# Patient Record
Sex: MALE | Race: WHITE | Employment: OTHER | URBAN - METROPOLITAN AREA
[De-identification: names, ages, dates, MRNs, and addresses within clinical notes are randomized per-mention and may not be internally consistent; named-entity substitution may affect disease eponyms.]

---

## 2017-09-06 PROBLEM — G60.9 PERIPHERAL NEUROPATHY, IDIOPATHIC: Status: ACTIVE | Noted: 2017-09-06

## 2017-09-06 PROCEDURE — 86141 C-REACTIVE PROTEIN HS: CPT | Performed by: FAMILY MEDICINE

## 2017-12-13 RX ORDER — MULTIVITAMIN
1 TABLET ORAL DAILY
COMMUNITY
End: 2020-01-21

## 2017-12-26 ENCOUNTER — APPOINTMENT (OUTPATIENT)
Dept: LAB | Age: 62
End: 2017-12-26
Attending: SURGERY
Payer: COMMERCIAL

## 2017-12-26 ENCOUNTER — LAB ENCOUNTER (OUTPATIENT)
Dept: LAB | Age: 62
End: 2017-12-26
Attending: SURGERY
Payer: COMMERCIAL

## 2017-12-26 DIAGNOSIS — Z01.818 PRE-OP TESTING: ICD-10-CM

## 2017-12-26 DIAGNOSIS — Z01.818 PREOPERATIVE TESTING: ICD-10-CM

## 2017-12-26 PROCEDURE — 93005 ELECTROCARDIOGRAM TRACING: CPT

## 2017-12-26 PROCEDURE — 86850 RBC ANTIBODY SCREEN: CPT

## 2017-12-26 PROCEDURE — 80053 COMPREHEN METABOLIC PANEL: CPT

## 2017-12-26 PROCEDURE — 85025 COMPLETE CBC W/AUTO DIFF WBC: CPT

## 2017-12-26 PROCEDURE — 85730 THROMBOPLASTIN TIME PARTIAL: CPT

## 2017-12-26 PROCEDURE — 93010 ELECTROCARDIOGRAM REPORT: CPT | Performed by: INTERNAL MEDICINE

## 2017-12-26 PROCEDURE — 86901 BLOOD TYPING SEROLOGIC RH(D): CPT

## 2017-12-26 PROCEDURE — 36415 COLL VENOUS BLD VENIPUNCTURE: CPT

## 2017-12-26 PROCEDURE — 85610 PROTHROMBIN TIME: CPT

## 2017-12-26 PROCEDURE — 86900 BLOOD TYPING SEROLOGIC ABO: CPT

## 2018-02-11 ENCOUNTER — ANESTHESIA EVENT (OUTPATIENT)
Dept: CARDIAC SURGERY | Facility: HOSPITAL | Age: 63
DRG: 272 | End: 2018-02-11
Payer: COMMERCIAL

## 2018-02-12 ENCOUNTER — SURGERY (OUTPATIENT)
Age: 63
End: 2018-02-12

## 2018-02-12 ENCOUNTER — HOSPITAL ENCOUNTER (INPATIENT)
Facility: HOSPITAL | Age: 63
LOS: 1 days | Discharge: HOME | DRG: 272 | End: 2018-02-13
Attending: SURGERY | Admitting: SURGERY
Payer: COMMERCIAL

## 2018-02-12 ENCOUNTER — ANESTHESIA (OUTPATIENT)
Dept: CARDIAC SURGERY | Facility: HOSPITAL | Age: 63
DRG: 272 | End: 2018-02-12
Payer: COMMERCIAL

## 2018-02-12 PROBLEM — I71.40 AAA (ABDOMINAL AORTIC ANEURYSM) WITHOUT RUPTURE: Status: ACTIVE | Noted: 2018-02-12

## 2018-02-12 PROBLEM — I71.4 AAA (ABDOMINAL AORTIC ANEURYSM) WITHOUT RUPTURE (HCC): Status: ACTIVE | Noted: 2018-02-12

## 2018-02-12 LAB
ALBUMIN SERPL-MCNC: 4.5 G/DL (ref 3.5–4.8)
ALP LIVER SERPL-CCNC: 67 U/L (ref 45–117)
ALT SERPL-CCNC: 31 U/L (ref 17–63)
ANTIBODY SCREEN: NEGATIVE
APTT PPP: 32.3 SECONDS (ref 25–34)
APTT PPP: 33.9 SECONDS (ref 25–34)
AST SERPL-CCNC: 15 U/L (ref 15–41)
ATRIAL RATE: 83 BPM
BASOPHILS # BLD AUTO: 0.08 X10(3) UL (ref 0–0.1)
BASOPHILS NFR BLD AUTO: 1.2 %
BILIRUB SERPL-MCNC: 0.9 MG/DL (ref 0.1–2)
BUN BLD-MCNC: 18 MG/DL (ref 8–20)
CALCIUM BLD-MCNC: 9.3 MG/DL (ref 8.3–10.3)
CHLORIDE: 105 MMOL/L (ref 101–111)
CO2: 24 MMOL/L (ref 22–32)
CREAT BLD-MCNC: 0.96 MG/DL (ref 0.7–1.3)
EOSINOPHIL # BLD AUTO: 0.3 X10(3) UL (ref 0–0.3)
EOSINOPHIL NFR BLD AUTO: 4.5 %
ERYTHROCYTE [DISTWIDTH] IN BLOOD BY AUTOMATED COUNT: 13.2 % (ref 11.5–16)
ERYTHROCYTE [DISTWIDTH] IN BLOOD BY AUTOMATED COUNT: 13.3 % (ref 11.5–16)
GLUCOSE BLD-MCNC: 114 MG/DL (ref 70–99)
HCT VFR BLD AUTO: 40.6 % (ref 37–53)
HCT VFR BLD AUTO: 45.6 % (ref 37–53)
HGB BLD-MCNC: 14.2 G/DL (ref 13–17)
HGB BLD-MCNC: 16.1 G/DL (ref 13–17)
IMMATURE GRANULOCYTE COUNT: 0.01 X10(3) UL (ref 0–1)
IMMATURE GRANULOCYTE RATIO %: 0.1 %
INR BLD: 1.13 (ref 0.89–1.11)
INR BLD: 1.25 (ref 0.89–1.11)
LYMPHOCYTES # BLD AUTO: 2.51 X10(3) UL (ref 0.9–4)
LYMPHOCYTES NFR BLD AUTO: 37.5 %
M PROTEIN MFR SERPL ELPH: 7.5 G/DL (ref 6.1–8.3)
MCH RBC QN AUTO: 30.3 PG (ref 27–33.2)
MCH RBC QN AUTO: 30.5 PG (ref 27–33.2)
MCHC RBC AUTO-ENTMCNC: 35 G/DL (ref 31–37)
MCHC RBC AUTO-ENTMCNC: 35.3 G/DL (ref 31–37)
MCV RBC AUTO: 85.9 FL (ref 80–99)
MCV RBC AUTO: 87.1 FL (ref 80–99)
MONOCYTES # BLD AUTO: 0.72 X10(3) UL (ref 0.1–1)
MONOCYTES NFR BLD AUTO: 10.8 %
NEUTROPHIL ABS PRELIM: 3.07 X10 (3) UL (ref 1.3–6.7)
NEUTROPHILS # BLD AUTO: 3.07 X10(3) UL (ref 1.3–6.7)
NEUTROPHILS NFR BLD AUTO: 45.9 %
P AXIS: 48 DEGREES
P-R INTERVAL: 140 MS
PLATELET # BLD AUTO: 209 10(3)UL (ref 150–450)
PLATELET # BLD AUTO: 226 10(3)UL (ref 150–450)
POTASSIUM SERPL-SCNC: 3.9 MMOL/L (ref 3.6–5.1)
PSA SERPL DL<=0.01 NG/ML-MCNC: 14.6 SECONDS (ref 12–14.3)
PSA SERPL DL<=0.01 NG/ML-MCNC: 15.8 SECONDS (ref 12–14.3)
Q-T INTERVAL: 386 MS
QRS DURATION: 88 MS
QTC CALCULATION (BEZET): 453 MS
R AXIS: 77 DEGREES
RBC # BLD AUTO: 4.66 X10(6)UL (ref 4.3–5.7)
RBC # BLD AUTO: 5.31 X10(6)UL (ref 4.3–5.7)
RED CELL DISTRIBUTION WIDTH-SD: 41.1 FL (ref 35.1–46.3)
RED CELL DISTRIBUTION WIDTH-SD: 42.4 FL (ref 35.1–46.3)
RH BLOOD TYPE: POSITIVE
SODIUM SERPL-SCNC: 139 MMOL/L (ref 136–144)
T AXIS: 38 DEGREES
VENTRICULAR RATE: 83 BPM
WBC # BLD AUTO: 6.7 X10(3) UL (ref 4–13)
WBC # BLD AUTO: 9.6 X10(3) UL (ref 4–13)

## 2018-02-12 PROCEDURE — 85730 THROMBOPLASTIN TIME PARTIAL: CPT | Performed by: SURGERY

## 2018-02-12 PROCEDURE — 86900 BLOOD TYPING SEROLOGIC ABO: CPT | Performed by: SURGERY

## 2018-02-12 PROCEDURE — 80053 COMPREHEN METABOLIC PANEL: CPT | Performed by: SURGERY

## 2018-02-12 PROCEDURE — 85027 COMPLETE CBC AUTOMATED: CPT | Performed by: SURGERY

## 2018-02-12 PROCEDURE — 85025 COMPLETE CBC W/AUTO DIFF WBC: CPT | Performed by: SURGERY

## 2018-02-12 PROCEDURE — 93010 ELECTROCARDIOGRAM REPORT: CPT | Performed by: INTERNAL MEDICINE

## 2018-02-12 PROCEDURE — 93005 ELECTROCARDIOGRAM TRACING: CPT

## 2018-02-12 PROCEDURE — 87081 CULTURE SCREEN ONLY: CPT | Performed by: SURGERY

## 2018-02-12 PROCEDURE — 86901 BLOOD TYPING SEROLOGIC RH(D): CPT | Performed by: SURGERY

## 2018-02-12 PROCEDURE — 04VC3DZ RESTRICTION OF RIGHT COMMON ILIAC ARTERY WITH INTRALUMINAL DEVICE, PERCUTANEOUS APPROACH: ICD-10-PCS | Performed by: SURGERY

## 2018-02-12 PROCEDURE — 85610 PROTHROMBIN TIME: CPT | Performed by: SURGERY

## 2018-02-12 PROCEDURE — 86850 RBC ANTIBODY SCREEN: CPT | Performed by: SURGERY

## 2018-02-12 PROCEDURE — 04VD3DZ RESTRICTION OF LEFT COMMON ILIAC ARTERY WITH INTRALUMINAL DEVICE, PERCUTANEOUS APPROACH: ICD-10-PCS | Performed by: SURGERY

## 2018-02-12 PROCEDURE — B41DYZZ FLUOROSCOPY OF AORTA AND BILATERAL LOWER EXTREMITY ARTERIES USING OTHER CONTRAST: ICD-10-PCS | Performed by: SURGERY

## 2018-02-12 PROCEDURE — 86920 COMPATIBILITY TEST SPIN: CPT

## 2018-02-12 RX ORDER — DEXAMETHASONE SODIUM PHOSPHATE 4 MG/ML
4 VIAL (ML) INJECTION AS NEEDED
Status: ACTIVE | OUTPATIENT
Start: 2018-02-12 | End: 2018-02-12

## 2018-02-12 RX ORDER — MORPHINE SULFATE 2 MG/ML
6 INJECTION, SOLUTION INTRAMUSCULAR; INTRAVENOUS EVERY 4 HOURS PRN
Status: DISCONTINUED | OUTPATIENT
Start: 2018-02-12 | End: 2018-02-13

## 2018-02-12 RX ORDER — FAMOTIDINE 10 MG/ML
20 INJECTION, SOLUTION INTRAVENOUS 2 TIMES DAILY
Status: DISCONTINUED | OUTPATIENT
Start: 2018-02-12 | End: 2018-02-13

## 2018-02-12 RX ORDER — ACETAMINOPHEN 325 MG/1
650 TABLET ORAL EVERY 4 HOURS PRN
Status: DISCONTINUED | OUTPATIENT
Start: 2018-02-12 | End: 2018-02-13

## 2018-02-12 RX ORDER — DOCUSATE SODIUM 100 MG/1
100 CAPSULE, LIQUID FILLED ORAL 2 TIMES DAILY
Status: DISCONTINUED | OUTPATIENT
Start: 2018-02-12 | End: 2018-02-13

## 2018-02-12 RX ORDER — HYDROCODONE BITARTRATE AND ACETAMINOPHEN 10; 325 MG/1; MG/1
2 TABLET ORAL AS NEEDED
Status: ACTIVE | OUTPATIENT
Start: 2018-02-12 | End: 2018-02-12

## 2018-02-12 RX ORDER — VALSARTAN AND HYDROCHLOROTHIAZIDE 160; 25 MG/1; MG/1
1 TABLET ORAL
Status: DISCONTINUED | OUTPATIENT
Start: 2018-02-12 | End: 2018-02-12 | Stop reason: SDUPTHER

## 2018-02-12 RX ORDER — HYDROCODONE BITARTRATE AND ACETAMINOPHEN 10; 325 MG/1; MG/1
1 TABLET ORAL AS NEEDED
Status: ACTIVE | OUTPATIENT
Start: 2018-02-12 | End: 2018-02-12

## 2018-02-12 RX ORDER — HYDROCODONE BITARTRATE AND ACETAMINOPHEN 5; 325 MG/1; MG/1
2 TABLET ORAL EVERY 4 HOURS PRN
Status: DISCONTINUED | OUTPATIENT
Start: 2018-02-12 | End: 2018-02-13

## 2018-02-12 RX ORDER — SODIUM CHLORIDE 9 MG/ML
INJECTION, SOLUTION INTRAVENOUS CONTINUOUS
Status: DISCONTINUED | OUTPATIENT
Start: 2018-02-12 | End: 2018-02-13

## 2018-02-12 RX ORDER — ASPIRIN 325 MG
325 TABLET, DELAYED RELEASE (ENTERIC COATED) ORAL DAILY
Status: DISCONTINUED | OUTPATIENT
Start: 2018-02-12 | End: 2018-02-13

## 2018-02-12 RX ORDER — CLOPIDOGREL BISULFATE 75 MG/1
150 TABLET ORAL ONCE
Status: COMPLETED | OUTPATIENT
Start: 2018-02-13 | End: 2018-02-13

## 2018-02-12 RX ORDER — BUPIVACAINE HYDROCHLORIDE AND EPINEPHRINE 5; 5 MG/ML; UG/ML
INJECTION, SOLUTION EPIDURAL; INTRACAUDAL; PERINEURAL AS NEEDED
Status: DISCONTINUED | OUTPATIENT
Start: 2018-02-12 | End: 2018-02-12 | Stop reason: HOSPADM

## 2018-02-12 RX ORDER — TEMAZEPAM 15 MG/1
15 CAPSULE ORAL NIGHTLY PRN
Status: DISCONTINUED | OUTPATIENT
Start: 2018-02-12 | End: 2018-02-13

## 2018-02-12 RX ORDER — HYDROCODONE BITARTRATE AND ACETAMINOPHEN 5; 325 MG/1; MG/1
1 TABLET ORAL EVERY 4 HOURS PRN
Status: DISCONTINUED | OUTPATIENT
Start: 2018-02-12 | End: 2018-02-13

## 2018-02-12 RX ORDER — SODIUM CHLORIDE, SODIUM LACTATE, POTASSIUM CHLORIDE, CALCIUM CHLORIDE 600; 310; 30; 20 MG/100ML; MG/100ML; MG/100ML; MG/100ML
INJECTION, SOLUTION INTRAVENOUS CONTINUOUS
Status: DISCONTINUED | OUTPATIENT
Start: 2018-02-12 | End: 2018-02-13

## 2018-02-12 RX ORDER — ONDANSETRON 2 MG/ML
4 INJECTION INTRAMUSCULAR; INTRAVENOUS AS NEEDED
Status: ACTIVE | OUTPATIENT
Start: 2018-02-12 | End: 2018-02-12

## 2018-02-12 RX ORDER — MORPHINE SULFATE 2 MG/ML
4 INJECTION, SOLUTION INTRAMUSCULAR; INTRAVENOUS EVERY 4 HOURS PRN
Status: DISCONTINUED | OUTPATIENT
Start: 2018-02-12 | End: 2018-02-13

## 2018-02-12 RX ORDER — NALOXONE HYDROCHLORIDE 0.4 MG/ML
80 INJECTION, SOLUTION INTRAMUSCULAR; INTRAVENOUS; SUBCUTANEOUS AS NEEDED
Status: ACTIVE | OUTPATIENT
Start: 2018-02-12 | End: 2018-02-12

## 2018-02-12 RX ORDER — MORPHINE SULFATE 2 MG/ML
2 INJECTION, SOLUTION INTRAMUSCULAR; INTRAVENOUS EVERY 4 HOURS PRN
Status: DISCONTINUED | OUTPATIENT
Start: 2018-02-12 | End: 2018-02-13

## 2018-02-12 RX ORDER — FAMOTIDINE 20 MG/1
20 TABLET ORAL 2 TIMES DAILY
Status: DISCONTINUED | OUTPATIENT
Start: 2018-02-12 | End: 2018-02-13

## 2018-02-12 RX ORDER — CLOPIDOGREL BISULFATE 75 MG/1
150 TABLET ORAL ONCE
Status: DISCONTINUED | OUTPATIENT
Start: 2018-02-13 | End: 2018-02-12

## 2018-02-12 RX ORDER — ONDANSETRON 2 MG/ML
4 INJECTION INTRAMUSCULAR; INTRAVENOUS EVERY 6 HOURS PRN
Status: DISCONTINUED | OUTPATIENT
Start: 2018-02-12 | End: 2018-02-13

## 2018-02-12 NOTE — ANESTHESIA POSTPROCEDURE EVALUATION
[facility] Patient Status:  Inpatient   Age/Gender 57year old male MRN [id number]   [facility] 6NE-A Attending Anne Hollins MD   Hosp Day # 0 PCP Tod Merrill MD       Anesthesia Post-op Note    Procedure(s):  e

## 2018-02-12 NOTE — BRIEF OP NOTE
Pre-Operative Diagnosis: bilateral common iliac artery aneurysms     Post-Operative Diagnosis: same     Procedure Performed:   1. U/S perc access left and right common femoral arteries  2. Aortogram  3.  Right iliac branch device placement (23x14.5x10) w

## 2018-02-12 NOTE — ANESTHESIA PREPROCEDURE EVALUATION
PRE-OP EVALUATION    Patient Name: Sherrie Chino    Pre-op Diagnosis: abdominal aortic aneurysm    Procedure(s):  endovascular abdominal aortic aneurysm stent graft repair  SA pending      Surgeon(s) and Role:     Martina Carvajal MD - Primary    Pr path), int hemorrhoids;                next colonoscopy in 2019  No date: OTHER SURGICAL HISTORY      Comment: Eye surgery for lazy eye  10/15/09  [facility]: OTHER SURGICAL HISTORY      Comment: EGD (heartburn): normal     Smoking status: Current Some Day Smoker products.           Present on Admission:  **None**

## 2018-02-13 VITALS
SYSTOLIC BLOOD PRESSURE: 125 MMHG | HEART RATE: 69 BPM | TEMPERATURE: 98 F | DIASTOLIC BLOOD PRESSURE: 84 MMHG | BODY MASS INDEX: 29.99 KG/M2 | WEIGHT: 254 LBS | OXYGEN SATURATION: 100 % | RESPIRATION RATE: 20 BRPM | HEIGHT: 77 IN

## 2018-02-13 LAB
BUN BLD-MCNC: 13 MG/DL (ref 8–20)
CALCIUM BLD-MCNC: 7.7 MG/DL (ref 8.3–10.3)
CHLORIDE: 107 MMOL/L (ref 101–111)
CO2: 25 MMOL/L (ref 22–32)
CREAT BLD-MCNC: 0.65 MG/DL (ref 0.7–1.3)
ERYTHROCYTE [DISTWIDTH] IN BLOOD BY AUTOMATED COUNT: 13.4 % (ref 11.5–16)
GLUCOSE BLD-MCNC: 108 MG/DL (ref 70–99)
HCT VFR BLD AUTO: 33.3 % (ref 37–53)
HGB BLD-MCNC: 11.9 G/DL (ref 13–17)
MCH RBC QN AUTO: 31.2 PG (ref 27–33.2)
MCHC RBC AUTO-ENTMCNC: 35.7 G/DL (ref 31–37)
MCV RBC AUTO: 87.4 FL (ref 80–99)
PLATELET # BLD AUTO: 142 10(3)UL (ref 150–450)
POTASSIUM SERPL-SCNC: 4 MMOL/L (ref 3.6–5.1)
RBC # BLD AUTO: 3.81 X10(6)UL (ref 4.3–5.7)
RED CELL DISTRIBUTION WIDTH-SD: 42.5 FL (ref 35.1–46.3)
SODIUM SERPL-SCNC: 139 MMOL/L (ref 136–144)
WBC # BLD AUTO: 8.9 X10(3) UL (ref 4–13)

## 2018-02-13 PROCEDURE — 80048 BASIC METABOLIC PNL TOTAL CA: CPT | Performed by: SURGERY

## 2018-02-13 PROCEDURE — 85027 COMPLETE CBC AUTOMATED: CPT | Performed by: SURGERY

## 2018-02-13 RX ORDER — CLOPIDOGREL BISULFATE 75 MG/1
75 TABLET ORAL DAILY
Status: DISCONTINUED | OUTPATIENT
Start: 2018-02-14 | End: 2018-02-13

## 2018-02-13 NOTE — PLAN OF CARE
Pt from OR this afternoon around 1500. Pt recovered per CCU protocol and orders. Labs sent, EKG done, initial assessment done. Bilateral groin sites c/d/i, FRANCISCO, soft, nontender. no bleeding or hematoma noted. Ped and PT pulses per doppler.   Pt c/o lower

## 2018-02-13 NOTE — PAYOR COMM NOTE
--------------  ADMISSION REVIEW     Payor: [facility]  Subscriber #:  [id number]  Authorization Number: [id number]    Admit date: 2/12/18  Admit time: 3275       Admitting Physician: Per Bear MD  Attending Physician:  Per Bear MD  Primary Date Action Dose Route User    2/12/2018 2145 Given 026 mg Oral Nhung Mcnulty, NIKO      famoTIDine (PEPCID) tab 20 mg     Date Action Dose Route User    2/12/2018 2145 Given 20 mg Oral Loki WATKINS RN      iodixanol (VISIPAQUE) 320 MG/ML injecti

## 2018-02-13 NOTE — PLAN OF CARE
Discharge instructions, medications & follow-up appointments reviewed with pt and wife. Verbalizes understanding. IV SL X2 DC'd. Wife to drive pt home. To car ambulatory accompanied by wife.

## 2018-02-15 LAB — BLOOD TYPE BARCODE: 5100

## 2018-02-15 NOTE — PAYOR COMM NOTE
--------------  DISCHARGE REVIEW    Payor: [employer]SA [employer]  Subscriber #:  [id number]  Authorization Number: [id number]    Admit date: 2/12/18  Admit time:  0915  Discharge Date: 2/13/2018  4:10 PM     Admitting Physician: Bhumi Manning MD  Attending Physici

## 2018-02-19 NOTE — OPERATIVE REPORT
Pre-Operative Diagnosis: bilateral common iliac artery aneurysms     Post-Operative Diagnosis: same     Procedure Performed:   1. U/S perc access left and right common femoral arteries  2. Aortogram  3.  Right iliac branch device placement (23x14.5x10) w Both hypogastric arteries were patent in the external iliac arteries are patent. To preserve both internal iliac arteries and treat his aneurysm, both left and right iliac would have to be treated with iliac branch device.     Over the 8 Western Angie sheath on with a 12 x 20 balloon proximally and distally. 12 mm balloon was inflated to the rated burst close to 13 mm both times. The second stage of the iliac branch device was deployed into the external iliac artery on the right.   Iliac branch device main body internal iliac, 3 Ecorse V BX stents were deployed. Two 11 x 39 and 1 11 x 29 Ecorse V BX stents were deployed. Stents were profiled initially with the 12 balloon and then a 14 balloon. Repeat angiogram showed excellent filling of the hypogastric artery.   Garrison Essex summary, endograft stent system was deployed from below the renal arteries with reconstruction of both left and right iliac artery bifurcation using bifurcated endoprosthesis from the common iliac into both external and internal iliac arteries bilateral.

## 2018-02-22 NOTE — H&P
[facility]  Vascular Surgery Consultation    Mart Lee Patient Status:  Inpatient    1955 MRN [id number]   [facility] 7NE-A Attending No att. providers found   [facility] Day # 1 PCP Gerhard Sherman MD     History of Present Illne any unusual skin lesions or rashes  MUSCULOSKELETAL: denies back pain, joint pain, leg swelling  NEURO/EYES: denies headaches, passing out, motor dysfunction, difficulty walking, difficulty with speech, temporary blindness, double vision, confusion    Phys

## 2019-12-27 RX ORDER — WARFARIN SODIUM 4 MG/1
4 TABLET ORAL DAILY
COMMUNITY
End: 2020-01-30

## 2019-12-27 RX ORDER — ATORVASTATIN CALCIUM 40 MG/1
40 TABLET, FILM COATED ORAL DAILY
COMMUNITY
End: 2020-02-24

## 2019-12-27 NOTE — PAT NURSING NOTE
Called surgeons office and closed. Spoke to JUNE GRUBER at [facility]- states did not receive any orders from surgeon regarding stopping coumadin or procedure being done. Fax [employer] instructions and arrival time so transportation could be arranged.   Paged o

## 2019-12-28 NOTE — PAT NURSING NOTE
Spoke to Dr De Frederick- states to hold coumadin starting tonight if he has not already received.     Notified Neftali POPE at [employer]

## 2019-12-29 ENCOUNTER — ANESTHESIA EVENT (OUTPATIENT)
Dept: SURGERY | Facility: HOSPITAL | Age: 64
DRG: 240 | End: 2019-12-29
Payer: MEDICARE

## 2019-12-30 ENCOUNTER — HOSPITAL ENCOUNTER (INPATIENT)
Facility: HOSPITAL | Age: 64
LOS: 4 days | Discharge: NURSING, REHAB OR LONG-TERM CARE | DRG: 240 | End: 2020-01-03
Attending: SURGERY | Admitting: SURGERY
Payer: MEDICARE

## 2019-12-30 ENCOUNTER — ANESTHESIA (OUTPATIENT)
Dept: SURGERY | Facility: HOSPITAL | Age: 64
DRG: 240 | End: 2019-12-30
Payer: MEDICARE

## 2019-12-30 DIAGNOSIS — I10 ESSENTIAL HYPERTENSION: Primary | ICD-10-CM

## 2019-12-30 LAB
ANION GAP SERPL CALC-SCNC: 8 MMOL/L (ref 0–18)
APTT PPP: 33.5 SECONDS (ref 25.4–36.1)
ATRIAL RATE: 87 BPM
BUN BLD-MCNC: 23 MG/DL (ref 7–18)
BUN/CREAT SERPL: 19 (ref 10–20)
CALCIUM BLD-MCNC: 8.4 MG/DL (ref 8.5–10.1)
CHLORIDE SERPL-SCNC: 102 MMOL/L (ref 98–112)
CO2 SERPL-SCNC: 20 MMOL/L (ref 21–32)
CREAT BLD-MCNC: 1.21 MG/DL (ref 0.7–1.3)
GLUCOSE BLD-MCNC: 142 MG/DL (ref 70–99)
GLUCOSE BLD-MCNC: 90 MG/DL (ref 70–99)
INR BLD: 1.2 (ref 0.9–1.1)
OSMOLALITY SERPL CALC.SUM OF ELEC: 273 MOSM/KG (ref 275–295)
P AXIS: 74 DEGREES
P-R INTERVAL: 134 MS
POTASSIUM SERPL-SCNC: 4.9 MMOL/L (ref 3.5–5.1)
PSA SERPL DL<=0.01 NG/ML-MCNC: 15.8 SECONDS (ref 12.5–14.7)
Q-T INTERVAL: 344 MS
QRS DURATION: 86 MS
QTC CALCULATION (BEZET): 413 MS
R AXIS: 88 DEGREES
SODIUM SERPL-SCNC: 130 MMOL/L (ref 136–145)
T AXIS: 51 DEGREES
VENTRICULAR RATE: 87 BPM

## 2019-12-30 PROCEDURE — 0Y6N0Z9 DETACHMENT AT LEFT FOOT, PARTIAL 1ST RAY, OPEN APPROACH: ICD-10-PCS | Performed by: SURGERY

## 2019-12-30 PROCEDURE — 0Y6W0Z0 DETACHMENT AT LEFT 4TH TOE, COMPLETE, OPEN APPROACH: ICD-10-PCS | Performed by: SURGERY

## 2019-12-30 PROCEDURE — 88305 TISSUE EXAM BY PATHOLOGIST: CPT | Performed by: SURGERY

## 2019-12-30 PROCEDURE — 82962 GLUCOSE BLOOD TEST: CPT

## 2019-12-30 PROCEDURE — 0Y6N0ZC DETACHMENT AT LEFT FOOT, PARTIAL 3RD RAY, OPEN APPROACH: ICD-10-PCS | Performed by: SURGERY

## 2019-12-30 PROCEDURE — 85730 THROMBOPLASTIN TIME PARTIAL: CPT | Performed by: SURGERY

## 2019-12-30 PROCEDURE — 88311 DECALCIFY TISSUE: CPT | Performed by: SURGERY

## 2019-12-30 PROCEDURE — 0Y6Y0Z0 DETACHMENT AT LEFT 5TH TOE, COMPLETE, OPEN APPROACH: ICD-10-PCS | Performed by: SURGERY

## 2019-12-30 PROCEDURE — 93010 ELECTROCARDIOGRAM REPORT: CPT | Performed by: INTERNAL MEDICINE

## 2019-12-30 PROCEDURE — 0Y6N0ZB DETACHMENT AT LEFT FOOT, PARTIAL 2ND RAY, OPEN APPROACH: ICD-10-PCS | Performed by: SURGERY

## 2019-12-30 PROCEDURE — 93005 ELECTROCARDIOGRAM TRACING: CPT

## 2019-12-30 PROCEDURE — 85610 PROTHROMBIN TIME: CPT

## 2019-12-30 PROCEDURE — 80048 BASIC METABOLIC PNL TOTAL CA: CPT

## 2019-12-30 RX ORDER — ONDANSETRON 2 MG/ML
INJECTION INTRAMUSCULAR; INTRAVENOUS AS NEEDED
Status: DISCONTINUED | OUTPATIENT
Start: 2019-12-30 | End: 2019-12-30 | Stop reason: SURG

## 2019-12-30 RX ORDER — MORPHINE SULFATE 4 MG/ML
4 INJECTION, SOLUTION INTRAMUSCULAR; INTRAVENOUS
Status: DISCONTINUED | OUTPATIENT
Start: 2019-12-30 | End: 2020-01-03

## 2019-12-30 RX ORDER — VANCOMYCIN HYDROCHLORIDE 1 G/20ML
INJECTION, POWDER, LYOPHILIZED, FOR SOLUTION INTRAVENOUS AS NEEDED
Status: DISCONTINUED | OUTPATIENT
Start: 2019-12-30 | End: 2019-12-30 | Stop reason: SURG

## 2019-12-30 RX ORDER — ENOXAPARIN SODIUM 100 MG/ML
1 INJECTION SUBCUTANEOUS EVERY 12 HOURS SCHEDULED
Status: DISCONTINUED | OUTPATIENT
Start: 2019-12-31 | End: 2020-01-03

## 2019-12-30 RX ORDER — EPHEDRINE SULFATE 50 MG/ML
INJECTION, SOLUTION INTRAVENOUS AS NEEDED
Status: DISCONTINUED | OUTPATIENT
Start: 2019-12-30 | End: 2019-12-30 | Stop reason: SURG

## 2019-12-30 RX ORDER — ACETAMINOPHEN 500 MG
1000 TABLET ORAL ONCE
Status: DISCONTINUED | OUTPATIENT
Start: 2019-12-30 | End: 2019-12-30 | Stop reason: HOSPADM

## 2019-12-30 RX ORDER — DEXAMETHASONE SODIUM PHOSPHATE 4 MG/ML
VIAL (ML) INJECTION AS NEEDED
Status: DISCONTINUED | OUTPATIENT
Start: 2019-12-30 | End: 2019-12-30 | Stop reason: SURG

## 2019-12-30 RX ORDER — SODIUM CHLORIDE, SODIUM LACTATE, POTASSIUM CHLORIDE, CALCIUM CHLORIDE 600; 310; 30; 20 MG/100ML; MG/100ML; MG/100ML; MG/100ML
INJECTION, SOLUTION INTRAVENOUS CONTINUOUS
Status: DISCONTINUED | OUTPATIENT
Start: 2019-12-30 | End: 2020-01-02

## 2019-12-30 RX ORDER — ATORVASTATIN CALCIUM 40 MG/1
40 TABLET, FILM COATED ORAL NIGHTLY
Status: DISCONTINUED | OUTPATIENT
Start: 2019-12-30 | End: 2020-01-03

## 2019-12-30 RX ORDER — MAGNESIUM OXIDE 400 MG (241.3 MG MAGNESIUM) TABLET
1 TABLET NIGHTLY
Status: DISCONTINUED | OUTPATIENT
Start: 2019-12-30 | End: 2020-01-03

## 2019-12-30 RX ORDER — ONDANSETRON 2 MG/ML
INJECTION INTRAMUSCULAR; INTRAVENOUS
Status: DISCONTINUED
Start: 2019-12-30 | End: 2019-12-30 | Stop reason: WASHOUT

## 2019-12-30 RX ORDER — HYDROMORPHONE HYDROCHLORIDE 1 MG/ML
INJECTION, SOLUTION INTRAMUSCULAR; INTRAVENOUS; SUBCUTANEOUS
Status: COMPLETED
Start: 2019-12-30 | End: 2019-12-30

## 2019-12-30 RX ORDER — ASPIRIN 81 MG/1
81 TABLET ORAL DAILY
Status: DISCONTINUED | OUTPATIENT
Start: 2019-12-30 | End: 2020-01-03

## 2019-12-30 RX ORDER — HYDROCODONE BITARTRATE AND ACETAMINOPHEN 10; 325 MG/1; MG/1
1 TABLET ORAL EVERY 4 HOURS PRN
Status: DISCONTINUED | OUTPATIENT
Start: 2019-12-30 | End: 2020-01-03

## 2019-12-30 RX ORDER — SODIUM CHLORIDE 9 MG/ML
INJECTION, SOLUTION INTRAVENOUS CONTINUOUS
Status: DISCONTINUED | OUTPATIENT
Start: 2019-12-30 | End: 2020-01-02

## 2019-12-30 RX ORDER — SODIUM CHLORIDE, SODIUM LACTATE, POTASSIUM CHLORIDE, CALCIUM CHLORIDE 600; 310; 30; 20 MG/100ML; MG/100ML; MG/100ML; MG/100ML
INJECTION, SOLUTION INTRAVENOUS CONTINUOUS
Status: DISCONTINUED | OUTPATIENT
Start: 2019-12-30 | End: 2019-12-30 | Stop reason: HOSPADM

## 2019-12-30 RX ORDER — ASPIRIN 325 MG
325 TABLET, DELAYED RELEASE (ENTERIC COATED) ORAL DAILY
Status: DISCONTINUED | OUTPATIENT
Start: 2019-12-30 | End: 2019-12-30

## 2019-12-30 RX ORDER — WARFARIN SODIUM 2 MG/1
4 TABLET ORAL NIGHTLY
Status: DISCONTINUED | OUTPATIENT
Start: 2019-12-30 | End: 2020-01-02

## 2019-12-30 RX ORDER — HYDROMORPHONE HYDROCHLORIDE 1 MG/ML
0.4 INJECTION, SOLUTION INTRAMUSCULAR; INTRAVENOUS; SUBCUTANEOUS EVERY 5 MIN PRN
Status: DISCONTINUED | OUTPATIENT
Start: 2019-12-30 | End: 2019-12-30 | Stop reason: HOSPADM

## 2019-12-30 RX ORDER — HYDROCODONE BITARTRATE AND ACETAMINOPHEN 5; 325 MG/1; MG/1
1 TABLET ORAL EVERY 4 HOURS PRN
Status: DISCONTINUED | OUTPATIENT
Start: 2019-12-30 | End: 2020-01-03

## 2019-12-30 RX ORDER — MORPHINE SULFATE 4 MG/ML
8 INJECTION, SOLUTION INTRAMUSCULAR; INTRAVENOUS
Status: DISCONTINUED | OUTPATIENT
Start: 2019-12-30 | End: 2020-01-03

## 2019-12-30 RX ORDER — GLYCOPYRROLATE 0.2 MG/ML
INJECTION, SOLUTION INTRAMUSCULAR; INTRAVENOUS AS NEEDED
Status: DISCONTINUED | OUTPATIENT
Start: 2019-12-30 | End: 2019-12-30 | Stop reason: SURG

## 2019-12-30 RX ORDER — LIDOCAINE HYDROCHLORIDE 10 MG/ML
INJECTION, SOLUTION EPIDURAL; INFILTRATION; INTRACAUDAL; PERINEURAL AS NEEDED
Status: DISCONTINUED | OUTPATIENT
Start: 2019-12-30 | End: 2019-12-30 | Stop reason: SURG

## 2019-12-30 RX ORDER — ONDANSETRON 2 MG/ML
4 INJECTION INTRAMUSCULAR; INTRAVENOUS AS NEEDED
Status: DISCONTINUED | OUTPATIENT
Start: 2019-12-30 | End: 2019-12-30 | Stop reason: HOSPADM

## 2019-12-30 RX ORDER — ROCURONIUM BROMIDE 10 MG/ML
INJECTION, SOLUTION INTRAVENOUS AS NEEDED
Status: DISCONTINUED | OUTPATIENT
Start: 2019-12-30 | End: 2019-12-30 | Stop reason: SURG

## 2019-12-30 RX ORDER — NEOSTIGMINE METHYLSULFATE 1 MG/ML
INJECTION INTRAVENOUS AS NEEDED
Status: DISCONTINUED | OUTPATIENT
Start: 2019-12-30 | End: 2019-12-30 | Stop reason: SURG

## 2019-12-30 RX ORDER — NALOXONE HYDROCHLORIDE 0.4 MG/ML
80 INJECTION, SOLUTION INTRAMUSCULAR; INTRAVENOUS; SUBCUTANEOUS AS NEEDED
Status: DISCONTINUED | OUTPATIENT
Start: 2019-12-30 | End: 2019-12-30 | Stop reason: HOSPADM

## 2019-12-30 RX ORDER — MORPHINE SULFATE 4 MG/ML
2 INJECTION, SOLUTION INTRAMUSCULAR; INTRAVENOUS
Status: DISCONTINUED | OUTPATIENT
Start: 2019-12-30 | End: 2020-01-03

## 2019-12-30 RX ADMIN — NEOSTIGMINE METHYLSULFATE 3 MG: 1 INJECTION INTRAVENOUS at 17:31:00

## 2019-12-30 RX ADMIN — EPHEDRINE SULFATE 10 MG: 50 INJECTION, SOLUTION INTRAVENOUS at 16:12:00

## 2019-12-30 RX ADMIN — LIDOCAINE HYDROCHLORIDE 50 MG: 10 INJECTION, SOLUTION EPIDURAL; INFILTRATION; INTRACAUDAL; PERINEURAL at 16:06:00

## 2019-12-30 RX ADMIN — VANCOMYCIN HYDROCHLORIDE 1000 MG: 1 INJECTION, POWDER, LYOPHILIZED, FOR SOLUTION INTRAVENOUS at 16:22:00

## 2019-12-30 RX ADMIN — DEXAMETHASONE SODIUM PHOSPHATE 4 MG: 4 MG/ML VIAL (ML) INJECTION at 16:17:00

## 2019-12-30 RX ADMIN — ROCURONIUM BROMIDE 40 MG: 10 INJECTION, SOLUTION INTRAVENOUS at 16:06:00

## 2019-12-30 RX ADMIN — SODIUM CHLORIDE, SODIUM LACTATE, POTASSIUM CHLORIDE, CALCIUM CHLORIDE: 600; 310; 30; 20 INJECTION, SOLUTION INTRAVENOUS at 16:00:00

## 2019-12-30 RX ADMIN — ONDANSETRON 4 MG: 2 INJECTION INTRAMUSCULAR; INTRAVENOUS at 16:17:00

## 2019-12-30 RX ADMIN — SODIUM CHLORIDE, SODIUM LACTATE, POTASSIUM CHLORIDE, CALCIUM CHLORIDE: 600; 310; 30; 20 INJECTION, SOLUTION INTRAVENOUS at 17:31:00

## 2019-12-30 RX ADMIN — GLYCOPYRROLATE 0.4 MG: 0.2 INJECTION, SOLUTION INTRAMUSCULAR; INTRAVENOUS at 17:31:00

## 2019-12-30 NOTE — ANESTHESIA PROCEDURE NOTES
Airway  Date/Time: 12/30/2019 4:11 PM  Urgency: elective    Airway not difficult    General Information and Staff    Patient location during procedure: OR  Anesthesiologist: Tim Clifton MD  Performed: anesthesiologist     Indications and Patient Condi

## 2019-12-30 NOTE — ANESTHESIA PREPROCEDURE EVALUATION
PRE-OP EVALUATION    Patient Name: Guerrero Lewis    Pre-op Diagnosis: GANGRENE OF LEFT FOOT    Procedure(s):  LEFT RAY FIRST-THIRD METATARSAL AMPUTATION, FOURTH AND FIFTH TOE AMPUTATION    Surgeon(s) and Role:     Aurelia Jay MD - Primary    Pr Clarence Kilgore MD at [address] [facility]   • COLONOSCOPY  10/15/09  [facility]    Screening: small polypoid fold in sigmoid (no polyp tissue seen on path), int hemorrhoids; next colonoscopy in 2019   • OTHER  02/2017    AAA repair   • OTHER SURGICAL HISTORY      Eye surger

## 2019-12-30 NOTE — ANESTHESIA POSTPROCEDURE EVALUATION
[facility] Patient Status:  Hospital Outpatient Surgery   Age/Gender 75year old male MRN [id number]   Location [address] [facility] Attending Leora Hennessy MD   Hosp Day # 0 PCP Wenceslao Bunk, MD Teri Romberg

## 2019-12-30 NOTE — H&P
[facility]  Vascular Surgery Consultation    Juan Pablo Vanessa Patient Status:  Hospital Outpatient Surgery    1955 MRN [id number]   Location [facility] PRE OP HOLDING Attending Jose Grossman MD   Hosp Day # 0 PCP Anjum Au MD lactated ringers infusion, , Intravenous, Continuous  •  [MAR Hold] acetaminophen (TYLENOL EXTRA STRENGTH) tab 1,000 mg, 1,000 mg, Oral, Once    Review of Systems:    CONSTITUTIONAL: denies fever, chills  ENT: denies sore throat, nasal drainage/congestion 12/30/2019    PTP 15.8 12/30/2019       Impression and Plan:  Left foot gangrene - plan for 1-3 ampuation, digit amp of 4/5, possible tma. INR normal, left foot marked     Thank you for allowing me to participate in the care of your patient.     Leanne Hester

## 2019-12-30 NOTE — BRIEF OP NOTE
Pre-Operative Diagnosis: GANGRENE OF LEFT FOOT     Post-Operative Diagnosis: GANGRENE OF LEFT FOOT      Procedure Performed:   1. Digit amputation of 4th and 5th toes  2.  Partial ray amputation of 1st, 2nd, 3rd toes    Surgeon(s) and Role:     * Sheridan Oquendo

## 2019-12-31 LAB
GLUCOSE BLD-MCNC: 115 MG/DL (ref 70–99)
GLUCOSE BLD-MCNC: 240 MG/DL (ref 70–99)
INR BLD: 1.26 (ref 0.9–1.1)
PLATELET # BLD AUTO: 161 10(3)UL (ref 150–450)
PSA SERPL DL<=0.01 NG/ML-MCNC: 16.4 SECONDS (ref 12.5–14.7)

## 2019-12-31 PROCEDURE — 85610 PROTHROMBIN TIME: CPT | Performed by: SURGERY

## 2019-12-31 PROCEDURE — 85049 AUTOMATED PLATELET COUNT: CPT | Performed by: SURGERY

## 2019-12-31 PROCEDURE — 82962 GLUCOSE BLOOD TEST: CPT

## 2019-12-31 RX ORDER — RAMELTEON 8 MG/1
8 TABLET ORAL NIGHTLY
COMMUNITY
End: 2020-01-21

## 2019-12-31 NOTE — OPERATIVE REPORT
[facility]    PATIENT'S NAME: Reola Frankel   ATTENDING PHYSICIAN: Mónica Kapoor M.D. OPERATING PHYSICIAN: Mónica Kapoor M.D.    PATIENT ACCOUNT#:   [de-identified]    LOCATION:  [address] [place] [address]  MEDICAL RECORD #:   [id number]       [facility] the fourth toe, I made a circumferential incision at the border of the healthy tissue and the gangrene and disconnected the joint between the middle and proximal phalanges. I then removed all ligaments and tendons.   I then used a [place] and exposed the dis 00:02:17  [facility] [id number]  [facility]/

## 2019-12-31 NOTE — OCCUPATIONAL THERAPY NOTE
Attempted to see pt for skilled OT eval, however, per orders pt will remain on bed rest until specified/activity orders placed. Will attempt to see pt as medically appropriate and as schedule allows.  Mily Welch, 12/31/19, 8:06 AM

## 2019-12-31 NOTE — DIETARY NOTE
[facility]    NUTRITION INITIAL ASSESSMENT    Pt does not meet malnutrition criteria.       NUTRITION DIAGNOSIS/PROBLEM:    Unintentional weight loss related to physiological causes increasing needs, prolonged hospitalization as evidenced by wt loss of oz)  08/02/18 : 111.6 kg (246 lb)  02/28/18 : 111.1 kg (245 lb)  02/13/18 : 115.2 kg (253 lb 15.5 oz)  10/24/17 : 111.1 kg (245 lb)      NUTRITION:  Diet:General  Oral Supplements: Ensure Enlive daily    FOOD/NUTRITION RELATED HISTORY:  Appetite: Good  Int

## 2019-12-31 NOTE — PLAN OF CARE
ADMISSION NOTE    Assumed care of Ze Croft at 2100 from PACU to room 1816  Patient oriented to room  Safety precautions in place  Admission navigator completed with patient and wife  Dr. Marrianne Boas and Dr. Brady Chester notified of patient status  V

## 2019-12-31 NOTE — CONSULTS
[facility] Hospitalist History and Physical      CC:  Consult sp amputation      PCP: Luciana Mcginnis MD      History of Present Illness: Patient is a 75year old male with PMH sig for AAA sp repair, recent iliac to SMA bypass for dissection, HTN, GERD, presents f Disorder Father         CHF   • Heart Disorder Mother          age 79, aortic aneurysm   • Hypertension Sister        Review of Systems  Comprehensive ROS reviewed and negative except for what is stated in HPI.       OBJECTIVE:  /61 (BP Location: coumadin/lovenox   - INR 1.26    Outpatient records or previous hospital records reviewed.    [facility] hospitalist to continue to follow patient while in house      Ramy Quintero  [phone]    **Certification      PHYSICIAN Certification

## 2019-12-31 NOTE — PHYSICAL THERAPY NOTE
Attempted PT eval, however, per orders pt will remain on bed rest until specified/activity orders placed. Will continue to follow.

## 2019-12-31 NOTE — CM/SW NOTE
Pt is a 76 yo male admitted for gangrene of left foot. Pt had surgery for toe amp yesterday with Dr Reagan Lawson. Pt came from [facility] where he was getting acute rehab.   He would like to go back to [place].  PT/OT to see to determine if acute rehab is still being recomm none

## 2020-01-01 LAB
ANION GAP SERPL CALC-SCNC: 4 MMOL/L (ref 0–18)
BASOPHILS # BLD AUTO: 0.03 X10(3) UL (ref 0–0.2)
BASOPHILS NFR BLD AUTO: 0.7 %
BUN BLD-MCNC: 32 MG/DL (ref 7–18)
BUN/CREAT SERPL: 34 (ref 10–20)
CALCIUM BLD-MCNC: 7.6 MG/DL (ref 8.5–10.1)
CHLORIDE SERPL-SCNC: 105 MMOL/L (ref 98–112)
CO2 SERPL-SCNC: 24 MMOL/L (ref 21–32)
CREAT BLD-MCNC: 0.94 MG/DL (ref 0.7–1.3)
DEPRECATED RDW RBC AUTO: 48.5 FL (ref 35.1–46.3)
EOSINOPHIL # BLD AUTO: 0.03 X10(3) UL (ref 0–0.7)
EOSINOPHIL NFR BLD AUTO: 0.7 %
ERYTHROCYTE [DISTWIDTH] IN BLOOD BY AUTOMATED COUNT: 14.1 % (ref 11–15)
GLUCOSE BLD-MCNC: 98 MG/DL (ref 70–99)
HCT VFR BLD AUTO: 30.7 % (ref 39–53)
HGB BLD-MCNC: 9.9 G/DL (ref 13–17.5)
IMM GRANULOCYTES # BLD AUTO: 0.05 X10(3) UL (ref 0–1)
IMM GRANULOCYTES NFR BLD: 1.1 %
INR BLD: 1.41 (ref 0.9–1.1)
LYMPHOCYTES # BLD AUTO: 1.47 X10(3) UL (ref 1–4)
LYMPHOCYTES NFR BLD AUTO: 32.7 %
MCH RBC QN AUTO: 29.7 PG (ref 26–34)
MCHC RBC AUTO-ENTMCNC: 32.2 G/DL (ref 31–37)
MCV RBC AUTO: 92.2 FL (ref 80–100)
MONOCYTES # BLD AUTO: 0.43 X10(3) UL (ref 0.1–1)
MONOCYTES NFR BLD AUTO: 9.6 %
NEUTROPHILS # BLD AUTO: 2.48 X10 (3) UL (ref 1.5–7.7)
NEUTROPHILS # BLD AUTO: 2.48 X10(3) UL (ref 1.5–7.7)
NEUTROPHILS NFR BLD AUTO: 55.2 %
OSMOLALITY SERPL CALC.SUM OF ELEC: 283 MOSM/KG (ref 275–295)
PLATELET # BLD AUTO: 139 10(3)UL (ref 150–450)
POTASSIUM SERPL-SCNC: 4.4 MMOL/L (ref 3.5–5.1)
PSA SERPL DL<=0.01 NG/ML-MCNC: 18 SECONDS (ref 12.5–14.7)
RBC # BLD AUTO: 3.33 X10(6)UL (ref 3.8–5.8)
SODIUM SERPL-SCNC: 133 MMOL/L (ref 136–145)
WBC # BLD AUTO: 4.5 X10(3) UL (ref 4–11)

## 2020-01-01 PROCEDURE — 85610 PROTHROMBIN TIME: CPT | Performed by: SURGERY

## 2020-01-01 PROCEDURE — 80048 BASIC METABOLIC PNL TOTAL CA: CPT | Performed by: INTERNAL MEDICINE

## 2020-01-01 PROCEDURE — 97535 SELF CARE MNGMENT TRAINING: CPT

## 2020-01-01 PROCEDURE — 85025 COMPLETE CBC W/AUTO DIFF WBC: CPT | Performed by: INTERNAL MEDICINE

## 2020-01-01 PROCEDURE — 97166 OT EVAL MOD COMPLEX 45 MIN: CPT

## 2020-01-01 PROCEDURE — 97162 PT EVAL MOD COMPLEX 30 MIN: CPT

## 2020-01-01 PROCEDURE — 97530 THERAPEUTIC ACTIVITIES: CPT

## 2020-01-01 NOTE — OCCUPATIONAL THERAPY NOTE
Attempted to see pt for OT. Pt politely declining secondary to breakfast, stating \"Trust me, I want to get in there. I just want to eat my breakfast first.\" Will re-attempt to see pt as appropriate and as able.

## 2020-01-01 NOTE — PHYSICAL THERAPY NOTE
PHYSICAL THERAPY EVALUATION - INPATIENT     Room Number: 8063/1853-S  Evaluation Date: 1/1/2020  Type of Evaluation: Initial  Physician Order: PT Eval and Treat    Presenting Problem: gangrene left foot s/p left partial ray amputations digits 1-3, di HISTORY  10/15/09  [facility]    EGD (heartburn): normal   • PART REMOVAL COLON W END COLOSTOMY      10/2019       HOME SITUATION  Type of Home: House   Home Layout: Two level  Stairs to Enter : 3  Railing: Yes  Stairs to Bedroom: 16  Railing: Yes    Lives With: -   Sitting down on and standing up from a chair with arms (e.g., wheelchair, bedside commode, etc.): A Lot   -   Moving from lying on back to sitting on the side of the bed?: A Little   How much help from another person does the patient currently need. Maya Olsen session/findings; All patient questions and concerns addressed; Family present    ASSESSMENT   Patient is a 75year old male admitted on 12/30/2019 for  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   Pertinent comorb Good  Frequency (Obs): Daily  Number of Visits to Meet Established Goals: 5      CURRENT GOALS    Goal #1 Patient is able to demonstrate supine - sit EOB @ level: modified independent     Goal #2 Patient is able to demonstrate transfers EOB to/from Chair/W

## 2020-01-01 NOTE — PLAN OF CARE
Assumed care at [address].   1 tab norco given for pain with relief. Bilateral pedal pulses palpable. Wound vac and bunny boot to L foot, no drainage. Clean, dry. R toe ulcer noted. Clean, dry. NWB to L foot. Ensure given through PEG tube.   Ileostomy infection  Description  INTERVENTIONS:  - Assess and document risk factors for pressure ulcer development  - Assess and document skin integrity  - Assess and document dressing/incision, wound bed, drain sites and surrounding tissue  - Implement wound care

## 2020-01-01 NOTE — PHYSICAL THERAPY NOTE
Attempted to see patient for PT evaluation. Pt politely declining secondary to eating breakfast, stating \"Trust me, I want to get in there. I just want to eat my breakfast first.\" Will continue to follow.

## 2020-01-01 NOTE — PROGRESS NOTES
[facility] Hospitalist Progress Note                                                                   Jaswant Pavon  4/24/1955    SUBJECTIVE: no issues. Pain tolerable.       OBJECTIVE:  Temp coumadin/lovenox   - INR 1.41     Outpatient records or previous hospital records reviewed.    [facility] hospitalist to continue to follow patient while in house        Alexx Davila MD  [facility] Hospitalist  [phone]     **Certification               Benjamin Crawford

## 2020-01-01 NOTE — PLAN OF CARE
Assumed care at 0730. Pt AxOx4, SR on tele, RA  C/o left foot pain. Given morphine and norco w/ relief  Per Dr. Katiana Rodriguez, ok to work w/ therapy but NWB to left foot.  OK to wear surgical shoe  BLE warm, pulses palpable  Wound vac w/ no output  Ileostomy w/ b

## 2020-01-01 NOTE — OCCUPATIONAL THERAPY NOTE
OCCUPATIONAL THERAPY EVALUATION - INPATIENT     Room Number: 2576/8316-L  Evaluation Date: 1/1/2020  Type of Evaluation: Initial  Presenting Problem: LLE gangrene s/p LLE partial ray amputation digits 1-3, digits 4& 5 amputation    Physician Order: IP Cons REMOVAL COLON W END COLOSTOMY      10/2019       OCCUPATIONAL PROFILE    HOME SITUATION  Type of Home: House  Home Layout: Two level  Lives With: Spouse    Toilet and Equipment: Comfort height toilet  Shower/Tub and Equipment: Walk-in shower(plans to get g Bathing (including washing, rinsing, drying)?: A Lot  -   Toileting, which includes using toilet, bedpan or urinal? : A Lot  -   Putting on and taking off regular upper body clothing?: A Little  -   Taking care of personal grooming such as brushing teeth patient’s ability to participate in ADL, transfers, instrumental activities of daily living, rest and sleep, leisure and social participation.      The patient is functioning below his previous functional level and would benefit from skilled inpatient OT to during pivot transfer with no more than 1 verbal cue

## 2020-01-01 NOTE — PLAN OF CARE
Received report at 0700. Patient alert and oriented x4. Complains of pain to left foot. Norco given this AM with relief. Refused PT this AM due to wanting to finish his breakfast. They will try to see him again today if time allows.  Wound vac to L foot,

## 2020-01-01 NOTE — PROGRESS NOTES
01/01/20 6311   Clinical Encounter Type   Visited With Patient   Routine Visit Follow-up  ( gave the patient a blank POLST form for review and completion.)   Continue Visiting No  (Unless paged for POLST form completion.)   Patient Spiritual Enc

## 2020-01-02 LAB
ANION GAP SERPL CALC-SCNC: 6 MMOL/L (ref 0–18)
BUN BLD-MCNC: 28 MG/DL (ref 7–18)
BUN/CREAT SERPL: 31.8 (ref 10–20)
CALCIUM BLD-MCNC: 8.1 MG/DL (ref 8.5–10.1)
CHLORIDE SERPL-SCNC: 109 MMOL/L (ref 98–112)
CO2 SERPL-SCNC: 22 MMOL/L (ref 21–32)
CREAT BLD-MCNC: 0.88 MG/DL (ref 0.7–1.3)
GLUCOSE BLD-MCNC: 80 MG/DL (ref 70–99)
INR BLD: 1.48 (ref 0.9–1.1)
OSMOLALITY SERPL CALC.SUM OF ELEC: 288 MOSM/KG (ref 275–295)
POTASSIUM SERPL-SCNC: 4 MMOL/L (ref 3.5–5.1)
PSA SERPL DL<=0.01 NG/ML-MCNC: 18.7 SECONDS (ref 12.5–14.7)
SODIUM SERPL-SCNC: 137 MMOL/L (ref 136–145)

## 2020-01-02 PROCEDURE — 97110 THERAPEUTIC EXERCISES: CPT

## 2020-01-02 PROCEDURE — 97530 THERAPEUTIC ACTIVITIES: CPT

## 2020-01-02 PROCEDURE — 97535 SELF CARE MNGMENT TRAINING: CPT

## 2020-01-02 PROCEDURE — 85610 PROTHROMBIN TIME: CPT | Performed by: INTERNAL MEDICINE

## 2020-01-02 PROCEDURE — 80048 BASIC METABOLIC PNL TOTAL CA: CPT | Performed by: INTERNAL MEDICINE

## 2020-01-02 NOTE — PROGRESS NOTES
[facility] Hospitalist Progress Note                                                                   Jaswant Librado  4/24/1955    SUBJECTIVE:   Doing well. Waiting for wound vac to come off. today  - re consult MJ for re-eval    Mild hilton- resolved  - dc IVF     Hyponatremia-resolved  -  Dc IVF      recent dissection  - coumadin/lovenox   - INR 1.48     Outpatient records or previous hospital records reviewed.    [facility] hospitalist to continue to f

## 2020-01-02 NOTE — PLAN OF CARE
Wound vac removed per Dr. Ramos Smith. Sutures intact. Gauze dressing applied per his order. Pt already has ortho shoe at bedside. Left heel weight bearing only.

## 2020-01-02 NOTE — OCCUPATIONAL THERAPY NOTE
OCCUPATIONAL THERAPY TREATMENT NOTE - INPATIENT     Room Number: 5660/9455-J  Session: 1   Number of Visits to Meet Established Goals: 5    Presenting Problem: LLE gangrene s/p LLE partial ray amputation digits 1-3, digits 4& 5 amputation    History relate digit amputations)    Patient self-stated goal is to go home    OBJECTIVE  Precautions: Colostomy;ROYER tube    WEIGHT BEARING RESTRICTION  Weight Bearing Restriction: L lower extremity           L Lower Extremity: Partial Weight Bearing(Heel weight bearing w completed full body sponge bath while in supported sitting, set-up. Supine to sidelying: supervision and use of rails. Dr. Janae Kim saw patient during session and removed wound vac.   Dr. Janae Kim gave orders to allow patient heel weight bearing on left w/

## 2020-01-02 NOTE — DIETARY NOTE
[facility]    NUTRITION INITIAL ASSESSMENT    Pt does not meet malnutrition criteria.       NUTRITION DIAGNOSIS/PROBLEM:    Unintentional weight loss related to physiological causes increasing needs, prolonged hospitalization as evidenced by wt loss of well, avoids nuts, skin from fruit and popcorn typically. Agreeable with continuation of bolus feeds to maximize nutrition. ANTHROPOMETRICS:  Ht: 195.6 cm (6' 5\")  Wt: 87.1 kg (192 lb). This is 92% of IBW  BMI: Body mass index is 22.77 kg/m². IBW: 94.

## 2020-01-02 NOTE — CM/SW NOTE
Updates sent to [facility], they are still recommending acute rehab. PMR consult completed, patient is acute rehab appropriate. SW will continue to follow up.     Kane Roa, [facility]

## 2020-01-02 NOTE — CONSULTS
.[facility] Patient Status:  Inpatient    1955 MRN [id number]   [facility] 7NE-A Attending Per Bear MD   [facility] Day # 3 PCP Dagmar Gibbs MD     Patient Identification  Sariah Last is a 75 year consult obtained now to assess pt's funtional status and make appropriate recommendations. Premorbid Functional Status: Patient was independent with mobility/ambulation, transfers, ADL's, IADL's.   Support System and Family Circumstances (i.e., potential per tab 1 tablet, 1 tablet, Oral, Q4H PRN  enoxaparin sodium (LOVENOX) 100 MG/ML injection 90 mg, 1 mg/kg, Subcutaneous, 2 times per day  melatonin tab TABS 1 mg, 1 mg, Oral, Nightly  [COMPLETED] HYDROmorphone HCl (DILAUDID) 1 MG/ML injection, , ,   aspiri functional.   Ears/Nose/Throat: Hearing intact. Lips, mucosa, and tongue normal. Teeth and gums normal. Moist mucous membranes. Neck: No neck masses or thyroid enlargement/tenderness/nodules.        Lungs:   Resonant, clear breath sounds, quiet accessor with risk for developing chronic pain. Risk for infection. Risk for contractures and stiffness with consequent functional impairments.     Risk for thromboembolic disease with need for careful DVT prophylaxis, potential for bleeding and hematoma or hemar

## 2020-01-03 VITALS
SYSTOLIC BLOOD PRESSURE: 136 MMHG | OXYGEN SATURATION: 99 % | HEIGHT: 77 IN | TEMPERATURE: 98 F | RESPIRATION RATE: 18 BRPM | BODY MASS INDEX: 22.67 KG/M2 | HEART RATE: 85 BPM | DIASTOLIC BLOOD PRESSURE: 78 MMHG | WEIGHT: 192 LBS

## 2020-01-03 LAB
ANION GAP SERPL CALC-SCNC: 8 MMOL/L (ref 0–18)
BASOPHILS # BLD AUTO: 0.07 X10(3) UL (ref 0–0.2)
BASOPHILS NFR BLD AUTO: 1.5 %
BUN BLD-MCNC: 19 MG/DL (ref 7–18)
BUN/CREAT SERPL: 20.4 (ref 10–20)
CALCIUM BLD-MCNC: 8.2 MG/DL (ref 8.5–10.1)
CHLORIDE SERPL-SCNC: 107 MMOL/L (ref 98–112)
CO2 SERPL-SCNC: 22 MMOL/L (ref 21–32)
CREAT BLD-MCNC: 0.93 MG/DL (ref 0.7–1.3)
DEPRECATED RDW RBC AUTO: 48.6 FL (ref 35.1–46.3)
EOSINOPHIL # BLD AUTO: 0.16 X10(3) UL (ref 0–0.7)
EOSINOPHIL NFR BLD AUTO: 3.5 %
ERYTHROCYTE [DISTWIDTH] IN BLOOD BY AUTOMATED COUNT: 14.2 % (ref 11–15)
GLUCOSE BLD-MCNC: 91 MG/DL (ref 70–99)
HCT VFR BLD AUTO: 34.3 % (ref 39–53)
HGB BLD-MCNC: 10.8 G/DL (ref 13–17.5)
IMM GRANULOCYTES # BLD AUTO: 0.04 X10(3) UL (ref 0–1)
IMM GRANULOCYTES NFR BLD: 0.9 %
INR BLD: 1.53 (ref 0.9–1.1)
LYMPHOCYTES # BLD AUTO: 1.63 X10(3) UL (ref 1–4)
LYMPHOCYTES NFR BLD AUTO: 35.3 %
MCH RBC QN AUTO: 29.3 PG (ref 26–34)
MCHC RBC AUTO-ENTMCNC: 31.5 G/DL (ref 31–37)
MCV RBC AUTO: 93.2 FL (ref 80–100)
MONOCYTES # BLD AUTO: 0.34 X10(3) UL (ref 0.1–1)
MONOCYTES NFR BLD AUTO: 7.4 %
NEUTROPHILS # BLD AUTO: 2.38 X10 (3) UL (ref 1.5–7.7)
NEUTROPHILS # BLD AUTO: 2.38 X10(3) UL (ref 1.5–7.7)
NEUTROPHILS NFR BLD AUTO: 51.4 %
OSMOLALITY SERPL CALC.SUM OF ELEC: 286 MOSM/KG (ref 275–295)
PLATELET # BLD AUTO: 142 10(3)UL (ref 150–450)
POTASSIUM SERPL-SCNC: 3.7 MMOL/L (ref 3.5–5.1)
PSA SERPL DL<=0.01 NG/ML-MCNC: 19.2 SECONDS (ref 12.5–14.7)
RBC # BLD AUTO: 3.68 X10(6)UL (ref 3.8–5.8)
SODIUM SERPL-SCNC: 137 MMOL/L (ref 136–145)
WBC # BLD AUTO: 4.6 X10(3) UL (ref 4–11)

## 2020-01-03 PROCEDURE — 85610 PROTHROMBIN TIME: CPT | Performed by: SURGERY

## 2020-01-03 PROCEDURE — 97535 SELF CARE MNGMENT TRAINING: CPT

## 2020-01-03 PROCEDURE — 97530 THERAPEUTIC ACTIVITIES: CPT

## 2020-01-03 PROCEDURE — 85025 COMPLETE CBC W/AUTO DIFF WBC: CPT | Performed by: SURGERY

## 2020-01-03 PROCEDURE — 80048 BASIC METABOLIC PNL TOTAL CA: CPT | Performed by: SURGERY

## 2020-01-03 RX ORDER — ENOXAPARIN SODIUM 100 MG/ML
1 INJECTION SUBCUTANEOUS EVERY 12 HOURS SCHEDULED
Qty: 10 SYRINGE | Refills: 0 | Status: SHIPPED | OUTPATIENT
Start: 2020-01-03 | End: 2020-01-08

## 2020-01-03 RX ORDER — HYDROCODONE BITARTRATE AND ACETAMINOPHEN 10; 325 MG/1; MG/1
1 TABLET ORAL EVERY 4 HOURS PRN
Qty: 30 TABLET | Refills: 0 | Status: SHIPPED | OUTPATIENT
Start: 2020-01-03 | End: 2020-01-21

## 2020-01-03 NOTE — PLAN OF CARE
Assumed care at 0700  Patient alert and oriented x4. NSR  Pain control with norco  L foot dressing changed. +2 pedals. Bunny boot on  Worked with PT and OT. Tolerated well    Discharge planning to [facility] in progress.  Called medicar at [phone] to check eta hygiene and moisture  - Encourage food from home; allow for food preferences  - Enhance eating environment  1/3/2020 1137 by Janet Bauer  Outcome: Progressing  1/3/2020 1126 by Janet Bauer  Outcome: Progressing     Problem: SKIN/TISSUE INTEGRITY - AD

## 2020-01-03 NOTE — PROGRESS NOTES
01/03/20 1601   Clinical Encounter Type   Visited With Health care provider  (Chaplian scanned completed POLST form, dated 12/2/19, into patient's electronic Resuscitation Wishes/POLST medical records. )   Referral From Other (Comment)  (Nurse paged Brendan Kappa

## 2020-01-03 NOTE — PLAN OF CARE
Assumed care at [address]. AOx4. Denies left foot pain. Left foot dressing and bunny boot in place. Pt voids per urinal.  Ileostomy site care provided. Plan for [facility] of care discussed with pt. Call light within reach.       Problem: PAIN - ADULT  Goal: Assess and document skin integrity  - Assess and document dressing/incision, wound bed, drain sites and surrounding tissue  - Implement wound care per orders  - Initiate isolation precautions as appropriate  - Initiate Pressure Ulcer prevention bundle as i

## 2020-01-03 NOTE — OCCUPATIONAL THERAPY NOTE
OCCUPATIONAL THERAPY TREATMENT NOTE - INPATIENT     Room Number: 9660/5855-O  Session: 2  Number of Visits to Meet Established Goals: 5    Presenting Problem: LLE gangrene s/p LLE partial ray amputation digits 1-3, digits 4& 5 amputation    History related left foot digit amputations)    Patient self-stated goal is to go home    OBJECTIVE  Precautions: Colostomy;ROYER tube    WEIGHT BEARING RESTRICTION  Weight Bearing Restriction: L lower extremity           L Lower Extremity: Partial Weight Bearing(Heel weight goals with improvements in functional transfers and ADL. However,  patient remains below his baseline in these and other functional areas.  Patient would benefit from continued OT services during hospital stay to further address these deficits and assist pa

## 2020-01-03 NOTE — PLAN OF CARE
NURSING DISCHARGE NOTE    Patient discharged to [facility]  Report given to [facility]. Driven by [facility]  AVS, transition of care paperwork, POLST, and prescriptions provided.  1940.  dylan cardozo patient leaving hospital    Discharged Yayo

## 2020-01-03 NOTE — PHYSICAL THERAPY NOTE
PHYSICAL THERAPY TREATMENT NOTE - INPATIENT    Room Number: 2331/5526-L     Session: 1   Number of Visits to Meet Established Goals: 5    Presenting Problem: gangrene left foot s/p left partial ray amputations digits 1-3, digits 4&5 amputations (12/30/19) Standing: Fair -  Dynamic Standing: Fair -    ACTIVITY TOLERANCE  Pulse: 80  Heart Rate Source: Monitor                   O2 WALK                    AM-PAC '6-Clicks' INPATIENT SHORT FORM - BASIC MOBILITY  How much difficulty does the patient currently hav met;Call light within reach; All patient questions and concerns addressed(left with OT in room )    ASSESSMENT   Patient limited due to functional mobility due to WB restrictions L LE.  Patient able to progress to perform transfer today adhering to [facility] restri

## 2020-01-03 NOTE — PROGRESS NOTES
[facility] Hospitalist Progress Note                                                                   Jaswant Librado  4/24/1955    SUBJECTIVE:   Doing well. Waiting for wound vac to come off. dc  - re consult MJ for re-eval    Mild hilton- resolved  - dc IVF     Hyponatremia-resolved  -  Dc IVF      recent dissection  - coumadin/lovenox   - INR 1.53, cont lovenox until INR > 2.0     Outpatient records or previous hospital records reviewed.    [facility] h

## 2020-01-03 NOTE — CM/SW NOTE
Patient is cleared for DC. BENITO called and left a voice mail for Sugar with [facility] to schedule dc back to [facility] today, 1.3.19.    BENITO will continue to follow up. ADDENDUM 1.3.19 1 PM    BENITO left another voice mail for [facility] [phone].

## 2020-01-03 NOTE — PLAN OF CARE
No acute issues today. Wound vac initially in place to left foot. Discontinued this early evening per Dr. Jessie Finn. Sutures intact. Scant bleeding. Dry Gauze applied. Ok for heel bearing only. Up to chair x 1. Prn Norco given x 1 for pain.    Ileostomy ba

## 2020-01-04 NOTE — DISCHARGE SUMMARY
Vascular Surgery  Surgical Discharge Summary    Admission Date: 12/30/2019   D/C Date: 1/3/2020  Discharge Diagnosis: gangrene of lef tfoot  Discharge Condition: good      HISTORY OF PRESENT ILLNESS: Jose Rafael Estevez is a 75year old  male who w Tab Take 40 mg by mouth daily. • Naproxen Sodium 550 MG Oral Tab Take 1 tablet (550 mg total) by mouth 2 (two) times daily as needed. 90 tablet 2   • Multiple Vitamin (ONE-DAILY MULTI VITAMINS) Oral Tab Take 1 tablet by mouth daily.      • tadalafil (CI

## 2020-01-21 PROBLEM — Z93.3 COLOSTOMY IN PLACE (HCC): Status: ACTIVE | Noted: 2020-01-21

## 2020-01-21 PROBLEM — I71.019 DISSECTION OF THORACIC AORTA (HCC): Status: ACTIVE | Noted: 2020-01-21

## 2020-01-21 PROBLEM — I25.119 ATHEROSCLEROSIS OF NATIVE CORONARY ARTERY OF NATIVE HEART WITH ANGINA PECTORIS: Status: ACTIVE | Noted: 2020-01-21

## 2020-01-21 PROBLEM — I71.01 DISSECTION OF THORACIC AORTA (HCC): Status: ACTIVE | Noted: 2020-01-21

## 2020-01-21 PROBLEM — I25.119 ATHEROSCLEROSIS OF NATIVE CORONARY ARTERY OF NATIVE HEART WITH ANGINA PECTORIS (HCC): Status: ACTIVE | Noted: 2020-01-21

## 2020-01-29 PROBLEM — J96.01 ACUTE RESPIRATORY FAILURE WITH HYPOXIA (HCC): Status: ACTIVE | Noted: 2020-01-29

## 2020-01-29 PROBLEM — I48.92 ATRIAL FLUTTER, UNSPECIFIED TYPE (HCC): Status: ACTIVE | Noted: 2020-01-29

## 2020-01-30 NOTE — PAT NURSING NOTE
I called DR Mayco Hammonds office and spoke to [employer]. After talking to pt, gathering his health history, I inquired the type of sedation, and asked her to clarify with . Due to the pt health history.   She will talk to  and call me back today or tomorrow w

## 2020-02-07 ENCOUNTER — HOSPITAL ENCOUNTER (OUTPATIENT)
Facility: HOSPITAL | Age: 65
Setting detail: HOSPITAL OUTPATIENT SURGERY
Discharge: HOME | End: 2020-02-07
Attending: INTERNAL MEDICINE | Admitting: INTERNAL MEDICINE
Payer: MEDICARE

## 2020-02-07 VITALS
RESPIRATION RATE: 17 BRPM | HEIGHT: 77 IN | HEART RATE: 62 BPM | TEMPERATURE: 98 F | SYSTOLIC BLOOD PRESSURE: 137 MMHG | DIASTOLIC BLOOD PRESSURE: 66 MMHG | BODY MASS INDEX: 22.43 KG/M2 | WEIGHT: 190 LBS | OXYGEN SATURATION: 94 %

## 2020-02-07 DIAGNOSIS — Z12.11 SPECIAL SCREENING FOR MALIGNANT NEOPLASM OF COLON: ICD-10-CM

## 2020-02-07 DIAGNOSIS — K52.9 COLITIS: ICD-10-CM

## 2020-02-07 LAB — INR: 3.1 (ref 0.8–1.3)

## 2020-02-07 PROCEDURE — 85610 PROTHROMBIN TIME: CPT | Performed by: INTERNAL MEDICINE

## 2020-02-07 PROCEDURE — 99152 MOD SED SAME PHYS/QHP 5/>YRS: CPT | Performed by: INTERNAL MEDICINE

## 2020-02-07 PROCEDURE — 99153 MOD SED SAME PHYS/QHP EA: CPT | Performed by: INTERNAL MEDICINE

## 2020-02-07 PROCEDURE — 0DJD8ZZ INSPECTION OF LOWER INTESTINAL TRACT, VIA NATURAL OR ARTIFICIAL OPENING ENDOSCOPIC: ICD-10-PCS | Performed by: INTERNAL MEDICINE

## 2020-02-07 RX ORDER — SODIUM CHLORIDE, SODIUM LACTATE, POTASSIUM CHLORIDE, CALCIUM CHLORIDE 600; 310; 30; 20 MG/100ML; MG/100ML; MG/100ML; MG/100ML
INJECTION, SOLUTION INTRAVENOUS CONTINUOUS
Status: DISCONTINUED | OUTPATIENT
Start: 2020-02-07 | End: 2020-02-07

## 2020-02-07 RX ORDER — DIPHENHYDRAMINE HYDROCHLORIDE 50 MG/ML
INJECTION INTRAMUSCULAR; INTRAVENOUS
Status: DISCONTINUED | OUTPATIENT
Start: 2020-02-07 | End: 2020-02-07

## 2020-02-07 RX ORDER — MIDAZOLAM HYDROCHLORIDE 1 MG/ML
INJECTION INTRAMUSCULAR; INTRAVENOUS
Status: DISCONTINUED | OUTPATIENT
Start: 2020-02-07 | End: 2020-02-07

## 2020-02-07 NOTE — BRIEF OP NOTE
Pre-Operative Diagnosis: Colitis [K52.9]  Special screening for malignant neoplasm of colon [Z12.11]     Post-Operative Diagnosis: normal flexsig      Procedure Performed:   Procedure(s):   FLEXIBLE SIGMOIDOSCOPY    Surgeon(s) and Role:     * Juan Carlos Yarbrough

## 2020-02-07 NOTE — OPERATIVE REPORT
[facility]    PATIENT'S NAME: Ayse GUEVARA   ATTENDING PHYSICIAN: Huyen Lyon M.D. OPERATING PHYSICIAN: Huyen Lyon M.D.    PATIENT ACCOUNT#:   [de-identified]    LOCATION:  [facility] ENDO POOL ROOMS 5 EDWP  MEDICAL RECORD #:   [id number]

## 2020-02-07 NOTE — H&P
[facility] Endoscopy Health History   [facility] Department of  Gastroenterology  Update Health History :       Fidelina Hastings  male   Drew Grant MD     [id number]  4/24/1955 Primary Care Physician  Ginny Mondragon MD        HPI : comment: Cigars     Quit cigarettes in 2000    Alcohol use: Yes      Alcohol/week: 0.0 standard drinks      Frequency: 2-3 times a week      Drinks per session: 3 or 4      Binge frequency: Less than monthly      Comment: occassionally    Drug use:  No were discussed in detail with the patient, alternatives and options were all discussed, all questions were answered, patient verbalized understanding and agreed to proceed with procedure as scheduled.       Concetta Garcia MD

## 2020-02-13 PROBLEM — I82.409 DVT OF LOWER EXTREMITY (DEEP VENOUS THROMBOSIS) (HCC): Status: ACTIVE | Noted: 2020-02-13

## 2020-03-25 PROBLEM — I82.409 DEEP VEIN THROMBOSIS (DVT) OF LOWER EXTREMITY, UNSPECIFIED CHRONICITY, UNSPECIFIED LATERALITY, UNSPECIFIED VEIN (HCC): Status: ACTIVE | Noted: 2020-03-25

## 2020-05-27 PROBLEM — I82.409 DEEP VEIN THROMBOSIS (DVT) OF LOWER EXTREMITY, UNSPECIFIED CHRONICITY, UNSPECIFIED LATERALITY, UNSPECIFIED VEIN (HCC): Status: RESOLVED | Noted: 2020-03-25 | Resolved: 2020-05-27

## 2020-08-20 PROBLEM — I48.92 ATRIAL FLUTTER, UNSPECIFIED TYPE (HCC): Status: RESOLVED | Noted: 2020-01-29 | Resolved: 2020-08-20

## 2020-08-20 PROBLEM — J96.01 ACUTE RESPIRATORY FAILURE WITH HYPOXIA (HCC): Status: RESOLVED | Noted: 2020-01-29 | Resolved: 2020-08-20

## 2020-08-20 PROBLEM — I25.119 ATHEROSCLEROSIS OF NATIVE CORONARY ARTERY OF NATIVE HEART WITH ANGINA PECTORIS (HCC): Status: RESOLVED | Noted: 2020-01-21 | Resolved: 2020-08-20

## 2020-08-20 PROBLEM — I25.119 ATHEROSCLEROSIS OF NATIVE CORONARY ARTERY OF NATIVE HEART WITH ANGINA PECTORIS: Status: RESOLVED | Noted: 2020-01-21 | Resolved: 2020-08-20

## 2020-08-20 PROBLEM — Z87.19: Status: ACTIVE | Noted: 2020-08-20

## 2020-08-20 PROBLEM — Z93.3 COLOSTOMY IN PLACE (HCC): Status: RESOLVED | Noted: 2020-01-21 | Resolved: 2020-08-20

## 2021-02-08 PROBLEM — K43.9 VENTRAL HERNIA: Status: ACTIVE | Noted: 2021-02-08

## 2021-02-11 PROBLEM — S98.112D: Status: ACTIVE | Noted: 2021-02-11

## 2021-02-11 PROBLEM — I71.3 AAA (ABDOMINAL AORTIC ANEURYSM, RUPTURED) (HCC): Status: ACTIVE | Noted: 2021-02-11

## 2021-02-11 PROBLEM — I71.40 AAA (ABDOMINAL AORTIC ANEURYSM) WITHOUT RUPTURE: Status: RESOLVED | Noted: 2018-02-12 | Resolved: 2021-02-11

## 2021-02-11 PROBLEM — I71.4 AAA (ABDOMINAL AORTIC ANEURYSM) WITHOUT RUPTURE (HCC): Status: RESOLVED | Noted: 2018-02-12 | Resolved: 2021-02-11

## 2021-02-11 PROBLEM — I71.30 AAA (ABDOMINAL AORTIC ANEURYSM, RUPTURED) (HCC): Status: ACTIVE | Noted: 2021-02-11

## 2021-08-05 PROBLEM — I82.409 DVT OF LOWER EXTREMITY (DEEP VENOUS THROMBOSIS) (HCC): Status: RESOLVED | Noted: 2020-02-13 | Resolved: 2021-08-05

## 2022-03-23 PROBLEM — I71.019 DISSECTION OF THORACIC AORTA (HCC): Status: RESOLVED | Noted: 2020-01-21 | Resolved: 2022-03-23

## 2022-03-23 PROBLEM — J30.1 NON-SEASONAL ALLERGIC RHINITIS DUE TO POLLEN: Status: ACTIVE | Noted: 2022-03-23

## 2022-03-23 PROBLEM — I71.30 AAA (ABDOMINAL AORTIC ANEURYSM, RUPTURED) (HCC): Status: RESOLVED | Noted: 2021-02-11 | Resolved: 2022-03-23

## 2025-03-17 ENCOUNTER — ANESTHESIA EVENT (OUTPATIENT)
Dept: CARDIAC SURGERY | Facility: HOSPITAL | Age: 70
End: 2025-03-17
Payer: MEDICARE

## 2025-03-18 NOTE — TRANSFER CENTER NOTE
DIRECT ADMISSION    Admitting MD: Azra  Called in by: Dr. Oquendo  Call back #: [phone]  Date of admission: 3/19/2025  Diagnosis: AAA/INR correction/lumbar drain insertion  Specialist MD: Azra  Hospitalist assigned: no  Type of admission: INPT  Type of bed: Cardiac Telemetry  Time of admission: 1300  Insurance Verification complete: Yes

## 2025-03-19 ENCOUNTER — HOSPITAL ENCOUNTER (INPATIENT)
Facility: HOSPITAL | Age: 70
LOS: 29 days | Discharge: HOME HEALTH | End: 2025-04-18
Attending: SURGERY | Admitting: SURGERY
Payer: MEDICARE

## 2025-03-19 LAB
ALBUMIN SERPL-MCNC: 4.4 G/DL (ref 3.2–4.8)
ALBUMIN/GLOB SERPL: 1.5 {RATIO} (ref 1–2)
ALP LIVER SERPL-CCNC: 86 U/L
ALT SERPL-CCNC: <7 U/L
ANION GAP SERPL CALC-SCNC: 7 MMOL/L (ref 0–18)
ANTIBODY SCREEN: NEGATIVE
APTT PPP: 32.5 SECONDS (ref 23–36)
AST SERPL-CCNC: 10 U/L (ref ?–34)
BASOPHILS # BLD AUTO: 0.08 X10(3) UL (ref 0–0.2)
BASOPHILS NFR BLD AUTO: 1.2 %
BILIRUB SERPL-MCNC: 0.5 MG/DL (ref 0.2–1.1)
BUN BLD-MCNC: 21 MG/DL (ref 9–23)
CALCIUM BLD-MCNC: 9.2 MG/DL (ref 8.7–10.6)
CHLORIDE SERPL-SCNC: 102 MMOL/L (ref 98–112)
CO2 SERPL-SCNC: 26 MMOL/L (ref 21–32)
CREAT BLD-MCNC: 1.46 MG/DL
EGFRCR SERPLBLD CKD-EPI 2021: 51 ML/MIN/1.73M2 (ref 60–?)
EOSINOPHIL # BLD AUTO: 0.16 X10(3) UL (ref 0–0.7)
EOSINOPHIL NFR BLD AUTO: 2.3 %
ERYTHROCYTE [DISTWIDTH] IN BLOOD BY AUTOMATED COUNT: 17.1 %
GLOBULIN PLAS-MCNC: 2.9 G/DL (ref 2–3.5)
GLUCOSE BLD-MCNC: 97 MG/DL (ref 70–99)
HCT VFR BLD AUTO: 37.3 %
HGB BLD-MCNC: 12.5 G/DL
IMM GRANULOCYTES # BLD AUTO: 0.04 X10(3) UL (ref 0–1)
IMM GRANULOCYTES NFR BLD: 0.6 %
INR BLD: 1.66 (ref 0.8–1.2)
INR BLD: 1.8 (ref 0.8–1.2)
LYMPHOCYTES # BLD AUTO: 1.83 X10(3) UL (ref 1–4)
LYMPHOCYTES NFR BLD AUTO: 26.4 %
MCH RBC QN AUTO: 27.8 PG (ref 26–34)
MCHC RBC AUTO-ENTMCNC: 33.5 G/DL (ref 31–37)
MCV RBC AUTO: 82.9 FL
MONOCYTES # BLD AUTO: 0.63 X10(3) UL (ref 0.1–1)
MONOCYTES NFR BLD AUTO: 9.1 %
NEUTROPHILS # BLD AUTO: 4.2 X10 (3) UL (ref 1.5–7.7)
NEUTROPHILS # BLD AUTO: 4.2 X10(3) UL (ref 1.5–7.7)
NEUTROPHILS NFR BLD AUTO: 60.4 %
OSMOLALITY SERPL CALC.SUM OF ELEC: 283 MOSM/KG (ref 275–295)
PLATELET # BLD AUTO: 187 10(3)UL (ref 150–450)
POTASSIUM SERPL-SCNC: 4.4 MMOL/L (ref 3.5–5.1)
PROT SERPL-MCNC: 7.3 G/DL (ref 5.7–8.2)
PROTHROMBIN TIME: 19.7 SECONDS (ref 11.6–14.8)
PROTHROMBIN TIME: 21 SECONDS (ref 11.6–14.8)
RBC # BLD AUTO: 4.5 X10(6)UL
RH BLOOD TYPE: POSITIVE
SODIUM SERPL-SCNC: 135 MMOL/L (ref 136–145)
WBC # BLD AUTO: 6.9 X10(3) UL (ref 4–11)

## 2025-03-19 RX ORDER — HYDROCHLOROTHIAZIDE 25 MG/1
25 TABLET ORAL DAILY
Status: DISCONTINUED | OUTPATIENT
Start: 2025-03-20 | End: 2025-03-21

## 2025-03-19 RX ORDER — HYDROCHLOROTHIAZIDE 25 MG/1
25 TABLET ORAL DAILY
COMMUNITY
End: 2025-04-18

## 2025-03-19 RX ORDER — DIPHENOXYLATE HYDROCHLORIDE AND ATROPINE SULFATE 2.5; .025 MG/1; MG/1
1 TABLET ORAL 4 TIMES DAILY PRN
COMMUNITY

## 2025-03-19 RX ORDER — FLUTICASONE PROPIONATE 50 MCG
1 SPRAY, SUSPENSION (ML) NASAL DAILY PRN
Status: DISCONTINUED | OUTPATIENT
Start: 2025-03-19 | End: 2025-03-21

## 2025-03-19 NOTE — PROGRESS NOTES
STAT labs ordered   Patient to not receive any antiplatelet or anticoagulation    Plan for lumbar drain with Neuro IR tomorrow

## 2025-03-19 NOTE — PLAN OF CARE
NURSING ADMISSION NOTE      Patient admitted via Wheelchair  Oriented to room.  Safety precautions initiated.  Bed in low position.  Call light in reach.    Received direct admission.   Admission complete. Paged md's.

## 2025-03-20 ENCOUNTER — APPOINTMENT (OUTPATIENT)
Dept: INTERVENTIONAL RADIOLOGY/VASCULAR | Facility: HOSPITAL | Age: 70
End: 2025-03-20
Payer: MEDICARE

## 2025-03-20 PROBLEM — Z01.818 PRE-OP TESTING: Status: ACTIVE | Noted: 2025-03-20

## 2025-03-20 LAB
ALBUMIN SERPL-MCNC: 4.2 G/DL (ref 3.2–4.8)
ANION GAP SERPL CALC-SCNC: 6 MMOL/L (ref 0–18)
BUN BLD-MCNC: 23 MG/DL (ref 9–23)
CALCIUM BLD-MCNC: 9.2 MG/DL (ref 8.7–10.6)
CHLORIDE SERPL-SCNC: 103 MMOL/L (ref 98–112)
CO2 SERPL-SCNC: 29 MMOL/L (ref 21–32)
CREAT BLD-MCNC: 1.38 MG/DL
EGFRCR SERPLBLD CKD-EPI 2021: 55 ML/MIN/1.73M2 (ref 60–?)
ERYTHROCYTE [DISTWIDTH] IN BLOOD BY AUTOMATED COUNT: 17 %
GLUCOSE BLD-MCNC: 87 MG/DL (ref 70–99)
HCT VFR BLD AUTO: 35.5 %
HGB BLD-MCNC: 11.9 G/DL
INR BLD: 1.38 (ref 0.8–1.2)
MAGNESIUM SERPL-MCNC: 1.6 MG/DL (ref 1.6–2.6)
MCH RBC QN AUTO: 27.4 PG (ref 26–34)
MCHC RBC AUTO-ENTMCNC: 33.5 G/DL (ref 31–37)
MCV RBC AUTO: 81.6 FL
MRSA DNA SPEC QL NAA+PROBE: NEGATIVE
OSMOLALITY SERPL CALC.SUM OF ELEC: 289 MOSM/KG (ref 275–295)
PHOSPHATE SERPL-MCNC: 3.8 MG/DL (ref 2.4–5.1)
PLATELET # BLD AUTO: 177 10(3)UL (ref 150–450)
POTASSIUM SERPL-SCNC: 4.2 MMOL/L (ref 3.5–5.1)
PROTHROMBIN TIME: 17.1 SECONDS (ref 11.6–14.8)
RBC # BLD AUTO: 4.35 X10(6)UL
SODIUM SERPL-SCNC: 138 MMOL/L (ref 136–145)
WBC # BLD AUTO: 7.1 X10(3) UL (ref 4–11)

## 2025-03-20 PROCEDURE — 009U30Z DRAINAGE OF SPINAL CANAL WITH DRAINAGE DEVICE, PERCUTANEOUS APPROACH: ICD-10-PCS | Performed by: RADIOLOGY

## 2025-03-20 RX ORDER — MAGNESIUM OXIDE 400 MG/1
400 TABLET ORAL ONCE
Status: COMPLETED | OUTPATIENT
Start: 2025-03-20 | End: 2025-03-20

## 2025-03-20 RX ORDER — LOPERAMIDE HYDROCHLORIDE 2 MG/1
2 CAPSULE ORAL
Status: DISCONTINUED | OUTPATIENT
Start: 2025-03-20 | End: 2025-03-21

## 2025-03-20 RX ORDER — LIDOCAINE HYDROCHLORIDE 10 MG/ML
INJECTION, SOLUTION INFILTRATION; PERINEURAL
Status: COMPLETED
Start: 2025-03-20 | End: 2025-03-20

## 2025-03-20 RX ORDER — DIPHENOXYLATE HYDROCHLORIDE AND ATROPINE SULFATE 2.5; .025 MG/1; MG/1
2 TABLET ORAL 2 TIMES DAILY
Status: DISCONTINUED | OUTPATIENT
Start: 2025-03-20 | End: 2025-03-21

## 2025-03-20 RX ORDER — MIDAZOLAM HYDROCHLORIDE 1 MG/ML
INJECTION INTRAMUSCULAR; INTRAVENOUS
Status: COMPLETED
Start: 2025-03-20 | End: 2025-03-20

## 2025-03-20 NOTE — PROCEDURES
[facility]  Interventional Neuroradiology Procedure Note      Procedure: Lumbar Drain Placement    Pre-Op Diagnosis:  Aortic intervention    Post-Op Diagnosis: Same    Surgeon: Deejay Haney MD    Technique/Findings:      A 14 gauge Touhy needle was placed into the thecal sac at the L2-L3 level.  A drain with inner 018 wire was advance through the needle up to the T9-T10 level.  The needle and wire were removed.  Clear CSF drainage was confirmed through the drain.    Full report to follow      Anesthesia:  Moderate sedation    Complications:  None immediate    Medications:  2mg IV versed  100 ucg IV fentanyl    Specimens: 5 mL CSF drawn and discarded    Blood Loss:  < 2 mL     Assessment/Plan:  - Lay flat with HOB flat for 2 hours after the procedure to reduce risk of subsequent CSF leak  - Assess vitals and neurological status in room  - Will drain 5mL every 2 hours to maintain drain patency in anticipation of surgery  - Will initiate Ancef and discuss with vascular surgery  - Additional drain management per vascular surgery    Deejay Haney MD  3/20/2025  2:01 PM

## 2025-03-20 NOTE — H&P (VIEW-ONLY)
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    PMH:  Past Medical History:    AAA (abdominal aortic aneurysm)    Abdominal aneurysm    Abdominal aortic aneurysm (AAA)    AAA surgery done    Anxiety state    Back pain    Naproxen twice a week    Back problem    minor    Calculus of kidney    Deep vein thrombosis (HCC)    to colon    Esophageal reflux    Hearing impairment    Lt ear tinnitus    High blood pressure    History of recent hospitalization    10/2019- was at [facility] x5 weeks (ICU) with Dissected Aorta/ Post op colon problem/and post op cardiac arrest    Ileostomy present (HCC)    Peripheral vascular disease    Unspecified essential hypertension    Visual impairment    glasses        PSH:  Past Surgical History:   Procedure Laterality Date    Colonoscopy  10/15/09  [facility]    Screening: small polypoid fold in sigmoid (no polyp tissue seen on path), int hemorrhoids; next colonoscopy in 2019    Other  02/2017    AAA repair    Other      10/23/19- Dissected Aorta surgery    Other surgical history      Eye surgery for lazy eye    Other surgical history  10/15/09  [facility]    EGD (heartburn): normal    Other surgical history      AAA repair    Other surgical history      multiple toe amputations    Part removal colon w end colostomy      10/2019        Home Medications:  Medications  Taking[1]    Scheduled Medication:   [START ON 3/20/2025] hydroCHLOROthiazide  25 mg Oral Daily     Continuous Infusing Medication:    PRN Medication:  fluticasone propionate     ALL:  Allergies[2]     Soc Hx:  Social History     Tobacco Use    Smoking status: Some Days     Types: Cigars, Cigarettes    Smokeless tobacco: Never    Tobacco comments:     Cigars      Substance Use Topics    Alcohol use: Yes     Alcohol/week: 0.0 standard drinks of alcohol     Comment: occassionally        Fam Hx  Family History   Problem Relation Age of Onset    Diabetes Father     Heart Disorder Father         CHF    Heart Disorder Mother          age 70, aortic aneurysm    Hypertension Sister        Review of Systems  Comprehensive ROS reviewed and negative except for what's stated above.  Including negative for chest pain, shortness of breath, syncope.       OBJECTIVE:  BP (!) 163/99 (BP Location: Left arm)   Pulse 67   Temp 97.7 °F (36.5 °C) (Oral)   Resp 16   Wt 193 lb (87.5 kg)   SpO2 96%   BMI 22.89 kg/m²     Exam   Gen: Alert, no acute distress  Heent: Normocephalic, atraumatic, neck supple, EOMI, PERRLA  Pulm: Lungs CTAB, normal respiratory effort  CV:  Regular rate and rhythm, no murmurs/rubs/gallops  Abd: Soft, nontender, nondistended, bowel sounds present  Extremities: No peripheral edema, no clubbing, pulses intact. Toe amputations bilaterally.  Skin: No rashes or lesions  Neuro: AOx3, no focal neurologic deficits, CN II-XII grossly intact  Psych: appropriate mood and affect     Diagnostics:   CBC/Chem  Recent Labs   Lab 25  1424 25  2132   WBC 6.9  --    HGB 12.5*  --    MCV 82.9  --    .0  --    INR 1.80* 1.66*       Recent Labs   Lab 25  1427   *   K 4.4      CO2 26.0   BUN 21   CREATSERUM 1.46*   GLU 97   CA 9.2       Recent Labs   Lab 25  1427   ALT <7*   AST 10   ALB 4.4       Radiology:   All imaging personally reviewed      No results found.       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  Planning for AAA repair with vascular.  -Inpatient admission, this is an impending high risk procedure with complications including spinal cord ischemia, mesenteric ischemia, possible renal failure.  -Plan for lumbar drain tomorrow with neurointerventional to limit risk of spinal cord ischemia  -Outpatient warfarin has been held since last Sunday. INR greater than 3 on outpatient testing. Repeat INR reviewed, 1.66 today.  No heparin GGT to be initiated per vascular.  -Further recommendations and management per vascular surgery    CKD  -Creatinine reviewed this admission, 1.46 (baseline 1.2)  -Continue to monitor with serial RFP's  -Avoid nephrotoxic agents    Hypertension  -Continue home HCTZ    Allergic rhinitis  -Continue home fluticasone as needed    Dispo: Inpatient    Outpatient or previous hospital records reviewed. Questions/concerns were discussed with patient and/or family by bedside. Discussed with vascular surgery.     DO Kendra Lewis [facility]  Hospitalist  Contact via  Epic/PerfectServe/AlertMD      Advanced Care Planning    While discussing goals of care with the patient and their family, patient voluntarily participated in an advanced care planning discussion. The following was discussed: Had lengthy conversation with patient regarding CODE STATUS.  Patient notes that he has documents at home regarding his wishes and advanced directives as well.  Patient said that if his heart were to stop beating, he would not want any chest compressions or ACLS, however okay with all the treatments at this time.  He says that he has needed CPR and shocking in the past, and feels like if his heart were to stop beating this time, he would like to go peacefully.  However, he remained apprehensive and said he would like additional time to think about this.  For now, he said that his CODE STATUS will be DNR full treatment.    16 minutes were spent in discussing advanced care planning. This time was exclusive of the documented time for this visit.      Supplementary Documentation:   DVT Mechanical Prophylaxis:   SCDs,    DVT Pharmacologic Prophylaxis   Medication   None                Code Status: Full Code  Arzola: No urinary catheter in place  Arzola Duration (in days):   Central line:    YNES:                          [1]   Outpatient Medications Marked as Taking for the 3/19/25 encounter (Hospital Encounter)   Medication Sig Dispense Refill    hydroCHLOROthiazide 25 MG Oral Tab Take 1 tablet (25 mg total) by mouth daily.      diphenoxylate-atropine 2.5-0.025 MG Oral Tab Take 1 tablet by mouth 4 (four) times daily as needed for Diarrhea.      Naproxen Sodium 550 MG Oral Tab Take 1 tablet (550 mg total) by mouth 2 (two) times daily with meals. (Patient taking differently: Take 1 tablet (550 mg total) by mouth 2 (two) times daily as needed.) 180 tablet 3    Fluticasone Propionate 50 MCG/ACT Nasal Suspension 1 spray by Nasal route daily as needed.      Loperamide-Simethicone (IMODIUM ADVANCED OR) Take  2 Doses by mouth Every afternoon at 2:00 pm.     [2]   Allergies  Allergen Reactions    Amoxicillin ITCHING     Itchiness on hands and feet      Clindamycin OTHER (SEE COMMENTS)     Upset stomach

## 2025-03-20 NOTE — PLAN OF CARE
PT A/O, 95% ON RA, AFEBRILE, SR, UP VOIDS IN BATHROOM, LOOSE STOOL, WHICH PT STATES IS HIS BASELINE, GIVEN VITAMIN K AT 1900, POST INR AT 2130-1.66, NOTIFIED DR. EDUARDO, ORDERS RECEIVED TO GIVE ADDITIONAL DOSE OF VIT K, RECHECK INR WITH LABS IN AM, NPO FROM MIDNIGHT, REFUSED SCDS, INSTRUCTED PT ON POC    Problem: CARDIOVASCULAR - ADULT  Goal: Maintains optimal cardiac output and hemodynamic stability  Description: INTERVENTIONS:- Monitor vital signs, rhythm, and trends- Monitor for bleeding, hypotension and signs of decreased cardiac output- Evaluate effectiveness of vasoactive medications to optimize hemodynamic stability- Monitor arterial and/or venous puncture sites for bleeding and/or hematoma- Assess quality of pulses, skin color and temperature- Assess for signs of decreased coronary artery perfusion - ex. Angina- Evaluate fluid balance, assess for edema, trend weights  Outcome: Progressing     Problem: HEMATOLOGIC - ADULT  Goal: Free from bleeding injury  Description: (Example usage: patient with low platelets)INTERVENTIONS:- Avoid intramuscular injections, enemas and rectal medication administration- Ensure safe mobilization of patient- Hold pressure on venipuncture sites to achieve adequate hemostasis- Assess for signs and symptoms of internal bleeding- Monitor lab trends- Patient is to report abnormal signs of bleeding to staff- Avoid use of toothpicks and dental floss- Use electric shaver for shaving- Use soft bristle tooth brush- Limit straining and forceful nose blowing  Outcome: Progressing

## 2025-03-20 NOTE — PLAN OF CARE
Assumed care of patient at 0700  Mg replaced per protocol  NPO for lumbar drain  Patient requesting his home Lomotil medication be reordered during his stay, hospitalist notified of request  Denies pain  IVPB phytonadione given per order  Patient taken to IR for drain placement  Patient's belongings brought to new room on CNICU

## 2025-03-20 NOTE — PROGRESS NOTES
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  Planning for AAA repair with vascular.  -Inpatient admission, this is an impending high risk procedure with complications including spinal cord ischemia, mesenteric ischemia, possible renal failure.  -s/p lumbar drain with neurointerventional to limit risk of spinal cord ischemia 3/20  -Outpatient warfarin has been held since last Sunday. INR greater than 3 on outpatient testing. Repeat INR reviewed, 1.66 today.  No heparin GGT to be initiated per vascular.  -Further recommendations and management per vascular surgery     CKD  -Creatinine reviewed this admission, 1.46 (baseline 1.2)  -Continue to monitor with serial RFP's  -Avoid nephrotoxic agents     Hypertension  -Continue home HCTZ     Allergic rhinitis  -Continue home fluticasone as needed     Short gut syndrome  - resume lamotil    Dispo: Inpatient     Outpatient or previous hospital records reviewed. Questions/concerns  were discussed with patient and/or family by bedside. Discussed with vascular surgery.      Dallas Green DO  Hospitalist  [facility]       Subjective:     No CP, SOB, or N/V. Laying flat after the procedure.     OBJECTIVE:    Blood pressure 150/77, pulse 60, temperature 99 °F (37.2 °C), temperature source Temporal, resp. rate 16, weight 192 lb 15.9 oz (87.5 kg), SpO2 92%.    Temp:  [97.5 °F (36.4 °C)-99 °F (37.2 °C)] 99 °F (37.2 °C)  Pulse:  [56-71] 60  Resp:  [11-21] 16  BP: (125-168)/(77-99) 150/77  SpO2:  [92 %-96 %] 92 %      Intake/Output:    Intake/Output Summary (Last 24 hours) at 3/20/2025 1543  Last data filed at 3/20/2025 1400  Gross per 24 hour   Intake 0 ml   Output 5 ml   Net -5 ml       Last 3 Weights   03/20/25 1231 192 lb 15.9 oz (87.5 kg)   03/19/25 1444 193 lb (87.5 kg)   03/07/25 1536 185 lb (83.9 kg)   03/23/22 0943 236 lb 9.6 oz (107.3 kg)       Exam   Gen: No acute distress, alert and oriented x3, no focal neurologic deficits  Heent: NC AT, mucous memb clear, neck supple  Pulm: Lungs clear, normal respiratory effort  CV: Heart with regular rate and rhythm, no peripheral edema  Abd: Abdomen soft, nontender, nondistended, bowel sounds present  MSK: Full range of motion in extremities, 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  Skin: no rashes or lesions  Neuro: AO*3, motor intact, no sensory deficits  Psyc: appropriate mood and affect      Data Review:       Labs:     Recent Labs   Lab 03/19/25  1424 03/20/25  0528   RBC 4.50 4.35   HGB 12.5* 11.9*   HCT 37.3* 35.5*   MCV 82.9 81.6   MCH 27.8 27.4   MCHC 33.5 33.5   RDW 17.1 17.0   NEPRELIM 4.20  --    WBC 6.9 7.1   .0 177.0         Recent Labs   Lab 03/19/25  1427 03/20/25  0528   GLU 97 87   BUN 21 23   CREATSERUM 1.46* 1.38*   EGFRCR 51* 55*   CA 9.2 9.2   * 138   K 4.4 4.2    103   CO2 26.0 29.0       Recent Labs   Lab 03/19/25  1427 03/20/25  0528   ALT <7*  --    AST 10  --    ALB 4.4 4.2         Imaging:  IR  LUMBAR DRAINAGE    Result Date: 3/20/2025  CONCLUSION:   Successful lumbar drain placement, as described above.   LOCATION:  [facility]       Dictated by (CST): Deejay Haney MD on 3/20/2025 at 2:05 PM     Finalized by (CST): Deejay Haney MD on 3/20/2025 at 2:19 PM          Meds:      ceFAZolin  2 g Intravenous Q8H    hydroCHLOROthiazide  25 mg Oral Daily         fluticasone propionate      Supplementary Documentation:   DVT Mechanical Prophylaxis:   SCDs,    DVT Pharmacologic Prophylaxis   Medication   None                Code Status: DNAR/Full Treatment  Arzola: No urinary catheter in place  Arzola Duration (in days):   Central line:    YNES:

## 2025-03-20 NOTE — ANESTHESIA PREPROCEDURE EVALUATION
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, type 1c endoleak of aortic graft, history of abdominal aortic aneurysm repair    FENESTRATED ENDOVASCULAR ARCH REPAIR AND FENESTRATED THORACOABDOMINAL ANEURYSM REPAIR WITH POSSIBLE LEFT CAROTID TO SUBCLAVIAN TRANSPOSITION, LEFT AXILLARY EXPOSURE, RIGHT AXILLARY EXPOSURE, ENDOVASCULAR SEPTOTOMY OF DISSECTION, NEUROMONITORING    Anesthesia Procedure: FENESTRATED ENDOVASCULAR ARCH REPAIR AND FENESTRATED THORACOABDOMINAL ANEURYSM REPAIR WITH POSSIBLE LEFT CAROTID TO SUBCLAVIAN TRANSPOSITION, LEFT AXILLARY EXPOSURE, RIGHT AXILLARY EXPOSURE, ENDOVASCULAR SEPTOTOMY OF DISSECTION, NEUROMONITORING    Surgeons and Role:     * Neil Oquendo MD - Primary     * David Yanez MD    Pre-op vitals reviewed.  Temp: 98.5 °F (36.9 °C)  Pulse: 56  Resp: 18  BP: 143/79  SpO2: 94 %  Body mass index is 22.89 kg/m².    Current medications reviewed.  Hospital Medications:   [COMPLETED] phytonadione (Aqua-Mephton) 10 mg in sodium chloride 0.9% 50 mL IVPB  10 mg Intravenous Once    [COMPLETED] magnesium oxide (Mag-Ox) tab 400 mg  400 mg Oral Once    [COMPLETED] lidocaine (Xylocaine) 1 % injection        [COMPLETED] fentaNYL (Sublimaze) 50 mcg/mL injection        [COMPLETED] midazolam (Versed) 2 MG/2ML injection        [COMPLETED] phytonadione (Aqua-Mephton) 10 mg in sodium chloride 0.9% 50 mL IVPB  10 mg Intravenous Once    fluticasone propionate (Flonase) 50 MCG/ACT nasal suspension 1 spray  1 spray Nasal Daily PRN    hydroCHLOROthiazide tab 25 mg  25 mg Oral Daily    [COMPLETED] phytonadione (Aqua-Mephton) 10 mg in sodium chloride 0.9% 50 mL IVPB  10 mg Intravenous Once       Outpatient Medications:   Prescriptions Prior to Admission[1]    Allergies: Amoxicillin and Clindamycin      Anesthesia Evaluation    Patient summary  reviewed.    Anesthetic Complications  (-) history of anesthetic complications         GI/Hepatic/Renal      (+) GERD                           Cardiovascular  Comment: H/o acute dissection of aorta managed at [facility]. Course complicated by bowel ischemia requiring resection and gangrene of left foot. S/p endo AAA stent graft 2018 at [facility].    ECG reviewed.      MET: >4      (+) hypertension   (+) hyperlipidemia                                  Endo/Other    Negative endo/other ROS.                              Pulmonary    Negative pulmonary ROS.                       Neuro/Psych  Comment: H/o cerebral infarction   peripheral neuropathy      (+) anxiety  (+) CVA       (+) neuromuscular disease             Type A dissection repair and with type B s/p EVAR        Past Surgical History:   Procedure Laterality Date    Colonoscopy  10/15/09  [facility]    Screening: small polypoid fold in sigmoid (no polyp tissue seen on path), int hemorrhoids; next colonoscopy in 2019    Other  02/2017    AAA repair    Other      10/23/19- Dissected Aorta surgery    Other surgical history      Eye surgery for lazy eye    Other surgical history  10/15/09  [facility]    EGD (heartburn): normal    Other surgical history      AAA repair    Other surgical history      multiple toe amputations    Part removal colon w end colostomy      10/2019     Social History     Socioeconomic History    Marital status:    Tobacco Use    Smoking status: Some Days     Types: Cigars, Cigarettes    Smokeless tobacco: Never    Tobacco comments:     Cigars      Vaping Use    Vaping status: Never Used   Substance and Sexual Activity    Alcohol use: Yes     Alcohol/week: 0.0 standard drinks of alcohol     Comment: occassionally    Drug use: No     History   Drug Use No     Available pre-op labs reviewed.  Lab Results   Component Value Date    WBC 7.1 03/20/2025    RBC 4.35 03/20/2025    HGB 11.9 (L) 03/20/2025    HCT 35.5 (L) 03/20/2025    MCV 81.6 03/20/2025     MCH 27.4 03/20/2025    MCHC 33.5 03/20/2025    RDW 17.0 03/20/2025    .0 03/20/2025     Lab Results   Component Value Date     03/20/2025    K 4.2 03/20/2025     03/20/2025    CO2 29.0 03/20/2025    BUN 23 03/20/2025    CREATSERUM 1.38 (H) 03/20/2025    GLU 87 03/20/2025    CA 9.2 03/20/2025     Lab Results   Component Value Date    INR 1.38 (H) 03/20/2025         Airway      Mallampati: II  Mouth opening: >3 FB  TM distance: > 6 cm  Neck ROM: full Cardiovascular    Cardiovascular exam normal.         Dental    Dentition appears grossly intact         Pulmonary    Pulmonary exam normal.                 Other findings              ASA: 4   Plan: general  NPO status verified and     Post-procedure pain management plan discussed with surgeon and patient.    Comment: Lumbar drain placed in IR today. Discussed risks and benefits of GA including sore throat, allergy, nausea, vomiting, dental trauma, pain management modalities, transfusion if needed, etc. Discussed possible central line, additional IV access, a-line, possible post-op mechanical ventilation. Questions answered and options explained. Patient accepts and wishes to proceed. The consent was signed without further questions.    Plan/risks discussed with: patient  Use of blood product(s) discussed with: patient    Consented to blood products.          Present on Admission:  **None**             [1]   Medications Prior to Admission   Medication Sig Dispense Refill Last Dose/Taking    hydroCHLOROthiazide 25 MG Oral Tab Take 1 tablet (25 mg total) by mouth daily.   3/19/2025 at  7:30 AM    diphenoxylate-atropine 2.5-0.025 MG Oral Tab Take 1 tablet by mouth 4 (four) times daily as needed for Diarrhea.   Taking As Needed    Naproxen Sodium 550 MG Oral Tab Take 1 tablet (550 mg total) by mouth 2 (two) times daily with meals. (Patient taking differently: Take 1 tablet (550 mg total) by mouth 2 (two) times daily as needed.) 180 tablet 3 3/18/2025 at  11:00 PM    Fluticasone Propionate 50 MCG/ACT Nasal Suspension 1 spray by Nasal route daily as needed.   Past Month    Loperamide-Simethicone (IMODIUM ADVANCED OR) Take 2 Doses by mouth Every afternoon at 2:00 pm.   3/18/2025 at  2:30 PM    tadalafil (CIALIS) 5 MG Oral Tab Take 1 tablet (5 mg total) by mouth daily as needed for Erectile Dysfunction. 30 tablet 5 Unknown    Fexofenadine-Pseudoephed -240 MG Oral Tablet 24 Hr Take 1 tablet by mouth daily as needed.   More than a month

## 2025-03-20 NOTE — CONSULTS
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    PMH:  Past Medical History:    AAA (abdominal aortic aneurysm)    Abdominal aneurysm    Abdominal aortic aneurysm (AAA)    AAA surgery done    Anxiety state    Back pain    Naproxen twice a week    Back problem    minor    Calculus of kidney    Deep vein thrombosis (HCC)    to colon    Esophageal reflux    Hearing impairment    Lt ear tinnitus    High blood pressure    History of recent hospitalization    10/2019- was at [facility] x5 weeks (ICU) with Dissected Aorta/ Post op colon problem/and post op cardiac arrest    Ileostomy present (HCC)    Peripheral vascular disease    Unspecified essential hypertension    Visual impairment    glasses        PSH:  Past Surgical History:   Procedure Laterality Date    Colonoscopy  10/15/09  [facility]    Screening: small polypoid fold in sigmoid (no polyp tissue seen on path), int hemorrhoids; next colonoscopy in 2019    Other  02/2017    AAA repair    Other      10/23/19- Dissected Aorta surgery    Other surgical history      Eye surgery for lazy eye    Other surgical history  10/15/09  [facility]    EGD (heartburn): normal    Other surgical history      AAA repair    Other surgical history      multiple toe amputations    Part removal colon w end colostomy      10/2019        Home Medications:  Medications  Taking[1]    Scheduled Medication:   [START ON 3/20/2025] hydroCHLOROthiazide  25 mg Oral Daily     Continuous Infusing Medication:    PRN Medication:  fluticasone propionate     ALL:  Allergies[2]     Soc Hx:  Social History     Tobacco Use    Smoking status: Some Days     Types: Cigars, Cigarettes    Smokeless tobacco: Never    Tobacco comments:     Cigars      Substance Use Topics    Alcohol use: Yes     Alcohol/week: 0.0 standard drinks of alcohol     Comment: occassionally        Fam Hx  Family History   Problem Relation Age of Onset    Diabetes Father     Heart Disorder Father         CHF    Heart Disorder Mother          age 70, aortic aneurysm    Hypertension Sister        Review of Systems  Comprehensive ROS reviewed and negative except for what's stated above.  Including negative for chest pain, shortness of breath, syncope.       OBJECTIVE:  BP (!) 163/99 (BP Location: Left arm)   Pulse 67   Temp 97.7 °F (36.5 °C) (Oral)   Resp 16   Wt 193 lb (87.5 kg)   SpO2 96%   BMI 22.89 kg/m²     Exam   Gen: Alert, no acute distress  Heent: Normocephalic, atraumatic, neck supple, EOMI, PERRLA  Pulm: Lungs CTAB, normal respiratory effort  CV:  Regular rate and rhythm, no murmurs/rubs/gallops  Abd: Soft, nontender, nondistended, bowel sounds present  Extremities: No peripheral edema, no clubbing, pulses intact. Toe amputations bilaterally.  Skin: No rashes or lesions  Neuro: AOx3, no focal neurologic deficits, CN II-XII grossly intact  Psych: appropriate mood and affect     Diagnostics:   CBC/Chem  Recent Labs   Lab 25  1424 25  2132   WBC 6.9  --    HGB 12.5*  --    MCV 82.9  --    .0  --    INR 1.80* 1.66*       Recent Labs   Lab 25  1427   *   K 4.4      CO2 26.0   BUN 21   CREATSERUM 1.46*   GLU 97   CA 9.2       Recent Labs   Lab 25  1427   ALT <7*   AST 10   ALB 4.4       Radiology:   All imaging personally reviewed      No results found.       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  Planning for AAA repair with vascular.  -Inpatient admission, this is an impending high risk procedure with complications including spinal cord ischemia, mesenteric ischemia, possible renal failure.  -Plan for lumbar drain tomorrow with neurointerventional to limit risk of spinal cord ischemia  -Outpatient warfarin has been held since last Sunday. INR greater than 3 on outpatient testing. Repeat INR reviewed, 1.66 today.  No heparin GGT to be initiated per vascular.  -Further recommendations and management per vascular surgery    CKD  -Creatinine reviewed this admission, 1.46 (baseline 1.2)  -Continue to monitor with serial RFP's  -Avoid nephrotoxic agents    Hypertension  -Continue home HCTZ    Allergic rhinitis  -Continue home fluticasone as needed    Dispo: Inpatient    Outpatient or previous hospital records reviewed. Questions/concerns were discussed with patient and/or family by bedside. Discussed with vascular surgery.     DO Kendra Lewis [facility]  Hospitalist  Contact via  Epic/PerfectServe/AlertMD      Advanced Care Planning    While discussing goals of care with the patient and their family, patient voluntarily participated in an advanced care planning discussion. The following was discussed: Had lengthy conversation with patient regarding CODE STATUS.  Patient notes that he has documents at home regarding his wishes and advanced directives as well.  Patient said that if his heart were to stop beating, he would not want any chest compressions or ACLS, however okay with all the treatments at this time.  He says that he has needed CPR and shocking in the past, and feels like if his heart were to stop beating this time, he would like to go peacefully.  However, he remained apprehensive and said he would like additional time to think about this.  For now, he said that his CODE STATUS will be DNR full treatment.    16 minutes were spent in discussing advanced care planning. This time was exclusive of the documented time for this visit.      Supplementary Documentation:   DVT Mechanical Prophylaxis:   SCDs,    DVT Pharmacologic Prophylaxis   Medication   None                Code Status: Full Code  Arzola: No urinary catheter in place  Arzola Duration (in days):   Central line:    YNES:                          [1]   Outpatient Medications Marked as Taking for the 3/19/25 encounter (Hospital Encounter)   Medication Sig Dispense Refill    hydroCHLOROthiazide 25 MG Oral Tab Take 1 tablet (25 mg total) by mouth daily.      diphenoxylate-atropine 2.5-0.025 MG Oral Tab Take 1 tablet by mouth 4 (four) times daily as needed for Diarrhea.      Naproxen Sodium 550 MG Oral Tab Take 1 tablet (550 mg total) by mouth 2 (two) times daily with meals. (Patient taking differently: Take 1 tablet (550 mg total) by mouth 2 (two) times daily as needed.) 180 tablet 3    Fluticasone Propionate 50 MCG/ACT Nasal Suspension 1 spray by Nasal route daily as needed.      Loperamide-Simethicone (IMODIUM ADVANCED OR) Take  2 Doses by mouth Every afternoon at 2:00 pm.     [2]   Allergies  Allergen Reactions    Amoxicillin ITCHING     Itchiness on hands and feet      Clindamycin OTHER (SEE COMMENTS)     Upset stomach

## 2025-03-20 NOTE — CONSULTS
[facility]   Vascular and Endovascular Surgery     VASCULAR SURGERY   CONSULT NOTE      Name: Cy Monsalve   :   1955  [id number]     3/20/2025       REFERRING PHYSICIAN: Neil Oquendo MD  PRIMARY CARE PHYSICIAN:  Kam Alberts MD    HISTORY OF PRESENT ILLNESS: Patient is a 69 year old male who presents with for scheduled fenestrated abdominal aortic aneurysm repair with left carotid to subclavian transposition on 3/21 with Dr. Yanez and Dr. Oquendo.  Patient will undergo a lumbar drain placement for neuromonitoring today.  Patient states he is doing well today.  Denies any abdominal pain.  Denies any neurological changes.  Full range motion and strength in the upper and lower extremities.    PAST MEDICAL HISTORY:    Past Medical History:    AAA (abdominal aortic aneurysm)    Abdominal aneurysm    Abdominal aortic aneurysm (AAA)    AAA surgery done    Anxiety state    Back pain    Naproxen twice a week    Back problem    minor    Calculus of kidney    Deep vein thrombosis (HCC)    to colon    Esophageal reflux    Hearing impairment    Lt ear tinnitus    High blood pressure    History of recent hospitalization    10/2019- was at [facility] x5 weeks (ICU) with Dissected Aorta/ Post op colon problem/and post op cardiac arrest    Ileostomy present (HCC)    Peripheral vascular disease    Unspecified essential hypertension    Visual impairment    glasses       PAST SURGICAL HISTORY:   Past Surgical History:   Procedure Laterality Date    Colonoscopy  10/15/09  [facility]    Screening: small polypoid fold in sigmoid (no polyp tissue seen on path), int hemorrhoids; next colonoscopy in 2019    Other  2017    AAA repair    Other      10/23/19- Dissected Aorta surgery    Other surgical history      Eye surgery for lazy eye    Other surgical history  10/15/09  [facility]    EGD (heartburn): normal    Other surgical history      AAA repair    Other surgical history      multiple  toe amputations    Part removal colon w end colostomy      10/2019       MEDICATIONS:   Medications Ordered Prior to Encounter[1]     ALLERGIES:    He is allergic to amoxicillin and clindamycin.    SOCIAL HISTORY:    Patient  reports that he has been smoking cigars and cigarettes. He has never used smokeless tobacco. He reports current alcohol use. He reports that he does not use drugs.    FAMILY HISTORY:    Patient's family history includes Diabetes in his father; Heart Disorder in his father and mother; Hypertension in his sister.    ROS:    Comprehensive ROS reviewed and negative except for what's stated above.  Including negative for chest pain, shortness of breath, syncope.     EXAM:  /83 (BP Location: Left arm)   Pulse 68   Temp 97.6 °F (36.4 °C) (Oral)   Resp 16   Wt 193 lb (87.5 kg)   SpO2 92%   BMI 22.89 kg/m²   GENERAL: alert and oriented x3, in no apparent distress  PSYCH: Normal mood and affect  RESPIRATORY: Normal work of breathing  ABDOMEN: Soft, non-tender, non-distended  BACK: Normal, no tenderness  SKIN: No rashes, warm and dry  MUSCULOSKELETAL: Strength 5/5 throughout, sensation intact  EXTREMITIES: No edema  VASCULAR:    DP PT Edema   Right palpable, strong palpable, strong none   Left palpable, strong palpable, strong none        Diagnostic Data:      LABS:  Recent Labs   Lab 03/19/25 1424 03/19/25 2132 03/20/25 0528   WBC 6.9  --  7.1   HGB 12.5*  --  11.9*   MCV 82.9  --  81.6   .0  --  177.0   INR 1.80* 1.66* 1.38*       Recent Labs   Lab 03/19/25 1427 03/20/25 0528   * 138   K 4.4 4.2    103   CO2 26.0 29.0   BUN 21 23   CREATSERUM 1.46* 1.38*   GLU 97 87   CA 9.2 9.2   MG  --  1.6   PHOS  --  3.8     Recent Labs   Lab 03/19/25 1424 03/19/25 2132 03/20/25 0528   PTP 21.0* 19.7* 17.1*   INR 1.80* 1.66* 1.38*   PTT 32.5  --   --      Recent Labs   Lab 03/19/25 1427 03/20/25 0528   ALT <7*  --    AST 10  --    ALB 4.4 4.2     No results for input(s):  \"TROP\" in the last 168 hours.  No results found for: \"ANAS\", \"ROVERTO\", \"ANASCRN\"  No results for input(s): \"PCACT\", \"PSACT\", \"AT3ACT\", \"HIPAB\", \"PATHI\", \"STALA\", \"DRVVTRATIO\", \"DRVVT\", \"STACLOT\", \"CARIGG\", \"V8SM4GLZIP\", \"D3MI0GLEPV\", \"RA\", \"HAVIGM\", \"HBCIGM\", \"HCVAB\", \"HBSAG\", \"HBCAB\", \"HBVDNAINTERP\", \"ANAS\", \"C3\", \"C4\" in the last 168 hours.    Radiology: No results found.       ASSESSMENT AND PLAN:     The patient is a 69 year old male who presents for fenestrated abdominal aortic repair with subclavian to carotid transposition on 3/21 with Dr. Oquendo and Dr. Yanez. Patient will undergo a lumbar drain placement for neuromonitoring today.      ISATU Warren  03/20/25   8:04 AM   Supplementary Documentation:   DVT Mechanical Prophylaxis:   SCDs,    DVT Pharmacologic Prophylaxis   Medication   None                Code Status: DNAR/Full Treatment  Arzola: No urinary catheter in place  Arzola Duration (in days):   Central line:    YNES:                                         [1]   No current facility-administered medications on file prior to encounter.     Current Outpatient Medications on File Prior to Encounter   Medication Sig Dispense Refill    hydroCHLOROthiazide 25 MG Oral Tab Take 1 tablet (25 mg total) by mouth daily.      diphenoxylate-atropine 2.5-0.025 MG Oral Tab Take 1 tablet by mouth 4 (four) times daily as needed for Diarrhea.      Naproxen Sodium 550 MG Oral Tab Take 1 tablet (550 mg total) by mouth 2 (two) times daily with meals. (Patient taking differently: Take 1 tablet (550 mg total) by mouth 2 (two) times daily as needed.) 180 tablet 3    Fluticasone Propionate 50 MCG/ACT Nasal Suspension 1 spray by Nasal route daily as needed.      Loperamide-Simethicone (IMODIUM ADVANCED OR) Take 2 Doses by mouth Every afternoon at 2:00 pm.      tadalafil (CIALIS) 5 MG Oral Tab Take 1 tablet (5 mg total) by mouth daily as needed for Erectile Dysfunction. 30 tablet 5    Fexofenadine-Pseudoephed -240 MG  Oral Tablet 24 Hr Take 1 tablet by mouth daily as needed.

## 2025-03-21 ENCOUNTER — ANESTHESIA (OUTPATIENT)
Dept: CARDIAC SURGERY | Facility: HOSPITAL | Age: 70
End: 2025-03-21
Payer: MEDICARE

## 2025-03-21 ENCOUNTER — APPOINTMENT (OUTPATIENT)
Dept: GENERAL RADIOLOGY | Facility: HOSPITAL | Age: 70
End: 2025-03-21
Attending: SURGERY
Payer: MEDICARE

## 2025-03-21 PROBLEM — I71.60 THORACOABDOMINAL AORTIC ANEURYSM (TAAA): Status: ACTIVE | Noted: 2025-03-21

## 2025-03-21 LAB
ALBUMIN SERPL-MCNC: 4.1 G/DL (ref 3.2–4.8)
ALBUMIN/GLOB SERPL: 1.4 {RATIO} (ref 1–2)
ALP LIVER SERPL-CCNC: 81 U/L
ALT SERPL-CCNC: <7 U/L
ANION GAP SERPL CALC-SCNC: 9 MMOL/L (ref 0–18)
APTT PPP: 31.8 SECONDS (ref 23–36)
AST SERPL-CCNC: 10 U/L (ref ?–34)
BASE EXCESS BLD CALC-SCNC: -2 MMOL/L
BASE EXCESS BLD CALC-SCNC: -3 MMOL/L
BASE EXCESS BLD CALC-SCNC: -4 MMOL/L
BASE EXCESS BLD CALC-SCNC: -4 MMOL/L
BASE EXCESS BLD CALC-SCNC: -5 MMOL/L
BASE EXCESS BLD CALC-SCNC: -5 MMOL/L
BASE EXCESS BLD CALC-SCNC: -6 MMOL/L
BASE EXCESS BLD CALC-SCNC: -6 MMOL/L
BASOPHILS # BLD AUTO: 0.03 X10(3) UL (ref 0–0.2)
BASOPHILS NFR BLD AUTO: 0.4 %
BILIRUB SERPL-MCNC: 0.7 MG/DL (ref 0.2–1.1)
BUN BLD-MCNC: 23 MG/DL (ref 9–23)
CA-I BLD-SCNC: 1.04 MMOL/L (ref 1.12–1.32)
CA-I BLD-SCNC: 1.08 MMOL/L (ref 1.12–1.32)
CA-I BLD-SCNC: 1.1 MMOL/L (ref 1.12–1.32)
CA-I BLD-SCNC: 1.14 MMOL/L (ref 1.12–1.32)
CA-I BLD-SCNC: 1.14 MMOL/L (ref 1.12–1.32)
CA-I BLD-SCNC: 1.17 MMOL/L (ref 1.12–1.32)
CA-I BLD-SCNC: 1.19 MMOL/L (ref 1.12–1.32)
CA-I BLD-SCNC: 1.21 MMOL/L (ref 1.12–1.32)
CALCIUM BLD-MCNC: 8.9 MG/DL (ref 8.7–10.6)
CHLORIDE SERPL-SCNC: 101 MMOL/L (ref 98–112)
CO2 BLD-SCNC: 22 MMOL/L (ref 22–32)
CO2 BLD-SCNC: 23 MMOL/L (ref 22–32)
CO2 BLD-SCNC: 23 MMOL/L (ref 22–32)
CO2 BLD-SCNC: 24 MMOL/L (ref 22–32)
CO2 BLD-SCNC: 26 MMOL/L (ref 22–32)
CO2 BLD-SCNC: 26 MMOL/L (ref 22–32)
CO2 SERPL-SCNC: 27 MMOL/L (ref 21–32)
CREAT BLD-MCNC: 1.27 MG/DL
EGFRCR SERPLBLD CKD-EPI 2021: 60 ML/MIN/1.73M2 (ref 60–?)
EOSINOPHIL # BLD AUTO: 0.12 X10(3) UL (ref 0–0.7)
EOSINOPHIL NFR BLD AUTO: 1.7 %
ERYTHROCYTE [DISTWIDTH] IN BLOOD BY AUTOMATED COUNT: 16.5 %
GLOBULIN PLAS-MCNC: 2.9 G/DL (ref 2–3.5)
GLUCOSE BLD-MCNC: 100 MG/DL (ref 70–99)
GLUCOSE BLD-MCNC: 128 MG/DL (ref 70–99)
GLUCOSE BLD-MCNC: 139 MG/DL (ref 70–99)
GLUCOSE BLD-MCNC: 143 MG/DL (ref 70–99)
GLUCOSE BLD-MCNC: 145 MG/DL (ref 70–99)
GLUCOSE BLD-MCNC: 146 MG/DL (ref 70–99)
GLUCOSE BLD-MCNC: 149 MG/DL (ref 70–99)
GLUCOSE BLD-MCNC: 152 MG/DL (ref 70–99)
GLUCOSE BLD-MCNC: 92 MG/DL (ref 70–99)
HCO3 BLD-SCNC: 20.8 MEQ/L
HCO3 BLD-SCNC: 21.2 MEQ/L
HCO3 BLD-SCNC: 21.6 MEQ/L
HCO3 BLD-SCNC: 22.1 MEQ/L
HCO3 BLD-SCNC: 22.6 MEQ/L
HCO3 BLD-SCNC: 22.6 MEQ/L
HCO3 BLD-SCNC: 24 MEQ/L
HCO3 BLD-SCNC: 24.4 MEQ/L
HCT VFR BLD AUTO: 35.6 %
HCT VFR BLD CALC: 28 %
HCT VFR BLD CALC: 28 %
HCT VFR BLD CALC: 29 %
HCT VFR BLD CALC: 29 %
HCT VFR BLD CALC: 30 %
HCT VFR BLD CALC: 32 %
HCT VFR BLD CALC: 33 %
HCT VFR BLD CALC: 36 %
HGB BLD-MCNC: 12.2 G/DL
IMM GRANULOCYTES # BLD AUTO: 0.03 X10(3) UL (ref 0–1)
IMM GRANULOCYTES NFR BLD: 0.4 %
INR BLD: 1.29 (ref 0.8–1.2)
ISTAT ACTIVATED CLOTTING TIME: 129 SECONDS (ref 74–137)
ISTAT ACTIVATED CLOTTING TIME: 135 SECONDS (ref 74–137)
ISTAT ACTIVATED CLOTTING TIME: 268 SECONDS (ref 74–137)
ISTAT ACTIVATED CLOTTING TIME: 285 SECONDS (ref 74–137)
ISTAT ACTIVATED CLOTTING TIME: 285 SECONDS (ref 74–137)
ISTAT ACTIVATED CLOTTING TIME: 302 SECONDS (ref 74–137)
ISTAT ACTIVATED CLOTTING TIME: 308 SECONDS (ref 74–137)
ISTAT ACTIVATED CLOTTING TIME: 314 SECONDS (ref 74–137)
LYMPHOCYTES # BLD AUTO: 0.89 X10(3) UL (ref 1–4)
LYMPHOCYTES NFR BLD AUTO: 12.4 %
MAGNESIUM SERPL-MCNC: 1.6 MG/DL (ref 1.6–2.6)
MCH RBC QN AUTO: 27.7 PG (ref 26–34)
MCHC RBC AUTO-ENTMCNC: 34.3 G/DL (ref 31–37)
MCV RBC AUTO: 80.7 FL
MONOCYTES # BLD AUTO: 0.44 X10(3) UL (ref 0.1–1)
MONOCYTES NFR BLD AUTO: 6.2 %
NEUTROPHILS # BLD AUTO: 5.64 X10 (3) UL (ref 1.5–7.7)
NEUTROPHILS # BLD AUTO: 5.64 X10(3) UL (ref 1.5–7.7)
NEUTROPHILS NFR BLD AUTO: 78.9 %
OSMOLALITY SERPL CALC.SUM OF ELEC: 287 MOSM/KG (ref 275–295)
PCO2 BLD: 44.7 MMHG
PCO2 BLD: 45.3 MMHG
PCO2 BLD: 45.3 MMHG
PCO2 BLD: 45.6 MMHG
PCO2 BLD: 45.8 MMHG
PCO2 BLD: 46.7 MMHG
PCO2 BLD: 47.8 MMHG
PCO2 BLD: 51.6 MMHG
PH BLD: 7.27 [PH]
PH BLD: 7.28 [PH]
PH BLD: 7.29 [PH]
PH BLD: 7.3 [PH]
PH BLD: 7.31 [PH]
PH BLD: 7.32 [PH]
PLATELET # BLD AUTO: 169 10(3)UL (ref 150–450)
PO2 BLD: 288 MMHG
PO2 BLD: 303 MMHG
PO2 BLD: 305 MMHG
PO2 BLD: 308 MMHG
PO2 BLD: 310 MMHG
PO2 BLD: 313 MMHG
PO2 BLD: 344 MMHG
PO2 BLD: 415 MMHG
POTASSIUM BLD-SCNC: 3.7 MMOL/L (ref 3.6–5.1)
POTASSIUM BLD-SCNC: 4.5 MMOL/L (ref 3.6–5.1)
POTASSIUM BLD-SCNC: 4.8 MMOL/L (ref 3.6–5.1)
POTASSIUM BLD-SCNC: 5 MMOL/L (ref 3.6–5.1)
POTASSIUM BLD-SCNC: 5.1 MMOL/L (ref 3.6–5.1)
POTASSIUM BLD-SCNC: 5.1 MMOL/L (ref 3.6–5.1)
POTASSIUM SERPL-SCNC: 3.8 MMOL/L (ref 3.5–5.1)
PROT SERPL-MCNC: 7 G/DL (ref 5.7–8.2)
PROTHROMBIN TIME: 16.3 SECONDS (ref 11.6–14.8)
RBC # BLD AUTO: 4.41 X10(6)UL
SAO2 % BLD: 100 %
SODIUM BLD-SCNC: 136 MMOL/L (ref 136–145)
SODIUM BLD-SCNC: 137 MMOL/L (ref 136–145)
SODIUM BLD-SCNC: 138 MMOL/L (ref 136–145)
SODIUM SERPL-SCNC: 137 MMOL/L (ref 136–145)
WBC # BLD AUTO: 7.2 X10(3) UL (ref 4–11)

## 2025-03-21 PROCEDURE — 36430 TRANSFUSION BLD/BLD COMPNT: CPT | Performed by: INTERNAL MEDICINE

## 2025-03-21 PROCEDURE — B41GYZZ FLUOROSCOPY OF LEFT LOWER EXTREMITY ARTERIES USING OTHER CONTRAST: ICD-10-PCS | Performed by: SURGERY

## 2025-03-21 PROCEDURE — B418YZZ FLUOROSCOPY OF BILATERAL RENAL ARTERIES USING OTHER CONTRAST: ICD-10-PCS | Performed by: SURGERY

## 2025-03-21 PROCEDURE — 30233N1 TRANSFUSION OF NONAUTOLOGOUS RED BLOOD CELLS INTO PERIPHERAL VEIN, PERCUTANEOUS APPROACH: ICD-10-PCS | Performed by: SURGERY

## 2025-03-21 PROCEDURE — 04V03FZ RESTRICTION OF ABDOMINAL AORTA WITH BRANCHED OR FENESTRATED INTRALUMINAL DEVICE, THREE OR MORE ARTERIES, PERCUTANEOUS APPROACH: ICD-10-PCS | Performed by: SURGERY

## 2025-03-21 PROCEDURE — 031J0ZY BYPASS LEFT COMMON CAROTID ARTERY TO UPPER ARTERY, OPEN APPROACH: ICD-10-PCS | Performed by: SURGERY

## 2025-03-21 PROCEDURE — B44HZZ3 ULTRASONOGRAPHY OF BILATERAL LOWER EXTREMITY ARTERIES, INTRAVASCULAR: ICD-10-PCS | Performed by: SURGERY

## 2025-03-21 PROCEDURE — 02VW3EZ RESTRICTION OF THORACIC AORTA, DESCENDING WITH BRANCHED OR FENESTRATED INTRALUMINAL DEVICE, ONE OR TWO ARTERIES, PERCUTANEOUS APPROACH: ICD-10-PCS | Performed by: SURGERY

## 2025-03-21 PROCEDURE — B440ZZ3 ULTRASONOGRAPHY OF ABDOMINAL AORTA, INTRAVASCULAR: ICD-10-PCS | Performed by: SURGERY

## 2025-03-21 PROCEDURE — 047F3DZ DILATION OF LEFT INTERNAL ILIAC ARTERY WITH INTRALUMINAL DEVICE, PERCUTANEOUS APPROACH: ICD-10-PCS | Performed by: SURGERY

## 2025-03-21 PROCEDURE — B414YZZ FLUOROSCOPY OF SUPERIOR MESENTERIC ARTERY USING OTHER CONTRAST: ICD-10-PCS | Performed by: SURGERY

## 2025-03-21 PROCEDURE — B410YZZ FLUOROSCOPY OF ABDOMINAL AORTA USING OTHER CONTRAST: ICD-10-PCS | Performed by: SURGERY

## 2025-03-21 PROCEDURE — B310YZZ FLUOROSCOPY OF THORACIC AORTA USING OTHER CONTRAST: ICD-10-PCS | Performed by: SURGERY

## 2025-03-21 PROCEDURE — B444ZZ3 ULTRASONOGRAPHY OF SUPERIOR MESENTERIC ARTERY, INTRAVASCULAR: ICD-10-PCS | Performed by: SURGERY

## 2025-03-21 PROCEDURE — 02VW3DZ RESTRICTION OF THORACIC AORTA, DESCENDING WITH INTRALUMINAL DEVICE, PERCUTANEOUS APPROACH: ICD-10-PCS | Performed by: SURGERY

## 2025-03-21 PROCEDURE — 71045 X-RAY EXAM CHEST 1 VIEW: CPT | Performed by: SURGERY

## 2025-03-21 DEVICE — VIABAHN BX BALLOON EXP ENDO 11MMX59MM 8FR135CMCATH HEPARIN
Type: IMPLANTABLE DEVICE | Status: FUNCTIONAL
Brand: GORE VIABAHN VBX BALLOON EXPANDABLE ENDO

## 2025-03-21 DEVICE — VIABAHN SX ENDO HEPARIN 18 RO 6MMX5CM 6FR 120CM CATH
Type: IMPLANTABLE DEVICE | Site: AORTA | Status: FUNCTIONAL
Brand: GORE VIABAHN ENDOPROSTHESIS WITH HEPARIN

## 2025-03-21 DEVICE — CTAG CMDS 37MMX37MMX20CM 22FR
Type: IMPLANTABLE DEVICE | Site: AORTA | Status: FUNCTIONAL
Brand: GORE TAG CONFORMABLE THORACIC STENT GRAFT

## 2025-03-21 DEVICE — IMPLANTABLE DEVICE: Type: IMPLANTABLE DEVICE | Site: ABDOMEN | Status: FUNCTIONAL

## 2025-03-21 DEVICE — BARD® PTFE FELT PLEDGETS, (OVAL), 4.8 MM X 6 MM
Type: IMPLANTABLE DEVICE | Site: AORTA
Brand: BARD® PTFE FELT PLEDGETS

## 2025-03-21 DEVICE — TAG TBE THORACIC SIDE BRANCH 20MMX6CM 12MM HEPARIN
Type: IMPLANTABLE DEVICE | Site: AORTA | Status: FUNCTIONAL
Brand: GORE TAG THORACIC BRANCH ENDOPROSTHESIS

## 2025-03-21 DEVICE — VIABAHN BX BALLOON EXP ENDO 11MMX39MM 8FR 80CMCATH HEPARIN
Type: IMPLANTABLE DEVICE | Status: FUNCTIONAL
Brand: GORE VIABAHN VBX BALLOON EXPANDABLE ENDO

## 2025-03-21 DEVICE — ZENITH ALPHA 2, THORACIC ENDOVASCULAR GRAFT PROXIMAL TAPERED COMPONENT
Type: IMPLANTABLE DEVICE | Site: AORTA
Brand: ZENITH ALPHA

## 2025-03-21 DEVICE — EXCLUDER CONFORM AAA EXT ENDO 36MMX4.5CM 18FR
Type: IMPLANTABLE DEVICE | Status: FUNCTIONAL
Brand: GORE EXCLUDER CONF AAA ENDO - AORTIC EXTENDER

## 2025-03-21 DEVICE — IMPLANTABLE DEVICE: Type: IMPLANTABLE DEVICE | Site: AORTA | Status: FUNCTIONAL

## 2025-03-21 DEVICE — IMPLANTABLE DEVICE: Type: IMPLANTABLE DEVICE | Site: LEG | Status: FUNCTIONAL

## 2025-03-21 RX ORDER — NITROGLYCERIN 20 MG/100ML
INJECTION INTRAVENOUS CONTINUOUS PRN
Status: DISCONTINUED | OUTPATIENT
Start: 2025-03-21 | End: 2025-03-21 | Stop reason: SURG

## 2025-03-21 RX ORDER — ACETAMINOPHEN 325 MG/1
650 TABLET ORAL EVERY 4 HOURS PRN
Status: DISCONTINUED | OUTPATIENT
Start: 2025-03-21 | End: 2025-04-18

## 2025-03-21 RX ORDER — SENNOSIDES 8.8 MG/5ML
10 LIQUID ORAL 2 TIMES DAILY
Status: DISCONTINUED | OUTPATIENT
Start: 2025-03-21 | End: 2025-03-23

## 2025-03-21 RX ORDER — PHENYLEPHRINE HCL 10 MG/ML
VIAL (ML) INJECTION AS NEEDED
Status: DISCONTINUED | OUTPATIENT
Start: 2025-03-21 | End: 2025-03-21 | Stop reason: SURG

## 2025-03-21 RX ORDER — HYDROCODONE BITARTRATE AND ACETAMINOPHEN 5; 325 MG/1; MG/1
1 TABLET ORAL EVERY 4 HOURS PRN
Status: DISCONTINUED | OUTPATIENT
Start: 2025-03-21 | End: 2025-04-18

## 2025-03-21 RX ORDER — HYDROCODONE BITARTRATE AND ACETAMINOPHEN 5; 325 MG/1; MG/1
2 TABLET ORAL EVERY 4 HOURS PRN
Status: DISCONTINUED | OUTPATIENT
Start: 2025-03-21 | End: 2025-04-18

## 2025-03-21 RX ORDER — HYDROMORPHONE HYDROCHLORIDE 1 MG/ML
0.5 INJECTION, SOLUTION INTRAMUSCULAR; INTRAVENOUS; SUBCUTANEOUS EVERY 2 HOUR PRN
Status: DISCONTINUED | OUTPATIENT
Start: 2025-03-21 | End: 2025-03-22

## 2025-03-21 RX ORDER — MIDAZOLAM HYDROCHLORIDE 1 MG/ML
INJECTION INTRAMUSCULAR; INTRAVENOUS AS NEEDED
Status: DISCONTINUED | OUTPATIENT
Start: 2025-03-21 | End: 2025-03-21 | Stop reason: SURG

## 2025-03-21 RX ORDER — CALCIUM CHLORIDE 100 MG/ML
INJECTION INTRAVENOUS; INTRAVENTRICULAR AS NEEDED
Status: DISCONTINUED | OUTPATIENT
Start: 2025-03-21 | End: 2025-03-21 | Stop reason: SURG

## 2025-03-21 RX ORDER — ROCURONIUM BROMIDE 10 MG/ML
INJECTION, SOLUTION INTRAVENOUS AS NEEDED
Status: DISCONTINUED | OUTPATIENT
Start: 2025-03-21 | End: 2025-03-21 | Stop reason: SURG

## 2025-03-21 RX ORDER — PROTAMINE SULFATE 10 MG/ML
INJECTION, SOLUTION INTRAVENOUS AS NEEDED
Status: DISCONTINUED | OUTPATIENT
Start: 2025-03-21 | End: 2025-03-21 | Stop reason: SURG

## 2025-03-21 RX ORDER — HYDROMORPHONE HYDROCHLORIDE 1 MG/ML
0.8 INJECTION, SOLUTION INTRAMUSCULAR; INTRAVENOUS; SUBCUTANEOUS EVERY 2 HOUR PRN
Status: DISCONTINUED | OUTPATIENT
Start: 2025-03-21 | End: 2025-03-27

## 2025-03-21 RX ORDER — ACETAMINOPHEN 10 MG/ML
1000 INJECTION, SOLUTION INTRAVENOUS EVERY 6 HOURS PRN
Status: DISCONTINUED | OUTPATIENT
Start: 2025-03-21 | End: 2025-03-24

## 2025-03-21 RX ORDER — SODIUM CHLORIDE, SODIUM LACTATE, POTASSIUM CHLORIDE, CALCIUM CHLORIDE 600; 310; 30; 20 MG/100ML; MG/100ML; MG/100ML; MG/100ML
INJECTION, SOLUTION INTRAVENOUS CONTINUOUS
Status: DISCONTINUED | OUTPATIENT
Start: 2025-03-21 | End: 2025-03-23

## 2025-03-21 RX ORDER — LIDOCAINE HYDROCHLORIDE 10 MG/ML
INJECTION, SOLUTION EPIDURAL; INFILTRATION; INTRACAUDAL; PERINEURAL AS NEEDED
Status: DISCONTINUED | OUTPATIENT
Start: 2025-03-21 | End: 2025-03-21 | Stop reason: SURG

## 2025-03-21 RX ORDER — HEPARIN SODIUM 1000 [USP'U]/ML
INJECTION, SOLUTION INTRAVENOUS; SUBCUTANEOUS AS NEEDED
Status: DISCONTINUED | OUTPATIENT
Start: 2025-03-21 | End: 2025-03-21 | Stop reason: SURG

## 2025-03-21 RX ORDER — HEPARIN SODIUM 5000 [USP'U]/ML
5000 INJECTION, SOLUTION INTRAVENOUS; SUBCUTANEOUS EVERY 12 HOURS SCHEDULED
Status: DISCONTINUED | OUTPATIENT
Start: 2025-03-22 | End: 2025-03-26

## 2025-03-21 RX ORDER — DEXMEDETOMIDINE HYDROCHLORIDE 4 UG/ML
INJECTION, SOLUTION INTRAVENOUS CONTINUOUS
Status: DISCONTINUED | OUTPATIENT
Start: 2025-03-21 | End: 2025-03-23

## 2025-03-21 RX ORDER — ONDANSETRON 2 MG/ML
4 INJECTION INTRAMUSCULAR; INTRAVENOUS EVERY 6 HOURS PRN
Status: DISCONTINUED | OUTPATIENT
Start: 2025-03-21 | End: 2025-04-18

## 2025-03-21 RX ORDER — MINERAL OIL AND PETROLATUM 150; 830 MG/G; MG/G
1 OINTMENT OPHTHALMIC NIGHTLY
Status: DISCONTINUED | OUTPATIENT
Start: 2025-03-21 | End: 2025-03-23

## 2025-03-21 RX ORDER — INDOMETHACIN 25 MG/1
CAPSULE ORAL AS NEEDED
Status: DISCONTINUED | OUTPATIENT
Start: 2025-03-21 | End: 2025-03-21 | Stop reason: SURG

## 2025-03-21 RX ORDER — HYDROMORPHONE HYDROCHLORIDE 1 MG/ML
0.2 INJECTION, SOLUTION INTRAMUSCULAR; INTRAVENOUS; SUBCUTANEOUS EVERY 2 HOUR PRN
Status: DISCONTINUED | OUTPATIENT
Start: 2025-03-21 | End: 2025-04-07

## 2025-03-21 RX ORDER — POLYETHYLENE GLYCOL 3350 17 G/17G
17 POWDER, FOR SOLUTION ORAL DAILY PRN
Status: DISCONTINUED | OUTPATIENT
Start: 2025-03-21 | End: 2025-03-23

## 2025-03-21 RX ORDER — METOCLOPRAMIDE HYDROCHLORIDE 5 MG/ML
10 INJECTION INTRAMUSCULAR; INTRAVENOUS EVERY 8 HOURS PRN
Status: DISCONTINUED | OUTPATIENT
Start: 2025-03-21 | End: 2025-04-18

## 2025-03-21 RX ORDER — ONDANSETRON 2 MG/ML
INJECTION INTRAMUSCULAR; INTRAVENOUS AS NEEDED
Status: DISCONTINUED | OUTPATIENT
Start: 2025-03-21 | End: 2025-03-21 | Stop reason: SURG

## 2025-03-21 RX ORDER — ACETAMINOPHEN 160 MG/5ML
650 SOLUTION ORAL EVERY 4 HOURS PRN
Status: DISCONTINUED | OUTPATIENT
Start: 2025-03-21 | End: 2025-03-25

## 2025-03-21 RX ORDER — ACETAMINOPHEN 650 MG/1
650 SUPPOSITORY RECTAL EVERY 4 HOURS PRN
Status: DISCONTINUED | OUTPATIENT
Start: 2025-03-21 | End: 2025-03-25

## 2025-03-21 RX ORDER — NITROGLYCERIN 5 MG/ML
INJECTION, SOLUTION INTRAVENOUS AS NEEDED
Status: DISCONTINUED | OUTPATIENT
Start: 2025-03-21 | End: 2025-03-21 | Stop reason: SURG

## 2025-03-21 RX ORDER — DEXAMETHASONE SODIUM PHOSPHATE 4 MG/ML
VIAL (ML) INJECTION AS NEEDED
Status: DISCONTINUED | OUTPATIENT
Start: 2025-03-21 | End: 2025-03-21 | Stop reason: SURG

## 2025-03-21 RX ORDER — BUPIVACAINE HYDROCHLORIDE 5 MG/ML
INJECTION, SOLUTION EPIDURAL; INTRACAUDAL; PERINEURAL AS NEEDED
Status: DISCONTINUED | OUTPATIENT
Start: 2025-03-21 | End: 2025-03-23

## 2025-03-21 RX ORDER — SODIUM CHLORIDE 9 MG/ML
INJECTION, SOLUTION INTRAVENOUS CONTINUOUS PRN
Status: DISCONTINUED | OUTPATIENT
Start: 2025-03-21 | End: 2025-03-21 | Stop reason: SURG

## 2025-03-21 RX ORDER — HYDROMORPHONE HYDROCHLORIDE 1 MG/ML
0.4 INJECTION, SOLUTION INTRAMUSCULAR; INTRAVENOUS; SUBCUTANEOUS EVERY 2 HOUR PRN
Status: DISCONTINUED | OUTPATIENT
Start: 2025-03-21 | End: 2025-04-07

## 2025-03-21 RX ORDER — FAMOTIDINE 10 MG/ML
20 INJECTION, SOLUTION INTRAVENOUS 2 TIMES DAILY
Status: DISCONTINUED | OUTPATIENT
Start: 2025-03-21 | End: 2025-03-22

## 2025-03-21 RX ORDER — BISACODYL 10 MG
10 SUPPOSITORY, RECTAL RECTAL
Status: DISCONTINUED | OUTPATIENT
Start: 2025-03-21 | End: 2025-03-25

## 2025-03-21 RX ORDER — CHLORHEXIDINE GLUCONATE ORAL RINSE 1.2 MG/ML
15 SOLUTION DENTAL
Status: DISCONTINUED | OUTPATIENT
Start: 2025-03-22 | End: 2025-03-23

## 2025-03-21 RX ADMIN — ROCURONIUM BROMIDE 10 MG: 10 INJECTION, SOLUTION INTRAVENOUS at 16:31:00

## 2025-03-21 RX ADMIN — HEPARIN SODIUM 2000 UNITS: 1000 INJECTION, SOLUTION INTRAVENOUS; SUBCUTANEOUS at 11:48:00

## 2025-03-21 RX ADMIN — NITROGLYCERIN 30 MCG/MIN: 20 INJECTION INTRAVENOUS at 21:40:00

## 2025-03-21 RX ADMIN — SODIUM CHLORIDE: 9 INJECTION, SOLUTION INTRAVENOUS at 16:01:00

## 2025-03-21 RX ADMIN — SODIUM CHLORIDE: 9 INJECTION, SOLUTION INTRAVENOUS at 08:06:00

## 2025-03-21 RX ADMIN — LIDOCAINE HYDROCHLORIDE 50 MG: 10 INJECTION, SOLUTION EPIDURAL; INFILTRATION; INTRACAUDAL; PERINEURAL at 08:18:00

## 2025-03-21 RX ADMIN — CALCIUM CHLORIDE 0.5 G: 100 INJECTION INTRAVENOUS; INTRAVENTRICULAR at 17:18:00

## 2025-03-21 RX ADMIN — CALCIUM CHLORIDE 0.5 G: 100 INJECTION INTRAVENOUS; INTRAVENTRICULAR at 16:59:00

## 2025-03-21 RX ADMIN — PHENYLEPHRINE HCL 50 MCG: 10 MG/ML VIAL (ML) INJECTION at 09:31:00

## 2025-03-21 RX ADMIN — ROCURONIUM BROMIDE 10 MG: 10 INJECTION, SOLUTION INTRAVENOUS at 18:52:00

## 2025-03-21 RX ADMIN — ROCURONIUM BROMIDE 10 MG: 10 INJECTION, SOLUTION INTRAVENOUS at 10:15:00

## 2025-03-21 RX ADMIN — ROCURONIUM BROMIDE 10 MG: 10 INJECTION, SOLUTION INTRAVENOUS at 17:54:00

## 2025-03-21 RX ADMIN — NITROGLYCERIN 20 MCG: 5 INJECTION, SOLUTION INTRAVENOUS at 12:41:00

## 2025-03-21 RX ADMIN — INDOMETHACIN 25 ML: 25 CAPSULE ORAL at 18:30:00

## 2025-03-21 RX ADMIN — ROCURONIUM BROMIDE 10 MG: 10 INJECTION, SOLUTION INTRAVENOUS at 17:03:00

## 2025-03-21 RX ADMIN — CALCIUM CHLORIDE 0.5 G: 100 INJECTION INTRAVENOUS; INTRAVENTRICULAR at 19:45:00

## 2025-03-21 RX ADMIN — NITROGLYCERIN 20 MCG: 5 INJECTION, SOLUTION INTRAVENOUS at 12:43:00

## 2025-03-21 RX ADMIN — DEXAMETHASONE SODIUM PHOSPHATE 8 MG: 4 MG/ML VIAL (ML) INJECTION at 08:35:00

## 2025-03-21 RX ADMIN — HEPARIN SODIUM 2000 UNITS: 1000 INJECTION, SOLUTION INTRAVENOUS; SUBCUTANEOUS at 12:23:00

## 2025-03-21 RX ADMIN — ROCURONIUM BROMIDE 10 MG: 10 INJECTION, SOLUTION INTRAVENOUS at 19:26:00

## 2025-03-21 RX ADMIN — ROCURONIUM BROMIDE 10 MG: 10 INJECTION, SOLUTION INTRAVENOUS at 16:00:00

## 2025-03-21 RX ADMIN — SODIUM CHLORIDE: 9 INJECTION, SOLUTION INTRAVENOUS at 19:45:00

## 2025-03-21 RX ADMIN — NITROGLYCERIN 20 MCG/MIN: 20 INJECTION INTRAVENOUS at 21:46:00

## 2025-03-21 RX ADMIN — MIDAZOLAM HYDROCHLORIDE 2 MG: 1 INJECTION INTRAMUSCULAR; INTRAVENOUS at 08:18:00

## 2025-03-21 RX ADMIN — PROTAMINE SULFATE 60 MG: 10 INJECTION, SOLUTION INTRAVENOUS at 20:19:00

## 2025-03-21 RX ADMIN — HEPARIN SODIUM 9000 UNITS: 1000 INJECTION, SOLUTION INTRAVENOUS; SUBCUTANEOUS at 11:04:00

## 2025-03-21 RX ADMIN — ROCURONIUM BROMIDE 10 MG: 10 INJECTION, SOLUTION INTRAVENOUS at 10:59:00

## 2025-03-21 RX ADMIN — CALCIUM CHLORIDE 0.5 G: 100 INJECTION INTRAVENOUS; INTRAVENTRICULAR at 19:26:00

## 2025-03-21 RX ADMIN — ROCURONIUM BROMIDE 10 MG: 10 INJECTION, SOLUTION INTRAVENOUS at 13:23:00

## 2025-03-21 RX ADMIN — ROCURONIUM BROMIDE 50 MG: 10 INJECTION, SOLUTION INTRAVENOUS at 08:19:00

## 2025-03-21 RX ADMIN — ROCURONIUM BROMIDE 10 MG: 10 INJECTION, SOLUTION INTRAVENOUS at 09:32:00

## 2025-03-21 RX ADMIN — INDOMETHACIN 25 ML: 25 CAPSULE ORAL at 19:31:00

## 2025-03-21 RX ADMIN — ONDANSETRON 4 MG: 2 INJECTION INTRAMUSCULAR; INTRAVENOUS at 20:19:00

## 2025-03-21 RX ADMIN — SODIUM CHLORIDE: 9 INJECTION, SOLUTION INTRAVENOUS at 18:32:00

## 2025-03-21 RX ADMIN — ROCURONIUM BROMIDE 10 MG: 10 INJECTION, SOLUTION INTRAVENOUS at 14:56:00

## 2025-03-21 NOTE — PROGRESS NOTES
Attempted to see patient's multiple times this morning.  Patient is in the OR.  Chart reviewed.  Will try again in the afternoon.      Dallas Green,   Hospitalist  [facility]

## 2025-03-21 NOTE — ANESTHESIA PROCEDURE NOTES
Central Line    Date/Time: 3/21/2025 8:25 AM    Performed by: Lionel Amado MD  Authorized by: Lionel Amado MD    General Information and Staff    Procedure Start:  3/21/2025 8:25 AM  Procedure End:  3/21/2025 8:35 AM  Anesthesiologist:  Lionel Amado MD  Performed by:  Anesthesiologist  Patient Location:  OR  Indication: central venous access and CVP monitoring    Site Identification: real time ultrasound guided and image stored and retrievable        Procedure Detail    Patient Position:  Trendelenburg  Laterality:  Right  Site:  Internal jugular  Prep:  Chloraprep  Catheter Size:  9 Fr  Catheter Type:  MAC introducer  Number of Lumens:  Double lumen  Procedure Detail: target vein identified, needle advanced into vein and blood aspirated and guidewire advanced into vein    Seldinger Technique?: Yes    Intravenous Verification: verified by ultrasound and venous blood return    Post Insertion: all ports aspirated, all ports flushed easily, guidewire was removed intact, line was sutured in place and dressing was applied      Assessment    Events: patient tolerated procedure well with no complications      PA Catheter Placement    PA Catheter Placed?: No      Additional Comments

## 2025-03-21 NOTE — ANESTHESIA PROCEDURE NOTES
Arterial Line    Date/Time: 3/21/2025 8:25 AM    Performed by: Lionel Amado MD  Authorized by: Lionel Amado MD    General Information and Staff    Procedure Start:  3/21/2025 8:25 AM  Procedure End:  3/21/2025 8:25 AM  Anesthesiologist:  Lionel Amado MD  Performed By:  Anesthesiologist  Patient Location:  OR  Indication: continuous blood pressure monitoring and blood sampling needed    Site Identification: real time ultrasound guided and image stored and retrievable    Preanesthetic Checklist: 2 patient identifiers, IV checked, risks and benefits discussed, monitors and equipment checked, pre-op evaluation, timeout performed, anesthesia consent and sterile technique used    Procedure Details    Catheter Size:  20 G  Catheter Length:  1 and 3/4 inch  Catheter Type:  Arrow  Seldinger Technique?: Yes    Laterality:  Right  Site:  Radial artery  Site Prep: chlorhexidine    Line Secured:  Tape and Tegaderm    Assessment    Events: patient tolerated procedure well with no complications      Medications  3/21/2025 8:25 AM      Additional Comments

## 2025-03-21 NOTE — PLAN OF CARE
Pt from IR lab around 1300, s/p lumber drain placement. Pt flat for 2 hours then HOB elevated and pt oob. Pt denies pain. Draining 5cc every 2 hours from lumber drain. VSS. Sr on monitor, BP stable. Tolerating diet and voiding without difficulty. Consent signed for OR tomorrow  Problem: CARDIOVASCULAR - ADULT  Goal: Maintains optimal cardiac output and hemodynamic stability  Description: INTERVENTIONS:- Monitor vital signs, rhythm, and trends- Monitor for bleeding, hypotension and signs of decreased cardiac output- Evaluate effectiveness of vasoactive medications to optimize hemodynamic stability- Monitor arterial and/or venous puncture sites for bleeding and/or hematoma- Assess quality of pulses, skin color and temperature- Assess for signs of decreased coronary artery perfusion - ex. Angina- Evaluate fluid balance, assess for edema, trend weights  Outcome: Progressing     Problem: HEMATOLOGIC - ADULT  Goal: Free from bleeding injury  Description: (Example usage: patient with low platelets)INTERVENTIONS:- Avoid intramuscular injections, enemas and rectal medication administration- Ensure safe mobilization of patient- Hold pressure on venipuncture sites to achieve adequate hemostasis- Assess for signs and symptoms of internal bleeding- Monitor lab trends- Patient is to report abnormal signs of bleeding to staff- Avoid use of toothpicks and dental floss- Use electric shaver for shaving- Use soft bristle tooth brush- Limit straining and forceful nose blowing  Outcome: Progressing

## 2025-03-21 NOTE — ANESTHESIA PROCEDURE NOTES
Airway  Date/Time: 3/21/2025 8:21 AM  Urgency: elective      General Information and Staff    Patient location during procedure: OR  Anesthesiologist: Lionel Amado MD  Performed: anesthesiologist   Performed by: Lionel Amado MD  Authorized by: Lionel Amado MD      Indications and Patient Condition  Indications for airway management: anesthesia  Sedation level: deep  Preoxygenated: yes  Patient position: sniffing  Mask difficulty assessment: 1 - vent by mask    Final Airway Details  Final airway type: endotracheal airway      Successful airway: ETT  Cuffed: yes   Successful intubation technique: Video laryngoscopy  Endotracheal tube insertion site: oral  Blade size: #3  ETT size (mm): 8.0    Placement verified by: capnometry   Cuff volume (mL): 10  Measured from: lips  ETT to lips (cm): 23  Number of attempts at approach: 1    Additional Comments  atraumatic

## 2025-03-21 NOTE — PROGRESS NOTES
Physician Clarification    Please specify chronic kidney disease:     CKD stage 3     This note is part of the patient's medical record.

## 2025-03-21 NOTE — PLAN OF CARE
Assumed care at around 1930. Pt A&Ox4. No complaints of pain. Voiding up in bathroom. IV abx given per order. Lumbar draining 5cc Q2 hr. NPO at midnight. Pt updated on plan of care. Call light within reach.

## 2025-03-22 ENCOUNTER — APPOINTMENT (OUTPATIENT)
Dept: MRI IMAGING | Facility: HOSPITAL | Age: 70
End: 2025-03-22
Attending: SURGERY
Payer: MEDICARE

## 2025-03-22 PROBLEM — R29.898 BILATERAL LEG WEAKNESS: Status: ACTIVE | Noted: 2025-03-22

## 2025-03-22 LAB
ALBUMIN SERPL-MCNC: 3.9 G/DL (ref 3.2–4.8)
ALBUMIN/GLOB SERPL: 1.6 {RATIO} (ref 1–2)
ALP LIVER SERPL-CCNC: 68 U/L
ALT SERPL-CCNC: 8 U/L
ANION GAP SERPL CALC-SCNC: 11 MMOL/L (ref 0–18)
ANION GAP SERPL CALC-SCNC: 17 MMOL/L (ref 0–18)
APTT PPP: 28.7 SECONDS (ref 23–36)
AST SERPL-CCNC: 30 U/L (ref ?–34)
BILIRUB SERPL-MCNC: 1.2 MG/DL (ref 0.2–1.1)
BUN BLD-MCNC: 33 MG/DL (ref 9–23)
BUN BLD-MCNC: 35 MG/DL (ref 9–23)
CALCIUM BLD-MCNC: 7.9 MG/DL (ref 8.7–10.6)
CALCIUM BLD-MCNC: 8.7 MG/DL (ref 8.7–10.6)
CHLORIDE SERPL-SCNC: 104 MMOL/L (ref 98–112)
CHLORIDE SERPL-SCNC: 106 MMOL/L (ref 98–112)
CO2 SERPL-SCNC: 17 MMOL/L (ref 21–32)
CO2 SERPL-SCNC: 22 MMOL/L (ref 21–32)
CREAT BLD-MCNC: 1.84 MG/DL
CREAT BLD-MCNC: 2.24 MG/DL
EGFRCR SERPLBLD CKD-EPI 2021: 31 ML/MIN/1.73M2 (ref 60–?)
EGFRCR SERPLBLD CKD-EPI 2021: 39 ML/MIN/1.73M2 (ref 60–?)
ERYTHROCYTE [DISTWIDTH] IN BLOOD BY AUTOMATED COUNT: 16.5 %
EST. AVERAGE GLUCOSE BLD GHB EST-MCNC: 123 MG/DL (ref 68–126)
GLOBULIN PLAS-MCNC: 2.5 G/DL (ref 2–3.5)
GLUCOSE BLD-MCNC: 118 MG/DL (ref 70–99)
GLUCOSE BLD-MCNC: 188 MG/DL (ref 70–99)
GLUCOSE BLD-MCNC: 207 MG/DL (ref 70–99)
HBA1C MFR BLD: 5.9 % (ref ?–5.7)
HCT VFR BLD AUTO: 24.4 %
HCT VFR BLD AUTO: 33.4 %
HGB BLD-MCNC: 11.2 G/DL
HGB BLD-MCNC: 8.1 G/DL
INR BLD: 1.63 (ref 0.8–1.2)
MAGNESIUM SERPL-MCNC: 1.5 MG/DL (ref 1.6–2.6)
MCH RBC QN AUTO: 28.7 PG (ref 26–34)
MCHC RBC AUTO-ENTMCNC: 33.5 G/DL (ref 31–37)
MCV RBC AUTO: 85.6 FL
OSMOLALITY SERPL CALC.SUM OF ELEC: 296 MOSM/KG (ref 275–295)
OSMOLALITY SERPL CALC.SUM OF ELEC: 300 MOSM/KG (ref 275–295)
PLATELET # BLD AUTO: 155 10(3)UL (ref 150–450)
POTASSIUM SERPL-SCNC: 4.2 MMOL/L (ref 3.5–5.1)
POTASSIUM SERPL-SCNC: 4.5 MMOL/L (ref 3.5–5.1)
PROT SERPL-MCNC: 6.4 G/DL (ref 5.7–8.2)
PROTHROMBIN TIME: 19.5 SECONDS (ref 11.6–14.8)
RBC # BLD AUTO: 3.9 X10(6)UL
SODIUM SERPL-SCNC: 138 MMOL/L (ref 136–145)
SODIUM SERPL-SCNC: 139 MMOL/L (ref 136–145)
TRIGL SERPL-MCNC: 113 MG/DL (ref 30–149)
TROPONIN I SERPL HS-MCNC: 12 NG/L
WBC # BLD AUTO: 18 X10(3) UL (ref 4–11)

## 2025-03-22 PROCEDURE — 30233K1 TRANSFUSION OF NONAUTOLOGOUS FROZEN PLASMA INTO PERIPHERAL VEIN, PERCUTANEOUS APPROACH: ICD-10-PCS | Performed by: SURGERY

## 2025-03-22 PROCEDURE — 72146 MRI CHEST SPINE W/O DYE: CPT | Performed by: SURGERY

## 2025-03-22 PROCEDURE — 99291 CRITICAL CARE FIRST HOUR: CPT | Performed by: OTHER

## 2025-03-22 PROCEDURE — 72148 MRI LUMBAR SPINE W/O DYE: CPT | Performed by: SURGERY

## 2025-03-22 RX ORDER — SODIUM CHLORIDE 9 MG/ML
INJECTION, SOLUTION INTRAVENOUS ONCE
Status: COMPLETED | OUTPATIENT
Start: 2025-03-22 | End: 2025-03-22

## 2025-03-22 RX ORDER — MAGNESIUM OXIDE 400 MG/1
800 TABLET ORAL ONCE
Status: COMPLETED | OUTPATIENT
Start: 2025-03-22 | End: 2025-03-22

## 2025-03-22 RX ORDER — NICOTINE POLACRILEX 4 MG
30 LOZENGE BUCCAL
Status: DISCONTINUED | OUTPATIENT
Start: 2025-03-22 | End: 2025-04-18

## 2025-03-22 RX ORDER — DOCUSATE SODIUM 100 MG/1
100 CAPSULE, LIQUID FILLED ORAL 2 TIMES DAILY
Status: DISCONTINUED | OUTPATIENT
Start: 2025-03-22 | End: 2025-04-11

## 2025-03-22 RX ORDER — POLYETHYLENE GLYCOL 3350 17 G/17G
17 POWDER, FOR SOLUTION ORAL DAILY
Status: DISCONTINUED | OUTPATIENT
Start: 2025-03-22 | End: 2025-04-11

## 2025-03-22 RX ORDER — NICOTINE POLACRILEX 4 MG
15 LOZENGE BUCCAL
Status: DISCONTINUED | OUTPATIENT
Start: 2025-03-22 | End: 2025-04-18

## 2025-03-22 RX ORDER — DEXTROSE MONOHYDRATE 25 G/50ML
50 INJECTION, SOLUTION INTRAVENOUS
Status: DISCONTINUED | OUTPATIENT
Start: 2025-03-22 | End: 2025-04-18

## 2025-03-22 RX ORDER — FAMOTIDINE 10 MG
10 TABLET ORAL DAILY
Status: DISCONTINUED | OUTPATIENT
Start: 2025-03-23 | End: 2025-03-22

## 2025-03-22 NOTE — PLAN OF CARE
Assumed care at around 2200. Q1 neuro checks, see flowsheets. Prn dilaudid given for pain. Prn zofran and reglan given. Lumbar drain in place, draining 10 ml Q1 hr.  SBP above 140. Bilateral groin sites C/D/I, soft, and no hematoma. IV abx given per order. Frequent repositioning. Pt updated on plan of care. Call light within reach.

## 2025-03-22 NOTE — PLAN OF CARE
Assumed care of patient this am.   At 0800 assessment, patient noting increased weakness R leg, not able to lift off bed, or move toes, numbness in right thigh. Dr. Oquendo at bedside, aware. 500ml bolus, BP parameters adjusted to 160-180. Movement increased slightly with SCD removal.     Patient later had repeated decreased right sided movement, and right sided \"floaters\" in center of visual field.Dr. Catherine notified. Orders for thoracic/lumba MRI, 1L fluid bolus, and increase lumbar drain fluid removal to 15ml/hr. Neurocritical care consulted.    Patient otherwise A&O x4. Bilateral upper extremities 5/5 strength, no deficits. Numbness/tingling to BLE, R>L. Left leg 4/5 strength, Right 3/5. See flowsheet for detailed neuro exam.    Bilateral groin sites remain soft, no hematoma, clean, dry, intact.   Lumbar drain with 15ml clear fluid out Q1H.   ROYER drain to bulb suction, 45ml out total.     Patient up to chair with total lift, tolerated well

## 2025-03-22 NOTE — PHYSICAL THERAPY NOTE
PHYSICAL THERAPY EVALUATION - INPATIENT     Room Number: 6615/6615-A  Evaluation Date: 3/22/2025  Type of Evaluation: Initial  Physician Order: PT Eval and Treat    Presenting Problem: S/p 3/21 fenestrated abdominal aortic aneurysm repair with left carotid to subclavian transposition on  with Dr. Yanez and Dr. Oquendo and 3/20 lumbar drain  Co-Morbidities : HTN, Peripheral Neuropathy, Hx of toe amputation L great toe  Reason for Therapy: Mobility Dysfunction and Discharge Planning    PHYSICAL THERAPY ASSESSMENT   Patient is a 69 year old male admitted 3/19/2025 for S/p 3/21 fenestrated abdominal aortic aneurysm repair with left carotid to subclavian transposition on  with Dr. Yanez and Dr. Oquendo and 3/20 lumbar drain.  Prior to admission, patient's baseline is IND.  Patient is currently functioning below baseline with bed mobility, transfers, and gait.  Patient is requiring moderate assist and maximum assist as a result of the following impairments: decreased endurance/aerobic capacity, pain, impaired Sitting balance, and decreased muscular endurance.  Physical Therapy will continue to follow for duration of hospitalization.    Patient will benefit from continued skilled PT Services. Pt would benefit with gradual or Intense rehab however, the type of PT services after discharge is undetermined at this time secondary to the progression of his recovery.     PLAN DURING HOSPITALIZATION  Nursing Mobility Recommendation : Lift Equipment  PT Device Recommendation: Rolling walker  PT Treatment Plan: Bed mobility, Body mechanics, Gait training, Strengthening, Transfer training, Stair training, Balance training  Rehab Potential : Good  Frequency (Obs): 3-5x/week     CURRENT GOALS    Goal #1 Patient is able to demonstrate supine - sit EOB @ level: minimum assistance     Goal #2 Patient is able to demonstrate transfers EOB to/from [facility] at assistance level: minimum assistance     Goal #3 Patient is able to sit at the EOB  for 10 mins with CGA.      Goal #4 Patient is able to ambulate 50 feet with assist device: walker - rolling at assistance level: moderate assistance   Goal #5    Goal #6    Goal Comments: Goals established on 3/22/2025      PHYSICAL THERAPY MEDICAL/SOCIAL HISTORY  History related to current admission: Patient is a 69 year old male admitted on 3/19/2025 from Home for S/p 3/21 fenestrated abdominal aortic aneurysm repair with left carotid to subclavian transposition on  with Dr. Yanez and Dr. Oquendo and 3/20 lumbar drain.      HOME SITUATION  Type of Home: House  Home Layout: Two level           Stairs to Bedroom: 16    Railing: Yes    Lives With: Spouse               Prior Level of [place]: Pt states that he lives in a house with spouse. Pt  reports that he was IND with all his ADLs and gait. Pt has no history of falls.    SUBJECTIVE  \"My right leg is weak, I hope I am not crippled\"    OBJECTIVE  Precautions:  (>160 SBP, Lumbar Drain, Surgical boot on the L foot)  Fall Risk: High fall risk    WEIGHT BEARING RESTRICTION     PAIN ASSESSMENT  Ratin  Location: Pt reports no pain       COGNITION  Overall Cognitive Status:  WFL - within functional limits  Pt is observed to be impulsive with poor safety awareness.     RANGE OF MOTION AND STRENGTH ASSESSMENT  Upper extremity ROM and strength are within functional limits     Lower extremity ROM is within functional limits     Lower extremity strength is within functional limits except for the following:    Right Hip flexion  1/5  Left Hip flexion  2+/5  Right Knee extension  1/5  Left Knee extension  2+/5  Right Knee flexion  1/5  Left Knee flexion  2+/5  Right Dorsiflexion  2+/5  Left Dorsiflexion  2+/5    BALANCE  Static Sitting: Poor +  Dynamic Sitting: Poor  Static Standing: Not tested  Dynamic Standing: Not tested    ADDITIONAL TESTS                                    ACTIVITY TOLERANCE                         O2 WALK       NEUROLOGICAL FINDINGS                         AM-PAC '6-Clicks' INPATIENT SHORT FORM - BASIC MOBILITY  How much difficulty does the patient currently have...  Patient Difficulty: Turning over in bed (including adjusting bedclothes, sheets and blankets)?: A Lot   Patient Difficulty: Sitting down on and standing up from a chair with arms (e.g., wheelchair, bedside commode, etc.): A Lot   Patient Difficulty: Moving from lying on back to sitting on the side of the bed?: A Lot   How much help from another person does the patient currently need...   Help from Another: Moving to and from a bed to a chair (including a wheelchair)?: A Lot   Help from Another: Need to walk in hospital room?: Total   Help from Another: Climbing 3-5 steps with a railing?: Total     AM-PAC Score:  Raw Score: 10   Approx Degree of Impairment: 76.75%   Standardized Score (AM-PAC Scale): 32.29   CMS Modifier (G-Code): CL    FUNCTIONAL ABILITY STATUS  Gait Assessment   Functional Mobility/Gait Assessment  Gait Assistance: Not tested    Skilled Therapy Provided     Bed Mobility:  Rolling: Mod A  Supine to sit: Mod A   Sit to supine: Mod A     Transfer Mobility:  Sit to stand: NT   Stand to sit: NT  Gait = NT    Therapist's Comments: Pt was observed to require total assist for his RLE to bring to the EOB. While sitting at the EOB pt demonstrated the need of Mod a to maintain balance at times pt was observed to require min A. Pt demonstrated increase sitting sway as pt attempted to self himself. Pt required constant cues to decrease activity speed as pt demonstrates to be impulsive. Pt was unable to demonstrate force production on the BLE preventing pt from attempting to perform sit to stand. Discuss with RN the session and advise to use the katherine lift for transfers.     Exercise/Education Provided:  Bed mobility  Body mechanics  Gait training  Transfer training    Patient End of Session: In bed, Needs met, Call light within reach, RN aware of session/findings, All patient questions and  concerns addressed, Alarm set      Patient Evaluation Complexity Level:  History Moderate - 1 or 2 personal factors and/or co-morbidities   Examination of body systems Low -  addressing 1-2 elements   Clinical Presentation Low- Stable   Clinical Decision Making Low Complexity       PT Session Time: 23 minutes  Therapeutic Activity: 15 minutes

## 2025-03-22 NOTE — PLAN OF CARE
Assumed care this morning at 0700 however Pt to OR this morning around 0800. Pt NPO, ready for surgery and in good spirits. Lumber drain intact     Mary Herndon RN

## 2025-03-22 NOTE — PROGRESS NOTES
[facility] Note: Route Optimization    Famotidine (Pepcid) switched from IV to PO per P&T approved protocol.

## 2025-03-22 NOTE — PROGRESS NOTES
[facility] Hospitalist Progress Note     CC: Hospital Follow up    PCP: Kam Alberts MD       Assessment/Plan:     Active Problems:    Pre-op testing    Thoracoabdominal aortic aneurysm (TAAA)        69 year old male with a past medical history of hypertension, GERD, anxiety, renal calculi, DVT, ascending aortic dissection s/p repair, 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 Patient is now post op.      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  Planning for AAA repair with vascular.  -Inpatient admission, this is an impending high risk procedure with complications including spinal cord ischemia, mesenteric ischemia, possible renal failure.  -s/p lumbar drain with neurointerventional to limit risk of spinal cord ischemia 3/20  -Outpatient warfarin has been held since last Sunday. INR greater than 3 on outpatient testing. Repeat INR reviewed, 1.66 today.  No heparin GGT to be initiated per vascular.  -Further recommendations and management per vascular surgery     S/p complex aortic repair 3/21  - SBP goal >160, per vascualr  - pressors per vascular  - pulm consulted, recs reviewed  - post op managmenet per [facility]      HOLLIS on CKD  -Creatinine reviewed this admission, 1.46 (baseline 1.2)  -Continue to monitor with serial RFP's  -Avoid nephrotoxic agents  - Cr bumped post operatively - given  prolonged surgery and complicated repair  - given IVF, follow BMP  - discussed with vascular surgery- defer decision for renal consult to vascular      Hypertension  -hold home HCTZ     Allergic rhinitis  -Continue home fluticasone as needed     Short gut syndrome  - resume lamotil    Dispo: Inpatient     Outpatient or previous hospital records reviewed. Questions/concerns were discussed with patient and/or family by bedside. Discussed with vascular surgery.      Dallas Green DO  Hospitalist  [facility]       Subjective:     Denies cp, sob. Feels ok post op. Some weakness on the RLE.    OBJECTIVE:    Blood pressure (!) 174/62, pulse 78, temperature 98.4 °F (36.9 °C), temperature source Temporal, resp. rate 22, weight 153 lb 3.5 oz (69.5 kg), SpO2 97%.    Temp:  [97 °F (36.1 °C)-98.4 °F (36.9 °C)] 98.4 °F (36.9 °C)  Pulse:  [] 78  Resp:  [11-25] 22  BP: (107-183)/() 174/62  SpO2:  [87 %-99 %] 97 %  AO: (117-198)/(51-93) 176/77      Intake/Output:    Intake/Output Summary (Last 24 hours) at 3/22/2025 1448  Last data filed at 3/22/2025 1330  Gross per 24 hour   Intake 8715.8 ml   Output 2170 ml   Net 6545.8 ml       Last 3 Weights   03/22/25 0637 153 lb 3.5 oz (69.5 kg)   03/20/25 1231 192 lb 15.9 oz (87.5 kg)   03/19/25 1444 193 lb (87.5 kg)   03/22/25 1113 153 lb 3.5 oz (69.5 kg)   03/07/25 1536 185 lb (83.9 kg)       Exam   Gen: No acute distress, alert and oriented x3, no focal neurologic deficits  Heent: NC AT, mucous memb clear, neck supple  Pulm: Lungs clear, normal respiratory effort  CV: Heart with regular rate and rhythm, no peripheral edema  Abd: Abdomen soft, nontender, nondistended, bowel sounds present  MSK:expected rom, no hematoma appreciated bilaterally, 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, 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, strentgh 3/4 on R, pulses intact  Skin: no rashes or lesions  Neuro: AO*3, motor intact, no sensory deficits  Psyc: appropriate mood and  affect      Data Review:       Labs:     Recent Labs   Lab 03/19/25  1424 03/20/25  0528 03/21/25 0415 03/22/25  0425   RBC 4.50 4.35 4.41 3.90   HGB 12.5* 11.9* 12.2* 11.2*   HCT 37.3* 35.5* 35.6* 33.4*   MCV 82.9 81.6 80.7 85.6   MCH 27.8 27.4 27.7 28.7   MCHC 33.5 33.5 34.3 33.5   RDW 17.1 17.0 16.5 16.5   NEPRELIM 4.20  --  5.64  --    WBC 6.9 7.1 7.2 18.0*   .0 177.0 169.0 155.0         Recent Labs   Lab 03/20/25  0528 03/21/25 0415 03/22/25  0423   GLU 87 92 207*   BUN 23 23 35*   CREATSERUM 1.38* 1.27 2.24*   EGFRCR 55* 60 31*   CA 9.2 8.9 8.7    137 138   K 4.2 3.8 4.5    101 104   CO2 29.0 27.0 17.0*       Recent Labs   Lab 03/19/25  1427 03/20/25 0528 03/21/25 0415 03/22/25  0423   ALT <7*  --  <7* 8*   AST 10  --  10 30   ALB 4.4 4.2 4.1 3.9         Imaging:  XR CHEST AP PORTABLE  (CPT=71045)    Result Date: 3/21/2025  CONCLUSION:   1. Endotracheal tube tip is 10.6 cm above the shailesh.  2. Right internal jugular central line has tip in the SVC.  Possible drainage catheter or other rectangular foreign body overlying the left supraclavicular region.  3. Aortic endograft is noted.  4. Median sternotomy wires are noted.  5. Interstitial abnormalities throughout the lungs.  6. No other foreign bodies are identified.    LOCATION:  [facility]      Dictated by (CST): Peter Villagomez MD on 3/21/2025 at 9:42 PM     Finalized by (CST): Peter Villagomez MD on 3/21/2025 at 9:45 PM       IR LUMBAR DRAINAGE    Result Date: 3/20/2025  CONCLUSION:   Successful lumbar drain placement, as described above.   LOCATION:  [place]       Dictated by (CST): Deejay Haney MD on 3/20/2025 at 2:05 PM     Finalized by (CST): Deejay Haney MD on 3/20/2025 at 2:19 PM          Meds:      sodium chloride  500 mL Intravenous Once    docusate sodium  100 mg Oral BID    polyethylene glycol (PEG 3350)  17 g Oral Daily    [START ON 3/23/2025] famotidine  10 mg Oral Daily    heparin  5,000 Units Subcutaneous 2  times per day    senna  10 mL Per NG Tube BID    chlorhexidine gluconate  15 mL Mouth/Throat BID@0800,2000    mineral oil-white petrolatum  1 Application Both Eyes Nightly      lactated ringers 150 mL/hr at 25 1349    niCARdipine Stopped (25 0245)    norepinephrine Stopped (25 1031)    fentanyl      dexmedetomidine         acetaminophen **OR** HYDROcodone-acetaminophen **OR** HYDROcodone-acetaminophen    ondansetron    metoclopramide    HYDROmorphone **OR** HYDROmorphone **OR** HYDROmorphone    niCARdipine    norepinephrine    bupivacaine PF    acetaminophen **OR** acetaminophen **OR** acetaminophen    [] fentaNYL (Sublimaze) **AND** fentanyl    fentaNYL (Sublimaze)    polyethylene glycol (PEG 3350)    bisacodyl      Supplementary Documentation:   DVT Mechanical Prophylaxis:   SCDs, Early ambuation  DVT Pharmacologic Prophylaxis   Medication    heparin (Porcine) 5000 UNIT/ML injection 5,000 Units                Code Status: DNAR/Full Treatment  Arzola: Arzola catheter in place  Arzola Duration (in days):   Central line:    YNES:

## 2025-03-22 NOTE — BRIEF OP NOTE
Cy Monsalve  [id number]  4/24/1955     Pre-Operative Diagnosis: ascending aortic dissection, aortic dissection distal to left subclavian, type 1c endoleak of aortic graft, history of abdominal aortic aneurysm repair     Post-Operative Diagnosis: * No post-op diagnosis entered *      Procedure Performed:   FENESTRATED ENDOVASCULAR ARCH REPAIR AND FENESTRATED THORACOABDOMINAL ANEURYSM REPAIR WITH LEFT CAROTID TO SUBCLAVIAN TRANSPOSITION, ENDOVASCULAR SEPTOTOMY OF DISSECTION, NEUROMONITORING; SEE CATH LAB CHARTING FOR ADDITIONAL DETAILS    Co-Surgeons:     * Neil Oquendo MD       * David Yanez MD     Surgical Findings: Wide patency of all great vessels. Widely patency viscerals. No endoleak at completion. Palpable pulses bilaterally.      Specimen: None     Estimated Blood Loss: 800cc       David Yanez MD  3/21/2025  8:58 PM

## 2025-03-22 NOTE — CONSULTS
[facility]    Cy Monsalve Patient Status:  Inpatient    1955 MRN [id number]   Location 6615/6615-A PCP Kam Alberts MD     Date of Admission: 3/19/2025    History of Present Illness: Pt is a 69 year old male consulted for CC management with h/o prior type A dissection s/p repair, s/p inffra renal repair, s/p SMA bypass for mesenteric ischemia, DVT, HTN, former smoker. Pt was admitted 3/19 for complex AAA repair and lumbar drain placement. Lumbar drained 3/20 by neurointervention. 3/21 had endovascular arch repair, thoracoabdominal aneurysm repair, repair of left carotid to subclavian transposition and endovscualr septotomy of dissection. Pt was extubated post op and brought to ICU for monitoring with goal of SBP >160 for cord perfusion. Pt currently feels OK with pain controlled. No focal weakness, chest pain or dyspnea.   Currently on low dose pressors.     PMH:    Past Medical History:    AAA (abdominal aortic aneurysm)    Abdominal aneurysm    Abdominal aortic aneurysm (AAA)    AAA surgery done    Anxiety state    Back pain    Naproxen twice a week    Back problem    minor    Calculus of kidney    Deep vein thrombosis (HCC)    to colon    Esophageal reflux    Hearing impairment    Lt ear tinnitus    High blood pressure    History of recent hospitalization    10/2019- was at [facility]5 weeks (ICU) with Dissected Aorta/ Post op colon problem/and post op cardiac arrest    Ileostomy present (HCC)    Peripheral vascular disease    Unspecified essential hypertension    Visual impairment    glasses       Past Surgical History:   Procedure Laterality Date    Colonoscopy  10/15/09  [facility]    Screening: small polypoid fold in sigmoid (no polyp tissue seen on path), int hemorrhoids; next colonoscopy in 2019    Other  2017    AAA repair    Other      10/23/19- Dissected Aorta surgery    Other surgical history      Eye surgery for lazy eye    Other surgical history   10/15/09  [facility]    EGD (heartburn): normal    Other surgical history      AAA repair    Other surgical history      multiple toe amputations    Part removal colon w end colostomy      10/2019       Allergies: Allergies[1]    Medications:  Outpatient Medications:  Medications Ordered Prior to Encounter[2]    Inpatient Medications:  Scheduled Medications:     sodium chloride  500 mL Intravenous Once    sodium chloride   Intravenous Once    sodium chloride  500 mL Intravenous Once    docusate sodium  100 mg Oral BID    polyethylene glycol (PEG 3350)  17 g Oral Daily    heparin  5,000 Units Subcutaneous 2 times per day    ceFAZolin  2 g Intravenous Q8H    senna  10 mL Per NG Tube BID    chlorhexidine gluconate  15 mL Mouth/Throat BID@0800,2000    mineral oil-white petrolatum  1 Application Both Eyes Nightly    famotidine  20 mg Intravenous BID     Infusing Medications:     lactated ringers 125 mL/hr at 25 0552    niCARdipine Stopped (25 0245)    norepinephrine Stopped (25 0815)    fentanyl      propofol      dexmedetomidine       PRN Medications:    acetaminophen **OR** HYDROcodone-acetaminophen **OR** HYDROcodone-acetaminophen    ondansetron    metoclopramide    HYDROmorphone **OR** HYDROmorphone **OR** HYDROmorphone    niCARdipine    norepinephrine    bupivacaine PF    acetaminophen **OR** acetaminophen **OR** acetaminophen    [] fentaNYL (Sublimaze) **AND** fentanyl    fentaNYL (Sublimaze)    polyethylene glycol (PEG 3350)    bisacodyl    HYDROmorphone    Social History:    History   Smoking Status    Some Days    Types: Cigars, Cigarettes   Smokeless Tobacco    Never       History   Alcohol Use    0.0 standard drinks of alcohol/week     Comment: occassionally       History   Drug Use No     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    TB: None  Asbestos: None known.       Family History   Problem Relation Age of Onset    Diabetes Father     Heart Disorder Father         CHF    Heart Disorder  Mother          age 70, aortic aneurysm    Hypertension Sister       REVIEW OF SYSTEMS:   A comprehensive 10 point review of systems was completed.  Pertinent positives and negatives noted in the the HPI.    OBJECTIVE:  Temp (24hrs), Av.5 °F (36.4 °C), Min:97 °F (36.1 °C), Max:97.8 °F (36.6 °C)   /80 (BP Location: Right arm)   Pulse 102   Temp 97.8 °F (36.6 °C) (Temporal)   Resp 23   Wt 153 lb 3.5 oz (69.5 kg)   SpO2 93%   BMI 18.17 kg/m²     Intake/Output Summary (Last 24 hours) at 3/22/2025 0924  Last data filed at 3/22/2025 0844  Gross per 24 hour   Intake 6278.8 ml   Output 1810 ml   Net 4468.8 ml     O2: RA  General: NAD.   Neuro: Alert, no focal deficits.    HEENT: PERRL  Neck : No LAD  CV: RRR, nl S1, S2, no S4, S3, soft SILVIANO.   Lungs: Clear bilaterally.   Abd: Nontender, non distended.   Ext: No edema. Post toe amputations on L and great toe amputation on R,  2+ dP pulses bilaterally.   Skin: No rashes. Cool at fee.     Labs:  Recent Labs   Lab 25  0525  0415 25  0425   WBC 7.1 7.2 18.0*   HGB 11.9* 12.2* 11.2*   HCT 35.5* 35.6* 33.4*   .0 169.0 155.0     Recent Labs   Lab 25  1427 25  0528 25  0415 25  0423   GLU 97 87 92 207*   BUN 21 23 23 35*   CREATSERUM 1.46* 1.38* 1.27 2.24*   CA 9.2 9.2 8.9 8.7   * 138 137 138   K 4.4 4.2 3.8 4.5    103 101 104   CO2 26.0 29.0 27.0 17.0*   AST 10  --  10 30   ALT <7*  --  <7* 8*   ALB 4.4 4.2 4.1 3.9     Recent Labs   Lab 25  1424 25  2132 25  0528 25  0415 25  0423   INR 1.80*   < > 1.38* 1.29* 1.63*   PTT 32.5  --   --  31.8 28.7    < > = values in this interval not displayed.     No results for input(s): \"ABGPHT\", \"PGLJMC3U\", \"PNPOZ3Z\", \"ABGHCO3\", \"SITE\", \"DEV\", \"THGB\" in the last 168 hours.  No results for input(s): \"TROP\", \"TROPHS\", \"CK\" in the last 168 hours.  No results for input(s): \"BNP\" in the last 168 hours.  COVID-19 Lab  Results    COVID-19  Lab Results   Component Value Date    COVID19 NOT DETECTED 01/04/2022       Pro-Calcitonin  No results for input(s): \"PCT\" in the last 168 hours.    Cardiac  No results for input(s): \"TROP\", \"PBNP\" in the last 168 hours.    Creatinine Kinase  No results for input(s): \"CK\" in the last 168 hours.    Inflammatory Markers  No results for input(s): \"CRP\", \"LUCY\", \"LDH\", \"DDIMER\" in the last 168 hours.      Imaging:  CXR 3/21:  1. Endotracheal tube tip is 10.6 cm above the shailesh.      2. Right internal jugular central line has tip in the SVC.  Possible drainage catheter or other rectangular foreign body overlying the left supraclavicular region.      3. Aortic endograft is noted.      4. Median sternotomy wires are noted.      5. Interstitial abnormalities throughout the lungs.      6. No other foreign bodies are identified.          Chest images personally reviewed.     Assessment/Plan   S/p complex aortic repair 3/21>   Wean pressors as tolerated to keep SBP >160  Neurochecks.   Per vascular surgery   Lumbar drain in place.   Respiratory insufficiency.   Extubated 3/21 post op.   O2 as needed.   Prior smoker. Outpt PFT's.   HOLLIS on CKD  Monitor labs.   Likely post procedure. IF does not improve or worsen then would consider renal evaluation.   Heme  Leukocytosis. No clear infection at this point. Monitor.   HTN  Per PCP  FEN  PO diet  Proph  SCD and subcutaneous heparin.   Dispo  Full code ICU monitoring.   Will follow.     Critical Care Time greater than: 35 minutes    My best regards,         Galo Nieves MD  [facility]monary, Critical Care and Sleep Medicine                     [1]   Allergies  Allergen Reactions    Amoxicillin ITCHING     Itchiness on hands and feet      Clindamycin OTHER (SEE COMMENTS)     Upset stomach   [2]   No current facility-administered medications on file prior to encounter.     Current Outpatient Medications on File Prior to Encounter   Medication Sig  Dispense Refill    hydroCHLOROthiazide 25 MG Oral Tab Take 1 tablet (25 mg total) by mouth daily.      diphenoxylate-atropine 2.5-0.025 MG Oral Tab Take 1 tablet by mouth 4 (four) times daily as needed for Diarrhea.      Naproxen Sodium 550 MG Oral Tab Take 1 tablet (550 mg total) by mouth 2 (two) times daily with meals. (Patient taking differently: Take 1 tablet (550 mg total) by mouth 2 (two) times daily as needed.) 180 tablet 3    Fluticasone Propionate 50 MCG/ACT Nasal Suspension 1 spray by Nasal route daily as needed.      Loperamide-Simethicone (IMODIUM ADVANCED OR) Take 2 Doses by mouth Every afternoon at 2:00 pm.      tadalafil (CIALIS) 5 MG Oral Tab Take 1 tablet (5 mg total) by mouth daily as needed for Erectile Dysfunction. 30 tablet 5    Fexofenadine-Pseudoephed -240 MG Oral Tablet 24 Hr Take 1 tablet by mouth daily as needed.

## 2025-03-22 NOTE — ANESTHESIA POSTPROCEDURE EVALUATION
[facility]    Cy Monsalve Patient Status:  Inpatient   Age/Gender 69 year old male MRN [id number]   Location [facility] 6NE-A Attending Neil Oquendo MD   Hosp Day # 1 PCP Kam Alberts MD       Anesthesia Post-op Note    FENESTRATED ENDOVASCULAR ARCH REPAIR AND FENESTRATED THORACOABDOMINAL ANEURYSM REPAIR WITH LEFT CAROTID TO SUBCLAVIAN TRANSPOSITION, ENDOVASCULAR SEPTOTOMY OF DISSECTION, NEUROMONITORING; SEE CATH LAB CHARTING FOR ADDITIONAL DETAILS    Procedure Summary       Date: 03/21/25 Room / Location:  CVOR 03 HYBRID /  CVOR    Anesthesia Start: 0806 Anesthesia Stop: 2217    Procedure: FENESTRATED ENDOVASCULAR ARCH REPAIR AND FENESTRATED THORACOABDOMINAL ANEURYSM REPAIR WITH LEFT CAROTID TO SUBCLAVIAN TRANSPOSITION, ENDOVASCULAR SEPTOTOMY OF DISSECTION, NEUROMONITORING; SEE CATH LAB CHARTING FOR ADDITIONAL DETAILS Diagnosis: (ascending aortic dissection, aortic dissection distal to left subclavian, type 1c endoleak of aortic graft, history of abdominal aortic aneurysm repair)    Surgeons: Neil Oquendo MD Anesthesiologist: Lionel Amado MD    Anesthesia Type: general ASA Status: 4            Anesthesia Type: general    Vitals Value Taken Time   /72 03/21/25 2217   Temp 97 03/21/25 2217   Pulse 88 03/21/25 2216   Resp 23 03/21/25 2216   SpO2 96 % 03/21/25 2216   Vitals shown include unfiled device data.        Patient Location: PACU    Anesthesia Type: general    Airway Patency: patent    Postop Pain Control: adequate    Mental Status: mildly sedated but able to meaningfully participate in the post-anesthesia evaluation    Nausea/Vomiting: none    Cardiopulmonary/Hydration status: stable euvolemic    Complications: no apparent anesthesia related complications    Postop vital signs: stable    Dental Exam: Unchanged from Preop    Sign out given to ICU staff.

## 2025-03-22 NOTE — PROGRESS NOTES
Much more awake this am and eating   Has right thigh numbness   Movement in legs appropriate but did feel like right leg was weaker until I removed SCD   Neck drain with minimal drainage     Cr 2.3  Hemoglobin stable   INR 1.63    Will give ffp   Saline bolus 500   Up to chair   Continue lumbar drain 48 hours at least   Adjust bp goal to 160

## 2025-03-22 NOTE — CONSULTS
[facility]   NEUROLOGY   CONSULT NOTE    Admission date: 3/19/2025  Reason for Consult: Leg weakness   Chief Complaint: S/p AAA repair  ________________________________________________________________    History     History of Presenting Illness  69 year old male with PMH of anxiety, DVT, GERD, prior type A dissection status post repair, PVD, DVT, HTN, prior smoker here for status post endovascular arch repair and thoracoabdominal aneurysm repair.  Patient had lumbar drain placed day prior.  Estimated blood loss from OR 3/21 800 cc.  Extubated overnight.  Pulm consulted for critical care management this morning.  HOLLIS noted.  Bicarb down.  INR elevated so getting FFP this morning.  Levophed weaned off this morning to maintain blood pressure parameters set by vascular surgery.  Neurology consulted for waxing and waning right leg weakness noted this morning.  Patient has baseline numbness and tingling in bilateral legs.    History obtained from patient and EMR.  States that he saw black and white spots from right eye only this morning events became colored and resolved since noon today.  Unclear exactly when lower extremity weakness started, states he did not move them much overnight and weakness only noticed this morning.  Primarily in the right leg.  Has baseline numbness in bilateral feet.  Wife states that he has had generalized weakness in both eyes over the past couple weeks but still was able to walk around and do regular activity.    Past Medical History:    AAA (abdominal aortic aneurysm)    Abdominal aneurysm    Abdominal aortic aneurysm (AAA)    AAA surgery done    Anxiety state    Back pain    Naproxen twice a week    Back problem    minor    Calculus of kidney    Deep vein thrombosis (HCC)    to colon    Esophageal reflux    Hearing impairment    Lt ear tinnitus    High blood pressure    History of recent hospitalization    10/2019- was at [facility] x5 weeks (ICU) with Dissected  Aorta/ Post op colon problem/and post op cardiac arrest    Ileostomy present (HCC)    Peripheral vascular disease    Unspecified essential hypertension    Visual impairment    glasses     Past Surgical History:   Procedure Laterality Date    Colonoscopy  10/15/09  [facility]    Screening: small polypoid fold in sigmoid (no polyp tissue seen on path), int hemorrhoids; next colonoscopy in 2019    Other  2017    AAA repair    Other      10/23/19- Dissected Aorta surgery    Other surgical history      Eye surgery for lazy eye    Other surgical history  10/15/09  [facility]    EGD (heartburn): normal    Other surgical history      AAA repair    Other surgical history      multiple toe amputations    Part removal colon w end colostomy      10/2019     Social History     Socioeconomic History    Marital status:    Tobacco Use    Smoking status: Some Days     Types: Cigars, Cigarettes    Smokeless tobacco: Never    Tobacco comments:     Cigars      Vaping Use    Vaping status: Never Used   Substance and Sexual Activity    Alcohol use: Yes     Alcohol/week: 0.0 standard drinks of alcohol     Comment: occassionally    Drug use: No     Social Drivers of Health     Food Insecurity: No Food Insecurity (3/19/2025)    NCSS - Food Insecurity     Worried About Running Out of Food in the Last Year: No     Ran Out of Food in the Last Year: No   Transportation Needs: No Transportation Needs (3/19/2025)    NCSS - Transportation     Lack of Transportation: No   Housing Stability: Not At Risk (3/19/2025)    NCSS - Housing/Utilities     Has Housing: Yes     Worried About Losing Housing: No     Unable to Get Utilities: No     Family History   Problem Relation Age of Onset    Diabetes Father     Heart Disorder Father         CHF    Heart Disorder Mother          age 70, aortic aneurysm    Hypertension Sister      Allergies Allergies[1]    Home Meds  Current Outpatient Medications   Medication Instructions    diphenoxylate-atropine 2.5-0.025  MG Oral Tab 1 tablet, 4 times daily PRN    Fexofenadine-Pseudoephed -240 MG Oral Tablet 24 Hr 1 tablet, Oral, DAILY PRN    Fluticasone Propionate 50 MCG/ACT Nasal Suspension 1 spray, DAILY PRN    hydroCHLOROthiazide 25 mg, Daily    Loperamide-Simethicone (IMODIUM ADVANCED OR) 2 Doses, Every afternoon at 1400    Naproxen Sodium 550 mg, Oral, 2 times daily with meals    tadalafil (CIALIS) 5 mg, Oral, Daily as needed     Scheduled Meds:   sodium chloride  500 mL Intravenous Once    docusate sodium  100 mg Oral BID    polyethylene glycol (PEG 3350)  17 g Oral Daily    heparin  5,000 Units Subcutaneous 2 times per day    senna  10 mL Per NG Tube BID    chlorhexidine gluconate  15 mL Mouth/Throat BID@0800,2000    mineral oil-white petrolatum  1 Application Both Eyes Nightly    famotidine  20 mg Intravenous BID     Continuous Infusions:   lactated ringers 150 mL/hr at 25 1119    niCARdipine Stopped (25 0245)    norepinephrine Stopped (25 1031)    fentanyl      propofol      dexmedetomidine       PRN Meds:  acetaminophen **OR** HYDROcodone-acetaminophen **OR** HYDROcodone-acetaminophen    ondansetron    metoclopramide    HYDROmorphone **OR** HYDROmorphone **OR** HYDROmorphone    niCARdipine    norepinephrine    bupivacaine PF    acetaminophen **OR** acetaminophen **OR** acetaminophen    [] fentaNYL (Sublimaze) **AND** fentanyl    fentaNYL (Sublimaze)    polyethylene glycol (PEG 3350)    bisacodyl    HYDROmorphone    OBJECTIVE   VITAL SIGNS:   Temp:  [97 °F (36.1 °C)-98.2 °F (36.8 °C)] 98.2 °F (36.8 °C)  Pulse:  [] 93  Resp:  [11-25] 17  BP: (107-166)/() 160/79  SpO2:  [87 %-99 %] 93 %  AO: (117-198)/(51-93) 175/77    INTAKE/OUTPUT:  Intake/Output Summary (Last 24 hours) at 3/22/2025 1215  Last data filed at 3/22/2025 1112  Gross per 24 hour   Intake 7213.8 ml   Output 2105 ml   Net 5108.8 ml     PHYSICAL EXAM:  CONSTITUTIONAL: Patient resting comfortably in bed, NAD    NEUROLOGIC:     Mental Status:  A&O x 4, Follows simple commands, no obvious aphasia or dysarthria  Cranial nerves: PERRL.  Visual fields full.  EOMI.  Face symmetric with normal movement bilaterally.  Hearing grossly intact. Tongue midline with normal movements.   Motor: Drift:  RUE mild drift, improved on retesting; Motor exam is 5 out of 5 in BL UE       HF  KE  KF  DF  PF     Left 3 5 3 5 4     Right 2 4- 2 3 3     Sensation: Decreased to light touch in left lower extremity.  Decreased cold sensation in bilateral lower extremities, decreased motor sensation in bilateral extremities, pain sharp down to umbilicus, T10, then becomes dull  Cerebellar: Normal Finger-To-Nose test BL    LABORATORY DATA:  Last 24 hour labs were reviewed in detail.  Recent Labs   Lab 03/20/25 0528 03/21/25 0415 03/22/25  0423    137 138   K 4.2 3.8 4.5    101 104   CO2 29.0 27.0 17.0*   GLU 87 92 207*   BUN 23 23 35*   CREATSERUM 1.38* 1.27 2.24*     Recent Labs   Lab 03/20/25 0528 03/21/25 0415 03/22/25  0425   WBC 7.1 7.2 18.0*   HGB 11.9* 12.2* 11.2*   .0 169.0 155.0     Recent Labs   Lab 03/19/25  1427 03/21/25 0415 03/22/25  0423   ALT <7* <7* 8*   AST 10 10 30     Recent Labs   Lab 03/20/25 0528 03/21/25  0415   MG 1.6 1.6   PHOS 3.8  --      Radiology:    XR CHEST AP PORTABLE  (CPT=71045)    Result Date: 3/21/2025  CONCLUSION:   1. Endotracheal tube tip is 10.6 cm above the shailesh.  2. Right internal jugular central line has tip in the SVC.  Possible drainage catheter or other rectangular foreign body overlying the left supraclavicular region.  3. Aortic endograft is noted.  4. Median sternotomy wires are noted.  5. Interstitial abnormalities throughout the lungs.  6. No other foreign bodies are identified.    LOCATION:  [facility]      Dictated by (CST): Peter Villagomez MD on 3/21/2025 at 9:42 PM     Finalized by (CST): Peter Villagomez MD on 3/21/2025 at 9:45 PM      ASSESSMENT/PLAN   69 year old male with PMH of  anxiety, DVT, GERD, prior type A dissection status post repair, PVD, DVT, HTN, prior smoker here for status post endovascular arch repair and thoracoabdominal aneurysm repair, neuroconsulted for waxing and waning right leg weakness    Active Problems:    Pre-op testing    Thoracoabdominal aortic aneurysm (TAAA)     Reviewed notes by pulm and vascular  Reviewed CBC/BMP and CXR  Imaging personally reviewed and interpreted by me, agree with radiologist impression.  Discussed case with care team    Bilateral lower extremity weakness  Initially reported right lower extremity weakness this morning, on current exam bilateral lower extremity weaker, right worse than left.    Very mild drift in RUE could potentially point to primary brain issue however given bilateral lower extremity involvement currently this is less likely.  Exam limited by baseline peripheral neuropathy in lower extremities.  Concern for spinal cord ischemia/hypoperfusion in the postoperative state, discussed with patient and family at bedside.  MRI T spine limited by artifact but without obvious compression or signs of cord ischemia noted   However, given clinical exam, concern remains so agree with blood pressure augmentation as ordered by vascular surgery  LD management per surgical team   Hb goal > 10, recheck in PM  Discussed with vascular surgery, given no improvement with above measures, will trial high dose steroids as well as a rescue measure   Already on chemoprophylaxis for DVT prevention  PT/OT evals when clinically stabilized  Will follow    36 minutes of CC time (exclusive of billable procedures) was administered to manage and/or prevent neurologic instability. More than 50% spent counseling/coordinating care. This involved direct patient intervention, complex decision making, and/or extensive discussions with the patient, family, and clinical staff.    Carolyn Burch MD  Neurocritical Care  [facility]       [1]    Allergies  Allergen Reactions    Amoxicillin ITCHING     Itchiness on hands and feet      Clindamycin OTHER (SEE COMMENTS)     Upset stomach

## 2025-03-23 LAB
ANION GAP SERPL CALC-SCNC: 9 MMOL/L (ref 0–18)
ANTIBODY SCREEN: NEGATIVE
ATRIAL RATE: 75 BPM
BASOPHILS # BLD AUTO: 0.01 X10(3) UL (ref 0–0.2)
BASOPHILS NFR BLD AUTO: 0.1 %
BLOOD TYPE BARCODE: 5100
BUN BLD-MCNC: 31 MG/DL (ref 9–23)
CALCIUM BLD-MCNC: 8.3 MG/DL (ref 8.7–10.6)
CHLORIDE SERPL-SCNC: 104 MMOL/L (ref 98–112)
CO2 SERPL-SCNC: 23 MMOL/L (ref 21–32)
CREAT BLD-MCNC: 1.72 MG/DL
EGFRCR SERPLBLD CKD-EPI 2021: 42 ML/MIN/1.73M2 (ref 60–?)
EOSINOPHIL # BLD AUTO: 0 X10(3) UL (ref 0–0.7)
EOSINOPHIL NFR BLD AUTO: 0 %
ERYTHROCYTE [DISTWIDTH] IN BLOOD BY AUTOMATED COUNT: 15.9 %
ERYTHROCYTE [DISTWIDTH] IN BLOOD BY AUTOMATED COUNT: 16 %
GLUCOSE BLD-MCNC: 169 MG/DL (ref 70–99)
GLUCOSE BLD-MCNC: 174 MG/DL (ref 70–99)
GLUCOSE BLD-MCNC: 177 MG/DL (ref 70–99)
GLUCOSE BLD-MCNC: 189 MG/DL (ref 70–99)
HCT VFR BLD AUTO: 27.3 %
HCT VFR BLD AUTO: 29.1 %
HGB BLD-MCNC: 10 G/DL
HGB BLD-MCNC: 9.2 G/DL
IMM GRANULOCYTES # BLD AUTO: 0.04 X10(3) UL (ref 0–1)
IMM GRANULOCYTES NFR BLD: 0.5 %
LYMPHOCYTES # BLD AUTO: 0.21 X10(3) UL (ref 1–4)
LYMPHOCYTES NFR BLD AUTO: 2.9 %
MAGNESIUM SERPL-MCNC: 1.6 MG/DL (ref 1.6–2.6)
MCH RBC QN AUTO: 28.2 PG (ref 26–34)
MCH RBC QN AUTO: 28.4 PG (ref 26–34)
MCHC RBC AUTO-ENTMCNC: 33.7 G/DL (ref 31–37)
MCHC RBC AUTO-ENTMCNC: 34.4 G/DL (ref 31–37)
MCV RBC AUTO: 82.7 FL
MCV RBC AUTO: 83.7 FL
MONOCYTES # BLD AUTO: 0.29 X10(3) UL (ref 0.1–1)
MONOCYTES NFR BLD AUTO: 3.9 %
NEUTROPHILS # BLD AUTO: 6.81 X10 (3) UL (ref 1.5–7.7)
NEUTROPHILS # BLD AUTO: 6.81 X10(3) UL (ref 1.5–7.7)
NEUTROPHILS NFR BLD AUTO: 92.6 %
OSMOLALITY SERPL CALC.SUM OF ELEC: 293 MOSM/KG (ref 275–295)
P AXIS: 67 DEGREES
P-R INTERVAL: 152 MS
PLATELET # BLD AUTO: 74 10(3)UL (ref 150–450)
PLATELET # BLD AUTO: 86 10(3)UL (ref 150–450)
PLATELETS.RETICULATED NFR BLD AUTO: 5.1 % (ref 0–7)
PLATELETS.RETICULATED NFR BLD AUTO: 7.8 % (ref 0–7)
POTASSIUM SERPL-SCNC: 4.3 MMOL/L (ref 3.5–5.1)
Q-T INTERVAL: 372 MS
QRS DURATION: 90 MS
QTC CALCULATION (BEZET): 415 MS
R AXIS: 74 DEGREES
RBC # BLD AUTO: 3.26 X10(6)UL
RBC # BLD AUTO: 3.52 X10(6)UL
RH BLOOD TYPE: POSITIVE
SODIUM SERPL-SCNC: 136 MMOL/L (ref 136–145)
T AXIS: 86 DEGREES
UNIT VOLUME: 292 ML
VENTRICULAR RATE: 75 BPM
WBC # BLD AUTO: 7.4 X10(3) UL (ref 4–11)
WBC # BLD AUTO: 8.8 X10(3) UL (ref 4–11)

## 2025-03-23 PROCEDURE — 99291 CRITICAL CARE FIRST HOUR: CPT | Performed by: OTHER

## 2025-03-23 PROCEDURE — 30233R1 TRANSFUSION OF NONAUTOLOGOUS PLATELETS INTO PERIPHERAL VEIN, PERCUTANEOUS APPROACH: ICD-10-PCS | Performed by: SURGERY

## 2025-03-23 RX ORDER — SODIUM CHLORIDE 9 MG/ML
INJECTION, SOLUTION INTRAVENOUS CONTINUOUS
Status: DISCONTINUED | OUTPATIENT
Start: 2025-03-23 | End: 2025-03-24

## 2025-03-23 RX ORDER — MAGNESIUM OXIDE 400 MG/1
400 TABLET ORAL ONCE
Status: COMPLETED | OUTPATIENT
Start: 2025-03-23 | End: 2025-03-23

## 2025-03-23 RX ORDER — SODIUM CHLORIDE 9 MG/ML
INJECTION, SOLUTION INTRAVENOUS ONCE
Status: COMPLETED | OUTPATIENT
Start: 2025-03-23 | End: 2025-03-23

## 2025-03-23 RX ORDER — IPRATROPIUM BROMIDE AND ALBUTEROL SULFATE 2.5; .5 MG/3ML; MG/3ML
3 SOLUTION RESPIRATORY (INHALATION) EVERY 4 HOURS PRN
Status: DISCONTINUED | OUTPATIENT
Start: 2025-03-23 | End: 2025-04-18

## 2025-03-23 RX ORDER — FUROSEMIDE 10 MG/ML
20 INJECTION INTRAMUSCULAR; INTRAVENOUS ONCE
Status: COMPLETED | OUTPATIENT
Start: 2025-03-23 | End: 2025-03-23

## 2025-03-23 NOTE — PROGRESS NOTES
[facility] Pulmonary, Critical Care and Sleep    Cy Monsalve Patient Status:  Inpatient    1955 MRN [id number]   [facility] 6NE-A Attending Neil Oquendo MD   Hosp Day # 3 PCP Kam Alberts MD     Date of Admission: 3/19/2025  1:15 PM    Admission Diagnosis: Abdominal aortic aneurysm (AAA) [I71.40]    S: Noted waxing and waning R leg weakness yesterday. Neuro consult obtained and started on high does steroids.     Scheduled Medications:     sodium chloride  500 mL Intravenous Once    docusate sodium  100 mg Oral BID    polyethylene glycol (PEG 3350)  17 g Oral Daily    insulin aspart  1-5 Units Subcutaneous TID AC and HS    methylPREDNISolone  1,000 mg Intravenous Q6H    pantoprazole  40 mg Intravenous Q24H    heparin  5,000 Units Subcutaneous 2 times per day    senna  10 mL Per NG Tube BID    chlorhexidine gluconate  15 mL Mouth/Throat BID@0800,2000    mineral oil-white petrolatum  1 Application Both Eyes Nightly       Infusing Medications:     lactated ringers 150 mL/hr at 25 0600    niCARdipine Stopped (25 0245)    norepinephrine Stopped (25 2255)    fentanyl      dexmedetomidine         PRN Medications:    glucose **OR** glucose **OR** glucose-vitamin C **OR** dextrose **OR** glucose **OR** glucose **OR** glucose-vitamin C    acetaminophen **OR** HYDROcodone-acetaminophen **OR** HYDROcodone-acetaminophen    ondansetron    metoclopramide    HYDROmorphone **OR** HYDROmorphone **OR** HYDROmorphone    niCARdipine    norepinephrine    bupivacaine PF    acetaminophen **OR** acetaminophen **OR** acetaminophen    [] fentaNYL (Sublimaze) **AND** fentanyl    fentaNYL (Sublimaze)    polyethylene glycol (PEG 3350)    bisacodyl    OBJECTIVE:  BP (!) 165/95   Pulse 70   Temp 97.8 °F (36.6 °C) (Temporal)   Resp 15   Wt 204 lb 2.3 oz (92.6 kg)   SpO2 96%   BMI 24.21 kg/m²    Temp (24hrs), Av.3 °F (36.8 °C), Min:97.4 °F (36.3 °C), Max:99.4 °F (37.4 °C)         Wt  Readings from Last 3 Encounters:   03/23/25 204 lb 2.3 oz (92.6 kg)   03/22/25 153 lb 3.5 oz (69.5 kg)   03/07/25 185 lb (83.9 kg)       Intake/Output Summary (Last 24 hours) at 3/23/2025 0703  Last data filed at 3/23/2025 0500  Gross per 24 hour   Intake 6751.8 ml   Output 2510 ml   Net 4241.8 ml     O2: 3 LNC  General: NAD.   Neuro: Alert, no focal deficits.    HEENT: PERRL  Neck : No LAD  CV: RRR, nl S1, S2, no S4, S3 or murmur.   Lungs: Clear bilaterally.   Abd: Nontender, non distended.   Ext: No edema.   Skin: No rashes.     Recent Labs   Lab 03/20/25  0528 03/21/25  0415 03/22/25  0425 03/22/25  1902 03/23/25  0505   WBC 7.1 7.2 18.0*  --  7.4   HGB 11.9* 12.2* 11.2* 8.1* 9.2*   HCT 35.5* 35.6* 33.4* 24.4* 27.3*   .0 169.0 155.0  --   --      Recent Labs   Lab 03/19/25  1427 03/20/25  0528 03/21/25  0415 03/22/25  0423 03/22/25  1739 03/23/25  0505   GLU 97 87 92 207* 118* 174*   BUN 21 23 23 35* 33* 31*   CREATSERUM 1.46* 1.38* 1.27 2.24* 1.84* 1.72*   CA 9.2 9.2 8.9 8.7 7.9* 8.3*   * 138 137 138 139 136   K 4.4 4.2 3.8 4.5 4.2 4.3    103 101 104 106 104   CO2 26.0 29.0 27.0 17.0* 22.0 23.0   AST 10  --  10 30  --   --    ALT <7*  --  <7* 8*  --   --    ALB 4.4 4.2 4.1 3.9  --   --      Recent Labs   Lab 03/19/25  1424 03/19/25  2132 03/20/25  0528 03/21/25  0415 03/22/25  0423   INR 1.80*   < > 1.38* 1.29* 1.63*   PTT 32.5  --   --  31.8 28.7    < > = values in this interval not displayed.     No results for input(s): \"ABGPHT\", \"JOATFR6L\", \"JZSOB0U\", \"ABGHCO3\", \"SITE\", \"DEV\", \"THGB\" in the last 168 hours.    COVID-19 Lab Results    COVID-19  Lab Results   Component Value Date    COVID19 NOT DETECTED 01/04/2022       Pro-Calcitonin  No results for input(s): \"PCT\" in the last 168 hours.    Cardiac  No results for input(s): \"TROP\", \"PBNP\" in the last 168 hours.    Creatinine Kinase  No results for input(s): \"CK\" in the last 168 hours.    Inflammatory Markers  No results for input(s): \"CRP\",  \"LUCY\", \"LDH\", \"DDIMER\" in the last 168 hours.    Imaging:   MRI thoracic spine 3/22:  Exam is somewhat suboptimal due to metal artifacts relating to patient's abdominal aortic stenting.  There is no evidence of acute cord injury/cord infarct.       MRI lumbar spine 3/22:  Pending read.     Assessment/Plan   S/p complex aortic repair 3/21  RLE weakness and started on high dose steroids solumedrol 1g q6hrs per neuro on 3/22.   Wean pressors as tolerated to keep SBP >160 currently off pressors.   S/p MRI spine.   Neurochecks.   Per vascular surgery and neurocritcal care.   Lumbar drain in place.   Respiratory insufficiency.   Extubated 3/21 post op.   O2 as needed.   Prior smoker. Outpt PFT's.   HOLLIS on CKD  Monitor labs and u/o. .   Improving.    Heme  Leukocytosis. No clear infection at this point. Monitor.   Hgb goal >10 per neuro. Transfuse today.   HTN  Per PCP  FEN  PO diet  Proph  SCD and subcutaneous heparin.   Dispo  Full code ICU monitoring.   Will follow.   Critical Care Time greater than: 35 minutes    My best regards,         Galo Nieves MD  [facility] Pulmonary, Critical Care and Sleep Medicine

## 2025-03-23 NOTE — PLAN OF CARE
A/Ox4, states minimal surgical pain. Able to lift R leg antigravity, weak dorsi/plantar flexion, L leg 4/5, sensation intact. Levo restarted to keep SBP>180 per Dr. Catherine. Bilat groin sites soft, no hematoma, mild bruising, + pedal pulses. Lumbar drain site CDI, 15ml/hr clear output drained per hour. L neck caitlin drain with 25ml bloody drainage for 12 hours. CBC reported to Dr. Catherine, orders received for 1 unit PRBC, platelets and FFP, infusions completed without complication. Pt with moist np cough, enouraged IS and flutter valve use, lungs clear posteriorly. Poor appetite, refused bowel regimen stating he has short bowel and normally has diarrhea, educated on effects of decreased activity, anesthesia, pain meds on bowel function. Up to chair x2 with sera steady, tolerated well. Spouse at bedside, updated on condition and POC.      Problem: CARDIOVASCULAR - ADULT  Goal: Maintains optimal cardiac output and hemodynamic stability  Description: INTERVENTIONS:- Monitor vital signs, rhythm, and trends- Monitor for bleeding, hypotension and signs of decreased cardiac output- Evaluate effectiveness of vasoactive medications to optimize hemodynamic stability- Monitor arterial and/or venous puncture sites for bleeding and/or hematoma- Assess quality of pulses, skin color and temperature- Assess for signs of decreased coronary artery perfusion - ex. Angina- Evaluate fluid balance, assess for edema, trend weights  Outcome: Progressing     Problem: HEMATOLOGIC - ADULT  Goal: Free from bleeding injury  Description: (Example usage: patient with low platelets)INTERVENTIONS:- Avoid intramuscular injections, enemas and rectal medication administration- Ensure safe mobilization of patient- Hold pressure on venipuncture sites to achieve adequate hemostasis- Assess for signs and symptoms of internal bleeding- Monitor lab trends- Patient is to report abnormal signs of bleeding to staff- Avoid use of toothpicks and dental floss- Use  electric shaver for shaving- Use soft bristle tooth brush- Limit straining and forceful nose blowing  Outcome: Progressing     Problem: NEUROLOGICAL - ADULT  Goal: Achieves stable or improved neurological status  Description: INTERVENTIONS- Assess for and report changes in neurological status- Initiate measures to prevent increased intracranial pressure- Maintain blood pressure and fluid volume within ordered parameters to optimize cerebral perfusion and minimize risk of hemorrhage- Monitor temperature, glucose, and sodium. Initiate appropriate interventions as ordered  Outcome: Progressing  Goal: Achieves maximal functionality and self care  Description: INTERVENTIONS- Monitor swallowing and airway patency with patient fatigue and changes in neurological status- Encourage and assist patient to increase activity and self care with guidance from PT/OT- Encourage visually impaired, hearing impaired and aphasic patients to use assistive/communication devices  Outcome: Progressing

## 2025-03-23 NOTE — INTERVAL H&P NOTE
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  I discussed with the patient and/or legal representative the potential benefits, risks and side effects of this procedure; the likelihood of the patient achieving goals; and potential problems that might occur during recuperation.  I discussed reasonable alternatives to the procedure, including risks, benefits and side effects related to the alternatives and risks related to not receiving this procedure.  We will proceed with procedure as planned.

## 2025-03-23 NOTE — PROGRESS NOTES
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  Postoperative course complicated by HOLLIS on CKD as well as right lower extremity weakness.     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   - high risk procedure with risks of complications including spinal cord ischemia, mesenteric ischemia, possible renal failure.  -s/p lumbar drain with neurointerventional to limit risk of spinal cord ischemia 3/20  -Further recommendations and management per vascular surgery  - previously on coumadin, now held per vascular  -Status post complex aortic repair 3/21 with vascular-see op report for details     S/p complex aortic repair 3/21  - SBP goal >160, per vascualr  - pressors per vascular  - pulm consulted, recs reviewed  - post op managmenet per [facility]    Right lower extremity weakness-improving  - In the setting of complex aortic repair 3/21  - Seen by neurointensivist, recommendations reviewed  - Hemoglobin goal per neuro ICU greater than 10, received 1 unit  of PRBC 3/22, plan for another unit 3/23  - Plan for 1 unit of platelets 3/23  - Started on IV steroids as rescue measure in the setting of worsening weakness on the right lower extremity-per neuro ICU  - Frequent neurovascular assessment per protocol    HOLLIS on CKD-improving  -Creatinine reviewed this admission, 1.46 (baseline 1.2)  -Continue to monitor with serial RFP's  -Avoid nephrotoxic agents  - Cr bumped post operatively - given prolonged surgery and complicated repair  - given IVF, follow BMP  - discussed with vascular surgery- defer decision for renal consult to vascular      Hypertension  -hold home HCTZ     Allergic rhinitis  -Continue home fluticasone as needed     Short gut syndrome  - resume lamotil if diarrhea resumes    Dispo: Inpatient     Outpatient or previous hospital records reviewed. Questions/concerns were discussed with patient and/or family by bedside. Discussed with vascular surgery.      Dallas Green DO  Hospitalist  [facility]       Subjective:     Denies cp, sob. Right lower extremity weakness is improving.  No overnight issues.    OBJECTIVE:    Blood pressure (!) 167/83, pulse 75, temperature 98.1 °F (36.7 °C), temperature source Temporal, resp. rate 18, weight 204 lb 2.3 oz (92.6 kg), SpO2 93%.    Temp:  [97.5 °F (36.4 °C)-99.4 °F (37.4 °C)] 98.1 °F (36.7 °C)  Pulse:  [] 75  Resp:  [13-22] 18  BP: (129-183)/() 167/83  SpO2:  [90 %-100 %] 93 %  AO: (151-193)/(43-96) 165/73      Intake/Output:    Intake/Output Summary (Last 24 hours) at 3/23/2025 1159  Last data filed at 3/23/2025 1100  Gross per 24 hour   Intake 4779.8 ml   Output 2550 ml   Net 2229.8 ml       Last 3 Weights   03/23/25 0516 204 lb 2.3 oz (92.6 kg)   03/22/25 0637 153 lb 3.5 oz (69.5 kg)   03/20/25 1231 192 lb 15.9 oz (87.5 kg)   03/19/25 1444 193 lb (87.5 kg)   03/22/25 1113 153 lb 3.5 oz (69.5 kg)   03/07/25 1536 185 lb (83.9 kg)       Exam   Gen: No acute distress, alert and oriented x3, no  focal neurologic deficits, right-sided Rhame, arterial line and lumbar drain noted  Heent: NC AT, mucous memb clear, neck supple  Pulm: Lungs clear, normal respiratory effort  CV: Heart with regular rate and rhythm, no peripheral edema  Abd: Abdomen soft, nontender, nondistended, bowel sounds present  MSK:expected rom, no hematoma appreciated bilaterally, 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, 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, strentgh 4/5 on R, pulses intact  Skin: no rashes or lesions  Neuro: AO*3, motor intact, no sensory deficits  Psyc: appropriate mood and affect      Data Review:       Labs:     Recent Labs   Lab 03/19/25  1424 03/20/25  0528 03/21/25  0415 03/22/25  0425 03/22/25  1902 03/23/25  0505   RBC 4.50   < > 4.41 3.90  --  3.26*   HGB 12.5*   < > 12.2* 11.2* 8.1* 9.2*   HCT 37.3*   < > 35.6* 33.4* 24.4* 27.3*   MCV 82.9   < > 80.7 85.6  --  83.7   MCH 27.8   < > 27.7 28.7  --  28.2   MCHC 33.5   < > 34.3 33.5  --  33.7   RDW 17.1   < > 16.5 16.5  --  16.0   NEPRELIM 4.20  --  5.64  --   --  6.81   WBC 6.9   < > 7.2 18.0*  --  7.4   .0   < > 169.0 155.0  --  74.0*    < > = values in this interval not displayed.         Recent Labs   Lab 03/22/25  0423 03/22/25  1739 03/23/25  0505   * 118* 174*   BUN 35* 33* 31*   CREATSERUM 2.24* 1.84* 1.72*   EGFRCR 31* 39* 42*   CA 8.7 7.9* 8.3*    139 136   K 4.5 4.2 4.3    106 104   CO2 17.0* 22.0 23.0       Recent Labs   Lab 03/19/25  1427 03/20/25  0528 03/21/25  0415 03/22/25  0423   ALT <7*  --  <7* 8*   AST 10  --  10 30   ALB 4.4 4.2 4.1 3.9         Imaging:  MRI SPINE THORACIC (CPT=72146)    Result Date: 3/22/2025  CONCLUSION:  Exam is somewhat suboptimal due to metal artifacts relating to patient's abdominal aortic stenting.  There is no evidence of acute cord injury/cord infarct.    Dictated by (CST): Sparkle High DO on 3/22/2025 at 2:54 PM     Finalized by (CST): Sparkle High DO on 3/22/2025 at 2:58 PM       XR  CHEST AP PORTABLE  (CPT=71045)    Result Date: 3/21/2025  CONCLUSION:   1. Endotracheal tube tip is 10.6 cm above the shailesh.  2. Right internal jugular central line has tip in the SVC.  Possible drainage catheter or other rectangular foreign body overlying the left supraclavicular region.  3. Aortic endograft is noted.  4. Median sternotomy wires are noted.  5. Interstitial abnormalities throughout the lungs.  6. No other foreign bodies are identified.    LOCATION:  [facility]      Dictated by (CST): Peter Villagomez MD on 3/21/2025 at 9:42 PM     Finalized by (CST): Peter Villagomez MD on 3/21/2025 at 9:45 PM       IR LUMBAR DRAINAGE    Result Date: 3/20/2025  CONCLUSION:   Successful lumbar drain placement, as described above.   LOCATION:  [facility]       Dictated by (CST): Deejay Haney MD on 3/20/2025 at 2:05 PM     Finalized by (CST): Deejay Haney MD on 3/20/2025 at 2:19 PM          Meds:      sodium chloride  500 mL Intravenous Once    docusate sodium  100 mg Oral BID    polyethylene glycol (PEG 3350)  17 g Oral Daily    insulin aspart  1-5 Units Subcutaneous TID AC and HS    methylPREDNISolone  1,000 mg Intravenous Q6H    pantoprazole  40 mg Intravenous Q24H    heparin  5,000 Units Subcutaneous 2 times per day    senna  10 mL Per NG Tube BID    chlorhexidine gluconate  15 mL Mouth/Throat BID@0800,2000    mineral oil-white petrolatum  1 Application Both Eyes Nightly      sodium chloride 83 mL/hr at 03/23/25 1130    niCARdipine Stopped (03/22/25 0245)    norepinephrine 2 mcg/min (03/23/25 1146)    fentanyl         glucose **OR** glucose **OR** glucose-vitamin C **OR** dextrose **OR** glucose **OR** glucose **OR** glucose-vitamin C    acetaminophen **OR** HYDROcodone-acetaminophen **OR** HYDROcodone-acetaminophen    ondansetron    metoclopramide    HYDROmorphone **OR** HYDROmorphone **OR** HYDROmorphone    niCARdipine    norepinephrine    bupivacaine PF    acetaminophen **OR** acetaminophen **OR**  acetaminophen    [] fentaNYL (Sublimaze) **AND** fentanyl    fentaNYL (Sublimaze)    polyethylene glycol (PEG 3350)    bisacodyl      Supplementary Documentation:   DVT Mechanical Prophylaxis:   SCDs, Early ambuation  DVT Pharmacologic Prophylaxis   Medication    heparin (Porcine) 5000 UNIT/ML injection 5,000 Units                Code Status: DNAR/Full Treatment  Arzola: Azrola catheter in place  Arzola Duration (in days):   Central line:    YNES:

## 2025-03-23 NOTE — PLAN OF CARE
Assumed care of the pt at approx. 0930pm. Neuro checks q 1hrs. A&O x4. Follows commands. Neuros intact except for R-leg weakness. Initially pt barely able to move his R-leg but it got much better overnight. Denies any numbness & tingling. Sensation intact b/l. Denies SOB. 3 L NC. SR/SB overnight. SBP goal 160-180. Weaned off Levo gtt. Hgb last pm 8.1, 2 units of PRBCs given per Dr. Oquendo. Recheck in am. Lumbar drain in place, draining 15 ml/hr. Dressing CDI. Good UO per moon. Dilaudid PRN mid sternal pain. Up to chair last night with total lift.

## 2025-03-24 ENCOUNTER — APPOINTMENT (OUTPATIENT)
Dept: GENERAL RADIOLOGY | Facility: HOSPITAL | Age: 70
End: 2025-03-24
Attending: INTERNAL MEDICINE
Payer: MEDICARE

## 2025-03-24 LAB
ANION GAP SERPL CALC-SCNC: 10 MMOL/L (ref 0–18)
BASOPHILS # BLD AUTO: 0.01 X10(3) UL (ref 0–0.2)
BASOPHILS NFR BLD AUTO: 0.1 %
BLOOD TYPE BARCODE: 1700
BLOOD TYPE BARCODE: 5100
BUN BLD-MCNC: 32 MG/DL (ref 9–23)
CALCIUM BLD-MCNC: 8.4 MG/DL (ref 8.7–10.6)
CHLORIDE SERPL-SCNC: 101 MMOL/L (ref 98–112)
CO2 SERPL-SCNC: 27 MMOL/L (ref 21–32)
CREAT BLD-MCNC: 1.57 MG/DL
EGFRCR SERPLBLD CKD-EPI 2021: 47 ML/MIN/1.73M2 (ref 60–?)
EOSINOPHIL # BLD AUTO: 0 X10(3) UL (ref 0–0.7)
EOSINOPHIL NFR BLD AUTO: 0 %
ERYTHROCYTE [DISTWIDTH] IN BLOOD BY AUTOMATED COUNT: 15.9 %
GLUCOSE BLD-MCNC: 160 MG/DL (ref 70–99)
GLUCOSE BLD-MCNC: 162 MG/DL (ref 70–99)
GLUCOSE BLD-MCNC: 171 MG/DL (ref 70–99)
GLUCOSE BLD-MCNC: 171 MG/DL (ref 70–99)
GLUCOSE BLD-MCNC: 195 MG/DL (ref 70–99)
HCT VFR BLD AUTO: 28.9 %
HEPARIN AB PPP QL PL AGG: NEGATIVE
HGB BLD-MCNC: 9.9 G/DL
IMM GRANULOCYTES # BLD AUTO: 0.07 X10(3) UL (ref 0–1)
IMM GRANULOCYTES NFR BLD: 0.8 %
LYMPHOCYTES # BLD AUTO: 0.18 X10(3) UL (ref 1–4)
LYMPHOCYTES NFR BLD AUTO: 2 %
MAGNESIUM SERPL-MCNC: 1.7 MG/DL (ref 1.6–2.6)
MCH RBC QN AUTO: 28.4 PG (ref 26–34)
MCHC RBC AUTO-ENTMCNC: 34.3 G/DL (ref 31–37)
MCV RBC AUTO: 82.8 FL
MONOCYTES # BLD AUTO: 0.58 X10(3) UL (ref 0.1–1)
MONOCYTES NFR BLD AUTO: 6.5 %
NEUTROPHILS # BLD AUTO: 8.07 X10 (3) UL (ref 1.5–7.7)
NEUTROPHILS # BLD AUTO: 8.07 X10(3) UL (ref 1.5–7.7)
NEUTROPHILS NFR BLD AUTO: 90.6 %
OSMOLALITY SERPL CALC.SUM OF ELEC: 296 MOSM/KG (ref 275–295)
PLATELET # BLD AUTO: 71 10(3)UL (ref 150–450)
PLATELETS.RETICULATED NFR BLD AUTO: 8.1 % (ref 0–7)
POTASSIUM SERPL-SCNC: 3.3 MMOL/L (ref 3.5–5.1)
POTASSIUM SERPL-SCNC: 3.8 MMOL/L (ref 3.5–5.1)
RBC # BLD AUTO: 3.49 X10(6)UL
SODIUM SERPL-SCNC: 138 MMOL/L (ref 136–145)
UNIT VOLUME: 188 ML
UNIT VOLUME: 295 ML
UNIT VOLUME: 350 ML
UNIT VOLUME: 350 ML
WBC # BLD AUTO: 8.9 X10(3) UL (ref 4–11)

## 2025-03-24 PROCEDURE — 71045 X-RAY EXAM CHEST 1 VIEW: CPT | Performed by: INTERNAL MEDICINE

## 2025-03-24 PROCEDURE — 99291 CRITICAL CARE FIRST HOUR: CPT | Performed by: INTERNAL MEDICINE

## 2025-03-24 RX ORDER — FUROSEMIDE 10 MG/ML
40 INJECTION INTRAMUSCULAR; INTRAVENOUS ONCE
Status: COMPLETED | OUTPATIENT
Start: 2025-03-24 | End: 2025-03-24

## 2025-03-24 RX ORDER — FLUTICASONE PROPIONATE 50 MCG
1 SPRAY, SUSPENSION (ML) NASAL DAILY
Status: DISCONTINUED | OUTPATIENT
Start: 2025-03-24 | End: 2025-04-18

## 2025-03-24 RX ORDER — POTASSIUM CHLORIDE 1500 MG/1
40 TABLET, EXTENDED RELEASE ORAL ONCE
Status: COMPLETED | OUTPATIENT
Start: 2025-03-24 | End: 2025-03-24

## 2025-03-24 RX ORDER — MAGNESIUM OXIDE 400 MG/1
400 TABLET ORAL ONCE
Status: COMPLETED | OUTPATIENT
Start: 2025-03-24 | End: 2025-03-24

## 2025-03-24 RX ORDER — POTASSIUM CHLORIDE 1500 MG/1
40 TABLET, EXTENDED RELEASE ORAL EVERY 4 HOURS
Status: COMPLETED | OUTPATIENT
Start: 2025-03-24 | End: 2025-03-24

## 2025-03-24 NOTE — PROGRESS NOTES
Vascular surgery progress note    Patient seen and examined.  No issues overnight.  Denies any weakness.  Vitals and labs stable.  On examination groins flat with no hematoma.  Sensation and motor intact.  Tolerating p.o. and ambulating.  Will decrease lumbar drain output to 5cc per hour. Will have blood pressure with systolic goal of 160. If no issues, will plan to clamp drain tomorrow. Encourage the patient to increase oral intake and ambulation.   Patient should remain in ICU.

## 2025-03-24 NOTE — OCCUPATIONAL THERAPY NOTE
OCCUPATIONAL THERAPY EVALUATION - INPATIENT     Room Number: 6615/6615-A  Evaluation Date: 3/24/2025  Type of Evaluation: Initial  Presenting Problem: S/p 3/21 fenestrated abdominal aortic aneurysm repair with left carotid to subclavian transposition on  with Dr. Yanez and Dr. Oquendo and 3/20 lumbar drain    Physician Order: IP Consult to Occupational Therapy  Reason for Therapy: ADL/IADL Dysfunction and Discharge Planning    OCCUPATIONAL THERAPY ASSESSMENT   Patient is currently functioning below baseline with  all ADL . Prior to admission, patient's baseline is independent.  Patient is requiring maximum assistance as a result of the following impairments: decreased functional strength, decreased endurance, and impaired standing balance. Occupational Therapy will continue to follow for duration of hospitalization.    Patient will benefit from continued skilled OT Services to facilitate return to prior level of function as patient demonstrates high motivation with excellent tolerance to an intensive therapy program    History Related to Current Admission: Patient is a 69 year old male admitted on 3/19/2025 with Presenting Problem: S/p 3/21 fenestrated abdominal aortic aneurysm repair with left carotid to subclavian transposition on  with Dr. Yanez and Dr. Oquendo and 3/20 lumbar drain. Co-Morbidities : HTN, Peripheral Neuropathy, Hx of toe amputation L great toe, mesenteric ischemia s/p SMA bypass with right external iliac artery to SMA bypass, Short gut syndrome      EVALUATION SESSION:  Patient Start of Session: supine    FUNCTIONAL TRANSFER ASSESSMENT  Sit to Stand: Edge of Bed; Chair  Edge of Bed: Maximum Assist (mod A x2)  Chair: Maximum Assist (mod A x2)    BED MOBILITY  Supine to Sit : Maximum Assist  Sit to Supine (OT): Not Tested  Scooting: n    BALANCE ASSESSMENT  Static Sitting: Supervision  Static Standing: Moderate Assist    O2 SATURATIONS  Oxygen Therapy  SPO2% on Oxygen at Rest: 94  At rest  oxygen flow (liters per minute): 8  SPO2% Ambulation on Oxygen: 86  Ambulation oxygen flow (liters per minute): 8    COGNITION  Overall Cognitive Status:  WFL - within functional limits    Upper Extremity   ROM: within functional limits   Strength: within functional limits     EDUCATION PROVIDED  Patient Education : Role of Occupational Therapy; Plan of Care; Functional Transfer Techniques; Posture/Positioning; Energy Conservation  Patient's Response to Education: Requires Further Education    Equipment used: rw     Therapist comments: RN clamped lumbar drain, 8 liters 94% at rest.   Pt reported, \"I can move my legs now, it was concerning when I couldn't move them.\"   Supine to sit with max A.  Supervision static sitting balance.  Cueing provided for hand placement and sequencing, mod A x 2 to shift weight to stand. Ambulated in hallway with close chair follow, min A x 2, HR up to 120, 8 liters 86%. Seated rest break, education on pursed lip breathing,  back to 90-92%, HR 94.   ABP reading 202 (order is to maintain above 180).      Patient End of Session: Up in chair, With  staff, Needs met, Call light within reach, RN aware of session/findings, All patient questions and concerns addressed    OCCUPATIONAL PROFILE    HOME SITUATION  Type of Home: House  Home Layout: Two level  Lives With: Spouse    Toilet and Equipment: Comfort height toilet  Shower/Tub and Equipment: Walk-in shower       Occupation/Status: retired from working for [employer]  Hand Dominance: Right  Drives: Yes       Prior Level of Function: independent with ADL. Pt used to enjoy playing golf. He is hoping to get back on the course.    PAIN ASSESSMENT  Ratin          OBJECTIVE  Precautions: Bed/chair alarm (>180 SBP, Lumbar Drain, Surgical boot on the L foot)  Fall Risk: High fall risk    ASSESSMENTS    AM-PAC ‘6-Clicks’ Inpatient Daily Activity Short Form  -   Putting on and taking off regular lower body clothing?: A Lot  -   Bathing (including  washing, rinsing, drying)?: A Lot  -   Toileting, which includes using toilet, bedpan or urinal? : A Lot  -   Putting on and taking off regular upper body clothing?: A Little  -   Taking care of personal grooming such as brushing teeth?: A Little  -   Eating meals?: A Little    AM-PAC Score:  Score: 15  Approx Degree of Impairment: 56.46%  Standardized Score (AM-PAC Scale): 34.69     PLAN  OT Device Recommendations: TBD  OT Treatment Plan: Balance activities, Energy conservation/work simplification techniques, ADL training, Functional transfer training, UE strengthening/ROM, Patient/Family education, Equipment eval/education, Compensatory technique education  Rehab Potential : Good  Frequency: 3x/week  Number of Visits to Meet Established Goals: 7    ADL Goals   Patient will perform upper body dressing:  with setup  Patient will perform lower body dressing:  with min assist  Patient will perform toileting: with min assist    Functional Transfer Goals  Patient will transfer from supine to sit:  with min assist  Patient will transfer to toilet:  with min assist    UE Exercise Program Goal  Patient will be independent with bilateral AROM HEP (home exercise program).    Additional Goals  Pt will incorporate 2 energy conservation techniques into ADL.    Patient Evaluation Complexity Level:   Occupational Profile/Medical History MODERATE - Expanded review of history including review of medical or therapy record   Specific performance deficits impacting engagement in ADL/IADL MODERATE  3 - 5 performance deficits   Client Assessment/Performance Deficits MODERATE - Comorbidities and min to mod modifications of tasks    Clinical Decision Making MODERATE - Analysis of occupational profile, detailed assessments, several treatment options    Overall Complexity LOW     OT Session Time: 24 minutes  Self-Care Home Management:  minutes  Therapeutic Activity: 10 minutes

## 2025-03-24 NOTE — PHYSICAL THERAPY NOTE
PHYSICAL THERAPY TREATMENT NOTE - INPATIENT    Room Number: 6615/6615-A     Session: 1     Number of Visits to Meet Established Goals: 3  History related to current admission: Patient is a 69 year old male admitted on 3/19/2025 from Home for S/p 3/21 fenestrated abdominal aortic aneurysm repair with left carotid to subclavian transposition on  with Dr. Yanez and Dr. Oquendo and 3/20 lumbar drain.  Reporting RLE weakness 3/22 AM worsening to BLE MRI T spine w/o obvious compression or signs of cord ischemia.      HOME SITUATION  Type of Home: House  Home Layout: Two level           Stairs to Bedroom: 16    Railing: Yes    Lives With: Spouse                Prior Level of [place]: Pt states that he lives in a house with spouse. Pt  reports that he was IND with all his ADLs and gait. Pt has no history of falls.  Presenting Problem: S/p 3/21 fenestrated abdominal aortic aneurysm repair with left carotid to subclavian transposition on  with Dr. Yanez and Dr. Oquendo and 3/20 lumbar drain  Co-Morbidities : HTN, Peripheral Neuropathy, Hx of toe amputation L great toe, mesenteric ischemia s/p SMA bypass with right external iliac artery to SMA bypass, Short gut syndrome    PHYSICAL THERAPY ASSESSMENT   Patient demonstrates good  progress this session, goals  remain in progress.  BLE strength improving however RLE remains weaker than LLE.     Patient is requiring moderate assist as a result of the following impairments: decreased functional strength, impaired static and dynamic balance, impaired coordination, impaired motor planning, and medical status.     Patient continues to function below baseline with bed mobility, transfers, gait, stair negotiation, maintaining seated position, and standing prolonged periods.  Next session anticipate patient to progress transfers and gait.  Physical Therapy will continue to follow patient for duration of hospitalization.    Patient continues to benefit from continued skilled PT  services: to facilitate return to prior level of function as patient demonstrates high motivation with excellent tolerance to an intensive therapy program .    PLAN DURING HOSPITALIZATION  Nursing Mobility Recommendation :  (2 assist RW)  PT Device Recommendation: Rolling walker  PT Treatment Plan: Bed mobility, Body mechanics, Gait training, Strengthening, Transfer training, Stair training, Balance training  Frequency (Obs): 3-5x/week     CURRENT GOALS     Goal #1 Patient is able to demonstrate supine - sit EOB @ level: minimum assistance      Goal #2 Patient is able to demonstrate transfers EOB to/from [facility] at assistance level: minimum assistance      Goal #3 Patient is able to sit at the EOB for 10 mins with CGA.       Goal #4 Patient is able to ambulate 50 feet with assist device: walker - rolling at assistance level: moderate assistance   Goal #5     Goal #6     Goal Comments: Goals established on 3/22/2025  3/24/2025 all goals ongoing achieved     SUBJECTIVE  \"All these lines make me b!tchy\"    \" I lost some toes and 2ft of my intestines last time I went to [facility]\"     OBJECTIVE  Precautions: Bed/chair alarm (>180 SBP, Lumbar Drain, Surgical boot on the L foot)  *after session SBP goal changes to > 160    WEIGHT BEARING RESTRICTION     PAIN ASSESSMENT   Ratin  Location: Pt reports no pain       BALANCE                                                                                                                       Static Sitting: Fair -  Dynamic Sitting: Fair -           Static Standing: Poor  Dynamic Standing: Poor    ACTIVITY TOLERANCE                         O2 WALK       AM-PAC '6-Clicks' INPATIENT SHORT FORM - BASIC MOBILITY  How much difficulty does the patient currently have...  Patient Difficulty: Turning over in bed (including adjusting bedclothes, sheets and blankets)?: A Little   Patient Difficulty: Sitting down on and standing up from a chair with arms (e.g., wheelchair, bedside commode,  etc.): A Lot   Patient Difficulty: Moving from lying on back to sitting on the side of the bed?: A Little   How much help from another person does the patient currently need...   Help from Another: Moving to and from a bed to a chair (including a wheelchair)?: A Lot   Help from Another: Need to walk in hospital room?: A Lot   Help from Another: Climbing 3-5 steps with a railing?: Total     AM-PAC Score:  Raw Score: 13   Approx Degree of Impairment: 64.91%   Standardized Score (AM-PAC Scale): 36.74   CMS Modifier (G-Code): CL    FUNCTIONAL ABILITY STATUS  Gait Assessment   Functional Mobility/Gait Assessment  Gait Assistance: Moderate assistance  Distance (ft): 25  Assistive Device: Rolling walker  Pattern: Shuffle    Skilled Therapy Provided    Bed Mobility:  Rolling: mod A    Supine<>Sit: mod A x2   Sit<>Supine: NT     Transfer Mobility:  Sit<>Stand: mod A    Stand<>Sit: mod A    Gait: mod A x2    Therapist's Comments: Discussed case with RN prior to session initiation. Pt agreeable to participation in therapy. Cuing provided for log roll technique from supine > EOB. Gait belt donned for out of bed mobility. Improvement in functional activity tolerance and strength demonstrated this session - pt able to maintain static and dynamic sitting balance with SBA/CGA. Able to perform ankle pumps and LAQ sitting EOB. Facilitated STS transfer to RW with mod A followed by transfer to bedside chair with mod A x2 - assist for RW navigation, stepping pattern, and BL weight shifts. Participated in gait training in [place] with close chair follow and mod A - cuing for stepping pattern (RW > RLE > LLE step together), assist with RW navigation, lateral weight shifts provided. Additional STS transfer performed at end of session requiring inc assist - max A (to obtain standing weight at scale), pt fatigued.         Patient End of Session: Up in chair, Needs met, Call light within reach, RN aware of session/findings, All patient questions  and concerns addressed, Hospital anti-slip socks, Alarm set    PT Session Time: 40 minutes  Gait Training: 15 minutes  Therapeutic Activity: 23 minutes  Therapeutic Exercise:  minutes   Neuromuscular Re-education:  minutes

## 2025-03-24 NOTE — CONSULTS
PHYSICAL MEDICINE AND REHABILITATION CONSULTATION       Location [facility] 6NE-A Attending Neil Oquendo MD   Hosp Day # 4 PCP Kam Alberts MD     Patient Identification  Cy Monsalve is a 69 year old male.  :  1955  Admit Date:  3/19/2025  Attending Provider:  Neil Oquendo MD                                  Primary Care Physician:  Kam Alberts MD   Admitting Diagnosis: Abdominal aortic aneurysm (AAA) [I71.40]    CC: Impaired mobility and ADL dysfunction secondary to AAA repair        HPI: This is a 69 year old male  who presented to [facility] on 3/19/2025 for planned fenestrated endovascular arch repair and fenestrated thoracoabdominal aneurysm repair with left carotid to subclavian transposition, endovascular septotomy of dissection performed by Dr. Yanez on 3/21. Plan to clamp drain tomorrow. Pain is controlled. On 8L O2. Patient denies any CP, SOB. Some fatigue. In good spirits and joking       PM&R has now been consulted in order to assess patient's functional status and make recommendations for rehabilitation plan of care.     ROS:  All other systems were reviewed and are negative except as noted under HPI    Current medications: Full medication list has been personally reviewed and include:   [COMPLETED] magnesium oxide (Mag-Ox) tab 400 mg  400 mg Oral Once    [COMPLETED] potassium chloride (Klor-Con M20) tab 40 mEq  40 mEq Oral Q4H    meropenem (Merrem) 500 mg in sodium chloride 0.9% 100 mL IVPB-MBP  500 mg Intravenous Q8H    [COMPLETED] furosemide (Lasix) 10 mg/mL injection 40 mg  40 mg Intravenous Once    fluticasone propionate (Flonase) 50 MCG/ACT nasal suspension 1 spray  1 spray Each Nare Daily    [COMPLETED] magnesium oxide (Mag-Ox) tab 400 mg  400 mg Oral Once    [COMPLETED] sodium chloride 0.9% infusion   Intravenous Once    [COMPLETED] furosemide (Lasix) 10 mg/mL injection 20 mg  20 mg Intravenous Once    ipratropium-albuterol (Duoneb) 0.5-2.5 (3) MG/3ML  inhalation solution 3 mL  3 mL Nebulization Q4H PRN    [COMPLETED] sodium chloride 0.9 % IV bolus 500 mL  500 mL Intravenous Once    [COMPLETED] sodium chloride 0.9% infusion   Intravenous Once    sodium chloride 0.9 % IV bolus 500 mL  500 mL Intravenous Once    docusate sodium (Colace) cap 100 mg  100 mg Oral BID    polyethylene glycol (PEG 3350) (Miralax) 17 g oral packet 17 g  17 g Oral Daily    [COMPLETED] sodium chloride 0.9 % IV bolus 1,000 mL  1,000 mL Intravenous Once    [COMPLETED] methylPREDNISolone sodium succinate (Solu-MEDROL) 2,000 mg in sodium chloride 0.9% 100 mL IVPB  2,000 mg Intravenous Once    glucose (Dex4) 15 GM/59ML oral liquid 15 g  15 g Oral Q15 Min PRN    Or    glucose (Glutose) 40% oral gel 15 g  15 g Oral Q15 Min PRN    Or    glucose-vitamin C (Dex-4) chewable tab 4 tablet  4 tablet Oral Q15 Min PRN    Or    dextrose 50% injection 50 mL  50 mL Intravenous Q15 Min PRN    Or    glucose (Dex4) 15 GM/59ML oral liquid 30 g  30 g Oral Q15 Min PRN    Or    glucose (Glutose) 40% oral gel 30 g  30 g Oral Q15 Min PRN    Or    glucose-vitamin C (Dex-4) chewable tab 8 tablet  8 tablet Oral Q15 Min PRN    insulin aspart (NovoLOG) 100 Units/mL FlexPen 1-5 Units  1-5 Units Subcutaneous TID AC and HS    [COMPLETED] magnesium oxide (Mag-Ox) tab 800 mg  800 mg Oral Once    [COMPLETED] methylPREDNISolone sodium succinate (Solu-MEDROL) 1,000 mg in sodium chloride 0.9% 100 mL IVPB  1,000 mg Intravenous Q6H    pantoprazole (Protonix) 40 mg in sodium chloride 0.9% PF 10 mL IV push  40 mg Intravenous Q24H    [COMPLETED] sodium chloride 0.9% infusion   Intravenous Once    acetaminophen (Tylenol) tab 650 mg  650 mg Oral Q4H PRN    Or    HYDROcodone-acetaminophen (Norco) 5-325 MG per tab 1 tablet  1 tablet Oral Q4H PRN    Or    HYDROcodone-acetaminophen (Norco) 5-325 MG per tab 2 tablet  2 tablet Oral Q4H PRN    ondansetron (Zofran) 4 MG/2ML injection 4 mg  4 mg Intravenous Q6H PRN    metoclopramide (Reglan) 5  mg/mL injection 10 mg  10 mg Intravenous Q8H PRN    [Held by provider] heparin (Porcine) 5000 UNIT/ML injection 5,000 Units  5,000 Units Subcutaneous 2 times per day    HYDROmorphone (Dilaudid) 1 MG/ML injection 0.2 mg  0.2 mg Intravenous Q2H PRN    Or    HYDROmorphone (Dilaudid) 1 MG/ML injection 0.4 mg  0.4 mg Intravenous Q2H PRN    Or    HYDROmorphone (Dilaudid) 1 MG/ML injection 0.8 mg  0.8 mg Intravenous Q2H PRN    niCARdipine in sodium chloride 0.86% (carDENE) 20 mg/200mL infusion premix  5-15 mg/hr Intravenous Continuous PRN    norepinephrine (Levophed) 4 mg/250mL infusion premix  0.5-30 mcg/min Intravenous Continuous PRN    [COMPLETED] ceFAZolin (Ancef) 2g in 10mL IV syringe premix  2 g Intravenous Q8H    acetaminophen (Tylenol) 160 MG/5ML oral liquid 650 mg  650 mg Per NG Tube Q4H PRN    Or    acetaminophen (Tylenol) rectal suppository 650 mg  650 mg Rectal Q4H PRN    bisacodyl (Dulcolax) 10 MG rectal suppository 10 mg  10 mg Rectal Daily PRN    [COMPLETED] phytonadione (Aqua-Mephton) 10 mg in sodium chloride 0.9% 50 mL IVPB  10 mg Intravenous Once    [COMPLETED] magnesium oxide (Mag-Ox) tab 400 mg  400 mg Oral Once    [COMPLETED] lidocaine (Xylocaine) 1 % injection        [COMPLETED] fentaNYL (Sublimaze) 50 mcg/mL injection        [COMPLETED] midazolam (Versed) 2 MG/2ML injection        [COMPLETED] phytonadione (Aqua-Mephton) 10 mg in sodium chloride 0.9% 50 mL IVPB  10 mg Intravenous Once    [COMPLETED] phytonadione (Aqua-Mephton) 10 mg in sodium chloride 0.9% 50 mL IVPB  10 mg Intravenous Once       Allergies:  Allergies[1]    Past Medical History:  Past Medical History:    AAA (abdominal aortic aneurysm)    Abdominal aneurysm    Abdominal aortic aneurysm (AAA)    AAA surgery done    Anxiety state    Back pain    Naproxen twice a week    Back problem    minor    Calculus of kidney    Deep vein thrombosis (HCC)    to colon    Esophageal reflux    Hearing impairment    Lt ear tinnitus    High blood  pressure    History of recent hospitalization    10/2019- was at [facility] (ICU) with Dissected Aorta/ Post op colon problem/and post op cardiac arrest    Ileostomy present (HCC)    Peripheral vascular disease    Unspecified essential hypertension    Visual impairment    glasses       Past Surgical History:  Past Surgical History:   Procedure Laterality Date    Colonoscopy  10/15/09  [facility]    Screening: small polypoid fold in sigmoid (no polyp tissue seen on path), int hemorrhoids; next colonoscopy in 2019    Other  2017    AAA repair    Other      10/23/19- Dissected Aorta surgery    Other surgical history      Eye surgery for lazy eye    Other surgical history  10/15/09  [facility]    EGD (heartburn): normal    Other surgical history      AAA repair    Other surgical history      multiple toe amputations    Part removal colon w end colostomy      10/2019       Family History:   Family History   Problem Relation Age of Onset    Diabetes Father     Heart Disorder Father         CHF    Heart Disorder Mother          age 70, aortic aneurysm    Hypertension Sister          Social History:  Social History     Socioeconomic History    Marital status:    Tobacco Use    Smoking status: Some Days     Types: Cigars, Cigarettes    Smokeless tobacco: Never    Tobacco comments:     Cigars      Vaping Use    Vaping status: Never Used   Substance and Sexual Activity    Alcohol use: Yes     Alcohol/week: 0.0 standard drinks of alcohol     Comment: occassionally    Drug use: No       FUNCTIONAL STATUS:  Premorbid functional status/Living Situation-   HOME SITUATION  Type of Home: House  Home Layout: Two level  Lives With: Spouse     Toilet and Equipment: Comfort height toilet  Shower/Tub and Equipment: Walk-in shower     Occupation/Status: retired from working for [employer]  Hand Dominance: Right  Drives: Yes     Prior Level of Function: independent with ADL. Pt used to enjoy playing golf. He is hoping to get  back on the course.     PAIN ASSESSMENT  Ratin    Current functional Status:     FUNCTIONAL TRANSFER ASSESSMENT  Sit to Stand: Edge of Bed; Chair  Edge of Bed: Maximum Assist (mod A x2)  Chair: Maximum Assist (mod A x2)     BED MOBILITY  Supine to Sit : Maximum Assist  Sit to Supine (OT): Not Tested  Scooting: n     BALANCE ASSESSMENT  Static Sitting: Supervision  Static Standing: Moderate Assist     Bed Mobility:  Rolling: mod A               Supine<>Sit: mod A x2             Sit<>Supine: NT               Transfer Mobility:  Sit<>Stand: mod A        Stand<>Sit: mod A        Gait: mod A x2       OBJECTIVE:    /87   Pulse 99   Temp 98.1 °F (36.7 °C) (Temporal)   Resp 19   Wt 200 lb 3.2 oz (90.8 kg)   SpO2 91%   BMI 23.74 kg/m²   Body mass index is 23.74 kg/m².   General: well nourished, NAD  Eyes: conjunctiva and lids intact, pupils are equal and round  ENMT: external inspection of ears and nose within normal limits. Normal functional hearing  Neck: supple, symmetrical, no thyromegaly appreciated  Lymph: no cervical and no axillary lymphadenopathy  Lungs: Non labored on RA, no wheezing appreciated, No accessory muscle noted  Heart: Reg Rate, no edema noted in BLE  Abdomen: soft, non-tender, non-distended. No hepatomegaly appreciated.  Extrems: no clubbing/cyanosis noted in digits or nails  Psych: awake,alert, oriented; normal mood and affect  MSK: PROM of BUE full; MMT 5/5 bilateral UE and LE; no dislocation noted BUE  Neuro: CN 2-12 grossly intact, sensation intact to LT in BUE/BLE;  speech clear and fluent    Data Review:    Lab Results   Component Value Date    WBC 8.9 2025    HGB 9.9 2025    HCT 28.9 2025    PLT 71.0 2025    CREATSERUM 1.57 2025    BUN 32 2025     2025    K 3.8 2025     2025    CO2 27.0 2025     2025    CA 8.4 2025    MG 1.7 2025    PGLU 171 2025       XR CHEST AP PORTABLE   (CPT=71045)    Result Date: 3/24/2025  CONCLUSION:  The patient is status post sternotomy.  Heart size is normal.  Normal vascularity A descending thoracic aortic stent graft is identified unchanged in appearance.  There is a graft along the superior aspect of the transverse aorta.  There are adjacent surgical clips.  There are surgical clips in the left neck also.  A right Norwood-Warner catheter is identified the tip it is in the upper SVC.  No pneumothorax.  There is some consolidation and ground-glass opacities in the left lower lobe with a small left pleural effusion either representing atelectasis or pneumonia.  There are ground-glass opacities in the right suprahilar and right infrahilar region suggesting foci of pneumonia versus atelectasis.  Multi focal pneumonia is favored.  Trace blunting the right CP angle.   LOCATION:  [place] [id number]      Dictated by (CST): Neil Tong MD on 3/24/2025 at 5:57 AM     Finalized by (CST): Neil Tong MD on 3/24/2025 at 6:00 AM        ASSESSMENT:    Deficits of self care and mobility secondary to   Active Problems:    Pre-op testing    Thoracoabdominal aortic aneurysm (TAAA)    Bilateral leg weakness      Recommendations: acute rehab if patient able to sat >90% O2 with 5L O2 or less at rest and with activity. Will continue to follow with you, as patient still undergoing medical optimization.     Advanced directives reviewed and in chart. DNAR    Thank you for allowing me to participate in the care of this patient.     Faby Stewart MD, FAAPM&R          [1]   Allergies  Allergen Reactions    Amoxicillin ITCHING     Itchiness on hands and feet      Clindamycin OTHER (SEE COMMENTS)     Upset stomach

## 2025-03-24 NOTE — PROGRESS NOTES
[facility]   NEUROCRITICAL CARE   PROGRESS NOTE    Admission date: 3/19/2025  Reason for Consult: Leg weakness   Chief Complaint: S/p AAA repair  ________________________________________________________________    SUBJECTIVE     24 Hour Significant Events: Desats overnight, lasix given per Pulm    OBJECTIVE   VITAL SIGNS:   Temp:  [97.5 °F (36.4 °C)-100.5 °F (38.1 °C)] 97.5 °F (36.4 °C)  Pulse:  [] 82  Resp:  [12-22] 20  BP: (155-198)/(74-96) 168/81  SpO2:  [90 %-100 %] 93 %  AO: (165-212)/(70-94) 203/83    INTAKE/OUTPUT:  Intake/Output Summary (Last 24 hours) at 3/24/2025 0838  Last data filed at 3/24/2025 0800  Gross per 24 hour   Intake 2741 ml   Output 3930 ml   Net -1189 ml     PHYSICAL EXAM:  CONSTITUTIONAL: Patient resting comfortably in bed, NAD    NEUROLOGIC:    Mental Status:  A&O x 4, Follows simple commands, no obvious aphasia or dysarthria  Cranial nerves: PERRL.  Visual fields full.  EOMI.  Face symmetric with normal movement bilaterally.  Hearing grossly intact. Tongue midline with normal movements.   Motor: Drift:  RUE mild drift, improved on retesting; Motor exam is 5 out of 5 in BL UE        HF  KE  KF  DF  PF     Left 4- 5 4 5 4     Right 4 5 4 4 4      Sensation: Decreased to light touch in left lower extremity.  Decreased cold sensation in bilateral lower extremities, decreased motor sensation in bilateral extremities, pain sharp down to umbilicus, T10, then becomes dull - checked 3/22  Cerebellar: Normal Finger-To-Nose test BL     LABORATORY DATA:  Last 24 hour labs were reviewed in detail.  Recent Labs   Lab 03/22/25  1739 03/23/25  0505 03/24/25  0443    136 138   K 4.2 4.3 3.3*    104 101   CO2 22.0 23.0 27.0   * 174* 162*   BUN 33* 31* 32*   CREATSERUM 1.84* 1.72* 1.57*     Recent Labs   Lab 03/23/25  0505 03/23/25  1543 03/24/25  0443   WBC 7.4 8.8 8.9   HGB 9.2* 10.0* 9.9*   PLT 74.0* 86.0* 71.0*     Recent Labs   Lab 03/19/25  2003  03/21/25  0415 03/22/25  0423   ALT <7* <7* 8*   AST 10 10 30     Recent Labs   Lab 03/20/25  0528 03/21/25  0415 03/22/25  1739 03/23/25  0505 03/24/25  0443   MG 1.6   < > 1.5* 1.6 1.7   PHOS 3.8  --   --   --   --     < > = values in this interval not displayed.       Scheduled Meds:   potassium chloride  40 mEq Oral Q4H    sodium chloride  500 mL Intravenous Once    docusate sodium  100 mg Oral BID    polyethylene glycol (PEG 3350)  17 g Oral Daily    insulin aspart  1-5 Units Subcutaneous TID AC and HS    methylPREDNISolone  1,000 mg Intravenous Q6H    pantoprazole  40 mg Intravenous Q24H    [Held by provider] heparin  5,000 Units Subcutaneous 2 times per day     Continuous Infusions:   sodium chloride 83 mL/hr at 03/23/25 1130    niCARdipine Stopped (03/22/25 0245)    norepinephrine 1 mcg/min (03/24/25 0710)     PRN Meds:  ipratropium-albuterol    glucose **OR** glucose **OR** glucose-vitamin C **OR** dextrose **OR** glucose **OR** glucose **OR** glucose-vitamin C    acetaminophen **OR** HYDROcodone-acetaminophen **OR** HYDROcodone-acetaminophen    ondansetron    metoclopramide    HYDROmorphone **OR** HYDROmorphone **OR** HYDROmorphone    niCARdipine    norepinephrine    acetaminophen **OR** acetaminophen **OR** [DISCONTINUED] acetaminophen    bisacodyl    Radiology:    XR CHEST AP PORTABLE  (CPT=71045)    Result Date: 3/24/2025  CONCLUSION:  The patient is status post sternotomy.  Heart size is normal.  Normal vascularity A descending thoracic aortic stent graft is identified unchanged in appearance.  There is a graft along the superior aspect of the transverse aorta.  There are adjacent surgical clips.  There are surgical clips in the left neck also.  A right Susan-Warner catheter is identified the tip it is in the upper SVC.  No pneumothorax.  There is some consolidation and ground-glass opacities in the left lower lobe with a small left pleural effusion either representing atelectasis or pneumonia.  There are  ground-glass opacities in the right suprahilar and right infrahilar region suggesting foci of pneumonia versus atelectasis.  Multi focal pneumonia is favored.  Trace blunting the right CP angle.   LOCATION:  [place] [id number]      Dictated by (CST): Neil Tong MD on 3/24/2025 at 5:57 AM     Finalized by (CST): Neil Tong MD on 3/24/2025 at 6:00 AM      ASSESSMENT/PLAN   69 year old male with PMH of anxiety, DVT, GERD, prior type A dissection status post repair, PVD, DVT, HTN, prior smoker here for status post endovascular arch repair and thoracoabdominal aneurysm repair, neuroconsulted for waxing and waning right leg weakness     Active Problems:    Pre-op testing    Thoracoabdominal aortic aneurysm (TAAA)     Reviewed notes by pulm and vascular  Reviewed CBC/BMP and CXR  Imaging personally reviewed and interpreted by me, agree with radiologist impression.  Discussed case with care team     Bilateral lower extremity weakness  Initially reported right lower extremity weakness 3/22 morning, later right leg worsened and patient also developed left leg weakness.    Exam limited by baseline peripheral neuropathy in lower extremities.  Concern for spinal cord ischemia/hypoperfusion in the postoperative state  MRI T spine limited by artifact but without obvious compression or signs of cord ischemia noted however, given clinical exam, concern remains   Improving today but still weak   Ok to wean BP augmentation from neuro standpoint   LD management per surgical team   Platelets goal per vascular surgery  Started steroids 3/22 as rescue measure in setting of worsening exam despite blood pressure augmentation & increased lumbar drainage                - Ok to dc today  Continue chemoprophylaxis for DVT prevention  PT/OT evals   Will follow     40 minutes of CC time (exclusive of billable procedures) was administered to manage and/or prevent neurologic instability. More than 50% spent counseling/coordinating care. This  involved direct patient intervention, complex decision making, and/or extensive discussions with the patient, family, and clinical staff.     Estrellita Verdin MD [facility]  Neurocritical Care  [facility]

## 2025-03-24 NOTE — PLAN OF CARE
Assumed care of the pt at approx. 1930pm. Neuro checks q 1 hr. R-leg slightly weaker than L but much better compared to yesterday. Sensation intact. Denies pain. Draining lumbar drain 15ml q 1 hr. Lumbar dressing CDI. Denies SOB. No respiratory distress noted but sats at 89%. Non-productive cough. Encouraged to use IS & flutter valve. Dr. Brandt notified and a nebulizing treatment ordered. CXR done in am. 20 mg of Lasix given as well with good response. SB/SA. Levo gtt to keep SBP > 180. Updated on POC.

## 2025-03-24 NOTE — CM/SW NOTE
03/24/25 1100   CM/SW Referral Data   Referral Source Physician   Reason for Referral Discharge planning   Informant Patient   Patient Info   Patient's Current Mental Status at Time of Assessment Alert;Oriented   Patient's Home Environment House   Number of Levels in Home 2   Patient lives with Spouse/Significant other   Patient Status Prior to Admission   Independent with ADLs and Mobility Yes   Discharge Needs   Anticipated D/C needs Acute rehab   Services Requested   PMR Consult Requested Consult ordered   Choice of Post-Acute Provider   Informed patient of right to choose their preferred provider Yes     Met with patient at the bedside for eval/discharge planning.    Pt is a 69 year old male admitted for complex AAA repair and lumbar drain  Pt reports he lives with his wife and is independent with all ADLs/IADLs at baseline.  Reports he has a hx toe amputations, has orthotics and ambulates without assistive devices.    PT worked w/pt and Anticipated therapy need: Intensive Therapy Program  PMR pending   Discussed with patient and he reports he has been to [facility] in the past and would like to go again if Acute Rehab needed.    / to remain available for support and/or discharge planning.     Gauri GENTILEA MSN, RN Case Manager  [phone]

## 2025-03-24 NOTE — PROGRESS NOTES
Pulmonary Progress Note        NAME: Cy Monsalve - ROOM: [address] [place], [place] [address] - MRN: [id number] - Age: 69 year old - : 1955        Last 24hrs: conts to have high O2 needs, able to ambulate for short distance this AM    OBJECTIVE:  Vitals:    25 0600 25 0700 25 0715 25 0800   BP: (!) 172/83   (!) 168/81   BP Location:    Right arm   Pulse: 71 76 74 82   Resp: 16 20 16 20   Temp:       TempSrc:       SpO2: 96% 94% 91% 93%   Weight:           Oxygen Therapy  SpO2: 93 %  O2 Device: Nasal cannula  O2 Flow Rate (L/min): 8 L/min  Pulse Oximetry Type: Continuous  Oximetry Probe Site Changed: No  Pulse Ox Probe Location: Right hand                  Intake/Output Summary (Last 24 hours) at 3/24/2025 0829  Last data filed at 3/24/2025 0800  Gross per 24 hour   Intake 2741 ml   Output 3930 ml   Net -1189 ml       Scheduled Medication:   potassium chloride  40 mEq Oral Q4H    sodium chloride  500 mL Intravenous Once    docusate sodium  100 mg Oral BID    polyethylene glycol (PEG 3350)  17 g Oral Daily    insulin aspart  1-5 Units Subcutaneous TID AC and HS    methylPREDNISolone  1,000 mg Intravenous Q6H    pantoprazole  40 mg Intravenous Q24H    [Held by provider] heparin  5,000 Units Subcutaneous 2 times per day     Continuous Infusing Medication:   sodium chloride 83 mL/hr at 25 1130    niCARdipine Stopped (25 0245)    norepinephrine 1 mcg/min (25 0710)       Lungs:  rales in right base  Heart: S1, S2 normal, regular rate and rhythm  Abdomen: soft, non-tender; bowel sounds normal; no masses,  no organomegaly  Extremities: extremities normal, atraumatic, no cyanosis or edema    Labs reviewed as noted below      Imaging: chest x-ray reviewed- attila interstitial changes consistent w/ pulm edema, more dense consolidations in RML and RLL concerning for PNA    ASSESSMENT/PLAN:    S/p complex aortic repair 3/21  RLE weakness and started on high dose steroids solumedrol 1g q6hrs per neuro  on 3/22.   Wean pressors as tolerated to keep SBP >160 currently off pressors.   S/p MRI spine.   Neurochecks.   Per vascular surgery and neurocritcal care.   Lumbar drain in place.   Acute Hypoxic Resp failure  Extubated 3/21 post op.   O2 as needed.   Prior smoker. Outpt PFT's.   Possible PNA  -start janna  -check cultures  Pulm Edema  -diuresis as tolerated, 40 of lasix today  HOLLIS on CKD  Monitor labs and u/o. .   Improving.    Heme  Leukocytosis. No clear infection at this point. Monitor.   Hgb goal >10 per neuro. Transfuse today.   HTN  Per PCP  FEN  PO diet  Proph  SCD and subcutaneous heparin.   Dispo  Full code   Will follow.       Yordy Jones  [facility]  Pulmonary and Critical Care

## 2025-03-24 NOTE — PROGRESS NOTES
[facility] Hospitalist Progress Note     CC: Hospital Follow up    PCP: Kam Alberts MD       Assessment/Plan:     Active Problems:    Pre-op testing    Thoracoabdominal aortic aneurysm (TAAA)    Bilateral leg weakness      69 year old male with a past medical history of hypertension, GERD, anxiety, renal calculi, DVT, ascending aortic dissection s/p repair, 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  Postoperative course complicated by HOLLIS on CKD as well as right lower extremity weakness along with hypoxic respiratory failure.      History of prior infrarenal endovascular AAA repair with bilateral KYLER for hypogastric and common iliac aneurysms  History of mesenteric ischemia s/p SMA bypass with right external iliac artery to SMA bypass  History of aortic dissection s/p repair, aortic dissection distal to left subclavian  Hx of DVT   S/p complex aortic repair 3/21  -Complicated aortic pathology with multiple repairs.   - high risk procedure with risks of complications including spinal cord ischemia, mesenteric ischemia, possible renal failure.  -s/p lumbar drain with neurointerventional to limit risk of spinal cord ischemia 3/20  -Further recommendations and management per vascular surgery  - previously on coumadin, now held per vascular  -Status post complex aortic repair 3/21 with vascular-see op report for details  - SBP goal >160, per vascualr  - pressors per vascular  - post op managmenet per [facility]    Right lower extremity weakness-improving  - In the setting of complex aortic repair 3/21  - Seen by neurointensivist, recommendations reviewed  - Hemoglobin goal per neuro ICU greater than 10, received 1 unit of PRBC 3/22, plan for another  unit 3/23  - Plan for 1 unit of platelets 3/23  - Started on IV steroids as rescue measure in the setting of worsening weakness on the right lower extremity-per neuro ICU  - Frequent neurovascular assessment per protocol    Acute hypoxemic respiratory failure w/ threat to bodily function 2/2 possible pneumonia versus edema  -Discussed with pulmonology  -Meropenem started  -IV Lasix    HOLLIS on CKD-improving  -Creatinine reviewed today - 1.57 (baseline 1.2)  -Continue to monitor with serial RFP's  -Avoid nephrotoxic agents  - Cr bumped post operatively - given prolonged surgery and complicated repair  - given IVF, follow BMP  - discussed with vascular surgery- defer decision for renal consult to vascular      Hypertension  -hold home HCTZ     Allergic rhinitis  -Continue home fluticasone as needed     Short gut syndrome  - resume lamotil if diarrhea resumes    Normocytic anemia  -Hgb reviewed - 9.9 this morning  -Serial CBC's     Dispo: Inpatient     Outpatient or previous hospital records reviewed. Questions/concerns were discussed with patient and/or family by bedside. Discussed with vascular surgery.      DO Kendra Lewis [facility]  Hospitalist  Contact via [employer]         Subjective:     On 8L O2 NC this morning. No significant overnight events.     OBJECTIVE:    Blood pressure 143/83, pulse 91, temperature 97.5 °F (36.4 °C), temperature source Temporal, resp. rate 15, weight 200 lb 3.2 oz (90.8 kg), SpO2 92%.    Temp:  [97.5 °F (36.4 °C)-100.5 °F (38.1 °C)] 97.5 °F (36.4 °C)  Pulse:  [] 91  Resp:  [12-20] 15  BP: (143-198)/(74-96) 143/83  SpO2:  [90 %-100 %] 92 %  AO: (165-212)/(70-94) 180/78      Intake/Output:    Intake/Output Summary (Last 24 hours) at 3/24/2025 1011  Last data filed at 3/24/2025 0900  Gross per 24 hour   Intake 2741 ml   Output 4025 ml   Net -1284 ml       Last 3 Weights   03/24/25 0900 200 lb 3.2 oz (90.8 kg)   03/24/25 0500 203 lb 0.7 oz (92.1 kg)   03/23/25  0516 204 lb 2.3 oz (92.6 kg)   03/22/25 0637 153 lb 3.5 oz (69.5 kg)   03/20/25 1231 192 lb 15.9 oz (87.5 kg)   03/19/25 1444 193 lb (87.5 kg)   03/22/25 1113 153 lb 3.5 oz (69.5 kg)   03/07/25 1536 185 lb (83.9 kg)       Exam   Gen: No acute distress, alert and oriented x3, no focal neurologic deficits, right-sided [place], arterial line and lumbar drain noted  Heent: NC AT, mucous memb clear, neck supple  Pulm: Lungs clear, normal respiratory effort  CV: Heart with regular rate and rhythm, no peripheral edema  Abd: Abdomen soft, nontender, nondistended, bowel sounds present  MSK:expected rom, no hematoma appreciated bilaterally, 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, 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, strentgh 4/5 on R, pulses intact  Skin: no rashes or lesions  Neuro: AO*3, motor intact, no sensory deficits  Psyc: appropriate mood and affect      Data Review:       Labs:     Recent Labs   Lab 03/21/25  0415 03/22/25  0425 03/23/25  0505 03/23/25  1543 03/24/25  0443   RBC 4.41   < > 3.26* 3.52* 3.49*   HGB 12.2*   < > 9.2* 10.0* 9.9*   HCT 35.6*   < > 27.3* 29.1* 28.9*   MCV 80.7   < > 83.7 82.7 82.8   MCH 27.7   < > 28.2 28.4 28.4   MCHC 34.3   < > 33.7 34.4 34.3   RDW 16.5   < > 16.0 15.9 15.9   NEPRELIM 5.64  --  6.81  --  8.07*   WBC 7.2   < > 7.4 8.8 8.9   .0   < > 74.0* 86.0* 71.0*    < > = values in this interval not displayed.         Recent Labs   Lab 03/22/25  1739 03/23/25  0505 03/24/25  0443   * 174* 162*   BUN 33* 31* 32*   CREATSERUM 1.84* 1.72* 1.57*   EGFRCR 39* 42* 47*   CA 7.9* 8.3* 8.4*    136 138   K 4.2 4.3 3.3*    104 101   CO2 22.0 23.0 27.0       Recent Labs   Lab 03/19/25  1427 03/20/25  0528 03/21/25  0415 03/22/25  0423   ALT <7*  --  <7* 8*   AST 10  --  10 30   ALB 4.4 4.2 4.1 3.9         Imaging:  XR CHEST AP PORTABLE  (CPT=71045)    Result Date: 3/24/2025  CONCLUSION:  The patient is status post sternotomy.  Heart size is normal.  Normal vascularity  A descending thoracic aortic stent graft is identified unchanged in appearance.  There is a graft along the superior aspect of the transverse aorta.  There are adjacent surgical clips.  There are surgical clips in the left neck also.  A right Ashton-Warner catheter is identified the tip it is in the upper SVC.  No pneumothorax.  There is some consolidation and ground-glass opacities in the left lower lobe with a small left pleural effusion either representing atelectasis or pneumonia.  There are ground-glass opacities in the right suprahilar and right infrahilar region suggesting foci of pneumonia versus atelectasis.  Multi focal pneumonia is favored.  Trace blunting the right CP angle.   LOCATION:  [id number]      Dictated by (CST): Neil Tong MD on 3/24/2025 at 5:57 AM     Finalized by (CST): Neil Tong MD on 3/24/2025 at 6:00 AM       MRI SPINE THORACIC (CPT=72146)    Result Date: 3/22/2025  CONCLUSION:  Exam is somewhat suboptimal due to metal artifacts relating to patient's abdominal aortic stenting.  There is no evidence of acute cord injury/cord infarct.    Dictated by (CST): Sparkle High DO on 3/22/2025 at 2:54 PM     Finalized by (CST): Sparkle High DO on 3/22/2025 at 2:58 PM       XR CHEST AP PORTABLE  (CPT=71045)    Result Date: 3/21/2025  CONCLUSION:   1. Endotracheal tube tip is 10.6 cm above the shailesh.  2. Right internal jugular central line has tip in the SVC.  Possible drainage catheter or other rectangular foreign body overlying the left supraclavicular region.  3. Aortic endograft is noted.  4. Median sternotomy wires are noted.  5. Interstitial abnormalities throughout the lungs.  6. No other foreign bodies are identified.    LOCATION:  [place]      Dictated by (CST): Peter Villagomez MD on 3/21/2025 at 9:42 PM     Finalized by (CST): Peter Villagomez MD on 3/21/2025 at 9:45 PM          Meds:      potassium chloride  40 mEq Oral Q4H    meropenem  500 mg Intravenous Q8H    furosemide   40 mg Intravenous Once    sodium chloride  500 mL Intravenous Once    docusate sodium  100 mg Oral BID    polyethylene glycol (PEG 3350)  17 g Oral Daily    insulin aspart  1-5 Units Subcutaneous TID AC and HS    methylPREDNISolone  1,000 mg Intravenous Q6H    pantoprazole  40 mg Intravenous Q24H    [Held by provider] heparin  5,000 Units Subcutaneous 2 times per day      sodium chloride 83 mL/hr at 03/24/25 1008    niCARdipine Stopped (03/22/25 0245)    norepinephrine 2 mcg/min (03/24/25 0920)       ipratropium-albuterol    glucose **OR** glucose **OR** glucose-vitamin C **OR** dextrose **OR** glucose **OR** glucose **OR** glucose-vitamin C    acetaminophen **OR** HYDROcodone-acetaminophen **OR** HYDROcodone-acetaminophen    ondansetron    metoclopramide    HYDROmorphone **OR** HYDROmorphone **OR** HYDROmorphone    niCARdipine    norepinephrine    acetaminophen **OR** acetaminophen **OR** [DISCONTINUED] acetaminophen    bisacodyl      Supplementary Documentation:   DVT Mechanical Prophylaxis:   SCDs, Early ambuation  DVT Pharmacologic Prophylaxis   Medication    [Held by provider] heparin (Porcine) 5000 UNIT/ML injection 5,000 Units                Code Status: DNAR/Full Treatment  Arzola: Arzola catheter in place  Arzola Duration (in days):   Central line:    YNES:

## 2025-03-24 NOTE — PLAN OF CARE
Assumed care of patient this am. Neuro exam intact, right leg markedly improved from Saturday, patient able to lift leg, plantar and dorsiflex. States sensation improved as well. See flowsheet for detailed assessment. Pulses palpable.   Lumbar drain initially draining 15ml/hr, per Dr. Yanez decrease to 5 ml out per hour. Maintain -160, preference toward 160.  Patient ambulating in room with 2 assist, gait belt, walker, limited tolerance due to weakness and fatigue.     At 1600, patient's lumbar drain dressing noted to be sticking to chair, with drain out slightly. Drain cleaned, redressed with gauze and tegaderm, Dr. Yanez and Dr. Haney notified. Per Dr. Haney, drain ok to continue to use, keep draining 5ml/hr and will reassess tomorrow.

## 2025-03-25 ENCOUNTER — APPOINTMENT (OUTPATIENT)
Dept: CV DIAGNOSTICS | Facility: HOSPITAL | Age: 70
End: 2025-03-25
Attending: INTERNAL MEDICINE
Payer: MEDICARE

## 2025-03-25 ENCOUNTER — APPOINTMENT (OUTPATIENT)
Dept: GENERAL RADIOLOGY | Facility: HOSPITAL | Age: 70
End: 2025-03-25
Attending: INTERNAL MEDICINE
Payer: MEDICARE

## 2025-03-25 LAB
ALBUMIN SERPL-MCNC: 3.8 G/DL (ref 3.2–4.8)
ALBUMIN SERPL-MCNC: 3.8 G/DL (ref 3.2–4.8)
ALBUMIN/GLOB SERPL: 1.5 {RATIO} (ref 1–2)
ALP LIVER SERPL-CCNC: 52 U/L
ALT SERPL-CCNC: <7 U/L
ANION GAP SERPL CALC-SCNC: 9 MMOL/L (ref 0–18)
ANION GAP SERPL CALC-SCNC: 9 MMOL/L (ref 0–18)
AST SERPL-CCNC: 12 U/L (ref ?–34)
BILIRUB SERPL-MCNC: 1 MG/DL (ref 0.2–1.1)
BUN BLD-MCNC: 35 MG/DL (ref 9–23)
BUN BLD-MCNC: 42 MG/DL (ref 9–23)
CALCIUM BLD-MCNC: 8.4 MG/DL (ref 8.7–10.6)
CALCIUM BLD-MCNC: 8.4 MG/DL (ref 8.7–10.6)
CHLORIDE SERPL-SCNC: 103 MMOL/L (ref 98–112)
CHLORIDE SERPL-SCNC: 99 MMOL/L (ref 98–112)
CO2 SERPL-SCNC: 27 MMOL/L (ref 21–32)
CO2 SERPL-SCNC: 33 MMOL/L (ref 21–32)
CREAT BLD-MCNC: 1.45 MG/DL
CREAT BLD-MCNC: 1.57 MG/DL
EGFRCR SERPLBLD CKD-EPI 2021: 47 ML/MIN/1.73M2 (ref 60–?)
EGFRCR SERPLBLD CKD-EPI 2021: 52 ML/MIN/1.73M2 (ref 60–?)
ERYTHROCYTE [DISTWIDTH] IN BLOOD BY AUTOMATED COUNT: 15.8 %
GLOBULIN PLAS-MCNC: 2.6 G/DL (ref 2–3.5)
GLUCOSE BLD-MCNC: 127 MG/DL (ref 70–99)
GLUCOSE BLD-MCNC: 146 MG/DL (ref 70–99)
GLUCOSE BLD-MCNC: 151 MG/DL (ref 70–99)
GLUCOSE BLD-MCNC: 156 MG/DL (ref 70–99)
GLUCOSE BLD-MCNC: 159 MG/DL (ref 70–99)
GLUCOSE BLD-MCNC: 176 MG/DL (ref 70–99)
HCT VFR BLD AUTO: 30.8 %
HGB BLD-MCNC: 10.6 G/DL
INR BLD: 1.53 (ref 0.8–1.2)
MAGNESIUM SERPL-MCNC: 1.8 MG/DL (ref 1.6–2.6)
MAGNESIUM SERPL-MCNC: 2 MG/DL (ref 1.6–2.6)
MCH RBC QN AUTO: 28.3 PG (ref 26–34)
MCHC RBC AUTO-ENTMCNC: 34.4 G/DL (ref 31–37)
MCV RBC AUTO: 82.4 FL
OSMOLALITY SERPL CALC.SUM OF ELEC: 299 MOSM/KG (ref 275–295)
OSMOLALITY SERPL CALC.SUM OF ELEC: 304 MOSM/KG (ref 275–295)
PHOSPHATE SERPL-MCNC: 1.7 MG/DL (ref 2.4–5.1)
PLATELET # BLD AUTO: 85 10(3)UL (ref 150–450)
PLATELETS.RETICULATED NFR BLD AUTO: 7.7 % (ref 0–7)
POTASSIUM SERPL-SCNC: 3.7 MMOL/L (ref 3.5–5.1)
POTASSIUM SERPL-SCNC: 3.8 MMOL/L (ref 3.5–5.1)
POTASSIUM SERPL-SCNC: 3.8 MMOL/L (ref 3.5–5.1)
PROT SERPL-MCNC: 6.4 G/DL (ref 5.7–8.2)
PROTHROMBIN TIME: 18.5 SECONDS (ref 11.6–14.8)
RBC # BLD AUTO: 3.74 X10(6)UL
SODIUM SERPL-SCNC: 139 MMOL/L (ref 136–145)
SODIUM SERPL-SCNC: 141 MMOL/L (ref 136–145)
WBC # BLD AUTO: 9.8 X10(3) UL (ref 4–11)

## 2025-03-25 PROCEDURE — 5A0945A ASSISTANCE WITH RESPIRATORY VENTILATION, 24-96 CONSECUTIVE HOURS, HIGH NASAL FLOW/VELOCITY: ICD-10-PCS | Performed by: SURGERY

## 2025-03-25 PROCEDURE — 71045 X-RAY EXAM CHEST 1 VIEW: CPT | Performed by: INTERNAL MEDICINE

## 2025-03-25 PROCEDURE — 99291 CRITICAL CARE FIRST HOUR: CPT | Performed by: INTERNAL MEDICINE

## 2025-03-25 PROCEDURE — 93306 TTE W/DOPPLER COMPLETE: CPT | Performed by: INTERNAL MEDICINE

## 2025-03-25 PROCEDURE — 5A09357 ASSISTANCE WITH RESPIRATORY VENTILATION, LESS THAN 24 CONSECUTIVE HOURS, CONTINUOUS POSITIVE AIRWAY PRESSURE: ICD-10-PCS | Performed by: SURGERY

## 2025-03-25 RX ORDER — FUROSEMIDE 10 MG/ML
40 INJECTION INTRAMUSCULAR; INTRAVENOUS 3 TIMES DAILY
Status: DISCONTINUED | OUTPATIENT
Start: 2025-03-25 | End: 2025-03-27

## 2025-03-25 RX ORDER — HYDRALAZINE HYDROCHLORIDE 20 MG/ML
10 INJECTION INTRAMUSCULAR; INTRAVENOUS EVERY 4 HOURS PRN
Status: DISCONTINUED | OUTPATIENT
Start: 2025-03-25 | End: 2025-04-18

## 2025-03-25 RX ORDER — FUROSEMIDE 10 MG/ML
40 INJECTION INTRAMUSCULAR; INTRAVENOUS
Status: DISCONTINUED | OUTPATIENT
Start: 2025-03-25 | End: 2025-03-25

## 2025-03-25 RX ORDER — DEXMEDETOMIDINE HYDROCHLORIDE 4 UG/ML
INJECTION, SOLUTION INTRAVENOUS
Status: COMPLETED
Start: 2025-03-25 | End: 2025-03-25

## 2025-03-25 RX ORDER — DEXMEDETOMIDINE HYDROCHLORIDE 4 UG/ML
INJECTION, SOLUTION INTRAVENOUS CONTINUOUS
Status: DISCONTINUED | OUTPATIENT
Start: 2025-03-25 | End: 2025-03-27

## 2025-03-25 RX ORDER — POTASSIUM CHLORIDE 14.9 MG/ML
20 INJECTION INTRAVENOUS ONCE
Status: COMPLETED | OUTPATIENT
Start: 2025-03-25 | End: 2025-03-26

## 2025-03-25 RX ORDER — LABETALOL HYDROCHLORIDE 5 MG/ML
10 INJECTION, SOLUTION INTRAVENOUS EVERY 4 HOURS PRN
Status: DISCONTINUED | OUTPATIENT
Start: 2025-03-25 | End: 2025-03-26

## 2025-03-25 RX ORDER — FUROSEMIDE 10 MG/ML
40 INJECTION INTRAMUSCULAR; INTRAVENOUS ONCE
Status: COMPLETED | OUTPATIENT
Start: 2025-03-25 | End: 2025-03-25

## 2025-03-25 RX ORDER — MAGNESIUM OXIDE 400 MG/1
400 TABLET ORAL ONCE
Status: COMPLETED | OUTPATIENT
Start: 2025-03-25 | End: 2025-03-25

## 2025-03-25 NOTE — PROGRESS NOTES
Pulmonary Progress Note        NAME: Cy Monsalve - ROOM: [address] [place], [place] [address] - MRN: [id number] - Age: 69 year old - : 1955        Last 24hrs: O2 needs increased, now on 15L    OBJECTIVE:  Vitals:    25 0500 25 0530 25 0600 25 0700   BP: 151/81 141/85 147/80 148/84   BP Location:       Pulse: 102 100 97 97   Resp:  20 21   Temp:       TempSrc:       SpO2: 90%  90% 92%   Weight:   200 lb 2.8 oz (90.8 kg)        Oxygen Therapy  SpO2: 92 %  O2 Device: High flow nasal cannula  O2 Flow Rate (L/min): 10 L/min  Pulse Oximetry Type: Continuous  Oximetry Probe Site Changed: No  Pulse Ox Probe Location: Right hand                  Intake/Output Summary (Last 24 hours) at 3/25/2025 0809  Last data filed at 3/25/2025 0700  Gross per 24 hour   Intake 3143.1 ml   Output 4378 ml   Net -1234.9 ml       Scheduled Medication:   potassium phosphate dibasic 15 mmol in sodium chloride 0.9% 250 mL IVPB  15 mmol Intravenous Once    magnesium oxide  400 mg Oral Once    meropenem  500 mg Intravenous Q8H    fluticasone propionate  1 spray Each Nare Daily    sodium chloride  500 mL Intravenous Once    docusate sodium  100 mg Oral BID    polyethylene glycol (PEG 3350)  17 g Oral Daily    insulin aspart  1-5 Units Subcutaneous TID AC and HS    pantoprazole  40 mg Intravenous Q24H    [Held by provider] heparin  5,000 Units Subcutaneous 2 times per day     Continuous Infusing Medication:   niCARdipine Stopped (25 0245)    norepinephrine Stopped (25 0440)       Lungs:  rales in right base  Heart: S1, S2 normal, regular rate and rhythm  Abdomen: soft, non-tender; bowel sounds normal; no masses,  no organomegaly  Extremities: extremities normal, atraumatic, no cyanosis or edema    Labs reviewed as noted below      Imaging: chest x-ray reviewed- attila interstitial changes consistent w/ pulm edema, more dense consolidations in RML and RLL concerning for PNA    ASSESSMENT/PLAN:    S/p complex aortic repair  3/21  RLE weakness and started on high dose steroids solumedrol 1g q6hrs per neuro on 3/22.   Wean pressors as tolerated to keep SBP >160  S/p MRI spine.   Neurochecks.   Per vascular surgery and neurocritcal care.   Lumbar drain in place.   Acute Hypoxic Resp failure  Extubated 3/21 post op.   O2 needs steadily increasing due to a combination of Pulm Edema, atelectasis, and PNA  -wean O2 as tolerated, may need vapotherm this AM  Possible PNA  -cont janna (3/24- )  -monitor cultures  Pulm Edema  -diuresis as tolerated, increase lasix to BID  Atelectasis  -reviewed IS with patient and discussed technique  HOLLIS on CKD  Monitor labs and u/o. .   Improving.    Heme  Leukocytosis. No clear infection at this point. Monitor.   Hgb goal >10 per neuro. Transfuse PRN  HTN  Per PCP  FEN  PO diet  Proph  SCD and subcutaneous heparin.   Dispo  Full code   Will follow.   -discussed w/ Dr Oquendo    Cctime 35 minutes      [facility]  Pulmonary and Critical Care    Addendum  Resp status worsened as the day progressed  Repeat x-ray shows further progression of pulmonary edema  Will check echo  Will increase lasix to TID  Pt now on BiPAP w/ sats in the high 90s    Additional Cctime 45 minutes

## 2025-03-25 NOTE — CM/SW NOTE
Reviewed [facility] referral and reserved patient's preferred provider, [facility]. Noted PMR recommends Acute rehab pending pt able to sat >90% on 5L or less O2.    / to remain available for support and/or discharge planning.     Gauri GENTILEA MSN, RN Case Manager  [phone]

## 2025-03-25 NOTE — DIETARY NOTE
[facility]   part of formerly [facility]    NUTRITION ASSESSMENT    Pt does not meet malnutrition criteria at this time.      NUTRITION INTERVENTION:    Meal and Snacks - Monitor and encourage adequate PO intake.   Medical Food Supplements - Magic Shake (Ensure/Magic Cup) 2pm daily snack.   Nutrition Education - Discussed importance of PO intake for healing.  Reviewed foods high in Calories to help w/ wt maintenance. Pt/family receptive to education, no barriers noted.    Vitamin and Mineral Supplements - adding Multivitamin with minerals      PATIENT STATUS: 03/25/25 69/M admitted with AAA, POD#4 for fenestrated endovascular arch repair with fenestrated thoracoabdominal aneurysm repair, left carotid to subclavian transposition with endovascular septotomy of dissection.  Pt seen for consult \"Patient refusing Ensure, low appetite, needs protein for wound healing.\"  Pt seen with family at bedside.  Pt reports not a robost appetite, but ate some cornflakes this AM and working on a donut at bedside.  Denies n/v/d. On Vapotherm due to suspected fluid o/l. Pt notes his appetite fluctuated PTA.  He had lost significant wt over the past several years due to medical issues. EMR records do not indicate any wt loss over the past year.  Discussed importance of nutrition and protein for healing.  Pt aware and feels his appetite will return eventually. Pt had Ensure ordered and dislikes it.  He declines this and other ONS offered. He does note that he did tolerate a Boost milkshake previously - will try Magic Shake.  Pt agreeable.  Offered to order food for pt, he declined      ANTHROPOMETRICS:  Ht:  6'5\"  Wt: 90.8 kg (200 lb 2.8 oz).   BMI: Body mass index is 23.74 kg/m².  IBW: 94.5 kg      WEIGHT HISTORY:   Weight loss: No    Wt Readings from Last 10 Encounters:   03/25/25 90.8 kg (200 lb 2.8 oz)   03/22/25 69.5 kg (153 lb 3.5 oz)   03/07/25 83.9 kg (185 lb)   03/23/22 107.3 kg (236 lb 9.6 oz)   12/28/21 106.5 kg (234 lb 12.8  oz)   08/05/21 101.3 kg (223 lb 6.4 oz)   03/16/21 96.2 kg (212 lb)   12/02/20 97.7 kg (215 lb 6.4 oz)   10/27/20 92.1 kg (203 lb)   08/20/20 90 kg (198 lb 6.4 oz)        NUTRITION:  Diet:       Procedures    Regular/General diet Is Patient on Accuchecks? Yes      Food Allergies: No  Cultural/Ethnic/Temple Preferences Addressed: Yes    Percent Meals Eaten (last 3 days)       Date/Time Percent Meals Eaten (%)    03/22/25 0400 0 %    03/22/25 0844 100 %    03/23/25 0800 100 %    03/23/25 1200 100 %    03/24/25 0920 100 %     Percent Meals Eaten (%): peaches and apple juice at 03/24/25 0920    03/24/25 1300 100 %     Percent Meals Eaten (%): ffood from family- burger and fries at 03/24/25 1300    03/24/25 1832 50 %     Percent Meals Eaten (%): half flatbread pizza, plus desserts at 03/24/25 1832            GI system review: WNL Last BM Date: 03/24/25  Skin and wounds: surgical wound to neck, redness to sacrum    NUTRITION RELATED PHYSICAL FINDINGS:     1. Body Fat/Muscle Mass: no wasting noted / well nourished     2. Fluid Accumulation: lower extremity edema non-pitting and generalized edema non-pitting    NUTRITION PRESCRIPTION:  90.8kg Actual Body Weight  Calories: 8623-8861 calories/day (25-30 kcal/kg)  Protein: 109-136 grams protein/day (1.2-1.5 grams protein per kg)  Fluid: ~1 ml/kcal or per MD discretion    NUTRITION DIAGNOSIS/PROBLEM:  Increased nutrient needs related to increased nutritional demands for healing as evidenced by diagnosis consistent with elevated nutritional needs      MONITOR AND EVALUATE/NUTRITION GOALS:  PO intake of 75% of meals TID - New  PO intake of 75% of oral nutrition supplement/s - New  Weight stable within 1 to 2 lbs during admission - New  Provide nutrition adequate for wound healing - New      MEDICATIONS:  Colace, lasix, Novolog (5u x 24h), MgOx, Abx, Protonix, Kphos 15mmol,     LABS:  BUN 35; Cr 1.45; -195    Pt is at Moderate nutrition risk    Susu Prakash RD, LDN,  [facility]  Clinical Dietitian  Phone [phone]

## 2025-03-25 NOTE — PROGRESS NOTES
[facility]   NEUROCRITICAL CARE   PROGRESS NOTE    Admission date: 3/19/2025  Reason for Consult: Leg weakness   Chief Complaint: S/p AAA repair  ________________________________________________________________    SUBJECTIVE     24 Hour Significant Events: Inc O2 requirements overnight. Reports RLE weakness improved though not back to baseline.    OBJECTIVE   VITAL SIGNS:   Temp:  [97.9 °F (36.6 °C)-98.9 °F (37.2 °C)] 98.8 °F (37.1 °C)  Pulse:  [] 97  Resp:  [13-28] 21  BP: (126-185)/(62-94) 148/84  SpO2:  [90 %-95 %] 92 %  AO: ()/() 172/86  FiO2 (%):  [100 %] 100 %    INTAKE/OUTPUT:  Intake/Output Summary (Last 24 hours) at 3/25/2025 0904  Last data filed at 3/25/2025 0800  Gross per 24 hour   Intake 3143.1 ml   Output 4428 ml   Net -1284.9 ml     PHYSICAL EXAM:  CONSTITUTIONAL: Patient resting comfortably in bed, NAD    NEUROLOGIC:    Mental Status:  A&O x 4, Follows simple commands, no obvious aphasia or dysarthria  Cranial nerves: PERRL.  Visual fields full.  EOMI.  Face symmetric with normal movement bilaterally.  Hearing grossly intact. Tongue midline with normal movements.   Motor: Drift:  RUE mild drift, improved on retesting; Motor exam is 5 out of 5 in BL UE        HF  KE  KF  DF  PF     Left 4- 5 4 5 4     Right 4 5 4 5 4      Sensation: Decreased to light touch in left lower extremity.  Decreased cold sensation in bilateral lower extremities, decreased motor sensation in bilateral extremities, pain sharp down to umbilicus, T10, then becomes dull - checked 3/22  Cerebellar: Normal Finger-To-Nose test BL     LABORATORY DATA:  Last 24 hour labs were reviewed in detail.  Recent Labs   Lab 03/23/25  0505 03/24/25  0443 03/24/25  1519 03/25/25  0444    138  --  139   K 4.3 3.3* 3.8 3.8  3.8    101  --  103   CO2 23.0 27.0  --  27.0   * 162*  --  156*   BUN 31* 32*  --  35*   CREATSERUM 1.72* 1.57*  --  1.45*     Recent Labs   Lab 03/23/25  0350  03/24/25  0443 03/25/25  0444   WBC 8.8 8.9 9.8   HGB 10.0* 9.9* 10.6*   PLT 86.0* 71.0* 85.0*     Recent Labs   Lab 03/19/25  1427 03/21/25  0415 03/22/25  0423   ALT <7* <7* 8*   AST 10 10 30     Recent Labs   Lab 03/20/25  0528 03/21/25  0415 03/23/25  0505 03/24/25  0443 03/25/25  0444   MG 1.6   < > 1.6 1.7 1.8   PHOS 3.8  --   --   --  1.7*    < > = values in this interval not displayed.       Scheduled Meds:   potassium phosphate dibasic 15 mmol in sodium chloride 0.9% 250 mL IVPB  15 mmol Intravenous Once    furosemide  40 mg Intravenous BID (Diuretic)    meropenem  500 mg Intravenous Q8H    fluticasone propionate  1 spray Each Nare Daily    sodium chloride  500 mL Intravenous Once    docusate sodium  100 mg Oral BID    polyethylene glycol (PEG 3350)  17 g Oral Daily    insulin aspart  1-5 Units Subcutaneous TID AC and HS    pantoprazole  40 mg Intravenous Q24H    [Held by provider] heparin  5,000 Units Subcutaneous 2 times per day     Continuous Infusions:   niCARdipine Stopped (03/22/25 0245)    norepinephrine Stopped (03/25/25 0440)     PRN Meds:  ipratropium-albuterol    glucose **OR** glucose **OR** glucose-vitamin C **OR** dextrose **OR** glucose **OR** glucose **OR** glucose-vitamin C    acetaminophen **OR** HYDROcodone-acetaminophen **OR** HYDROcodone-acetaminophen    ondansetron    metoclopramide    HYDROmorphone **OR** HYDROmorphone **OR** HYDROmorphone    niCARdipine    norepinephrine    Radiology:    No results found.  ASSESSMENT/PLAN   69 year old male with PMH of anxiety, DVT, GERD, prior type A dissection status post repair, PVD, DVT, HTN, prior smoker here for status post endovascular arch repair and thoracoabdominal aneurysm repair, neuroconsulted for waxing and waning right leg weakness     Active Problems:    Pre-op testing    Thoracoabdominal aortic aneurysm (TAAA)     Reviewed notes by pulm and vascular  Reviewed CBC/BMP and CXR  Imaging personally reviewed and interpreted by me, agree  with radiologist impression.  Discussed case with care team     Bilateral lower extremity weakness  Initially reported right lower extremity weakness 3/22 morning, later right leg worsened and patient also developed left leg weakness.    Exam limited by baseline peripheral neuropathy in lower extremities.  Concern for spinal cord ischemia/hypoperfusion in the postoperative state  MRI T spine limited by artifact but without obvious compression or signs of cord ischemia noted however, given clinical exam, concern remains   Exam continues to improve   Ok to wean BP augmentation from neuro standpoint   LD management per surgical team   Platelets goal per vascular surgery  Steroids 3/22-3/24 as rescue measure in setting of worsening exam despite blood pressure augmentation & increased lumbar drainage   PT/OT evals     No further neurocritical care needs at this time, further management and outpt follow-up per Vasc Surg, will follow peripherally please call with any questions.      40 minutes of CC time (exclusive of billable procedures) was administered to manage and/or prevent neurologic instability. More than 50% spent counseling/coordinating care. This involved direct patient intervention, complex decision making, and/or extensive discussions with the patient, family, and clinical staff.     Estrellita Verdin MD [facility]  Neurocritical Care  [facility]

## 2025-03-25 NOTE — PROGRESS NOTES
[facility] Hospitalist Progress Note     CC: Hospital Follow up    PCP: Kam Alberts MD       Assessment/Plan:     Active Problems:    Pre-op testing    Thoracoabdominal aortic aneurysm (TAAA)    Bilateral leg weakness      69 year old male with a past medical history of hypertension, GERD, anxiety, renal calculi, DVT, ascending aortic dissection s/p repair, 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  Postoperative course complicated by HOLLIS on CKD as well as right lower extremity weakness along with hypoxic respiratory failure.      History of prior infrarenal endovascular AAA repair with bilateral KYLER for hypogastric and common iliac aneurysms  History of mesenteric ischemia s/p SMA bypass with right external iliac artery to SMA bypass  History of aortic dissection s/p repair, aortic dissection distal to left subclavian  Hx of DVT   S/p complex aortic repair 3/21  -Complicated aortic pathology with multiple repairs.   - high risk procedure with risks of complications including spinal cord ischemia, mesenteric ischemia, possible renal failure.  -s/p lumbar drain with neurointerventional to limit risk of spinal cord ischemia 3/20  -Further recommendations and management per vascular surgery  - previously on coumadin, now held per vascular  -Status post complex aortic repair 3/21 with vascular-see op report for details  - SBP goal >160, per vascualr  - pressors per vascular  - post op managmenet per CNICU    Right lower extremity weakness-improving  - In the setting of complex aortic repair 3/21  - Seen by neurointensivist, recommendations reviewed  - Hemoglobin goal per neuro ICU greater than 10, received 1 unit of PRBC 3/22, plan for another  unit 3/23  - Plan for 1 unit of platelets 3/23  - Started on IV steroids as rescue measure in the setting of worsening weakness on the right lower extremity-per neuro ICU  - Frequent neurovascular assessment per protocol    Acute hypoxemic respiratory failure w/ threat to bodily function 2/2 possible pneumonia versus edema  -Discussed with pulmonology  -Meropenem started  -IV Lasix 40 mg twice daily  -On 40 L high flow this morning, continue to monitor respiratory status closely    HOLLIS on CKD-improving  -Creatinine reviewed today -1.45 (baseline 1.2)  -Continue to monitor with serial RFP's  -Avoid nephrotoxic agents  - Cr bumped post operatively - given prolonged surgery and complicated repair  - given IVF, follow BMP  - discussed with vascular surgery- defer decision for renal consult to vascular      Hypertension  -hold home HCTZ     Allergic rhinitis  -Continue home fluticasone as needed     Short gut syndrome  - resume lamotil if diarrhea resumes    Normocytic anemia  -Hgb reviewed -10.6 this morning  -Serial CBC's     Dispo: Inpatient     Outpatient or previous hospital records reviewed. Questions/concerns were discussed with patient and/or family by bedside. Discussed with vascular surgery.      DO Kendra Lewis [facility]  Hospitalist  Contact via [employer]         Subjective:     Increased O2 requirements this morning.  On 40 L high flow.    OBJECTIVE:    Blood pressure (!) 148/92, pulse 119, temperature 98.8 °F (37.1 °C), resp. rate 21, weight 200 lb 2.8 oz (90.8 kg), SpO2 95%.    Temp:  [97.9 °F (36.6 °C)-98.9 °F (37.2 °C)] 98.8 °F (37.1 °C)  Pulse:  [] 119  Resp:  [15-28] 21  BP: (126-183)/(73-94) 148/92  SpO2:  [90 %-98 %] 95 %  AO: ()/() 172/86  FiO2 (%):  [100 %] 100 %      Intake/Output:    Intake/Output Summary (Last 24 hours) at 3/25/2025 1543  Last data filed at 3/25/2025 1122  Gross per 24 hour   Intake 923.3 ml   Output 3243 ml   Net -2319.7 ml       Last  3 Weights   03/25/25 0600 200 lb 2.8 oz (90.8 kg)   03/24/25 0900 200 lb 3.2 oz (90.8 kg)   03/24/25 0500 203 lb 0.7 oz (92.1 kg)   03/23/25 0516 204 lb 2.3 oz (92.6 kg)   03/22/25 0637 153 lb 3.5 oz (69.5 kg)   03/20/25 1231 192 lb 15.9 oz (87.5 kg)   03/19/25 1444 193 lb (87.5 kg)   03/22/25 1113 153 lb 3.5 oz (69.5 kg)   03/07/25 1536 185 lb (83.9 kg)       Exam   Gen: No acute distress, alert and oriented x3, no focal neurologic deficits, right-sided [place], arterial line and lumbar drain noted  Heent: NC AT, mucous memb clear, neck supple  Pulm: Lungs clear, normal respiratory effort  CV: Heart with regular rate and rhythm, no peripheral edema  Abd: Abdomen soft, nontender, nondistended, bowel sounds present  MSK:expected rom, no hematoma appreciated bilaterally, 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, 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, strentgh 4/5 on R, pulses intact  Skin: no rashes or lesions  Neuro: AO*3, motor intact, no sensory deficits  Psyc: appropriate mood and affect      Data Review:       Labs:     Recent Labs   Lab 03/21/25  0415 03/22/25  0425 03/23/25  0505 03/23/25  1543 03/24/25  0443 03/25/25  0444   RBC 4.41   < > 3.26* 3.52* 3.49* 3.74*   HGB 12.2*   < > 9.2* 10.0* 9.9* 10.6*   HCT 35.6*   < > 27.3* 29.1* 28.9* 30.8*   MCV 80.7   < > 83.7 82.7 82.8 82.4   MCH 27.7   < > 28.2 28.4 28.4 28.3   MCHC 34.3   < > 33.7 34.4 34.3 34.4   RDW 16.5   < > 16.0 15.9 15.9 15.8   NEPRELIM 5.64  --  6.81  --  8.07*  --    WBC 7.2   < > 7.4 8.8 8.9 9.8   .0   < > 74.0* 86.0* 71.0* 85.0*    < > = values in this interval not displayed.         Recent Labs   Lab 03/23/25  0505 03/24/25  0443 03/24/25  1519 03/25/25  0444   * 162*  --  156*   BUN 31* 32*  --  35*   CREATSERUM 1.72* 1.57*  --  1.45*   EGFRCR 42* 47*  --  52*   CA 8.3* 8.4*  --  8.4*    138  --  139   K 4.3 3.3* 3.8 3.8  3.8    101  --  103   CO2 23.0 27.0  --  27.0       Recent Labs   Lab 03/19/25  1428  03/20/25  0528 03/21/25  0415 03/22/25  0423 03/25/25  0444   ALT <7*  --  <7* 8*  --    AST 10  --  10 30  --    ALB 4.4 4.2 4.1 3.9 3.8         Imaging:  XR CHEST AP PORTABLE  (CPT=71045)    Result Date: 3/25/2025  CONCLUSION:   Worsening airspace disease concerning for multifocal pneumonia, but the vascularity is congested as well, with cardiac enlargement present and the findings may also reflect asymmetric pulmonary edema and CHF.   LOCATION:  [id number]      Dictated by (CST): Shawn Kumar MD on 3/25/2025 at 3:22 PM     Finalized by (CST): Shawn Kumar MD on 3/25/2025 at 3:23 PM       XR CHEST AP PORTABLE  (CPT=71045)    Result Date: 3/24/2025  CONCLUSION:  The patient is status post sternotomy.  Heart size is normal.  Normal vascularity A descending thoracic aortic stent graft is identified unchanged in appearance.  There is a graft along the superior aspect of the transverse aorta.  There are adjacent surgical clips.  There are surgical clips in the left neck also.  A right Boxford-Warner catheter is identified the tip it is in the upper SVC.  No pneumothorax.  There is some consolidation and ground-glass opacities in the left lower lobe with a small left pleural effusion either representing atelectasis or pneumonia.  There are ground-glass opacities in the right suprahilar and right infrahilar region suggesting foci of pneumonia versus atelectasis.  Multi focal pneumonia is favored.  Trace blunting the right CP angle.   LOCATION:  [id number]      Dictated by (CST): Neil Tong MD on 3/24/2025 at 5:57 AM     Finalized by (CST): Neil Tong MD on 3/24/2025 at 6:00 AM          Meds:      furosemide  40 mg Intravenous BID (Diuretic)    meropenem  1 g Intravenous Q8H    fluticasone propionate  1 spray Each Nare Daily    sodium chloride  500 mL Intravenous Once    docusate sodium  100 mg Oral BID    polyethylene glycol (PEG 3350)  17 g Oral Daily    insulin aspart  1-5 Units Subcutaneous TID AC and HS     pantoprazole  40 mg Intravenous Q24H    [Held by provider] heparin  5,000 Units Subcutaneous 2 times per day      niCARdipine Stopped (03/22/25 0245)    norepinephrine Stopped (03/25/25 0440)       hydrALAzine    ipratropium-albuterol    glucose **OR** glucose **OR** glucose-vitamin C **OR** dextrose **OR** glucose **OR** glucose **OR** glucose-vitamin C    acetaminophen **OR** HYDROcodone-acetaminophen **OR** HYDROcodone-acetaminophen    ondansetron    metoclopramide    HYDROmorphone **OR** HYDROmorphone **OR** HYDROmorphone    niCARdipine    norepinephrine      Supplementary Documentation:   DVT Mechanical Prophylaxis:   SCDs, Early ambuation  DVT Pharmacologic Prophylaxis   Medication    [Held by provider] heparin (Porcine) 5000 UNIT/ML injection 5,000 Units                Code Status: DNAR/Full Treatment  Arzola: Arzola catheter in place  Arzola Duration (in days):   Central line:    YNES:

## 2025-03-25 NOTE — PLAN OF CARE
Assumed care of pt at change of shift.   Alert, oriented x4.  8-10L O2. NSR/ST on monitor.  Plan of care reviewed with pt.

## 2025-03-25 NOTE — PROGRESS NOTES
03/25/25 1500   BiPAP   $ Vent Care / Non-Invasive Initial Day Yes   Device V60   BiPAP / CPAP CE# [id number]   BiPAP bacteria filter Yes   BIPAP plugged into main power? Yes   Mode Spontaneous/Timed   Interface Full face mask   Mask Size Medium   Control Settings   Set Rate 15 breaths/min   Set IPAP 12   Set EPAP 6   Oxygen Percent 100 %   Inspiratory time 0.9   Insp rise time 3   BiPAP/CPAP Alarm Settings   Hi Rate 40   Low Rate 10   Hi VT 1000   Low    Hi Pressure 22   Low Pressure 5   Low Pressure Delay 20   Low MV 2   BiPAP/CPAP Monitored Parameters   PIP 12   Total Rate 35 breaths/min   Minute Volume 20   Tidal Volume 567   Total Leak 33   Trigger % 98   Ti/Ttot % 27   IPAP 12   EPAP 6   Toleration Well     Patient received on high flow canula 15l.  Sat in upper 80's.  Transitioned patient to vapotherm 40l 100%.  Tolerated well for awhile and after a couple hours patient stated he felt short of breathing and his work of breathing increased.  Obtained an ABG per protocol and placed patient on BIPAP with above settings.  Patient looks a lot more comfortable at this time.  Dr Jones notified.  CXR ordered.  Will cont to monitor closely.

## 2025-03-25 NOTE — PLAN OF CARE
Assumed care of pt at change of shift.   Alert, oriented x4.  Neuro checks q2h, stable, right leg slightly weaker than left. Pt reports decreased sensation to ble, hx of neuropathy, no change from his baseline.   NSR/ST on monitor.  Lumbar drain draining 5ml/hr.   SBP goal 140-160, Levo restarted, currently off.   8-10L O2. Has a moist, congested cough.   Plan of care reviewed with pt.    Problem: CARDIOVASCULAR - ADULT  Goal: Maintains optimal cardiac output and hemodynamic stability  Description: INTERVENTIONS:- Monitor vital signs, rhythm, and trends- Monitor for bleeding, hypotension and signs of decreased cardiac output- Evaluate effectiveness of vasoactive medications to optimize hemodynamic stability- Monitor arterial and/or venous puncture sites for bleeding and/or hematoma- Assess quality of pulses, skin color and temperature- Assess for signs of decreased coronary artery perfusion - ex. Angina- Evaluate fluid balance, assess for edema, trend weights  3/25/2025 0501 by Marguerite Jaimes RN  Outcome: Progressing  3/25/2025 0455 by Marguerite Jaimes RN  Outcome: Progressing     Problem: HEMATOLOGIC - ADULT  Goal: Free from bleeding injury  Description: (Example usage: patient with low platelets)INTERVENTIONS:- Avoid intramuscular injections, enemas and rectal medication administration- Ensure safe mobilization of patient- Hold pressure on venipuncture sites to achieve adequate hemostasis- Assess for signs and symptoms of internal bleeding- Monitor lab trends- Patient is to report abnormal signs of bleeding to staff- Avoid use of toothpicks and dental floss- Use electric shaver for shaving- Use soft bristle tooth brush- Limit straining and forceful nose blowing  3/25/2025 0501 by Marguerite Jaimes RN  Outcome: Progressing  3/25/2025 0455 by Marguerite Jaimes, RN  Outcome: Progressing     Problem: NEUROLOGICAL - ADULT  Goal: Achieves stable or improved neurological status  Description: INTERVENTIONS- Assess for  and report changes in neurological status- Initiate measures to prevent increased intracranial pressure- Maintain blood pressure and fluid volume within ordered parameters to optimize cerebral perfusion and minimize risk of hemorrhage- Monitor temperature, glucose, and sodium. Initiate appropriate interventions as ordered  3/25/2025 0501 by Marguerite Jaimes RN  Outcome: Progressing  3/25/2025 0455 by Marguerite Jaimes RN  Outcome: Progressing  Goal: Achieves maximal functionality and self care  Description: INTERVENTIONS- Monitor swallowing and airway patency with patient fatigue and changes in neurological status- Encourage and assist patient to increase activity and self care with guidance from PT/OT- Encourage visually impaired, hearing impaired and aphasic patients to use assistive/communication devices  3/25/2025 0501 by Marguerite Jaimes RN  Outcome: Progressing  3/25/2025 0455 by Marguerite Jaimes RN  Outcome: Progressing     Problem: PAIN - ADULT  Goal: Verbalizes/displays adequate comfort level or patient's stated pain goal  Description: INTERVENTIONS:- Encourage pt to monitor pain and request assistance- Assess pain using appropriate pain scale- Administer analgesics based on type and severity of pain and evaluate response- Implement non-pharmacological measures as appropriate and evaluate response- Consider cultural and social influences on pain and pain management- Manage/alleviate anxiety- Utilize distraction and/or relaxation techniques- Monitor for opioid side effects- Notify MD/LIP if interventions unsuccessful or patient reports new pain- Anticipate increased pain with activity and pre-medicate as appropriate  3/25/2025 0501 by Marguerite Jaimes RN  Outcome: Progressing  3/25/2025 0455 by Marguerite Jaimes RN  Outcome: Progressing     Problem: RESPIRATORY - ADULT  Goal: Achieves optimal ventilation and oxygenation  Description: INTERVENTIONS:- Assess for changes in respiratory status- Assess for  changes in mentation and behavior- Position to facilitate oxygenation and minimize respiratory effort- Oxygen supplementation based on oxygen saturation or ABGs- Provide Smoking Cessation handout, if applicable- Encourage broncho-pulmonary hygiene including cough, deep breathe, Incentive Spirometry- Assess the need for suctioning and perform as needed- Assess and instruct to report SOB or any respiratory difficulty- Respiratory Therapy support as indicated- Manage/alleviate anxiety- Monitor for signs/symptoms of CO2 retention  3/25/2025 0501 by Marguerite Jaimes, RN  Outcome: Not Progressing  3/25/2025 0455 by Marguerite Jaimes, RN  Outcome: Not Progressing

## 2025-03-25 NOTE — PHYSICAL THERAPY NOTE
Attempted to see pt for PT treatment session. Pt had been up in the chair for a few hours and had just gotten back to bed prior to PT arriving. Pt declined bed level exercises at this time. Will follow and re-attempt as able and appropriate.

## 2025-03-25 NOTE — PROGRESS NOTES
Increased oxygen requirement overnight - likely volume overload   Lasix given   Neuro intact     Cap lumbar drain - possibly remove this evening   Continue physical therapy   Start heparin tomorrow

## 2025-03-25 NOTE — PLAN OF CARE
Assumed care of patient at approximately 0730. Pt A&Ox4. Pt currently on BIPAP for dyspnea. Vapotherm on standby when patient can tolerate. PRN precedex gtt for anxiety. On telemetry, NSR/ST. Lasix TID. Pt reports c/o pain, PRN medications.  Pt up using rolling walker with x2 assist. Pt and spouse updated on plan of care and verbalized understanding. Pt in chair for 3 hours today. Per Azra CONNORS, plan is to remove LD tomorrow and start heparin gtt.    Problem: CARDIOVASCULAR - ADULT  Goal: Maintains optimal cardiac output and hemodynamic stability  Description: INTERVENTIONS:- Monitor vital signs, rhythm, and trends- Monitor for bleeding, hypotension and signs of decreased cardiac output- Evaluate effectiveness of vasoactive medications to optimize hemodynamic stability- Monitor arterial and/or venous puncture sites for bleeding and/or hematoma- Assess quality of pulses, skin color and temperature- Assess for signs of decreased coronary artery perfusion - ex. Angina- Evaluate fluid balance, assess for edema, trend weights  Outcome: Progressing     Problem: HEMATOLOGIC - ADULT  Goal: Free from bleeding injury  Description: (Example usage: patient with low platelets)INTERVENTIONS:- Avoid intramuscular injections, enemas and rectal medication administration- Ensure safe mobilization of patient- Hold pressure on venipuncture sites to achieve adequate hemostasis- Assess for signs and symptoms of internal bleeding- Monitor lab trends- Patient is to report abnormal signs of bleeding to staff- Avoid use of toothpicks and dental floss- Use electric shaver for shaving- Use soft bristle tooth brush- Limit straining and forceful nose blowing  Outcome: Progressing     Problem: NEUROLOGICAL - ADULT  Goal: Achieves stable or improved neurological status  Description: INTERVENTIONS- Assess for and report changes in neurological status- Initiate measures to prevent increased intracranial pressure- Maintain blood pressure and fluid  volume within ordered parameters to optimize cerebral perfusion and minimize risk of hemorrhage- Monitor temperature, glucose, and sodium. Initiate appropriate interventions as ordered  Outcome: Progressing  Goal: Achieves maximal functionality and self care  Description: INTERVENTIONS- Monitor swallowing and airway patency with patient fatigue and changes in neurological status- Encourage and assist patient to increase activity and self care with guidance from PT/OT- Encourage visually impaired, hearing impaired and aphasic patients to use assistive/communication devices  Outcome: Progressing     Problem: PAIN - ADULT  Goal: Verbalizes/displays adequate comfort level or patient's stated pain goal  Description: INTERVENTIONS:- Encourage pt to monitor pain and request assistance- Assess pain using appropriate pain scale- Administer analgesics based on type and severity of pain and evaluate response- Implement non-pharmacological measures as appropriate and evaluate response- Consider cultural and social influences on pain and pain management- Manage/alleviate anxiety- Utilize distraction and/or relaxation techniques- Monitor for opioid side effects- Notify MD/LIP if interventions unsuccessful or patient reports new pain- Anticipate increased pain with activity and pre-medicate as appropriate  Outcome: Progressing     Problem: RESPIRATORY - ADULT  Goal: Achieves optimal ventilation and oxygenation  Description: INTERVENTIONS:- Assess for changes in respiratory status- Assess for changes in mentation and behavior- Position to facilitate oxygenation and minimize respiratory effort- Oxygen supplementation based on oxygen saturation or ABGs- Provide Smoking Cessation handout, if applicable- Encourage broncho-pulmonary hygiene including cough, deep breathe, Incentive Spirometry- Assess the need for suctioning and perform as needed- Assess and instruct to report SOB or any respiratory difficulty- Respiratory Therapy support  as indicated- Manage/alleviate anxiety- Monitor for signs/symptoms of CO2 retention  Outcome: Progressing

## 2025-03-25 NOTE — PROGRESS NOTES
[facility]   part of [facility]     Vascular Surgery Progress Note    Cy Monsalve Patient Status:  Inpatient    1955 MRN [id number]   Location [facility] 6NE-A Attending Neil Oquendo MD   Hosp Day # 5 PCP Kam Alberts MD     Objective:   Temp: 98.8 °F (37.1 °C)  Pulse: 97  Resp: 21  BP: 148/84  AO: 172/86      Events Yesterday/Overnight:  Patient is a 69 year old male, POD 4, fenestrated endovascular arch repair with fenestrated thoracoabdominal aneurysm repair, left carotid to subclavian transposition with endovascular septotomy of dissection with Dr. Oquendo and Dr. Yanez.  Patient on 2 L of O2 for shortness of breath.  Chest x-ray completed on 3/24, indicating consolidation and ground glass opacities in the left lower lobe, groundglass opacities in the right suprahilar and infrahilar.  Patient on meropenem for pneumonia.  Patient sitting up in the bed, O2 satting between 88 to 90%. /84.  Hemoglobin 10.6.      Exam:  Patient neurologically intact.  Able to move bilateral lower extremities.  Palpable bilateral DP and PT pulses.  Bilateral groin access site soft, no hematoma. Left neck ROYER drain to suction with sanguineous drainage.     Impression/Plan:    IV furosemide for increase SOB and O2 requirements.    Lumbar drain capped today, possible removal tonight.    Plan to start heparin tomorrow.    Blood pressure goal 140-160.    Continue physical therapy.      Medications:  Current Facility-Administered Medications   Medication Dose Route Frequency    potassium phosphate dibasic 15 mmol in sodium chloride 0.9% 250 mL IVPB  15 mmol Intravenous Once    meropenem (Merrem) 500 mg in sodium chloride 0.9% 100 mL IVPB-MBP  500 mg Intravenous Q8H    fluticasone propionate (Flonase) 50 MCG/ACT nasal suspension 1 spray  1 spray Each Nare Daily    ipratropium-albuterol (Duoneb) 0.5-2.5 (3) MG/3ML inhalation solution 3 mL  3 mL Nebulization Q4H PRN    sodium chloride 0.9 % IV bolus 500  mL  500 mL Intravenous Once    docusate sodium (Colace) cap 100 mg  100 mg Oral BID    polyethylene glycol (PEG 3350) (Miralax) 17 g oral packet 17 g  17 g Oral Daily    glucose (Dex4) 15 GM/59ML oral liquid 15 g  15 g Oral Q15 Min PRN    Or    glucose (Glutose) 40% oral gel 15 g  15 g Oral Q15 Min PRN    Or    glucose-vitamin C (Dex-4) chewable tab 4 tablet  4 tablet Oral Q15 Min PRN    Or    dextrose 50% injection 50 mL  50 mL Intravenous Q15 Min PRN    Or    glucose (Dex4) 15 GM/59ML oral liquid 30 g  30 g Oral Q15 Min PRN    Or    glucose (Glutose) 40% oral gel 30 g  30 g Oral Q15 Min PRN    Or    glucose-vitamin C (Dex-4) chewable tab 8 tablet  8 tablet Oral Q15 Min PRN    insulin aspart (NovoLOG) 100 Units/mL FlexPen 1-5 Units  1-5 Units Subcutaneous TID AC and HS    pantoprazole (Protonix) 40 mg in sodium chloride 0.9% PF 10 mL IV push  40 mg Intravenous Q24H    acetaminophen (Tylenol) tab 650 mg  650 mg Oral Q4H PRN    Or    HYDROcodone-acetaminophen (Norco) 5-325 MG per tab 1 tablet  1 tablet Oral Q4H PRN    Or    HYDROcodone-acetaminophen (Norco) 5-325 MG per tab 2 tablet  2 tablet Oral Q4H PRN    ondansetron (Zofran) 4 MG/2ML injection 4 mg  4 mg Intravenous Q6H PRN    metoclopramide (Reglan) 5 mg/mL injection 10 mg  10 mg Intravenous Q8H PRN    [Held by provider] heparin (Porcine) 5000 UNIT/ML injection 5,000 Units  5,000 Units Subcutaneous 2 times per day    HYDROmorphone (Dilaudid) 1 MG/ML injection 0.2 mg  0.2 mg Intravenous Q2H PRN    Or    HYDROmorphone (Dilaudid) 1 MG/ML injection 0.4 mg  0.4 mg Intravenous Q2H PRN    Or    HYDROmorphone (Dilaudid) 1 MG/ML injection 0.8 mg  0.8 mg Intravenous Q2H PRN    niCARdipine in sodium chloride 0.86% (carDENE) 20 mg/200mL infusion premix  5-15 mg/hr Intravenous Continuous PRN    norepinephrine (Levophed) 4 mg/250mL infusion premix  0.5-30 mcg/min Intravenous Continuous PRN       Laboratory/Data:    LABS:  Recent Labs   Lab 03/19/25  1424 03/19/25  9051  03/20/25  0528 03/21/25  0415 03/22/25  0423 03/22/25  0425 03/22/25  1902 03/23/25  0505 03/23/25  1543 03/24/25  0443 03/25/25  0444   WBC 6.9  --  7.1 7.2  --  18.0*  --  7.4 8.8 8.9 9.8   HGB 12.5*  --  11.9* 12.2*  --  11.2* 8.1* 9.2* 10.0* 9.9* 10.6*   MCV 82.9  --  81.6 80.7  --  85.6  --  83.7 82.7 82.8 82.4   .0  --  177.0 169.0  --  155.0  --  74.0* 86.0* 71.0* 85.0*   INR 1.80* 1.66* 1.38* 1.29* 1.63*  --   --   --   --   --   --        Recent Labs   Lab 03/20/25  0528 03/21/25 0415 03/22/25  0423 03/22/25  1739 03/23/25  0505 03/24/25  0443 03/24/25  1519 03/25/25 0444    137 138 139 136 138  --  139   K 4.2 3.8 4.5 4.2 4.3 3.3* 3.8 3.8  3.8    101 104 106 104 101  --  103   CO2 29.0 27.0 17.0* 22.0 23.0 27.0  --  27.0   BUN 23 23 35* 33* 31* 32*  --  35*   CREATSERUM 1.38* 1.27 2.24* 1.84* 1.72* 1.57*  --  1.45*   GLU 87 92 207* 118* 174* 162*  --  156*   CA 9.2 8.9 8.7 7.9* 8.3* 8.4*  --  8.4*   MG 1.6 1.6  --  1.5* 1.6 1.7  --  1.8   PHOS 3.8  --   --   --   --   --   --  1.7*     Recent Labs   Lab 03/19/25  1424 03/19/25  2132 03/20/25  0528 03/21/25  0415 03/22/25  0423   PTP 21.0*   < > 17.1* 16.3* 19.5*   INR 1.80*   < > 1.38* 1.29* 1.63*   PTT 32.5  --   --  31.8 28.7    < > = values in this interval not displayed.     Recent Labs   Lab 03/19/25  1427 03/20/25  0528 03/21/25 0415 03/22/25 0423 03/25/25  0444   ALT <7*  --  <7* 8*  --    AST 10  --  10 30  --    ALB 4.4 4.2 4.1 3.9 3.8     No results for input(s): \"TROP\" in the last 168 hours.  No results found for: \"ANAS\", \"ROVERTO\", \"ANASCRN\"  Recent Labs   Lab 03/23/25  0907   HIPAB Negative       ISATU Warren  3/25/2025  8:47 AM

## 2025-03-26 ENCOUNTER — APPOINTMENT (OUTPATIENT)
Facility: HOSPITAL | Age: 70
End: 2025-03-26
Attending: SURGERY
Payer: MEDICARE

## 2025-03-26 LAB
ADENOVIRUS PCR:: NOT DETECTED
ANION GAP SERPL CALC-SCNC: 8 MMOL/L (ref 0–18)
ANION GAP SERPL CALC-SCNC: 9 MMOL/L (ref 0–18)
APTT PPP: 29.5 SECONDS (ref 23–36)
APTT PPP: 41.3 SECONDS (ref 23–36)
B PARAPERT DNA SPEC QL NAA+PROBE: NOT DETECTED
B PERT DNA SPEC QL NAA+PROBE: NOT DETECTED
BASE EXCESS BLD CALC-SCNC: -3 MMOL/L
BASE EXCESS BLD CALC-SCNC: -7 MMOL/L
BASOPHILS # BLD AUTO: 0.05 X10(3) UL (ref 0–0.2)
BASOPHILS NFR BLD AUTO: 0.3 %
BUN BLD-MCNC: 47 MG/DL (ref 9–23)
BUN BLD-MCNC: 55 MG/DL (ref 9–23)
C PNEUM DNA SPEC QL NAA+PROBE: NOT DETECTED
CA-I BLD-SCNC: 1.09 MMOL/L (ref 1.12–1.32)
CA-I BLD-SCNC: 1.17 MMOL/L (ref 1.12–1.32)
CALCIUM BLD-MCNC: 8.6 MG/DL (ref 8.7–10.6)
CALCIUM BLD-MCNC: 8.7 MG/DL (ref 8.7–10.6)
CHLORIDE SERPL-SCNC: 99 MMOL/L (ref 98–112)
CHLORIDE SERPL-SCNC: 99 MMOL/L (ref 98–112)
CO2 BLD-SCNC: 21 MMOL/L (ref 22–32)
CO2 BLD-SCNC: 24 MMOL/L (ref 22–32)
CO2 SERPL-SCNC: 33 MMOL/L (ref 21–32)
CO2 SERPL-SCNC: 34 MMOL/L (ref 21–32)
CORONAVIRUS 229E PCR:: NOT DETECTED
CORONAVIRUS HKU1 PCR:: NOT DETECTED
CORONAVIRUS NL63 PCR:: NOT DETECTED
CORONAVIRUS OC43 PCR:: NOT DETECTED
CREAT BLD-MCNC: 1.52 MG/DL
CREAT BLD-MCNC: 1.57 MG/DL
EGFRCR SERPLBLD CKD-EPI 2021: 47 ML/MIN/1.73M2 (ref 60–?)
EGFRCR SERPLBLD CKD-EPI 2021: 49 ML/MIN/1.73M2 (ref 60–?)
EOSINOPHIL # BLD AUTO: 0 X10(3) UL (ref 0–0.7)
EOSINOPHIL NFR BLD AUTO: 0 %
ERYTHROCYTE [DISTWIDTH] IN BLOOD BY AUTOMATED COUNT: 15.8 %
ERYTHROCYTE [DISTWIDTH] IN BLOOD BY AUTOMATED COUNT: 15.9 %
FLUAV RNA SPEC QL NAA+PROBE: NOT DETECTED
FLUBV RNA SPEC QL NAA+PROBE: NOT DETECTED
GLUCOSE BLD-MCNC: 129 MG/DL (ref 70–99)
GLUCOSE BLD-MCNC: 130 MG/DL (ref 70–99)
GLUCOSE BLD-MCNC: 141 MG/DL (ref 70–99)
GLUCOSE BLD-MCNC: 142 MG/DL (ref 70–99)
GLUCOSE BLD-MCNC: 145 MG/DL (ref 70–99)
GLUCOSE BLD-MCNC: 146 MG/DL (ref 70–99)
GLUCOSE BLD-MCNC: 151 MG/DL (ref 70–99)
GLUCOSE BLD-MCNC: 214 MG/DL (ref 70–99)
HCO3 BLD-SCNC: 19.6 MEQ/L
HCO3 BLD-SCNC: 23 MEQ/L
HCT VFR BLD AUTO: 30.5 %
HCT VFR BLD AUTO: 32.4 %
HCT VFR BLD CALC: 30 %
HCT VFR BLD CALC: 32 %
HGB BLD-MCNC: 10.5 G/DL
HGB BLD-MCNC: 10.8 G/DL
IMM GRANULOCYTES # BLD AUTO: 0.07 X10(3) UL (ref 0–1)
IMM GRANULOCYTES NFR BLD: 0.5 %
INR BLD: 1.53 (ref 0.8–1.2)
ISTAT ACTIVATED CLOTTING TIME: 291 SECONDS (ref 74–137)
ISTAT ACTIVATED CLOTTING TIME: 297 SECONDS (ref 74–137)
ISTAT ACTIVATED CLOTTING TIME: 297 SECONDS (ref 74–137)
ISTAT ACTIVATED CLOTTING TIME: 314 SECONDS (ref 74–137)
ISTAT ACTIVATED CLOTTING TIME: 320 SECONDS (ref 74–137)
ISTAT ACTIVATED CLOTTING TIME: 337 SECONDS (ref 74–137)
LYMPHOCYTES # BLD AUTO: 0.28 X10(3) UL (ref 1–4)
LYMPHOCYTES NFR BLD AUTO: 1.8 %
MAGNESIUM SERPL-MCNC: 2 MG/DL (ref 1.6–2.6)
MCH RBC QN AUTO: 28.2 PG (ref 26–34)
MCH RBC QN AUTO: 29.1 PG (ref 26–34)
MCHC RBC AUTO-ENTMCNC: 33.3 G/DL (ref 31–37)
MCHC RBC AUTO-ENTMCNC: 34.4 G/DL (ref 31–37)
MCV RBC AUTO: 84.5 FL
MCV RBC AUTO: 84.6 FL
METAPNEUMOVIRUS PCR:: NOT DETECTED
MONOCYTES # BLD AUTO: 0.58 X10(3) UL (ref 0.1–1)
MONOCYTES NFR BLD AUTO: 3.8 %
MYCOPLASMA PNEUMONIA PCR:: NOT DETECTED
NEUTROPHILS # BLD AUTO: 14.35 X10 (3) UL (ref 1.5–7.7)
NEUTROPHILS # BLD AUTO: 14.35 X10(3) UL (ref 1.5–7.7)
NEUTROPHILS NFR BLD AUTO: 93.6 %
OSMOLALITY SERPL CALC.SUM OF ELEC: 307 MOSM/KG (ref 275–295)
OSMOLALITY SERPL CALC.SUM OF ELEC: 309 MOSM/KG (ref 275–295)
PARAINFLUENZA 1 PCR:: NOT DETECTED
PARAINFLUENZA 2 PCR:: NOT DETECTED
PARAINFLUENZA 3 PCR:: NOT DETECTED
PARAINFLUENZA 4 PCR:: NOT DETECTED
PCO2 BLD: 40.9 MMHG
PCO2 BLD: 45.8 MMHG
PH BLD: 7.29 [PH]
PH BLD: 7.31 [PH]
PHOSPHATE SERPL-MCNC: 3.4 MG/DL (ref 2.4–5.1)
PLATELET # BLD AUTO: 85 10(3)UL (ref 150–450)
PLATELET # BLD AUTO: 93 10(3)UL (ref 150–450)
PLATELETS.RETICULATED NFR BLD AUTO: 9.2 % (ref 0–7)
PO2 BLD: 298 MMHG
PO2 BLD: 460 MMHG
POTASSIUM BLD-SCNC: 4.7 MMOL/L (ref 3.6–5.1)
POTASSIUM BLD-SCNC: 5.2 MMOL/L (ref 3.6–5.1)
POTASSIUM SERPL-SCNC: 3.6 MMOL/L (ref 3.5–5.1)
POTASSIUM SERPL-SCNC: 3.6 MMOL/L (ref 3.5–5.1)
POTASSIUM SERPL-SCNC: 4 MMOL/L (ref 3.5–5.1)
PROCALCITONIN SERPL-MCNC: 0.82 NG/ML (ref ?–0.05)
PROTHROMBIN TIME: 18.6 SECONDS (ref 11.6–14.8)
RBC # BLD AUTO: 3.61 X10(6)UL
RBC # BLD AUTO: 3.83 X10(6)UL
RHINOVIRUS/ENTERO PCR:: NOT DETECTED
RSV RNA SPEC QL NAA+PROBE: NOT DETECTED
SAO2 % BLD: 100 %
SAO2 % BLD: 100 %
SARS-COV-2 RNA NPH QL NAA+NON-PROBE: NOT DETECTED
SODIUM BLD-SCNC: 136 MMOL/L (ref 136–145)
SODIUM BLD-SCNC: 136 MMOL/L (ref 136–145)
SODIUM SERPL-SCNC: 141 MMOL/L (ref 136–145)
SODIUM SERPL-SCNC: 141 MMOL/L (ref 136–145)
WBC # BLD AUTO: 13.4 X10(3) UL (ref 4–11)
WBC # BLD AUTO: 15.3 X10(3) UL (ref 4–11)

## 2025-03-26 PROCEDURE — 02HV33Z INSERTION OF INFUSION DEVICE INTO SUPERIOR VENA CAVA, PERCUTANEOUS APPROACH: ICD-10-PCS | Performed by: SURGERY

## 2025-03-26 PROCEDURE — B548ZZA ULTRASONOGRAPHY OF SUPERIOR VENA CAVA, GUIDANCE: ICD-10-PCS | Performed by: SURGERY

## 2025-03-26 RX ORDER — POTASSIUM CHLORIDE 29.8 MG/ML
40 INJECTION INTRAVENOUS ONCE
Status: COMPLETED | OUTPATIENT
Start: 2025-03-26 | End: 2025-03-26

## 2025-03-26 RX ORDER — HEPARIN SODIUM AND DEXTROSE 10000; 5 [USP'U]/100ML; G/100ML
INJECTION INTRAVENOUS CONTINUOUS
Status: DISCONTINUED | OUTPATIENT
Start: 2025-03-26 | End: 2025-03-27

## 2025-03-26 RX ORDER — HEPARIN SODIUM AND DEXTROSE 10000; 5 [USP'U]/100ML; G/100ML
18 INJECTION INTRAVENOUS ONCE
Status: COMPLETED | OUTPATIENT
Start: 2025-03-26 | End: 2025-03-26

## 2025-03-26 RX ORDER — LIDOCAINE HYDROCHLORIDE 10 MG/ML
5 INJECTION, SOLUTION EPIDURAL; INFILTRATION; INTRACAUDAL; PERINEURAL
Status: COMPLETED | OUTPATIENT
Start: 2025-03-26 | End: 2025-03-26

## 2025-03-26 RX ORDER — HEPARIN SODIUM 1000 [USP'U]/ML
30 INJECTION, SOLUTION INTRAVENOUS; SUBCUTANEOUS ONCE
Status: COMPLETED | OUTPATIENT
Start: 2025-03-26 | End: 2025-03-26

## 2025-03-26 RX ORDER — HEPARIN SODIUM AND DEXTROSE 10000; 5 [USP'U]/100ML; G/100ML
INJECTION INTRAVENOUS CONTINUOUS
Status: DISCONTINUED | OUTPATIENT
Start: 2025-03-26 | End: 2025-03-26

## 2025-03-26 NOTE — PROGRESS NOTES
Received patient on Vapo therm 28L 70%. Weaned patient this afternoon to 25L 60%, patient did not tolerate. Placed patient back on the same settings listed below. Will continue to monitor.      03/26/25 1550   Oxygen Utilization   $ RT Standby Charge (per 15 min) 1   O2 Device High flow/High humidity   O2 Flow Rate (L/min) 28 L/min   FiO2 (%) 70 %   Heater Temperature 96.8 °F (36 °C)   SpO2 96 %   Humidity High   [place] 3/4 Full

## 2025-03-26 NOTE — PHYSICAL THERAPY NOTE
PHYSICAL THERAPY TREATMENT NOTE - INPATIENT    Room Number: 6615/6615-A       Session: 2     Number of Visits to Meet Established Goals: 3  History related to current admission: Patient is a 69 year old male admitted on 3/19/2025 from Home for S/p 3/21 fenestrated abdominal aortic aneurysm repair with left carotid to subclavian transposition on  with Dr. Yanez and Dr. Oquendo and 3/20 lumbar drain.  Reporting RLE weakness 3/22 AM worsening to BLE MRI T spine w/o obvious compression or signs of cord ischemia.      HOME SITUATION  Type of Home: House  Home Layout: Two level           Stairs to Bedroom: 16    Railing: Yes    Lives With: Spouse                Prior Level of [place]: Pt states that he lives in a house with spouse. Pt  reports that he was IND with all his ADLs and gait. Pt has no history of falls.  Presenting Problem: S/p 3/21 fenestrated abdominal aortic aneurysm repair with left carotid to subclavian transposition on  with Dr. Yanez and Dr. Oquendo and 3/20 lumbar drain  Co-Morbidities : HTN, Peripheral Neuropathy, Hx of toe amputation L great toe, mesenteric ischemia s/p SMA bypass with right external iliac artery to SMA bypass, Short gut syndrome    PHYSICAL THERAPY ASSESSMENT   Patient demonstrates good  progress this session, goals  remain in progress.  Grossly weak    Patient is requiring moderate assist as a result of the following impairments: decreased functional strength, impaired static and dynamic balance, impaired coordination, impaired motor planning, and medical status.     Patient continues to function below baseline with bed mobility, transfers, gait, stair negotiation, maintaining seated position, and standing prolonged periods.  Next session anticipate patient to progress transfers and gait.  Physical Therapy will continue to follow patient for duration of hospitalization.    Patient continues to benefit from continued skilled PT services: to facilitate return to prior level  of function as patient demonstrates high motivation with excellent tolerance to an intensive therapy program .    PLAN DURING HOSPITALIZATION  Nursing Mobility Recommendation :  (2 assist RW)  PT Device Recommendation: Rolling walker  PT Treatment Plan: Bed mobility, Body mechanics, Gait training, Strengthening, Transfer training, Stair training, Balance training  Frequency (Obs): 3-5x/week     CURRENT GOALS     Goal #1 Patient is able to demonstrate supine - sit EOB @ level: minimum assistance      Goal #2 Patient is able to demonstrate transfers EOB to/from BSC at assistance level: minimum assistance      Goal #3 Patient is able to sit at the EOB for 10 mins with CGA.       Goal #4 Patient is able to ambulate 50 feet with assist device: walker - rolling at assistance level: moderate assistance   Goal #5     Goal #6     Goal Comments: Goals established on 3/22/2025  3/26/2025 all goals ongoing achieved     SUBJECTIVE  \"I am done\"    OBJECTIVE  Precautions: Bed/chair alarm (>180 SBP, Lumbar Drain, Surgical boot on the L foot)  *after session SBP goal changes to > 160    WEIGHT BEARING RESTRICTION     PAIN ASSESSMENT   Ratin  Location: Pt reports no pain       BALANCE                                                                                                                       Static Sitting: Fair -  Dynamic Sitting: Fair -           Static Standing: Poor  Dynamic Standing: Poor    ACTIVITY TOLERANCE                         O2 WALK       AM-PAC '6-Clicks' INPATIENT SHORT FORM - BASIC MOBILITY  How much difficulty does the patient currently have...  Patient Difficulty: Turning over in bed (including adjusting bedclothes, sheets and blankets)?: A Lot   Patient Difficulty: Sitting down on and standing up from a chair with arms (e.g., wheelchair, bedside commode, etc.): A Lot   Patient Difficulty: Moving from lying on back to sitting on the side of the bed?: A Lot   How much help from another person does the  patient currently need...   Help from Another: Moving to and from a bed to a chair (including a wheelchair)?: Total   Help from Another: Need to walk in hospital room?: Total   Help from Another: Climbing 3-5 steps with a railing?: Total     AM-PAC Score:  Raw Score: 9   Approx Degree of Impairment: 81.38%   Standardized Score (AM-PAC Scale): 30.55   CMS Modifier (G-Code): CM    FUNCTIONAL ABILITY STATUS  Gait Assessment   Functional Mobility/Gait Assessment  Gait Assistance: Not tested  Distance (ft): 0  Assistive Device: Rolling walker  Pattern: Shuffle    Skilled Therapy Provided    Bed Mobility:  Rolling: Mod A    Supine<>Sit: Mod A x 2   Sit<>Supine: NT     Transfer Mobility:  Sit<>Stand: Mod A x 2  Stand<>Sit:  Mod A x 2  Gait: NT    Therapist's Comments: Pt agreeable to EOB sitting - Pt making passive comments about \"this is enough\", \"I don't want this anymore\".  RN Peggy witnessed - will contact doctors/PC.     Pt able to stand x 2 attempts to RW after having BM while sitting on EOB.  Pt with only partial standing posture - heavy reliance on RW/forearms.     Pt on 28 liters/70%FiO2 - sats ranging from 90-94%.  Heavy WOB.     Assisted back to bed - use of hercules for boosting.       Patient End of Session: Up in chair, Needs met, Call light within reach, RN aware of session/findings, All patient questions and concerns addressed, Hospital anti-slip socks    PT Session Time: 23 minutes  Gait Trainin minutes  Therapeutic Activity: 15 minutes  Therapeutic Exercise:  minutes   Neuromuscular Re-education: 8 minutes

## 2025-03-26 NOTE — OPERATIVE REPORT
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 Thoracoabdominal aortic aneurysm with Dissection     Procedure Performed:   Left carotid transposition to the left subclavian artery  Ultrasound and access of the bilateral common femoral arteries  Thoracic and abdominal aortogram with pelvic angiogram with radiologic supervision and interpretation  Thoracic endovascular aortic repair with coverage of the subclavian artery with placement of modified Cook Alpha endoprosthesis [id number], 37x200 [place] CTAG, [id number] Alpha  Angioplasty and stenting of the left subclavian artery with placement of [id number] [place] side branch endoprosthesis.  Angioplasty and stenting of the innominate artery with placement of [id number] [place] side branch endoprosthesis.  Fenestrated endovascular aortic repair with placement of 4 fenestrations (Celiac, SMA, Right and left renal arteries) with placement of 40 x 32 x205  Zdeg, 36mm Concord Excluder Aortic cuffs placed distally to reinforce device distally.  Celiac 10x38 iCast  SMA 11x39 VBX, 12mm Protege distally  Right renal 7x38 iCast  Left renal 6x79 VBX  Electrosurgical septotomy of CPT 42686  Selective catheterization of SMA bypass graft from right external iliac.  Embolization of left SMA bypass with 12mm Amplatzer plug.  11. Selective catheterization third order branch of the left lower extremity (left internal iliac artery).    12. Angioplasty and stenting of left internal iliac artery with placement of 11 x 79 VBX distally and 11 x 39 VBX proximally postdilated with 14 mm balloon  13. Intravascular ultrasound of SMA, left external iliac, left common iliac, right external iliac, right common iliac and aorta with radiologic supervision and  interpretation.  14. Closure of bilateral groin with Pro-glide Perclose suture (20 Bulgarian right, 24 Bulgarian left).     Co-Surgeons:  MD Neil Salgado MD     Due to the complexity of the operation I was asked by my partner to participate as cosurgeon for this operation given the severe medical and surgical complexity.     Surgical Findings: Carotid transposed to the left subclavian.  Endovascular repair performed in the arch with successful exclusion of the aneurysm.   Wide patency of the great vessels with no endoleak.  Neuromonitoring intact with no deficits throughout the entirety of the procedure.  Thermal septotomy performed distally followed by a fenestrated endovascular aortic repair.  Subsequent embolization of SMA bypass performed and repair of left internal iliac dissociated components.  Palpable pulses in the bilateral lower extremities at completion.  Extubated and neurologically intact at completion.     Specimen: None     Estimated Blood Loss: Blood Output: 900 mL       Drains: 10 mm neck drain, lumbar drain     Transfusions: 2 unit packed red blood cells     Complications: none.       Disposition: Awoke from general anesthesia was extubated and taken to the ICU neurovascularly intact.     Indications for procedure: This is a 69 year-old male who was found to have a thoracic aortic aneurysm extending and beginning at the arch with a thoracoabdominal aortic dissection.  He had previous endovascular aortic repair with iliac branch devices and had growth of this aneurysm in the internal iliac arteries.  There was a dissection flap that he developed after his endovascular aortic repair several years later and he had severe compromise to the true lumen of the graft.  He was also noted to have progressive aneurysmal degeneration in the thoracic aorta to 6.5 cm.  His aneurysm met size criteria for repair.  He had no suitable landing zone in the zone 1 or zone 2 segment.  Given the location  of the tears that were in discontinuity he was not amenable to staging.  He was evaluated  and given the extent of the operation and the redo nature, was deemed to be high risk for open surgery.    We thus recommended a fenestrated repair of the arch with carotid transposition, thermal septotomy followed by fenestrated repair and repair of the left internal iliac artery branch. He was informed that there was no stent commercially available and that he would require a physician modified endograft.  Dr. Oquendo discussed the risk and benefits of the procedure.  Informed consent was directly obtained.     On the morning of 03/21 the patient was brought into the operating room and placed in supine position.  General anesthesia was induced without any issues.  An arterial line, Arzola catheter and central line were placed.  Antibiotics were administered.  The patient was subsequently prepped and draped in the usual sterile fashion.  Timeout was performed.     During the anesthesia portion, we modified a Trippifi alpha endoprosthesis and added to internal retrograde branches that were 9 mm in diameter.  A microwire was used to jennifer the fenestration and this was reinforced with 4-0 Ethibond suture the device was then ready for use.  3-0 chromic suture was used for diameter reducing ties circumferentially and the graft was received into a 20 Turkish system.  An additional graft was then modified with 4 fenestrations and sutured microwire rings over the fenestrations with 4 Ethibond suture with circumferential 3-0 chromic sutures for diameter reduction.  Of note the left renal was also reinforced with an external 15 mm x 6 mm in diameter Viabahn external cuff.     With the use of a 10 blade an 8 cm incision was made in the supraclavicular portion of the base of the neck.  Dissection was carried down to electrocautery through the subcutaneous tissues.  Retractors were placed.  The platysma was transected.  The clavicular head of the  sternocleidomastoid was transected with electrocautery.  We first identified the jugular vein and mobilized this laterally to identify the common carotid.  This was circumferentially dissected by Dr. Oquendo down to the base under the sternum in order to ensure enough length for transposition.  The vein was then mobilized medially.  The fat pad was mobilized laterally and the anterior skin was identified with identification and preservation of the phrenic nerve.  This was transected with electrocautery.  The subclavian artery was identified and circumferentially dissected and encircled with Vesseloops with preservation of all branches.  The patient was systemically heparinized.  The common carotid was clamped proximally distally and transected with Noguera scissors and tunneled in a retrojugular fashion.  The stump was ligated with the use of 4-0 Prolene suture in running 2 layered fashion.     An 11 blade was then used to perform an arteriotomy after clamps were placed proximally and distally on the subclavian.  Lopez scissors were used to carry the arteriotomy.  The artery was trimmed to appropriate length and spatulated and then with 5-0 Prolene suture and end-to-side anastomosis was performed in running fashion. Dr. Oquendo performed the superior portion and I performed the inferior portion.  Prior to cinching the sutures the artery was forward and backbled and flushed with heparinized saline and then the sutures were then cinched and tied thereby completing the reconstruction.  The artery was forward bled through the arm and then once this was completed flow was returned to the carotid.  Neuromonitoring was found to be stable throughout the entirety of this segment.  At this point we opted to proceed with the endovascular portion of the procedure.         During this time I accessed the right common femoral arteries with a micropuncture and advanced a microwire and exchanged for a micro sheath.  A J-wire was then  put in place followed by placement of a 6 Djiboutian sheath.  I then deployed 2 Pro-glide Perclose sutures in the 10 and 2 o'clock position in each groin with subsequent placement of 8 Djiboutian sheath.  I performed this in the right. Dr. Oquendo performed this on the left.        The wire was then advanced into the aorta bilaterally.  Intravascular ultrasound was advanced from the right external iliac, right common iliac into the aorta to confirm placement into the true lumen.  I performed this on the right. Dr. Oquendo performed this on the left with confirmation through the left external iliac, left common iliac and aorta.     He then exchanged for a 24 Djiboutian dry seal sheath in the left groin. The graft was advanced into the descending thoracic aorta across the arch with care to manipulate the wire to follow the outer curve thereby aligning the branches with the ostia.  The nose cone was taken just outside the aortic valve with care and deployed without issue.   I then deployed the graft in the ascending aorta, aortic arch as well as the descending thoracic aorta without any issue.  I then upsized to a 20 Djiboutian sheath in the right groin. Dr. Oquendo then utilized a Glidewire advantage to select the graft that the wire then protruded through the innominate branch and with a Sonal 4 catheter the wire was able to select the innominate and directed to the right subclavian and the catheter was advanced and confirmed with contrast injection.  An Amplatz wire was then put in place.  I then similarly accessed the graft and select the left subclavian branch and directed the wire into the distal left subclavian and confirmed this with contrast injection and placed an Amplatz wire.    A robbi wire was then put in place and then the diameter reducing ties were fractured with a Turtlepoint trilobed balloon.   I then advanced the Turtlepoint 20 mm sidebranch over the amplatz wire through the portal into the innominate artery.  I then performed a  right upper extremity arteriogram to identify the subclavian and carotid bifurcation off of the innominate.  I then deployed the innominate artery stent performing angioplasty and stenting without any issues.  The angioplasty was performed with a molding and occluding aortic balloon. Similarly, Dr. Oquendo performed this in the left subclavian artery without issues and deployed a [place] 20 mm sidebranch endoprosthesis and postdilated this with a molding and occluding aortic balloon.  Repeat angiogram revealed wide patency of the stent with preservation of flow to the subclavian and carotid on the right and on the left with no leak through this.       We then proceeded with a thermal septotomy.  I utilized a 6.5  to exit from the true to the false lumen from the tear in the descending thoracic aorta and was able to externalize the wire into the true lumen and a snare was then used to obtain through and through access.  An Astato wire was then shaped and stripped of the lining and put in place followed by placement of Love cross catheters for insulation.  Thermal septotomy was then performed with Bovie electrocautery.  Intravascular ultrasound was performed simultaneously to confirm successful septotomy.    I then exchanged for a 37 mm C tag and extended this distal to the fenestration into the descending thoracic aorta. Dr. Oquendo then deployed a 40 x 36 mid length graft into the the descending thoracic aorta to the level of the celiac artery.  Deployed this without any issue.   And followed this with a 46 x 42 alpha.  I deployed the first 2 grafts without any issues.  Dr. Saunders then followed with a 42 x 34 double taper dissection graft all the way to the level of the celiac artery.    At this point we proceeded with the fenestrated device.     I confirmed under fluoroscopy proper orientation of the graft.  I advanced the stent graft   from the left  groin and deployed this without any issues. I unsheathed it  in the descending thoracic aorta through the visceral aorta into the infrarenal aorta into the pre-existing graft without issue. I then selected the graft and then advanced the 20 Fr sheath up the right groin into the device.      While the graft was constrained,  I exchanged for a vanshee 4 catheter and with a glidewire, I was able to select the celiac, confirmed cannulation via contrast angiography with placement of a Krause wire.  I then utilized the vanshee4 with glidewire to select the right renal fenestration confirmed this via contrast angiography. Krause wire was put in place.  Dr. Oquendo then selected the left renal artery with the  and glide wire and confirmed cannulation with quickcross catheter with contrast injection.  An Amplatz wire was put in place.      We then tried for some time to select the SMA however we were able to select this but were entering repeatedly into the false lumen with inability to enter into the true lumen.  As such we opted to abandon this and proceed with a retrograde approach for this.  At this point we decided to deployed the graft fully.  A robbi wire was put in place with MOAB balloon. Dr Oquendo then advanced the ballon was then I inflated the balloon thereby fracturing the diameter reducing and fully deploying the graft.  With the  in place, I advanced a 6 x 79 VBX through the left renal branch and after some manipulation we were able eventually to get it into appropriate location and performed angioplasty and stenting without any issues and flared the proximal segment with 9 mm balloon.  Repeat angiogram revealed wide patency with no leak through this.  The wire and catheter were removed.  We then ballooned the entirety of all graft in the proximal segment as well as the overlapping segments.     Dr Oquendo  then withdrew the sheath partially in the right renal and then I deployed the right renal stent without issue. This was then post dilated with a  [id number] balloon. Contrast injection revealed wide patency without any leak. The sheath and wire were removed. I then withdrew the sheath partially in the celiac and then he deployed the stent without issue. This was then post dilated with a [id number] balloon. Contrast injection revealed wide patency without any leak. The sheath and wire were removed.         We then placed two 36 mm Phoenix excluder cuffs to reinforce the fenestrated graft into the pre-existing Phoenix graft and to ensure no leak due to the tortuosity.  This was profiled with molding and occluding aortic balloon.    I then withdrew the dry seal sheath into the right external iliac and utilized a Kumpe catheter and Glidewire to select the bypass graft that was a retrograde bypass to the SMA that was performed.  I was able to cannulate through the true lumen and confirmed this with intravascular ultrasound of the SMA.  Through an antegrade and retrograde approach via Safari technique we were eventually able to have a wire accessed through the  through the fenestration in an antegrade fashion and was able to advance it into the bypass confirming true lumen cannulation thereby obtaining through and through access.  I then advanced an 1139 VBX and performed angioplasty and stenting in the SMA without any issues.  We tapped the distal segment with a 12 mm protégé and postdilated this with 8 mm balloon.  Repeat angiogram of this revealed wide patency with no leak through the components.  There is preservation of flow to all branches and confirmed on IVUS to be into the true lumen.  I then withdrew my catheter from below and deployed an Amplatzer plug 12 mm into the bypass thereby excluding this and preventing any competitive flow.    Dr. Oquendo then used a 16 Bolivian  from the left groin and was able to place this above the flow divider of the iliac branch endoprosthesis.  He utilized a Glidewire advantage and Love cross catheter to enter  through the internal iliac stents and was able to cannulate through the dissociated stents and cannulated the posterior pelvic branches and placed a Krause wire in place.  He then advanced an 11 x 79 VBX and was able to perform angioplasty and stenting into the left internal iliac artery followed by an additional 11 x 39 VBX stent that was profiled with 12 mm balloon proximally inflated to 13/14 mm.  Repeat angiogram of this revealed wide patency without any further dissociation and excellent exclusion of the internal iliac aneurysm.    A thoracic aortogram and abdominal aortogram and pelvic angiogram were performed with the revealed wide patency of the stents with preservation of flow to the great vessels and sealing of the aneurysm with no endoleak with preservation of flow to all visceral vessels.  Intravascular ultrasound from each end confirmed dramatic improvement of flow and excellent graft expansion.  At this point we are satisfied and opted to terminate the procedure.  All catheters were withdrawn.  I then cinched the Pro-glide Perclose sutures in the bilateral groins which were cinched locked and cut thereby closing the arteriotomy site.     Topical hemostatic agents were placed.  Protamine was administered.  Upon completion there was evidence of excellent hemostasis and at this point we proceeded with closure.  Dr. Oquendo closed the neck incision with the use of 3-0 Vicryl suture in interrupted multilayer fashion.  The skin was closed with 4 Monocryl sutures in subcuticular fashion with placement of Dermabond and sterile dressings.  Of note he did place a 10 mm Kenneth-Hardy drain into the neck to monitor for chyle leak.     The patient awoke from general anesthesia was extubated neurologically intact and taken to the ICU in stable condition.  All counts were correct at the end of the case. He was confirmed to have palpable pulses in the upper and lower extremities as well and was found to be  neurovascularly intact.    David Yanez MD  [facility]  Vascular Surgery

## 2025-03-26 NOTE — SLP NOTE
ADULT SWALLOWING EVALUATION    ASSESSMENT    ASSESSMENT/OVERALL IMPRESSION:  Pt seen for a swallowing evaluation per RN noticing Pt coughing with thin liquids. Pt was admitted to the hospital (03/21/2025) for a planned AAA repair with s/p lumbar drain. Postoperative course complicated by HOLLIS on CKD as well as right lower extremity weakness along with hypoxic respiratory failure. Pt presents with PMH of hypertension, GERD, anxiety, renal calculi, DVT, ascending aortic dissection s/p repair, aortic dissection distal to left subclavian, infrarenal endovascular AAA repair with bilateral KYLER for hypogastric and common iliac aneurysms, mesenteric ischemia s/p SMA bypass with right external iliac artery to SMA bypass.  Pt in bed upon entry to the room. Pts voice quality is weak, due to decreased respiratory support. Pt is currently receiving supplemental O2 flowing at 30 L per min 70% FiO2.  Pt RR ranged from 18-20 throughout the session. He did not appear to be in respiratory distress throughout the visit.     Oral mechanism exam was unremarkable. Strength, tone, and ROM were all WFL. Pt politely declined participation in hard solid trials. Pt self presented thin liquids via straw and thin liquids via cup in his normal manner.  Pt exhibited an immediate cough with Pts natural drink size. SLP cued the Pt to take a smaller sip. Pts oral retrieval, containment, prep and transit were all WFL. No overt signs of aspiration with smaller bolus size.  Laryngeal excursion appeared to be reduced of strength and questionable timeliness to palpation. Pt coordinated breathing and swallowing well, exhaled after swallowing consistently.      Recommend continue po diet as tolerated  with aspiration precautions (up right, slow rate, small bites, small sips). SLP discussed the importance of taking small sips, slow rate, being upright in bed, small bites with the Pt who demonstrated understanding of the precautions. If Pt coughs with thin  liquids, SLP discussed with Pt to take smaller sips and to avoid using straws.  SLP discussed above with RN. SLP discussed above with Pt who was in agreement. SLP will continue to follow for ongoing assessment of po tolerance with aspiration precautions.        RECOMMENDATIONS   Diet Recommendations - Solids: Regular  Diet Recommendations - Liquids: Thin Liquids                           Aspiration Precautions: Upright position, Slow rate, Small bites, Small sips  Medication Administration Recommendations: Crushed in puree  Treatment Plan/Recommendations: Aspiration precautions    HISTORY   MEDICAL HISTORY  Reason for Referral: R/O aspiration    Problem List  Active Problems:    Pre-op testing    Thoracoabdominal aortic aneurysm (TAAA)    Bilateral leg weakness      Past Medical History  Past Medical History:    AAA (abdominal aortic aneurysm)    Abdominal aneurysm    Abdominal aortic aneurysm (AAA)    AAA surgery done    Anxiety state    Back pain    Naproxen twice a week    Back problem    minor    Calculus of kidney    Deep vein thrombosis (HCC)    to colon    Esophageal reflux    Hearing impairment    Lt ear tinnitus    High blood pressure    History of recent hospitalization    10/2019- was at [facility] x5 weeks (ICU) with Dissected Aorta/ Post op colon problem/and post op cardiac arrest    Ileostomy present (HCC)    Peripheral vascular disease    Unspecified essential hypertension    Visual impairment    glasses       Prior Living Situation: Home with spouse  Diet Prior to Admission: Regular, Thin liquids  Precautions: Aspiration    Patient/Family Goals: none stated during visit    SWALLOWING HISTORY  Current Diet Consistency: Regular, Thin liquids  Dysphagia History: n/a  Imaging Results:   XR Chest 03/25/2025:  CONCLUSION:   Worsening airspace disease concerning for multifocal pneumonia, but the vascularity is congested as well, with cardiac enlargement present and the findings may also reflect  asymmetric pulmonary edema and CHF.         LOCATION:  [id number]               Dictated by (CST): Shawn Kumar MD on 3/25/2025 at 3:22 PM       Finalized by (CST): Shawn Kumar MD on 3/25/2025 at 3:23 PM     SUBJECTIVE       OBJECTIVE   ORAL MOTOR EXAMINATION  Dentition: Natural, Functional  Symmetry: Within Functional Limits  Strength: Within Functional Limits  Tone: Within Functional Limits  Range of Motion: Within Functional Limits  Rate of Motion: Within Functional Limits    Voice Quality: Weak  Respiratory Status: Nasal cannula (30 L per min 70%)  Consistencies Trialed: Thin liquids  Method of Presentation: Self presentation, Cup, Straw, Single sips  Patient Positioned: Upright, Midline (in bed)    Oral Phase of Swallow: Within Functional Limits                      Pharyngeal Phase of Swallow: Appears Impaired  Laryngeal Elevation Timing: Appears intact  Laryngeal Elevation Strength: Appears impaired  Laryngeal Elevation Coordination: Appears intact  (Please note: Silent aspiration cannot be evaluated clinically. Videofluoroscopic Swallow Study is required to rule-out silent aspiration.)    Esophageal Phase of Swallow: No complaints consistent with possible esophageal involvement  Comments:               GOALS  Goal #1 The patient will tolerate regular consistency and thin liquids without overt signs or symptoms of aspiration with 95 % accuracy over 1-2 session(s).  In Progress   Goal #2 The patient/family/caregiver will demonstrate understanding and implementation of aspiration precautions and swallow strategies independently over 1-2 session(s).    In Progress     FOLLOW UP  Treatment Plan/Recommendations: Aspiration precautions  Duration: 1 week  Follow Up Needed (Documentation Required): Yes  SLP Follow-up Date: 03/27/25    Thank you for your referral.   If you have any questions, please contact Yulia Caro  Clinician   Spectra [phone]

## 2025-03-26 NOTE — SPIRITUAL CARE NOTE
Spiritual Care Visit Note    Patient Name: Cy Monsalve Date of Spiritual Care Visit: 25   : 1955 Primary Dx: <principal problem not specified>       Referred By: Referral From: Nurse    Spiritual Care Taxonomy:    Intended Effects: Demonstrate caring and concern    Methods: Collaborate with care team member, Offer spiritual/Voodoo support    Interventions: Active listening, Ask guided questions, Explain  role, Provide hospitality    Visit Type/Summary:     - Spiritual Care: Responded to a request for spiritual care and assessed the patient for spiritual care needs. Responded to a request via the on call phone Consulted with RN prior to visit. Offered empathic listening and emotional support. Patient and family expressed appreciation for  visit. Provided information regarding how to contact Spiritual Care and left a Spiritual Care information card.  remains available as needed for follow up. Spoke with the nurse prior to the visit and was formed that the patient is experiencing a tough time since he's been in the hospital. Upon entering the patient's room I noticed that the patient sitting up in bed, the patient welcomed me.  The patient stated that he was ready to die and that he was tried of going through surgery. The patient stated that this was his second surgery in the past two months. The patient said that he is  and attend a [employer].  provided spiritual and emotional support to the patient. The patient expressed his gratitude for the  visit.     Spiritual Care support can be requested via an Epic consult. For urgent/immediate needs, please contact the On Call  at: Juni: ext [phone]    Chaplain Resident Kelly Trujillo MA

## 2025-03-26 NOTE — OCCUPATIONAL THERAPY NOTE
OCCUPATIONAL THERAPY TREATMENT NOTE - INPATIENT     Room Number: 6615/6615-A  Session: 1   Number of Visits to Meet Established Goals: 7    Presenting Problem: S/p 3/21 fenestrated abdominal aortic aneurysm repair with left carotid to subclavian transposition on  with Dr. Yanez and Dr. Oquendo and 3/20 lumbar drain    ASSESSMENT   Patient demonstrates limited progress this session, goals remain in progress.    Patient continues to function below baseline with  all ADL .   Contributing factors to remaining limitations include decreased functional strength, decreased functional reach, decreased endurance, impaired standing balance, and increased O2 needs from baseline.  Next session anticipate patient to progress upper body dressing and transfers.  Occupational Therapy will continue to follow patient for duration of hospitalization.    Patient continues to benefit from continued skilled OT services: to facilitate return to prior level of function as patient demonstrates high motivation with excellent tolerance to an intensive therapy program.     History: Patient is a 69 year old male admitted on 3/19/2025 with Presenting Problem: S/p 3/21 fenestrated abdominal aortic aneurysm repair with left carotid to subclavian transposition on  with Dr. Yanez and Dr. Oquendo and 3/20 lumbar drain. Co-Morbidities : HTN, Peripheral Neuropathy, Hx of toe amputation L great toe, mesenteric ischemia s/p SMA bypass with right external iliac artery to SMA bypass, Short gut syndrome     **3/25 acute respiratory failure, possible pneumonia, on vapotherm    TREATMENT SESSION:  Patient Start of Session: supine    FUNCTIONAL TRANSFER ASSESSMENT  Sit to Stand: Edge of Bed; Chair  Edge of Bed: Maximum Assist (mod A x 2)  Chair: Not Tested    BED MOBILITY  Supine to Sit : Maximum Assist  Sit to Supine (OT): Dependent  Scooting: n    BALANCE ASSESSMENT  Static Sitting: Stand-by Assist  Static Standing: Moderate Assist    O2  SATURATIONS  Oxygen Therapy  SPO2% on Oxygen at Rest: 91  At rest oxygen flow (liters per minute):  (vapotherm 28L 70%)    EDUCATION PROVIDED  Patient Education : Plan of Care; Role of Occupational Therapy; Functional Transfer Techniques; Posture/Positioning; Energy Conservation; Proper Body Mechanics  Patient's Response to Education: Requires Further Education    Equipment used: rw    Therapist comments: on vapotherm 28L 70%, 91% at rest, HR 76.   Supine to sit max A.   Supervision sitting balance static. Noted small skin tear on R upper back. Informed RN who placed dressing covering.   Pt had bowel movement.   Mod A x 2 to stand, cueing and tactile guidance to achieve upright standing posture, still partially flexed forward. RN provided total A with hygiene care.   Pt completed sit to stand twice for toileting needs.   Mod A x 2. Pt reported, \"It's just going bad. I don't want any of this. I want to just end all of this. I need to talk to a psychiatrist.\"  RN present in the room. RN will be reaching out to appropriate services.       Patient End of Session: In bed, Needs met, Call light within reach, RN aware of session/findings, All patient questions and concerns addressed, SCDs in place    SUBJECTIVE  See above.    PAIN ASSESSMENT  Rating: Unable to rate  Location: chest area  Management Techniques: Repositioning     OBJECTIVE  Precautions: Bed/chair alarm (>180 SBP, Lumbar Drain, Surgical boot on the L foot)    AM-PAC ‘6-Clicks’ Inpatient Daily Activity Short Form  -   Putting on and taking off regular lower body clothing?: A Lot  -   Bathing (including washing, rinsing, drying)?: A Lot  -   Toileting, which includes using toilet, bedpan or urinal? : A Lot  -   Putting on and taking off regular upper body clothing?: A Lot  -   Taking care of personal grooming such as brushing teeth?: A Little  -   Eating meals?: A Little    AM-PAC Score:  Score: 14  Approx Degree of Impairment: 59.67%  Standardized Score  (AM-PAC Scale): 33.39    PLAN  OT Device Recommendations: TBD  OT Treatment Plan: Balance activities, Energy conservation/work simplification techniques, ADL training, Functional transfer training, UE strengthening/ROM, Patient/Family education, Equipment eval/education, Compensatory technique education  Rehab Potential : Good  Frequency: 3x/week    OT Goals:   Ongoing 3/26  ADL Goals   Patient will perform upper body dressing:  with setup  Patient will perform lower body dressing:  with min assist  Patient will perform toileting: with min assist     Functional Transfer Goals  Patient will transfer from supine to sit:  with min assist  Patient will transfer to toilet:  with min assist     UE Exercise Program Goal  Patient will be independent with bilateral AROM HEP (home exercise program).     Additional Goals  Pt will incorporate 2 energy conservation techniques into ADL.    OT Session Time: 23 minutes  Self-Care Home Management: 10 minutes  Therapeutic Activity: 8 minutes

## 2025-03-26 NOTE — PROGRESS NOTES
[facility] Hospitalist Progress Note     CC: Hospital Follow up    PCP: Kam Alberts MD       Assessment/Plan:     Active Problems:    Pre-op testing    Thoracoabdominal aortic aneurysm (TAAA)    Bilateral leg weakness      69 year old male with a past medical history of hypertension, GERD, anxiety, renal calculi, DVT, ascending aortic dissection s/p repair, 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  Postoperative course complicated by HOLLIS on CKD as well as right lower extremity weakness along with hypoxic respiratory failure.      History of prior infrarenal endovascular AAA repair with bilateral KYLER for hypogastric and common iliac aneurysms  History of mesenteric ischemia s/p SMA bypass with right external iliac artery to SMA bypass  History of aortic dissection s/p repair, aortic dissection distal to left subclavian  Hx of DVT   S/p complex aortic repair 3/21  -Complicated aortic pathology with multiple repairs.   - high risk procedure with risks of complications including spinal cord ischemia, mesenteric ischemia, possible renal failure.  -s/p lumbar drain with neurointerventional to limit risk of spinal cord ischemia 3/20  -Further recommendations and management per vascular surgery  - previously on coumadin, now held per vascular  -Status post complex aortic repair 3/21 with vascular-see op report for details  - SBP goal >160, per vascualr  - pressors per vascular  - post op managmenet per CN[facility]  -Drain removal today    Right lower extremity weakness-improving  - In the setting of complex aortic repair 3/21  - Seen by neurointensivist, recommendations reviewed  - Hemoglobin goal per neuro ICU greater than 10, received 1 unit of PRBC  3/22, plan for another unit 3/23  - Plan for 1 unit of platelets 3/23  - Started on IV steroids as rescue measure in the setting of worsening weakness on the right lower extremity-per neuro ICU  - Frequent neurovascular assessment per protocol    Acute hypoxemic respiratory failure w/ threat to bodily function 2/2 possible pneumonia versus edema  Leukocytosis  -Discussed with pulmonology  -Meropenem started-  -CBC reviewed, white count 50.3.  Serial CBCs ordered.  -IV Lasix 40 mg twice daily, diuresing adequately  -On 30 L high flow this morning, continue to monitor respiratory status closely    HOLLIS on CKD-improving  -Creatinine reviewed today -1.57 (baseline 1.2)  -Continue to monitor with serial RFP's  -Avoid nephrotoxic agents  - Cr bumped post operatively - given prolonged surgery and complicated repair  - given IVF, follow BMP  - discussed with vascular surgery- defer decision for renal consult to vascular      Hypertension  -hold home HCTZ     Allergic rhinitis  -Continue home fluticasone as needed     Short gut syndrome  - resume lamotil if diarrhea resumes    Normocytic anemia  -Hgb reviewed -10.6 this morning  -Serial CBC's     Dispo: Inpatient     Outpatient or previous hospital records reviewed. Questions/concerns were discussed with patient and/or family by bedside. Discussed with vascular surgery.      Lamar Childs DO  [facility]  Hospitalist  Contact via [employer]         Subjective:     On 30 O2 HFNC today, plan for lumbar drain removal today.     OBJECTIVE:    Blood pressure (!) 161/75, pulse 69, temperature 98.7 °F (37.1 °C), temperature source Temporal, resp. rate 18, weight 193 lb 12.6 oz (87.9 kg), SpO2 97%.    Temp:  [98 °F (36.7 °C)-98.7 °F (37.1 °C)] 98.7 °F (37.1 °C)  Pulse:  [] 69  Resp:  [12-24] 18  BP: ()/() 161/75  SpO2:  [73 %-99 %] 97 %  FiO2 (%):  [60 %-100 %] 70 %      Intake/Output:    Intake/Output Summary (Last 24 hours) at 3/26/2025  1349  Last data filed at 3/26/2025 1304  Gross per 24 hour   Intake 1727.5 ml   Output 3383 ml   Net -1655.5 ml       Last 3 Weights   03/26/25 0400 193 lb 12.6 oz (87.9 kg)   03/25/25 0600 200 lb 2.8 oz (90.8 kg)   03/24/25 0900 200 lb 3.2 oz (90.8 kg)   03/24/25 0500 203 lb 0.7 oz (92.1 kg)   03/23/25 0516 204 lb 2.3 oz (92.6 kg)   03/22/25 0637 153 lb 3.5 oz (69.5 kg)   03/20/25 1231 192 lb 15.9 oz (87.5 kg)   03/19/25 1444 193 lb (87.5 kg)   03/22/25 1113 153 lb 3.5 oz (69.5 kg)   03/07/25 1536 185 lb (83.9 kg)       Exam   Gen: No acute distress, alert and oriented x3, no focal neurologic deficits, right-sided Pilgrim, arterial line and lumbar drain noted  Heent: NC AT, mucous memb clear, neck supple  Pulm: Lungs clear, normal respiratory effort  CV: Heart with regular rate and rhythm, no peripheral edema  Abd: Abdomen soft, nontender, nondistended, bowel sounds present  MSK:expected rom, no hematoma appreciated bilaterally, 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, 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, strentgh 4/5 on R, pulses intact  Skin: no rashes or lesions  Neuro: AO*3, motor intact, no sensory deficits  Psyc: appropriate mood and affect      Data Review:       Labs:     Recent Labs   Lab 03/23/25  0505 03/23/25  1543 03/24/25  0443 03/25/25  0444 03/26/25  0417   RBC 3.26*   < > 3.49* 3.74* 3.83   HGB 9.2*   < > 9.9* 10.6* 10.8*   HCT 27.3*   < > 28.9* 30.8* 32.4*   MCV 83.7   < > 82.8 82.4 84.6   MCH 28.2   < > 28.4 28.3 28.2   MCHC 33.7   < > 34.3 34.4 33.3   RDW 16.0   < > 15.9 15.8 15.8   NEPRELIM 6.81  --  8.07*  --  14.35*   WBC 7.4   < > 8.9 9.8 15.3*   PLT 74.0*   < > 71.0* 85.0* 93.0*    < > = values in this interval not displayed.         Recent Labs   Lab 03/25/25 0444 03/25/25 2021 03/26/25 0417   * 127* 141*   BUN 35* 42* 47*   CREATSERUM 1.45* 1.57* 1.57*   EGFRCR 52* 47* 47*   CA 8.4* 8.4* 8.6*    141 141   K 3.8  3.8 3.7 3.6  3.6    99 99   CO2 27.0 33.0* 33.0*        Recent Labs   Lab 03/19/25  1427 03/20/25  0528 03/21/25  0415 03/22/25  0423 03/25/25  0444 03/25/25 2021   ALT <7*  --  <7* 8*  --  <7*   AST 10  --  10 30  --  12   ALB 4.4 4.2 4.1 3.9 3.8 3.8         Imaging:  XR CHEST AP PORTABLE  (CPT=71045)    Result Date: 3/25/2025  CONCLUSION:   Worsening airspace disease concerning for multifocal pneumonia, but the vascularity is congested as well, with cardiac enlargement present and the findings may also reflect asymmetric pulmonary edema and CHF.   LOCATION:  [id number]      Dictated by (CST): Shawn Kumar MD on 3/25/2025 at 3:22 PM     Finalized by (CST): Shawn Kumar MD on 3/25/2025 at 3:23 PM       XR CHEST AP PORTABLE  (CPT=71045)    Result Date: 3/24/2025  CONCLUSION:  The patient is status post sternotomy.  Heart size is normal.  Normal vascularity A descending thoracic aortic stent graft is identified unchanged in appearance.  There is a graft along the superior aspect of the transverse aorta.  There are adjacent surgical clips.  There are surgical clips in the left neck also.  A right [place] catheter is identified the tip it is in the upper SVC.  No pneumothorax.  There is some consolidation and ground-glass opacities in the left lower lobe with a small left pleural effusion either representing atelectasis or pneumonia.  There are ground-glass opacities in the right suprahilar and right infrahilar region suggesting foci of pneumonia versus atelectasis.  Multi focal pneumonia is favored.  Trace blunting the right CP angle.   LOCATION:  [id number]      Dictated by (CST): Neil Tong MD on 3/24/2025 at 5:57 AM     Finalized by (CST): Neil Tong MD on 3/24/2025 at 6:00 AM          Meds:      initial dose (PE/DVT/THROMBUS) heparin  18 Units/kg/hr Intravenous Once    meropenem  1 g Intravenous Q8H    furosemide  40 mg Intravenous TID    fluticasone propionate  1 spray Each Nare Daily    sodium chloride  500 mL Intravenous Once    docusate  sodium  100 mg Oral BID    polyethylene glycol (PEG 3350)  17 g Oral Daily    insulin aspart  1-5 Units Subcutaneous TID AC and HS    pantoprazole  40 mg Intravenous Q24H      continuous dose heparin      dexmedetomidine 0.4 mcg/kg/hr (03/26/25 1112)    niCARdipine Stopped (03/22/25 0245)    norepinephrine 1.5 mcg/min (03/26/25 1304)       hydrALAzine    ipratropium-albuterol    glucose **OR** glucose **OR** glucose-vitamin C **OR** dextrose **OR** glucose **OR** glucose **OR** glucose-vitamin C    acetaminophen **OR** HYDROcodone-acetaminophen **OR** HYDROcodone-acetaminophen    ondansetron    metoclopramide    HYDROmorphone **OR** HYDROmorphone **OR** HYDROmorphone    niCARdipine    norepinephrine      Supplementary Documentation:   DVT Mechanical Prophylaxis:   SCDs, Early ambuation  DVT Pharmacologic Prophylaxis   Medication    heparin (Porcine) 66318 units/250mL infusion (PE/DVT/THROMBUS) INITIAL DOSE    heparin (Porcine) 12222 units/250mL infusion PE/DVT/THROMBUS CONTINUOUS                Code Status: DNAR/Full Treatment  Arzola: Arzola catheter in place  Arzola Duration (in days):   Central line:    YNES:

## 2025-03-26 NOTE — OPERATIVE REPORT
Vascular Surgery Procedure Note  Cy Monsalve  [id number]  4/24/1955          Date: 03/10/2025        Procedure: Physician Planning of a patient specific fenestrated visceral aortic graft requiring a minimum of 90 minutes of physician time. CPT 95330     69 year-old male with numerous comorbidities with large thoracoabdominal aortic aneurysm with dissection.  He underwent preoperative planning that was extensive and revealed that a conventional Zenith fenestrated custom-made graft would not be an option. As such  open surgery vs.  a physician modified fenestrated endovascular aortic repair were discussed. He strongly desired to proceed with a minimally invasive approach.     After review of CT scan, detailed three-dimensional reconstruction was performed with Glio it was determined that the patient would not have adequate seal zone without extending upto the celiac artery.  Therefore measurements were performed to create fenestrations for the celiac SMA, and bilateral renal arteries.  The measurements were recorded with the plan to perform on 03/21/2025. Over 90 minutes was used in measuring and creating the endograft for this patient. See operative dictation for procedure details.      David Yanez MD

## 2025-03-26 NOTE — PROGRESS NOTES
[facility]   part of [facility]     Vascular Surgery Progress Note    Cy Monsalve Patient Status:  Inpatient    1955 MRN [id number]   Location [facility] 6NE-A Attending Neil Oquendo MD   Hosp Day # 6 PCP Kam Alberts MD     Objective:   Temp: 98.3 °F (36.8 °C)  Pulse: 62  Resp: 18  BP: 170/90  FiO2 (%): 70 %      Events Yesterday/Overnight:    Patient is a 69 year old male, POD 5, fenestrated endovascular arch repair with fenestrated thoracoabdominal aneurysm repair, left carotid to subclavian transposition with endovascular septotomy of dissection with Dr. Ouqendo and Dr. Yanez. Patient started on bipap yesterday. On precedex. Patient is also on levophed. /90. Patient's WBC today is 15.3 from  yesterday. Hemoglobin is 10.8. Lumbar drain clamped.       Exam:    Palpable bilateral DP and PT pulses.  Bilateral groin access site soft, no hematoma. Left neck ROYER drain to suction with sanguineous drainage.     Impression/Plan:    Dr. Oquendo will remove lumbar drain today. Will start heparin after drain removed.    Pulmonary to follow.     BP goal 130-150.          Medications:  Current Facility-Administered Medications   Medication Dose Route Frequency    potassium chloride 40 mEq/100mL IVPB premix (central line) 40 mEq  40 mEq Intravenous Once    meropenem (Merrem) 1 g in sodium chloride 0.9% 100 mL IVPB-MBP  1 g Intravenous Q8H    hydrALAzine (Apresoline) 20 mg/mL injection 10 mg  10 mg Intravenous Q4H PRN    dexmedeTOMIDine in sodium chloride 0.9% (Precedex) 400 mcg/100mL infusion premix  0.2-1.5 mcg/kg/hr (Dosing Weight) Intravenous Continuous    furosemide (Lasix) 10 mg/mL injection 40 mg  40 mg Intravenous TID    labetalol (Trandate) 5 mg/mL injection 10 mg  10 mg Intravenous Q4H PRN    fluticasone propionate (Flonase) 50 MCG/ACT nasal suspension 1 spray  1 spray Each Nare Daily    ipratropium-albuterol (Duoneb) 0.5-2.5 (3) MG/3ML inhalation solution 3 mL  3 mL  Nebulization Q4H PRN    sodium chloride 0.9 % IV bolus 500 mL  500 mL Intravenous Once    docusate sodium (Colace) cap 100 mg  100 mg Oral BID    polyethylene glycol (PEG 3350) (Miralax) 17 g oral packet 17 g  17 g Oral Daily    glucose (Dex4) 15 GM/59ML oral liquid 15 g  15 g Oral Q15 Min PRN    Or    glucose (Glutose) 40% oral gel 15 g  15 g Oral Q15 Min PRN    Or    glucose-vitamin C (Dex-4) chewable tab 4 tablet  4 tablet Oral Q15 Min PRN    Or    dextrose 50% injection 50 mL  50 mL Intravenous Q15 Min PRN    Or    glucose (Dex4) 15 GM/59ML oral liquid 30 g  30 g Oral Q15 Min PRN    Or    glucose (Glutose) 40% oral gel 30 g  30 g Oral Q15 Min PRN    Or    glucose-vitamin C (Dex-4) chewable tab 8 tablet  8 tablet Oral Q15 Min PRN    insulin aspart (NovoLOG) 100 Units/mL FlexPen 1-5 Units  1-5 Units Subcutaneous TID AC and HS    pantoprazole (Protonix) 40 mg in sodium chloride 0.9% PF 10 mL IV push  40 mg Intravenous Q24H    acetaminophen (Tylenol) tab 650 mg  650 mg Oral Q4H PRN    Or    HYDROcodone-acetaminophen (Norco) 5-325 MG per tab 1 tablet  1 tablet Oral Q4H PRN    Or    HYDROcodone-acetaminophen (Norco) 5-325 MG per tab 2 tablet  2 tablet Oral Q4H PRN    ondansetron (Zofran) 4 MG/2ML injection 4 mg  4 mg Intravenous Q6H PRN    metoclopramide (Reglan) 5 mg/mL injection 10 mg  10 mg Intravenous Q8H PRN    [Held by provider] heparin (Porcine) 5000 UNIT/ML injection 5,000 Units  5,000 Units Subcutaneous 2 times per day    HYDROmorphone (Dilaudid) 1 MG/ML injection 0.2 mg  0.2 mg Intravenous Q2H PRN    Or    HYDROmorphone (Dilaudid) 1 MG/ML injection 0.4 mg  0.4 mg Intravenous Q2H PRN    Or    HYDROmorphone (Dilaudid) 1 MG/ML injection 0.8 mg  0.8 mg Intravenous Q2H PRN    niCARdipine in sodium chloride 0.86% (carDENE) 20 mg/200mL infusion premix  5-15 mg/hr Intravenous Continuous PRN    norepinephrine (Levophed) 4 mg/250mL infusion premix  0.5-30 mcg/min Intravenous Continuous PRN        Laboratory/Data:    LABS:  Recent Labs   Lab 03/20/25  0528 03/21/25  0415 03/22/25  0423 03/22/25  0425 03/23/25  0505 03/23/25  1543 03/24/25  0443 03/25/25  0444 03/25/25  0826 03/26/25 0417   WBC 7.1 7.2  --    < > 7.4 8.8 8.9 9.8  --  15.3*   HGB 11.9* 12.2*  --    < > 9.2* 10.0* 9.9* 10.6*  --  10.8*   MCV 81.6 80.7  --    < > 83.7 82.7 82.8 82.4  --  84.6   .0 169.0  --    < > 74.0* 86.0* 71.0* 85.0*  --  93.0*   INR 1.38* 1.29* 1.63*  --   --   --   --   --  1.53* 1.53*    < > = values in this interval not displayed.       Recent Labs   Lab 03/20/25  0528 03/21/25  0415 03/23/25  0505 03/24/25  0443 03/24/25  1519 03/25/25  0444 03/25/25 2021 03/26/25 0417      < > 136 138  --  139 141 141   K 4.2   < > 4.3 3.3* 3.8 3.8  3.8 3.7 3.6  3.6      < > 104 101  --  103 99 99   CO2 29.0   < > 23.0 27.0  --  27.0 33.0* 33.0*   BUN 23   < > 31* 32*  --  35* 42* 47*   CREATSERUM 1.38*   < > 1.72* 1.57*  --  1.45* 1.57* 1.57*   GLU 87   < > 174* 162*  --  156* 127* 141*   CA 9.2   < > 8.3* 8.4*  --  8.4* 8.4* 8.6*   MG 1.6   < > 1.6 1.7  --  1.8 2.0 2.0   PHOS 3.8  --   --   --   --  1.7*  --  3.4    < > = values in this interval not displayed.     Recent Labs   Lab 03/19/25  1424 03/19/25  2132 03/21/25 0415 03/22/25  0423 03/25/25  0826 03/26/25  0417   PTP 21.0*   < > 16.3* 19.5* 18.5* 18.6*   INR 1.80*   < > 1.29* 1.63* 1.53* 1.53*   PTT 32.5  --  31.8 28.7  --   --     < > = values in this interval not displayed.     Recent Labs   Lab 03/19/25  1427 03/20/25  0528 03/21/25 0415 03/22/25  0423 03/25/25  0444 03/25/25 2021   ALT <7*  --  <7* 8*  --  <7*   AST 10  --  10 30  --  12   ALB 4.4 4.2 4.1 3.9 3.8 3.8     No results for input(s): \"TROP\" in the last 168 hours.  No results found for: \"ANAS\", \"ROVERTO\", \"ANASCRN\"  Recent Labs   Lab 03/23/25  0907   HIPAB Negative       ISATU Warern  3/26/2025  7:46 AM

## 2025-03-26 NOTE — PLAN OF CARE
Assumed care of patient this am. Patient RASS-2 to 0, able to answer orientation questions consistently to place and person, confused at times regarding time and situation. Patient remains anxious, stating \"I want to be done\" and requesting \"psychiatric help to tell my wife I'm done.\"  Spiritual care called for additional support, patient sent them away.     Piero's shortness of breath and high oxygen needs continue, 70% and 30L heated high flow. Lung sounds very diminished, little air movement auscultated. Not able to tolerate BIPAP, except for 1 hour today.  Only able to tolerated brief dangle and standing bedside, otherwise patient remains in bed, chair mode at times, otherwise refusing turns.     NSR to SA, frequent PACs and rare PVCs. One 3 beat run NSVT. Following electrolytes.     Problem: RESPIRATORY - ADULT  Goal: Achieves optimal ventilation and oxygenation  Description: INTERVENTIONS:- Assess for changes in respiratory status- Assess for changes in mentation and behavior- Position to facilitate oxygenation and minimize respiratory effort- Oxygen supplementation based on oxygen saturation or ABGs- Provide Smoking Cessation handout, if applicable- Encourage broncho-pulmonary hygiene including cough, deep breathe, Incentive Spirometry- Assess the need for suctioning and perform as needed- Assess and instruct to report SOB or any respiratory difficulty- Respiratory Therapy support as indicated- Manage/alleviate anxiety- Monitor for signs/symptoms of CO2 retention  3/26/2025 1022 by Maryanne Spears, RN  Outcome: Not Progressing

## 2025-03-26 NOTE — PALLIATIVE CARE NOTE
Palliative Care Consult request from Dr Childs noted requesting discussion for goals of care and to establish Palliative Care. Discussed consultation request with patient's nurse who shared that spouse has left for the day and that she would want to be present for palliative discussion. Patient is currently sleeping soundly. Call to spouse Nona and notified of consult. Family meeting planned for tomorrow at 1000 to meet in the patient's room.      Consultation note to follow meeting family.      Thank you for the referral.    CHANTAL Moreno  Palliative Care   Phone: [phone]     3/26/2025  5:45 PM

## 2025-03-26 NOTE — PLAN OF CARE
Upon first assessment, unable to complete  neuro/neurovascular assessment due level of sedation. Sedation weaned. Once sedation decreased enough to perform assessment, Piero proceeded to pull off BiPAP mask, then refusing supplemental O2 altogether. Stated \"no more\" repeatedly. Precedex dose increased, ultimately got vapotherm cannula on. Pt wife called, updated, she spoke to Piero on speaker phone, encouraged to wear oxygen. Pt said \"whatever.\"  On room air, sats to 72%. After vapotherm, sats 96%.  Before wife off phone, she stated \"you have my permission to intubate if needed.\"     Throughout night, Piero has remained drowsy, but verbally refusing most care. He is allowing vapotherm cannula to be in place and for blood sampling from central line. Frequently offered, but consistently declining oral care like mouth moisturizer/teeth brushing/lip moisturizer; turning, repositioning, elevating heels; and Arzola care. He will sometimes move his lowers when asked or affirm there is no change in sensation in his lowers. However, generally, he just says \"no\" to most requests.     He has denied pain, no pain medication given.    Dr. Saunders, Vascular Surgery on call and Neuro Critical Care NOC APRN notified.

## 2025-03-26 NOTE — PROGRESS NOTES
Pulmonary Progress Note        NAME: Cy Monsalve - ROOM: [address] [place], [place] [address] - MRN: [id number] - Age: 69 year old - : 1955        Last 24hrs: Did not tolerate BIPAP. Now on HHFNC. Notes ongoing dyspnea.     OBJECTIVE:  Vitals:    25 1430 25 1500 25 1530 25 1550   BP: 121/60 130/71 139/59    BP Location:       Pulse: 70 66 58    Resp: 19 20 19    Temp:       TempSrc:       SpO2: 96% 98% 96% 96%   Weight:           Oxygen Therapy  SpO2: 96 %  O2 Device: High flow/High humidity  Mode: Spontaneous/Timed  FiO2 (%): 70 %  O2 Flow Rate (L/min): 28 L/min  Pulse Oximetry Type: Continuous  Oximetry Probe Site Changed: Yes  Pulse Ox Probe Location: Left hand                  Intake/Output Summary (Last 24 hours) at 3/26/2025 1606  Last data filed at 3/26/2025 1500  Gross per 24 hour   Intake 1727.5 ml   Output 3133 ml   Net -1405.5 ml       Scheduled Medication:   initial dose (PE/DVT/THROMBUS) heparin  18 Units/kg/hr Intravenous Once    meropenem  1 g Intravenous Q8H    furosemide  40 mg Intravenous TID    fluticasone propionate  1 spray Each Nare Daily    sodium chloride  500 mL Intravenous Once    docusate sodium  100 mg Oral BID    polyethylene glycol (PEG 3350)  17 g Oral Daily    insulin aspart  1-5 Units Subcutaneous TID AC and HS    pantoprazole  40 mg Intravenous Q24H     Continuous Infusing Medication:   continuous dose heparin      dexmedetomidine 0.4 mcg/kg/hr (25 1112)    niCARdipine Stopped (25 0245)    norepinephrine 1 mcg/min (25 1422)       Lungs: bibasilar crackles  Heart: S1, S2 normal, regular rate and rhythm  Abdomen: soft, non-tender; bowel sounds normal; no masses,  no organomegaly  Extremities: extremities normal, atraumatic, no cyanosis or edema    Labs reviewed as noted below      Imaging: chest x-ray reviewed- attila interstitial changes consistent w/ pulm edema, more dense consolidations in RML and RLL concerning for PNA    ASSESSMENT/PLAN:    S/p complex  aortic repair 3/21  RLE weakness and started on high dose steroids per neuro- now discontinued  Wean pressors as tolerated to keep SBP >160  S/p MRI spine.   Neurochecks.   Per vascular surgery and neurocritcal care.   Lumbar drain removed  Acute Hypoxic Resp failure  Extubated 3/21 post op.   O2 needs steadily increasing due to a combination of Pulm Edema, atelectasis, and PNA  -continue HHFNC; wean FiO2 as tolerated  Possible PNA  -cont janna (3/24- )  -monitor cultures  Pulm Edema  -Lasix TID  Atelectasis  -reviewed IS with patient and discussed technique  HOLLIS on CKD  Monitor labs and u/o. .   Improving.    Heme  Leukocytosis. No clear infection at this point. Monitor.   Hgb goal >10 per neuro. Transfuse PRN  HTN  Per PCP  FEN  PO diet  Proph  SCD and subcutaneous heparin.   Dispo  Full code   Will follow.     Cctime 35 minutes

## 2025-03-27 ENCOUNTER — APPOINTMENT (OUTPATIENT)
Dept: CT IMAGING | Facility: HOSPITAL | Age: 70
End: 2025-03-27
Attending: EMERGENCY MEDICINE
Payer: MEDICARE

## 2025-03-27 ENCOUNTER — APPOINTMENT (OUTPATIENT)
Dept: GENERAL RADIOLOGY | Facility: HOSPITAL | Age: 70
End: 2025-03-27
Attending: EMERGENCY MEDICINE
Payer: MEDICARE

## 2025-03-27 LAB
ALBUMIN SERPL-MCNC: 3.3 G/DL (ref 3.2–4.8)
ANION GAP SERPL CALC-SCNC: 7 MMOL/L (ref 0–18)
ANION GAP SERPL CALC-SCNC: 9 MMOL/L (ref 0–18)
APTT PPP: 45.2 SECONDS (ref 23–36)
APTT PPP: 51.4 SECONDS (ref 23–36)
APTT PPP: 62.9 SECONDS (ref 23–36)
ARTERIAL PATENCY WRIST A: POSITIVE
BASE EXCESS BLDA CALC-SCNC: 6.9 MMOL/L (ref ?–2)
BASE EXCESS BLDV CALC-SCNC: 11.5 MMOL/L
BODY TEMPERATURE: 98.6 F
BUN BLD-MCNC: 67 MG/DL (ref 9–23)
BUN BLD-MCNC: 68 MG/DL (ref 9–23)
CA-I BLD-SCNC: 1.08 MMOL/L (ref 0.95–1.32)
CA-I BLD-SCNC: 1.09 MMOL/L (ref 0.95–1.32)
CALCIUM BLD-MCNC: 8.4 MG/DL (ref 8.7–10.6)
CALCIUM BLD-MCNC: 8.9 MG/DL (ref 8.7–10.6)
CHLORIDE SERPL-SCNC: 95 MMOL/L (ref 98–112)
CHLORIDE SERPL-SCNC: 97 MMOL/L (ref 98–112)
CO2 SERPL-SCNC: 34 MMOL/L (ref 21–32)
CO2 SERPL-SCNC: 36 MMOL/L (ref 21–32)
COHGB MFR BLD: 2 % SAT (ref 0–3)
CREAT BLD-MCNC: 1.59 MG/DL
CREAT BLD-MCNC: 1.6 MG/DL
CRP SERPL-MCNC: 15.8 MG/DL (ref ?–0.5)
EGFRCR SERPLBLD CKD-EPI 2021: 46 ML/MIN/1.73M2 (ref 60–?)
EGFRCR SERPLBLD CKD-EPI 2021: 46 ML/MIN/1.73M2 (ref 60–?)
ERYTHROCYTE [DISTWIDTH] IN BLOOD BY AUTOMATED COUNT: 15.9 %
FIO2: 100 %
GLUCOSE BLD-MCNC: 149 MG/DL (ref 70–99)
GLUCOSE BLD-MCNC: 154 MG/DL (ref 70–99)
GLUCOSE BLD-MCNC: 156 MG/DL (ref 70–99)
GLUCOSE BLD-MCNC: 160 MG/DL (ref 70–99)
GLUCOSE BLD-MCNC: 166 MG/DL (ref 70–99)
GLUCOSE BLD-MCNC: 178 MG/DL (ref 70–99)
HCO3 BLDA-SCNC: 30.3 MEQ/L (ref 21–27)
HCO3 BLDV-SCNC: 33 MEQ/L (ref 22–26)
HCT VFR BLD AUTO: 29.9 %
HGB BLD-MCNC: 10 G/DL
HGB BLD-MCNC: 11 G/DL
L PNEUMO AG UR QL: NEGATIVE
L/M: 40 L/MIN
LACTATE BLD-SCNC: 2.2 MMOL/L (ref 0.5–2)
MAGNESIUM SERPL-MCNC: 2 MG/DL (ref 1.6–2.6)
MAGNESIUM SERPL-MCNC: 2.1 MG/DL (ref 1.6–2.6)
MCH RBC QN AUTO: 28.4 PG (ref 26–34)
MCHC RBC AUTO-ENTMCNC: 33.4 G/DL (ref 31–37)
MCV RBC AUTO: 84.9 FL
METHGB MFR BLD: 1.2 % SAT (ref 0.4–1.5)
MRSA DNA SPEC QL NAA+PROBE: NEGATIVE
OSMOLALITY SERPL CALC.SUM OF ELEC: 309 MOSM/KG (ref 275–295)
OSMOLALITY SERPL CALC.SUM OF ELEC: 313 MOSM/KG (ref 275–295)
OXYHGB MFR BLDA: 96.4 % (ref 92–100)
OXYHGB MFR BLDV: 63.9 % (ref 72–78)
PCO2 BLDA: 43 MM HG (ref 35–45)
PCO2 BLDV: 51 MM HG (ref 38–50)
PH BLDA: 7.47 [PH] (ref 7.35–7.45)
PH BLDV: 7.47 [PH] (ref 7.33–7.43)
PHOSPHATE SERPL-MCNC: 3.8 MG/DL (ref 2.4–5.1)
PLATELET # BLD AUTO: 82 10(3)UL (ref 150–450)
PLATELETS.RETICULATED NFR BLD AUTO: 8.3 % (ref 0–7)
PO2 BLDA: 104 MM HG (ref 80–100)
PO2 BLDV: 38 MM HG (ref 30–50)
POTASSIUM BLD-SCNC: 3.4 MMOL/L (ref 3.6–5.1)
POTASSIUM BLD-SCNC: 3.7 MMOL/L (ref 3.6–5.1)
POTASSIUM SERPL-SCNC: 3.9 MMOL/L (ref 3.5–5.1)
POTASSIUM SERPL-SCNC: 4 MMOL/L (ref 3.5–5.1)
POTASSIUM SERPL-SCNC: 4 MMOL/L (ref 3.5–5.1)
PROCALCITONIN SERPL-MCNC: 0.64 NG/ML (ref ?–0.05)
RBC # BLD AUTO: 3.52 X10(6)UL
SODIUM BLD-SCNC: 134 MMOL/L (ref 135–145)
SODIUM BLD-SCNC: 134 MMOL/L (ref 135–145)
SODIUM SERPL-SCNC: 138 MMOL/L (ref 136–145)
SODIUM SERPL-SCNC: 140 MMOL/L (ref 136–145)
STREP PNEUMO ANTIGEN, URINE: NEGATIVE
VENOUS LITERS PER MINUTE: 15 L/MIN
WBC # BLD AUTO: 9.5 X10(3) UL (ref 4–11)

## 2025-03-27 PROCEDURE — 71045 X-RAY EXAM CHEST 1 VIEW: CPT | Performed by: EMERGENCY MEDICINE

## 2025-03-27 PROCEDURE — 74176 CT ABD & PELVIS W/O CONTRAST: CPT | Performed by: EMERGENCY MEDICINE

## 2025-03-27 PROCEDURE — 99291 CRITICAL CARE FIRST HOUR: CPT | Performed by: INTERNAL MEDICINE

## 2025-03-27 PROCEDURE — 71250 CT THORAX DX C-: CPT | Performed by: EMERGENCY MEDICINE

## 2025-03-27 RX ORDER — HEPARIN SODIUM AND DEXTROSE 10000; 5 [USP'U]/100ML; G/100ML
INJECTION INTRAVENOUS CONTINUOUS
Status: DISCONTINUED | OUTPATIENT
Start: 2025-03-28 | End: 2025-04-01

## 2025-03-27 RX ORDER — HEPARIN SODIUM 1000 [USP'U]/ML
30 INJECTION, SOLUTION INTRAVENOUS; SUBCUTANEOUS ONCE
Status: COMPLETED | OUTPATIENT
Start: 2025-03-27 | End: 2025-03-27

## 2025-03-27 RX ORDER — DEXMEDETOMIDINE HYDROCHLORIDE 4 UG/ML
INJECTION, SOLUTION INTRAVENOUS CONTINUOUS
Status: DISCONTINUED | OUTPATIENT
Start: 2025-03-27 | End: 2025-03-28

## 2025-03-27 RX ORDER — METHYLPREDNISOLONE SODIUM SUCCINATE 125 MG/2ML
60 INJECTION INTRAMUSCULAR; INTRAVENOUS EVERY 12 HOURS
Status: DISCONTINUED | OUTPATIENT
Start: 2025-03-27 | End: 2025-03-29

## 2025-03-27 RX ORDER — FUROSEMIDE 10 MG/ML
40 INJECTION INTRAMUSCULAR; INTRAVENOUS DAILY
Status: DISCONTINUED | OUTPATIENT
Start: 2025-03-28 | End: 2025-03-27

## 2025-03-27 NOTE — PLAN OF CARE
Assumed care at 1930.  AOx3. Anxious at times but cooperative. Denies pain.  Continue on low dose of Precedex.   Heparin gtt with PE/DVT protocol.   Able to wean Levo last night. -140.  Vepotherm 70%/35L.  Poor appetite but adequate PO fluid intake.  Lasix IVP TID. Arzola catheter in place.  Small soft brown BM x1 this morning.  Family to meet palliative care at 1000 this morning.  Plan of care discussed with pt.  Call light within reach.

## 2025-03-27 NOTE — SLP NOTE
SPEECH DAILY NOTE - INPATIENT    ASSESSMENT & PLAN   ASSESSMENT  Pt seen for dysphagia tx to assess tolerance with recommended diet, ensure proper utilization of aspiration precautions and provide pt/family education. Pt was upright in bed upon entry to the room.  Pts vocal quality is weak, but vocal intensity has improved since previous visit. Pt is currently receiving supplemental O2 flowing at 35 L per min 70% FiO2.     Pt self presented thin liquids via straw. Pt spontaneously initiated sequential swallows. Pts oral retrieval, containment, prep, transit were WFL. Pt coordinated breathing and swallowing well. Pt had an intermittent cough, not necessarily related to Pts swallow. Pt had no overt signs of aspiration.     SLP reinforced aspiration precautions (upright, small bites, small sips, and slow rate) with the Pt. Pt was in agreement of the aspiration precautions. SLP will continue to follow for ongoing assessment of po tolerance with aspiration precautions. SLP discussed above with Pt.       Diet Recommendations - Solids: Regular  Diet Recommendations - Liquids: Thin Liquids       Aspiration Precautions: Upright position, Slow rate, Small bites, Small sips  Medication Administration Recommendations: Crushed in puree    Patient Experiencing Pain: No                Treatment Plan  Treatment Plan/Recommendations: Aspiration precautions    Interdisciplinary Communication: Discussed with RN            GOALS  Goal #1 The patient will tolerate regular consistency and thin liquids without overt signs or symptoms of aspiration with 95 % accuracy over 1-2 session(s).  In Progress   Goal #2 The patient/family/caregiver will demonstrate understanding and implementation of aspiration precautions and swallow strategies independently over 1-2 session(s).     In Progress      FOLLOW UP  Follow Up Needed (Documentation Required): Yes  SLP Follow-up Date: 03/28/25  Duration: 1 week    Session: 1    If you have any questions,  please contact Yulia Caro  Clinician   Spectra [phone]

## 2025-03-27 NOTE — PROGRESS NOTES
Doing better than yesterday  Still requiring high flow oxygen     Palpable pedal pulses    Continue aggressive pulmonary toilet   Up to chair when able   Cancel palliative consult for now

## 2025-03-27 NOTE — CONSULTS
CRITICAL CARE CONSULT NOTE:     Patient Name: Cy Monsalve  : 1955  MRN: [id number]  Admit Date: 3/19/2025  Length of Stay: 7 Days    HPI:  Cy Monsalve is a 69 year old male with an extensive vascular history including a infrarenal endovascular aneurysm repair with bilateral KYLER followed by a type A dissection requiring an ascending repair and AVR which was complicated by mesenteric ischemia requiring SMA bypass R external iliac artery to SMA retrograde. 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 Oquendo. Other medical history includes HTN, GERD, anxiety, kidney stones. He presented to the hospital on 3/19 preoperatively for elective repair of above this Dr. Oquendo and Dr. Yanez.    He is now POD #6 s/p fenestrated endovascular arch repair with fenestrated thoracoabdominal aneurysm repair, left carotid to subclavian transposition with endovascular septotomy of dissection with lumbar drain placement. Postop course complicated by HOLLIS on CKD, RLE extremity weakness requiring MRI imaging and high dose steroids (lumbar drain removed 3/26). Today Dr. Oquendo asked ICU to evaluate patient for worsening hypoxemia postop and transfer to MICU for further management. Patient was seen and evaluated in MICU 466 with his wife at the bedside to provide above HPI. He is hemodynamically stable on vapotherm 35/70%, low dose precedex, and systemic heparin. He has no complaints other than being irritable he is still in hospital and not progressing as expected postop.     ROS as above    Past medical history as above    Allergies  Patient is allergic to amoxicillin and clindamycin.    Physical Exam  General: No acute distress  Neuro: AOx3, non focal   Lungs: Diminished   Cardio:  RRR, bilateral groin sites open to air with bruising  Abdomen: Soft, non tended, non distended    : Arzola with yellow urine   Extremities: Warm, no swelling, BUE/BLE pulses palable     Hemodynamics  24h Vitals:  VitalLast Value (24 Hour)24 Hour Range  Temp98.8 °F (37.1 °C)Temp  Min: 98.5 °F (36.9 °C)  Max: 99.1 °F (37.3 °C)  [id number]  Min: 58  Max: 97  BP (Non-Invasive)133/59 BP  Min: 90/53  Max: 161/75  BP (A-Line) No data recorded  CVP  could not be evaluated. This SmartLink does not work with rows of the type:   [id number]  Min: 16  Max: 21  [id number] %SpO2  Min: 88 %  Max: 99 %  O2 Therapy       Assessment and Plan:  Acute hypoxemic respiratory failure likely multifactorial:  PNA vs atelectasis vs inflammatory process  S/p fenestrated endovascular arch repair, fenestrated thoracoabdominal aneurysm repair, left carotid to subclavian transposition, LD placement 3/21  HOLLIS on CKD  Acute blood loss anemia  Thrombocytopenia   H/o Infrarenal endovascular reapir of AAA with bilateral KYLER for hypogastric and common iliac aneurysms  H/o Type A dissection s/p repair  H/o Mesenteric ischemia s/p SMA bypass  H/o HTN   H/o GERD  Tobacco depenence     Neuro/Psych: PRN tylenol, norco, dilaudid for postop pain. RLE weakness improving - spine MRI with nothing acute. Received high dose steroids 3/23-3/24. Neuro signed off. Lumbar drain removed 3/26. Stop precedex.     Cardiovascular: Hemodynamically stable off vasopressors. Will confirm BP goal with vascular. Systemic heparin. POCUS with normal BiV function, normal to small IVC unlikely to be fluid overload/pulmonary edema cause of hypoxia.Send VBG and lactic from PICC    Pulmonary: Currently on vapotherm 70%/35L. At this point given worsening hypoxia with minimal improvement if any on antibiotics and diuretics would send of CT chest now. Add steroids. Duly pulm following.     GI: ADAT, PPI, bowel regimen     Renal/Metabolic: Worsening renal function today with rising bicarb likely has developed a contraction alkalosis. Reduced lasix to daily. Arzola. Electrolyte protocol.       Heme: Systemic heparin per above     Infectious Disease: Meropenem day #2 for presumed PNA. Repeat MRSA, send legionella, strep, CRP. Add azithromycin.    Endocrine: No acute issues    ICU checklist   Feeding: As tolerated  Analgesia: tylenol, norco, dilaudid   Sedation: precedex   Thromboembolism PPX: systemic heparin   Stress Ulcer PPX: PPI  Glucose control: <180  Bowel Regimen: PRN  Lines/drains/tubes: moon, PIV, PICC  Deescalation of antibiotics: Meropenem day 2, azithromycin day 1  Therapies: PT/OT/ST when appropriate    Code Status: DNAR/Full Treatment    Discussed with critical care intensivist Dr. Recinos     Upon my evaluation, this patient had a high probability of imminent or life-threatening deterioration due to hypoxemia, which required my direct attention, intervention, and personal management.    I have personally provided 45 minutes of critical care time, exclusive of time spent on separately billable procedures.  Time includes review of all pertinent laboratory/radiology results, discussion with consultants, and monitoring for potential decompensation.  Performed interventions included oxygen.     Domonique Chau [facility]  Critical Care Nurse Practitioner

## 2025-03-27 NOTE — PLAN OF CARE
Assumed care of patient at approximately 0730. Pt A&Ox 3-4, Pt can forgetful at times . Pt maintaining saturations on  Vapotherm. Please see flowsheets for settings , PRN precedex gtt for anxiety. IV lasix TID. On telemetry, NSR w/PAC's and PVC's.  Pt reports c/o generalized/back pain. Arzola with good UOP. Pt up  with x2 assist via Gaby steady . Pt and spouse updated on plan of care and verbalized understanding. Pt transferred to MICU, Report given to Efren POPE. Pt transferred with belongings and chart.     Problem: CARDIOVASCULAR - ADULT  Goal: Maintains optimal cardiac output and hemodynamic stability  Description: INTERVENTIONS:- Monitor vital signs, rhythm, and trends- Monitor for bleeding, hypotension and signs of decreased cardiac output- Evaluate effectiveness of vasoactive medications to optimize hemodynamic stability- Monitor arterial and/or venous puncture sites for bleeding and/or hematoma- Assess quality of pulses, skin color and temperature- Assess for signs of decreased coronary artery perfusion - ex. Angina- Evaluate fluid balance, assess for edema, trend weights  Outcome: Progressing     Problem: HEMATOLOGIC - ADULT  Goal: Free from bleeding injury  Description: (Example usage: patient with low platelets)INTERVENTIONS:- Avoid intramuscular injections, enemas and rectal medication administration- Ensure safe mobilization of patient- Hold pressure on venipuncture sites to achieve adequate hemostasis- Assess for signs and symptoms of internal bleeding- Monitor lab trends- Patient is to report abnormal signs of bleeding to staff- Avoid use of toothpicks and dental floss- Use electric shaver for shaving- Use soft bristle tooth brush- Limit straining and forceful nose blowing  Outcome: Progressing     Problem: NEUROLOGICAL - ADULT  Goal: Achieves stable or improved neurological status  Description: INTERVENTIONS- Assess for and report changes in neurological status- Initiate measures to prevent increased  intracranial pressure- Maintain blood pressure and fluid volume within ordered parameters to optimize cerebral perfusion and minimize risk of hemorrhage- Monitor temperature, glucose, and sodium. Initiate appropriate interventions as ordered  Outcome: Progressing  Goal: Achieves maximal functionality and self care  Description: INTERVENTIONS- Monitor swallowing and airway patency with patient fatigue and changes in neurological status- Encourage and assist patient to increase activity and self care with guidance from PT/OT- Encourage visually impaired, hearing impaired and aphasic patients to use assistive/communication devices  Outcome: Progressing     Problem: PAIN - ADULT  Goal: Verbalizes/displays adequate comfort level or patient's stated pain goal  Description: INTERVENTIONS:- Encourage pt to monitor pain and request assistance- Assess pain using appropriate pain scale- Administer analgesics based on type and severity of pain and evaluate response- Implement non-pharmacological measures as appropriate and evaluate response- Consider cultural and social influences on pain and pain management- Manage/alleviate anxiety- Utilize distraction and/or relaxation techniques- Monitor for opioid side effects- Notify MD/LIP if interventions unsuccessful or patient reports new pain- Anticipate increased pain with activity and pre-medicate as appropriate  Outcome: Progressing     Problem: RESPIRATORY - ADULT  Goal: Achieves optimal ventilation and oxygenation  Description: INTERVENTIONS:- Assess for changes in respiratory status- Assess for changes in mentation and behavior- Position to facilitate oxygenation and minimize respiratory effort- Oxygen supplementation based on oxygen saturation or ABGs- Provide Smoking Cessation handout, if applicable- Encourage broncho-pulmonary hygiene including cough, deep breathe, Incentive Spirometry- Assess the need for suctioning and perform as needed- Assess and instruct to report SOB  or any respiratory difficulty- Respiratory Therapy support as indicated- Manage/alleviate anxiety- Monitor for signs/symptoms of CO2 retention  Outcome: Progressing

## 2025-03-27 NOTE — CM/SW NOTE
Care Progression Note:  Length of stay: 7  GMLOS:   Avoidable Delays:   Code Status: DNAR/Full Tx    Acute Medical Issue/Factors: s/P complex aortic repair on 3/21/2025  <principal problem not specified>       Discharge Barriers:  remains on vapotherm--35 liters 70%--ongoing diuresis for pulmonary edema, atelectasis and abx for pneumonia.  Ongoing participation with therapies    Expected discharge date: TBD     Expected next site of care: patient seen by PMR on 3/24/2025--ACUTE rehab if able to maintain sats >90% with 5 liters or less with rest and activity  (MJ reserved in [facility]).     / available for discharge planning.

## 2025-03-27 NOTE — PROGRESS NOTES
Pulmonary Progress Note        NAME: Cy Monsalve - ROOM: [address] [place], [place] [address] - MRN: [id number] - Age: 69 year old - : 1955        Last 24hrs: Feels slightly better today, O2 needs are marginally improved over the past 24-48hrs    OBJECTIVE:  Vitals:    25 0530 25 0600 25 0630 25 0700   BP: 132/60 125/68 122/64 122/62   BP Location: Left arm Left arm Left arm Left arm   Pulse: 72 58 61 64   Resp:    Temp:       TempSrc:       SpO2: 93% 91% 97% 97%   Weight:  193 lb 12.6 oz (87.9 kg)         Oxygen Therapy  SpO2: 97 %  O2 Device: High flow/High humidity  Mode: Spontaneous/Timed  FiO2 (%): 70 %  O2 Flow Rate (L/min): 35 L/min  Pulse Oximetry Type: Continuous  Oximetry Probe Site Changed: No  Pulse Ox Probe Location: Left hand                  Intake/Output Summary (Last 24 hours) at 3/27/2025 0812  Last data filed at 3/27/2025 0617  Gross per 24 hour   Intake 2328.25 ml   Output 3355 ml   Net -1026.75 ml       Scheduled Medication:   meropenem  1 g Intravenous Q8H    furosemide  40 mg Intravenous TID    fluticasone propionate  1 spray Each Nare Daily    sodium chloride  500 mL Intravenous Once    docusate sodium  100 mg Oral BID    polyethylene glycol (PEG 3350)  17 g Oral Daily    insulin aspart  1-5 Units Subcutaneous TID AC and HS    pantoprazole  40 mg Intravenous Q24H     Continuous Infusing Medication:   continuous dose heparin 1,900 Units/hr (25 0617)    dexmedetomidine 0.2 mcg/kg/hr (25 0600)    niCARdipine Stopped (25 0245)    norepinephrine Stopped (25 2200)       Lungs: bibasilar crackles  Heart: S1, S2 normal, regular rate and rhythm  Abdomen: soft, non-tender; bowel sounds normal; no masses,  no organomegaly  Extremities: extremities normal, atraumatic, no cyanosis or edema    Labs reviewed as noted below      Imaging: chest x-ray reviewed- attila interstitial changes consistent w/ pulm edema, more dense consolidations in RML and RLL concerning  for PNA    ASSESSMENT/PLAN:    S/p complex aortic repair 3/21  RLE weakness and started on high dose steroids per neuro- now discontinued  Wean pressors as tolerated to keep SBP >160  S/p MRI spine.   Neurochecks.   Per vascular surgery and neurocritcal care.   Lumbar drain removed  Acute Hypoxic Resp failure  Extubated 3/21 post op.   - due to a combination of Pulm Edema, atelectasis, and PNA  -continue HHFNC; wean FiO2 as tolerated  Possible PNA  -cont janna (3/24- )  -monitor cultures  Pulm Edema/Pulm HTN  -elevated RVSP noted on echo from 3/25  -Lasix- decreased per ICU service  -monitor renal fxn- relatively stable  Atelectasis  -reviewed IS with patient and discussed technique  HOLLIS on CKD  Monitor labs and u/o. .     Heme  Leukocytosis. No clear infection at this point. Monitor.   Hgb goal >10 per neuro. Transfuse PRN  HTN  Per PCP  FEN  PO diet  Proph  SCD and subcutaneous heparin.   Dispo  Full code   Will follow.   -discussed w/ Dr. Oquendo    Cctime 35 minutes

## 2025-03-27 NOTE — PROGRESS NOTES
[facility] Hospitalist Progress Note     CC: Hospital Follow up    PCP: Kam Alberts MD       Assessment/Plan:     Active Problems:    Pre-op testing    Thoracoabdominal aortic aneurysm (TAAA)    Bilateral leg weakness      69 year old male with a past medical history of hypertension, GERD, anxiety, renal calculi, DVT, ascending aortic dissection s/p repair, 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  Postoperative course complicated by HOLLIS on CKD as well as right lower extremity weakness along with hypoxic respiratory failure.      History of prior infrarenal endovascular AAA repair with bilateral KYLER for hypogastric and common iliac aneurysms  History of mesenteric ischemia s/p SMA bypass with right external iliac artery to SMA bypass  History of aortic dissection s/p repair, aortic dissection distal to left subclavian  Hx of DVT   S/p complex aortic repair 3/21  -Complicated aortic pathology with multiple repairs.   - high risk procedure with risks of complications including spinal cord ischemia, mesenteric ischemia, possible renal failure.  -s/p lumbar drain with neurointerventional to limit risk of spinal cord ischemia 3/20  -Further recommendations and management per vascular surgery  - previously on coumadin, now held per vascular  -Status post complex aortic repair 3/21 with vascular-see op report for details  - SBP goal >160, per vascualr  - pressors per vascular  - transferring to medical ICU  -s/p lumbar drain removal 3/26    Right lower extremity weakness-improving  - In the setting of complex aortic repair 3/21  - Seen by neurointensivist, recommendations reviewed  - Hemoglobin goal per neuro ICU greater than 10, received 1 unit of  PRBC 3/22, plan for another unit 3/23  - Plan for 1 unit of platelets 3/23  - Started on IV steroids as rescue measure in the setting of worsening weakness on the right lower extremity-per neuro ICU  - Frequent neurovascular assessment per protocol    Acute hypoxemic respiratory failure w/ threat to bodily function 2/2 possible pneumonia versus edema  Leukocytosis  -Discussed with pulmonology  -Meropenem started-  -CBC reviewed, white count 9.5.  Serial CBCs ordered.  -IV Lasix 40 mg daily, diuresing adequately  -On 40 L high flow this morning, continue to monitor respiratory status closely    HOLLIS on CKD-improving  -Creatinine reviewed today -1.59 (baseline 1.2)  -Continue to monitor with serial RFP's  -Avoid nephrotoxic agents  - Cr bumped post operatively - given prolonged surgery and complicated repair  - given IVF, follow BMP  - discussed with vascular surgery- defer decision for renal consult to vascular      Hypertension  -hold home HCTZ     Allergic rhinitis  -Continue home fluticasone as needed     Short gut syndrome  - resume lamotil if diarrhea resumes    Normocytic anemia  -Hgb reviewed -10.6 this morning  -Serial CBC's     Dispo: Transferred to medical ICU     Outpatient or previous hospital records reviewed. Questions/concerns were discussed with patient and/or family by bedside. Discussed with vascular surgery, pulmonology.      DO Kendra Lewis [facility]  Hospitalist  Contact via [employer]         Subjective:     Transfer to medical ICU today.     OBJECTIVE:    Blood pressure (!) 121/92, pulse 91, temperature 99.9 °F (37.7 °C), temperature source Temporal, resp. rate 21, weight 193 lb 12.6 oz (87.9 kg), SpO2 96%.    Temp:  [98.5 °F (36.9 °C)-99.9 °F (37.7 °C)] 99.9 °F (37.7 °C)  Pulse:  [58-97] 91  Resp:  [16-21] 21  BP: ()/(48-92) 121/92  SpO2:  [88 %-99 %] 96 %  FiO2 (%):  [60 %-80 %] 60 %      Intake/Output:    Intake/Output Summary (Last 24 hours) at 3/27/2025  1413  Last data filed at 3/27/2025 1241  Gross per 24 hour   Intake 1743.25 ml   Output 2860 ml   Net -1116.75 ml       Last 3 Weights   03/27/25 0600 193 lb 12.6 oz (87.9 kg)   03/26/25 0400 193 lb 12.6 oz (87.9 kg)   03/25/25 0600 200 lb 2.8 oz (90.8 kg)   03/24/25 0900 200 lb 3.2 oz (90.8 kg)   03/24/25 0500 203 lb 0.7 oz (92.1 kg)   03/23/25 0516 204 lb 2.3 oz (92.6 kg)   03/22/25 0637 153 lb 3.5 oz (69.5 kg)   03/20/25 1231 192 lb 15.9 oz (87.5 kg)   03/19/25 1444 193 lb (87.5 kg)   03/22/25 1113 153 lb 3.5 oz (69.5 kg)   03/07/25 1536 185 lb (83.9 kg)       Exam   Gen: No acute distress, alert and oriented x3, no focal neurologic deficits, right-sided [place], arterial line and lumbar drain noted  Heent: NC AT, mucous memb clear, neck supple  Pulm: Lungs clear, normal respiratory effort  CV: Heart with regular rate and rhythm, no peripheral edema  Abd: Abdomen soft, nontender, nondistended, bowel sounds present  MSK:expected rom, no hematoma appreciated bilaterally, 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, 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, strentgh 4/5 on R, pulses intact  Skin: no rashes or lesions  Neuro: AO*3, motor intact, no sensory deficits  Psyc: appropriate mood and affect      Data Review:       Labs:     Recent Labs   Lab 03/23/25  0505 03/23/25  1543 03/24/25  0443 03/25/25  0444 03/26/25  0417 03/26/25  1426 03/27/25  0507   RBC 3.26*   < > 3.49*   < > 3.83 3.61* 3.52*   HGB 9.2*   < > 9.9*   < > 10.8* 10.5* 10.0*   HCT 27.3*   < > 28.9*   < > 32.4* 30.5* 29.9*   MCV 83.7   < > 82.8   < > 84.6 84.5 84.9   MCH 28.2   < > 28.4   < > 28.2 29.1 28.4   MCHC 33.7   < > 34.3   < > 33.3 34.4 33.4   RDW 16.0   < > 15.9   < > 15.8 15.9 15.9   NEPRELIM 6.81  --  8.07*  --  14.35*  --   --    WBC 7.4   < > 8.9   < > 15.3* 13.4* 9.5   PLT 74.0*   < > 71.0*   < > 93.0* 85.0* 82.0*    < > = values in this interval not displayed.         Recent Labs   Lab 03/26/25  0417 03/26/25  1426 03/27/25  0507   GLU  141* 130* 154*   BUN 47* 55* 68*   CREATSERUM 1.57* 1.52* 1.59*   EGFRCR 47* 49* 46*   CA 8.6* 8.7 8.4*    141 140   K 3.6  3.6 4.0 4.0  4.0   CL 99 99 97*   CO2 33.0* 34.0* 36.0*       Recent Labs   Lab 03/21/25  0415 03/22/25  0423 03/25/25  0444 03/25/25 2021 03/27/25  0507   ALT <7* 8*  --  <7*  --    AST 10 30  --  12  --    ALB 4.1 3.9 3.8 3.8 3.3         Imaging:  CT CHEST+ABDOMEN+PELVIS(CPT=71250/73317)    Result Date: 3/27/2025  CONCLUSION:  1. Extensive stent graft and side branch stenting of the thoracoabdominal aorta.  Segments of aneurysmal dilatation with measurements as given above.  No perivascular hematoma.  Aneurysmal dilatation of the right hypogastric artery extends beyond the distal margin of the stent. 2. Opacities are seen in both lungs.  These may be due to atelectasis.  The potential for pneumonia should be correlated with clinical suspicion for infection. 3. Retained contrast in the left kidney.  This appearance may reflect an infarcted segment. 4. Sensitivity decreased without IV contrast.  Endoleak is not excluded by this study.  5. Details as above.  Continued clinical correlation recommended.   Findings discussed with Dr. Oquendo by telephone.     LOCATION:  [facility]    Dictated by (CST): Isai Ralph MD on 3/27/2025 at 1:31 PM     Finalized by (CST): Isai Ralph MD on 3/27/2025 at 1:55 PM       XR CHEST AP PORTABLE  (CPT=71045)    Result Date: 3/27/2025  CONCLUSION:   Postoperative changes of cardiothoracic surgery with aortic stent graft noted.  Heart size upper limits normal.  Moderate interstitial opacities noted diffusely throughout the lungs, slightly improved in the interim.  This may represent pulmonary edema versus inflammatory process.  No associated pleural effusion or pneumothorax.  Right upper extremity PICC terminates in the lower SVC.   LOCATION:  [facility]      Dictated by (CST): An Childs MD on 3/27/2025 at 12:36 PM     Finalized by (CST): An Childs MD on  3/27/2025 at 12:37 PM       XR CHEST AP PORTABLE  (CPT=71045)    Result Date: 3/25/2025  CONCLUSION:   Worsening airspace disease concerning for multifocal pneumonia, but the vascularity is congested as well, with cardiac enlargement present and the findings may also reflect asymmetric pulmonary edema and CHF.   LOCATION:  [id number]      Dictated by (CST): Shawn Kumar MD on 3/25/2025 at 3:22 PM     Finalized by (CST): Shawn Kumar MD on 3/25/2025 at 3:23 PM          Meds:      [START ON 3/28/2025] furosemide  40 mg Intravenous Daily    methylPREDNISolone  60 mg Intravenous Q12H    meropenem  1 g Intravenous Q8H    fluticasone propionate  1 spray Each Nare Daily    sodium chloride  500 mL Intravenous Once    docusate sodium  100 mg Oral BID    polyethylene glycol (PEG 3350)  17 g Oral Daily    insulin aspart  1-5 Units Subcutaneous TID AC and HS    pantoprazole  40 mg Intravenous Q24H      continuous dose heparin 2,050 Units/hr (03/27/25 1231)    dexmedetomidine Stopped (03/27/25 1230)    niCARdipine Stopped (03/22/25 0245)    norepinephrine Stopped (03/26/25 2200)       hydrALAzine    ipratropium-albuterol    glucose **OR** glucose **OR** glucose-vitamin C **OR** dextrose **OR** glucose **OR** glucose **OR** glucose-vitamin C    acetaminophen **OR** HYDROcodone-acetaminophen **OR** HYDROcodone-acetaminophen    ondansetron    metoclopramide    HYDROmorphone **OR** HYDROmorphone **OR** HYDROmorphone    niCARdipine    norepinephrine      Supplementary Documentation:   DVT Mechanical Prophylaxis:   SCDs, Early ambuation  DVT Pharmacologic Prophylaxis   Medication    heparin (Porcine) 18777 units/250mL infusion PE/DVT/THROMBUS CONTINUOUS                Code Status: DNAR/Full Treatment  Arzola: Arzola catheter in place  Arzola Duration (in days):   Central line:    YNES:

## 2025-03-27 NOTE — PLAN OF CARE
Received patient as transfer earlier.   Alert, awake, oriented. Calm/cooperative, precedex turned off. This afternoon right leg weaker than left, but by evening check both legs about same in strength. On vapotherm, weaning per RT. Tele NSR with frequent pacs/pvcs. B/p parameters clarified, sbp . Poor oral intake. Able to get up into chair by standing and pivoting with 2 assist/walker. After getting up from bed noticed large area on bed sheet wet with clear fluid, site on patient's back where lumbar drain removed wet with clear fluid. Intensivist/Dr. Oquendo notified. Patient can stay in chair/bed but needs to keep hob <30 degrees at all times for now. Also, Dr. Oquendo requesting pressure dressing applied to lumbar drain site removal and done. Spouse at bedside earlier.

## 2025-03-27 NOTE — PROGRESS NOTES
[facility]   part of [facility]     Vascular Surgery Progress Note    Cy Monsalve Patient Status:  Inpatient    1955 MRN [id number]   Location [facility] 6NE-A Attending Neil Oquendo MD   Hosp Day # 7 PCP Kam Alberts MD     Objective:   Temp: 98.9 °F (37.2 °C)  Pulse: 64  Resp: 19  BP: 122/62  FiO2 (%): 70 %      Events Yesterday/Overnight:  Patient is a 69 year old male, POD 6, fenestrated endovascular arch repair with fenestrated thoracoabdominal aneurysm repair, left carotid to subclavian transposition with endovascular septotomy of dissection with Dr. Oquendo and Dr. Yanez. Patient on vepotherm 70% at 35 L. On precedex. Levophed stopped on 3/26./62. Patient's WBC today is 9.5. Hemoglobin is 10. Lumbar drain removed yesterday. PICC line has been placed. Heparin drip infusing. Patient is alert, answering questions appropriately. He is able to drink and tolerated his protein ice cream last night. Patient denies any pain.     Exam:  Palpable bilateral DP and PT pulses. Bilateral groin access site soft, no hematoma.     Impression/Plan:    Continue heparin drip.    Pulmonary to follow.     BP goal 130-150.    Continue physical therapy.       Medications:  Current Facility-Administered Medications   Medication Dose Route Frequency    heparin (Porcine) 73324 units/250mL infusion PE/DVT/THROMBUS CONTINUOUS  200-3,000 Units/hr Intravenous Continuous    meropenem (Merrem) 1 g in sodium chloride 0.9% 100 mL IVPB-MBP  1 g Intravenous Q8H    hydrALAzine (Apresoline) 20 mg/mL injection 10 mg  10 mg Intravenous Q4H PRN    dexmedeTOMIDine in sodium chloride 0.9% (Precedex) 400 mcg/100mL infusion premix  0.2-1.5 mcg/kg/hr (Dosing Weight) Intravenous Continuous    furosemide (Lasix) 10 mg/mL injection 40 mg  40 mg Intravenous TID    fluticasone propionate (Flonase) 50 MCG/ACT nasal suspension 1 spray  1 spray Each Nare Daily    ipratropium-albuterol (Duoneb) 0.5-2.5 (3) MG/3ML inhalation  solution 3 mL  3 mL Nebulization Q4H PRN    sodium chloride 0.9 % IV bolus 500 mL  500 mL Intravenous Once    docusate sodium (Colace) cap 100 mg  100 mg Oral BID    polyethylene glycol (PEG 3350) (Miralax) 17 g oral packet 17 g  17 g Oral Daily    glucose (Dex4) 15 GM/59ML oral liquid 15 g  15 g Oral Q15 Min PRN    Or    glucose (Glutose) 40% oral gel 15 g  15 g Oral Q15 Min PRN    Or    glucose-vitamin C (Dex-4) chewable tab 4 tablet  4 tablet Oral Q15 Min PRN    Or    dextrose 50% injection 50 mL  50 mL Intravenous Q15 Min PRN    Or    glucose (Dex4) 15 GM/59ML oral liquid 30 g  30 g Oral Q15 Min PRN    Or    glucose (Glutose) 40% oral gel 30 g  30 g Oral Q15 Min PRN    Or    glucose-vitamin C (Dex-4) chewable tab 8 tablet  8 tablet Oral Q15 Min PRN    insulin aspart (NovoLOG) 100 Units/mL FlexPen 1-5 Units  1-5 Units Subcutaneous TID AC and HS    pantoprazole (Protonix) 40 mg in sodium chloride 0.9% PF 10 mL IV push  40 mg Intravenous Q24H    acetaminophen (Tylenol) tab 650 mg  650 mg Oral Q4H PRN    Or    HYDROcodone-acetaminophen (Norco) 5-325 MG per tab 1 tablet  1 tablet Oral Q4H PRN    Or    HYDROcodone-acetaminophen (Norco) 5-325 MG per tab 2 tablet  2 tablet Oral Q4H PRN    ondansetron (Zofran) 4 MG/2ML injection 4 mg  4 mg Intravenous Q6H PRN    metoclopramide (Reglan) 5 mg/mL injection 10 mg  10 mg Intravenous Q8H PRN    HYDROmorphone (Dilaudid) 1 MG/ML injection 0.2 mg  0.2 mg Intravenous Q2H PRN    Or    HYDROmorphone (Dilaudid) 1 MG/ML injection 0.4 mg  0.4 mg Intravenous Q2H PRN    Or    HYDROmorphone (Dilaudid) 1 MG/ML injection 0.8 mg  0.8 mg Intravenous Q2H PRN    niCARdipine in sodium chloride 0.86% (carDENE) 20 mg/200mL infusion premix  5-15 mg/hr Intravenous Continuous PRN    norepinephrine (Levophed) 4 mg/250mL infusion premix  0.5-30 mcg/min Intravenous Continuous PRN       Laboratory/Data:    LABS:  Recent Labs   Lab 03/21/25  0415 03/22/25  0423 03/22/25  0425 03/24/25  0443 03/25/25  0444  03/25/25 0826 03/26/25 0417 03/26/25 1426 03/27/25  0507   WBC 7.2  --    < > 8.9 9.8  --  15.3* 13.4* 9.5   HGB 12.2*  --    < > 9.9* 10.6*  --  10.8* 10.5* 10.0*   MCV 80.7  --    < > 82.8 82.4  --  84.6 84.5 84.9   .0  --    < > 71.0* 85.0*  --  93.0* 85.0* 82.0*   INR 1.29* 1.63*  --   --   --  1.53* 1.53*  --   --     < > = values in this interval not displayed.       Recent Labs   Lab 03/24/25 0443 03/24/25  1519 03/25/25 0444 03/25/25 2021 03/26/25 0417 03/26/25 1426 03/27/25  0507     --  139 141 141 141 140   K 3.3*   < > 3.8  3.8 3.7 3.6  3.6 4.0 4.0  4.0     --  103 99 99 99 97*   CO2 27.0  --  27.0 33.0* 33.0* 34.0* 36.0*   BUN 32*  --  35* 42* 47* 55* 68*   CREATSERUM 1.57*  --  1.45* 1.57* 1.57* 1.52* 1.59*   *  --  156* 127* 141* 130* 154*   CA 8.4*  --  8.4* 8.4* 8.6* 8.7 8.4*   MG 1.7  --  1.8 2.0 2.0  --  2.1   PHOS  --   --  1.7*  --  3.4  --  3.8    < > = values in this interval not displayed.     Recent Labs   Lab 03/22/25  0423 03/25/25  0826 03/26/25  0417 03/26/25  1426 03/26/25  2151 03/27/25  0507   PTP 19.5* 18.5* 18.6*  --   --   --    INR 1.63* 1.53* 1.53*  --   --   --    PTT 28.7  --   --  29.5 41.3* 45.2*     Recent Labs   Lab 03/21/25  0415 03/22/25  0423 03/25/25  0444 03/25/25 2021 03/27/25  0507   ALT <7* 8*  --  <7*  --    AST 10 30  --  12  --    ALB 4.1 3.9 3.8 3.8 3.3     No results for input(s): \"TROP\" in the last 168 hours.  No results found for: \"ANAS\", \"ROVERTO\", \"ANASCRN\"  Recent Labs   Lab 03/23/25  0907   HIPAB Negative       ISATU Warren  3/27/2025  8:04 AM

## 2025-03-28 LAB
ALBUMIN SERPL-MCNC: 3.1 G/DL (ref 3.2–4.8)
ALBUMIN/GLOB SERPL: 1.3 {RATIO} (ref 1–2)
ALP LIVER SERPL-CCNC: 61 U/L
ALT SERPL-CCNC: 70 U/L
ANION GAP SERPL CALC-SCNC: 7 MMOL/L (ref 0–18)
APTT PPP: 109.5 SECONDS (ref 23–36)
APTT PPP: 57.5 SECONDS (ref 23–36)
APTT PPP: 61.5 SECONDS (ref 23–36)
AST SERPL-CCNC: 43 U/L (ref ?–34)
ATRIAL RATE: 102 BPM
BASOPHILS # BLD AUTO: 0.01 X10(3) UL (ref 0–0.2)
BASOPHILS NFR BLD AUTO: 0.1 %
BILIRUB SERPL-MCNC: 1.1 MG/DL (ref 0.2–1.1)
BUN BLD-MCNC: 70 MG/DL (ref 9–23)
CALCIUM BLD-MCNC: 8.4 MG/DL (ref 8.7–10.6)
CHLORIDE SERPL-SCNC: 97 MMOL/L (ref 98–112)
CO2 SERPL-SCNC: 35 MMOL/L (ref 21–32)
CREAT BLD-MCNC: 1.47 MG/DL
EGFRCR SERPLBLD CKD-EPI 2021: 51 ML/MIN/1.73M2 (ref 60–?)
EOSINOPHIL # BLD AUTO: 0 X10(3) UL (ref 0–0.7)
EOSINOPHIL NFR BLD AUTO: 0 %
ERYTHROCYTE [DISTWIDTH] IN BLOOD BY AUTOMATED COUNT: 15.5 %
GLOBULIN PLAS-MCNC: 2.3 G/DL (ref 2–3.5)
GLUCOSE BLD-MCNC: 115 MG/DL (ref 70–99)
GLUCOSE BLD-MCNC: 141 MG/DL (ref 70–99)
GLUCOSE BLD-MCNC: 151 MG/DL (ref 70–99)
GLUCOSE BLD-MCNC: 161 MG/DL (ref 70–99)
GLUCOSE BLD-MCNC: 201 MG/DL (ref 70–99)
HCT VFR BLD AUTO: 28.8 %
HGB BLD-MCNC: 9.6 G/DL
IMM GRANULOCYTES # BLD AUTO: 0.03 X10(3) UL (ref 0–1)
IMM GRANULOCYTES NFR BLD: 0.4 %
LYMPHOCYTES # BLD AUTO: 0.25 X10(3) UL (ref 1–4)
LYMPHOCYTES NFR BLD AUTO: 3.5 %
MAGNESIUM SERPL-MCNC: 2.1 MG/DL (ref 1.6–2.6)
MCH RBC QN AUTO: 28.1 PG (ref 26–34)
MCHC RBC AUTO-ENTMCNC: 33.3 G/DL (ref 31–37)
MCV RBC AUTO: 84.2 FL
MONOCYTES # BLD AUTO: 0.27 X10(3) UL (ref 0.1–1)
MONOCYTES NFR BLD AUTO: 3.8 %
NEUTROPHILS # BLD AUTO: 6.62 X10 (3) UL (ref 1.5–7.7)
NEUTROPHILS # BLD AUTO: 6.62 X10(3) UL (ref 1.5–7.7)
NEUTROPHILS NFR BLD AUTO: 92.2 %
OSMOLALITY SERPL CALC.SUM OF ELEC: 311 MOSM/KG (ref 275–295)
P AXIS: 62 DEGREES
P-R INTERVAL: 168 MS
PLATELET # BLD AUTO: 88 10(3)UL (ref 150–450)
PLATELETS.RETICULATED NFR BLD AUTO: 8.5 % (ref 0–7)
POTASSIUM SERPL-SCNC: 4 MMOL/L (ref 3.5–5.1)
PROT SERPL-MCNC: 5.4 G/DL (ref 5.7–8.2)
Q-T INTERVAL: 352 MS
QRS DURATION: 98 MS
QTC CALCULATION (BEZET): 458 MS
R AXIS: 80 DEGREES
RBC # BLD AUTO: 3.42 X10(6)UL
SODIUM SERPL-SCNC: 139 MMOL/L (ref 136–145)
T AXIS: 15 DEGREES
VENTRICULAR RATE: 102 BPM
WBC # BLD AUTO: 7.2 X10(3) UL (ref 4–11)

## 2025-03-28 PROCEDURE — 99233 SBSQ HOSP IP/OBS HIGH 50: CPT | Performed by: EMERGENCY MEDICINE

## 2025-03-28 PROCEDURE — XW0334A INTRODUCTION OF CEFEPIME-TANIBORBACTAM ANTI-INFECTIVE INTO PERIPHERAL VEIN, PERCUTANEOUS APPROACH, NEW TECHNOLOGY GROUP 10: ICD-10-PCS | Performed by: SURGERY

## 2025-03-28 RX ORDER — DEXMEDETOMIDINE HYDROCHLORIDE 4 UG/ML
INJECTION, SOLUTION INTRAVENOUS CONTINUOUS
Status: DISCONTINUED | OUTPATIENT
Start: 2025-03-28 | End: 2025-03-29

## 2025-03-28 RX ORDER — METOPROLOL TARTRATE 25 MG/1
25 TABLET, FILM COATED ORAL
Status: DISCONTINUED | OUTPATIENT
Start: 2025-03-28 | End: 2025-04-18

## 2025-03-28 NOTE — PLAN OF CARE
Assumed care at 1930  Received patient on recliner, on vapotherm.  Heparin drip infusing.  Transfer from  chair to bed, with max assist, gait belt and walker.  C/o lower abdominal pain, not relieved by Norco.Dilaudid IVF given.  Precedex started in low rate.  SR with frequent PVC's.Mg and K with normal values.  BP within goal.  Lower back with gauze and pressure dressing from drain removal.  Labs in am.  IV antibiotic, afebrile.  Problem: CARDIOVASCULAR - ADULT  Goal: Maintains optimal cardiac output and hemodynamic stability  Description: INTERVENTIONS:- Monitor vital signs, rhythm, and trends- Monitor for bleeding, hypotension and signs of decreased cardiac output- Evaluate effectiveness of vasoactive medications to optimize hemodynamic stability- Monitor arterial and/or venous puncture sites for bleeding and/or hematoma- Assess quality of pulses, skin color and temperature- Assess for signs of decreased coronary artery perfusion - ex. Angina- Evaluate fluid balance, assess for edema, trend weights  Outcome: Progressing     Problem: HEMATOLOGIC - ADULT  Goal: Free from bleeding injury  Description: (Example usage: patient with low platelets)INTERVENTIONS:- Avoid intramuscular injections, enemas and rectal medication administration- Ensure safe mobilization of patient- Hold pressure on venipuncture sites to achieve adequate hemostasis- Assess for signs and symptoms of internal bleeding- Monitor lab trends- Patient is to report abnormal signs of bleeding to staff- Avoid use of toothpicks and dental floss- Use electric shaver for shaving- Use soft bristle tooth brush- Limit straining and forceful nose blowing  Outcome: Progressing     Problem: RESPIRATORY - ADULT  Goal: Achieves optimal ventilation and oxygenation  Description: INTERVENTIONS:- Assess for changes in respiratory status- Assess for changes in mentation and behavior- Position to facilitate oxygenation and minimize respiratory effort- Oxygen  supplementation based on oxygen saturation or ABGs- Provide Smoking Cessation handout, if applicable- Encourage broncho-pulmonary hygiene including cough, deep breathe, Incentive Spirometry- Assess the need for suctioning and perform as needed- Assess and instruct to report SOB or any respiratory difficulty- Respiratory Therapy support as indicated- Manage/alleviate anxiety- Monitor for signs/symptoms of CO2 retention  Outcome: Progressing     Problem: NEUROLOGICAL - ADULT  Goal: Achieves stable or improved neurological status  Description: INTERVENTIONS- Assess for and report changes in neurological status- Initiate measures to prevent increased intracranial pressure- Maintain blood pressure and fluid volume within ordered parameters to optimize cerebral perfusion and minimize risk of hemorrhage- Monitor temperature, glucose, and sodium. Initiate appropriate interventions as ordered  Outcome: Progressing  Goal: Achieves maximal functionality and self care  Description: INTERVENTIONS- Monitor swallowing and airway patency with patient fatigue and changes in neurological status- Encourage and assist patient to increase activity and self care with guidance from PT/OT- Encourage visually impaired, hearing impaired and aphasic patients to use assistive/communication devices  Outcome: Progressing     Problem: PAIN - ADULT  Goal: Verbalizes/displays adequate comfort level or patient's stated pain goal  Description: INTERVENTIONS:- Encourage pt to monitor pain and request assistance- Assess pain using appropriate pain scale- Administer analgesics based on type and severity of pain and evaluate response- Implement non-pharmacological measures as appropriate and evaluate response- Consider cultural and social influences on pain and pain management- Manage/alleviate anxiety- Utilize distraction and/or relaxation techniques- Monitor for opioid side effects- Notify MD/LIP if interventions unsuccessful or patient reports new  pain- Anticipate increased pain with activity and pre-medicate as appropriate  Outcome: Progressing     Problem: Patient/Family Goals  Goal: Patient/Family Long Term Goal  Description: Patient's Long Term Goal: discharge to recommended facility  Interventions:- monitor O2 status  -wean from vapotherm  -IV antibiotics  -IS  -consults  -PT/OT  -tele monitoring  -labs  -medicines  - See additional Care Plan goals for specific interventions  Outcome: Progressing  Goal: Patient/Family Short Term Goal  Description: Patient's Short Term Goal: Be comfortable   Interventions: -pain controlled  -cluster care  -needs attended   See additional Care Plan goals for specific interventions  Outcome: Progressing

## 2025-03-28 NOTE — PROGRESS NOTES
Patient was stable overnight had some improvement in his oxygenation was titrated down from 70% and 50 L yesterday down to 50% and 35 L far today.    On exam he has a cough with increased sputum production.  He is not in any respiratory distress is comfortable and had ordered breakfast which was cereal.    He tells me he is using his Acapella as well as his incentive spirometer at this time.    On exam he has good pulses in bilateral upper and lower extremities.  He has coarse breath sounds with some expiratory wheezing at times when coughing forcefully.    Yesterday he had obvious drainage from his lumbar site that seems to have slowed today so far we will follow-up with vascular surgery    His labs they are showing a sodium of 139 potassium of 4 bicarb of 35 a creatinine of 1.47 which is downtrending a BUN up to 70 AST and ALT otherwise unremarkable.  His white blood cell count is 7.2 hemoglobin is 9.6 and his platelet count is 88,000.  No signs of GI bleeding uremia secondary to dehydration is most likely 25th his BUN was 42    In summary is a pleasant 69-year-old male that presents to us with hypoxic respiratory failure after a fenestrated endovascular arch repair and a fenestrated thoracoabdominal aneurysm repair and a left carotid subclavian transposition on March 21  Problems  Hypoxic respiratory failure  Abdominal aortic aneurysm as well as aortic dissection previously  Mesenteric ischemia status post SMA bypass  Hypertension  GERD  Heavy tobacco use  Acute kidney injury on chronic kidney disease  Uremia    Plan  Neuro  [place] for postoperative pain  Right lower extremity weakness has improved patient did have a lumbar drain which has subsequently been removed  There was some clear liquid drainage from his lumbar drain site yesterday this appears to have slowed down today  Will have vascular surgery evaluate this morning    Precedex has been discontinued from the MAR    Cardiovascular  Blood pressure is at  target    Abdominal and cardiac ultrasound showed relatively preserved overall biventricular function  IVC was collapsible not consistent with fluid overload  Would allow the patient to take fluids and bolus if needed      Respiratory  Hypoxic respiratory failure  Patient was smoking up until the patient had surgery  Postoperative pneumonias and atelectasis are markedly more common in patients with smoking  Patient has been given an Acapella he has been given incentive spirometer  He was out of bed into the chair already  He has been encouraged to use his I-S  He remains on antibiotics  We did add steroids 60 mg IV to every 12 which also may be increasing the patient's BUN    We have weaned from 35 L 70% to 35 L 50%  If I can get him to 40% today can consider switching over to nasal cannula    GI  Taking a general diet  He remains with bowel movements  He is on a PPI    Renal metabolic  Creatinine is stable slightly downtrending because we have discontinued some of his diuretics  His BUN is rising although this can be combined from mild dehydration plus the fact that the patient had steroids started at a higher dose which can also cause some uremia    Hematology  The patient has a history of being on anticoagulation with warfarin due to possible prothrombotic state previously.  He has had a transmetatarsal amputation he said bowel ischemia and intestinal ischemia is unclear if he had antiphospholipid syndrome versus other cause most of his records were at [facility] and outside hospitals.    We keep we will keep him on heparin drip in the intensive care unit prior PTT goals    Infectious disease  He is on meropenem  Legionella is negative  Will finish 3 days of azithromycin given the acute infectious process    Endocrine  No active issues at this time    Remains on sliding scale insulin and steroids blood sugars have been reasonably well-controlled do not see any need at this point to steroids    Tubes lines and  drains  He has a Arzola  Because he was being aggressively diuresed previously  Will attempt to remove    Disposition is the ICU due to hypoxia    Critical care attending    Date of service was March 28, 2025

## 2025-03-28 NOTE — PROGRESS NOTES
[facility] Hospitalist Progress Note     CC: Hospital Follow up    PCP: Kam Alberts MD       Assessment/Plan:     Active Problems:    Pre-op testing    Thoracoabdominal aortic aneurysm (TAAA)    Bilateral leg weakness      69 year old male with a past medical history of hypertension, GERD, anxiety, renal calculi, DVT, ascending aortic dissection s/p repair, 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  Postoperative course complicated by HOLLIS on CKD as well as right lower extremity weakness along with hypoxic respiratory failure.      History of prior infrarenal endovascular AAA repair with bilateral KYLER for hypogastric and common iliac aneurysms  History of mesenteric ischemia s/p SMA bypass with right external iliac artery to SMA bypass  History of aortic dissection s/p repair, aortic dissection distal to left subclavian  Hx of DVT   S/p complex aortic repair 3/21  -Complicated aortic pathology with multiple repairs.   - high risk procedure with risks of complications including spinal cord ischemia, mesenteric ischemia, possible renal failure.  -s/p lumbar drain with neurointerventional to limit risk of spinal cord ischemia 3/20  -Further recommendations and management per vascular surgery  - previously on coumadin, now held per vascular  -Status post complex aortic repair 3/21 with vascular-see op report for details  - SBP goal >160, per vascualr  - pressors per vascular  - in medical ICU   -s/p lumbar drain removal 3/26    Right lower extremity weakness-improving  - In the setting of complex aortic repair 3/21  - Seen by neurointensivist, recommendations reviewed  - Hemoglobin goal per neuro ICU greater than 10, received 1 unit of PRBC 3/22,  plan for another unit 3/23  - Plan for 1 unit of platelets 3/23  - Started on IV steroids as rescue measure in the setting of worsening weakness on the right lower extremity-per ICU  - Frequent neurovascular assessment per protocol    Acute hypoxemic respiratory failure w/ threat to bodily function 2/2 possible pneumonia versus edema  Leukocytosis  -Discussed with pulmonology  -Meropenem started-  -CBC reviewed, white count 7.2.  Serial CBCs ordered.  -On 35 high flow this morning, continue to monitor respiratory status closely    HOLLIS on CKD-improving  -Creatinine reviewed today - 1.47 (baseline 1.2)  -Continue to monitor with serial RFP's  -Avoid nephrotoxic agents  - Cr bumped post operatively - given prolonged surgery and complicated repair  - given IVF, follow BMP  - discussed with vascular surgery- defer decision for renal consult to vascular      Hypertension  -hold home HCTZ     Allergic rhinitis  -Continue home fluticasone as needed     Short gut syndrome  - resume lamotil if diarrhea resumes    Normocytic anemia  -Hgb reviewed -10.6 this morning  -Serial CBC's     Dispo: ICU     Outpatient or previous hospital records reviewed. Questions/concerns were discussed with patient and/or family by bedside. Discussed with vascular surgery, pulmonology, intensivist.      Lamar Childs [facility] Hospitalist  Contact via [employer]         Subjective:     In ICU this morning. On 30 L HFNC.     OBJECTIVE:    Blood pressure (!) 128/92, pulse (!) 126, temperature 98.6 °F (37 °C), temperature source Temporal, resp. rate 19, weight 193 lb 12.6 oz (87.9 kg), SpO2 91%.    Temp:  [98.4 °F (36.9 °C)-99.2 °F (37.3 °C)] 98.6 °F (37 °C)  Pulse:  [] 126  Resp:  [11-27] 19  BP: (106-168)/(52-96) 128/92  SpO2:  [89 %-97 %] 91 %  FiO2 (%):  [50 %-70 %] 50 %      Intake/Output:    Intake/Output Summary (Last 24 hours) at 3/28/2025 1537  Last data filed at 3/28/2025 1200  Gross per 24 hour   Intake  1661.8 ml   Output 2065 ml   Net -403.2 ml       Last 3 Weights   03/27/25 0600 193 lb 12.6 oz (87.9 kg)   03/26/25 0400 193 lb 12.6 oz (87.9 kg)   03/25/25 0600 200 lb 2.8 oz (90.8 kg)   03/24/25 0900 200 lb 3.2 oz (90.8 kg)   03/24/25 0500 203 lb 0.7 oz (92.1 kg)   03/23/25 0516 204 lb 2.3 oz (92.6 kg)   03/22/25 0637 153 lb 3.5 oz (69.5 kg)   03/20/25 1231 192 lb 15.9 oz (87.5 kg)   03/19/25 1444 193 lb (87.5 kg)   03/22/25 1113 153 lb 3.5 oz (69.5 kg)   03/07/25 1536 185 lb (83.9 kg)       Exam   Gen: No acute distress, alert and oriented x3, no focal neurologic deficits, right-sided Scott, arterial line and lumbar drain noted  Heent: NC AT, mucous memb clear, neck supple  Pulm: Lungs clear, normal respiratory effort  CV: Heart with regular rate and rhythm, no peripheral edema  Abd: Abdomen soft, nontender, nondistended, bowel sounds present  MSK:expected rom, no hematoma appreciated bilaterally, 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, 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, strentgh 4/5 on R, pulses intact  Skin: no rashes or lesions  Neuro: AO*3, motor intact, no sensory deficits  Psyc: appropriate mood and affect      Data Review:       Labs:     Recent Labs   Lab 03/24/25  0443 03/25/25  0444 03/26/25  0417 03/26/25  1426 03/27/25  0507 03/28/25  0404   RBC 3.49*   < > 3.83 3.61* 3.52* 3.42*   HGB 9.9*   < > 10.8* 10.5* 10.0* 9.6*   HCT 28.9*   < > 32.4* 30.5* 29.9* 28.8*   MCV 82.8   < > 84.6 84.5 84.9 84.2   MCH 28.4   < > 28.2 29.1 28.4 28.1   MCHC 34.3   < > 33.3 34.4 33.4 33.3   RDW 15.9   < > 15.8 15.9 15.9 15.5   NEPRELIM 8.07*  --  14.35*  --   --  6.62   WBC 8.9   < > 15.3* 13.4* 9.5 7.2   PLT 71.0*   < > 93.0* 85.0* 82.0* 88.0*    < > = values in this interval not displayed.         Recent Labs   Lab 03/27/25  0507 03/27/25  1611 03/28/25  0404   * 160* 151*   BUN 68* 67* 70*   CREATSERUM 1.59* 1.60* 1.47*   EGFRCR 46* 46* 51*   CA 8.4* 8.9 8.4*    138 139   K 4.0  4.0 3.9 4.0   CL  97* 95* 97*   CO2 36.0* 34.0* 35.0*       Recent Labs   Lab 03/22/25  0423 03/25/25  0444 03/25/25 2021 03/27/25  0507 03/28/25  0404   ALT 8*  --  <7*  --  70*   AST 30  --  12  --  43*   ALB 3.9 3.8 3.8 3.3 3.1*         Imaging:  CT CHEST+ABDOMEN+PELVIS(CPT=71250/64606)    Result Date: 3/27/2025  CONCLUSION:  1. Extensive stent graft and side branch stenting of the thoracoabdominal aorta.  Segments of aneurysmal dilatation with measurements as given above.  No perivascular hematoma.  Aneurysmal dilatation of the right hypogastric artery extends beyond the distal margin of the stent. 2. Opacities are seen in both lungs.  These may be due to atelectasis.  The potential for pneumonia should be correlated with clinical suspicion for infection. 3. Retained contrast in the left kidney.  This appearance may reflect an infarcted segment. 4. Sensitivity decreased without IV contrast.  Endoleak is not excluded by this study.  5. Details as above.  Continued clinical correlation recommended.   Findings discussed with Dr. Oquendo by telephone.     LOCATION:  [facility]    Dictated by (CST): Isai Ralph MD on 3/27/2025 at 1:31 PM     Finalized by (CST): Isai Ralph MD on 3/27/2025 at 1:55 PM       XR CHEST AP PORTABLE  (CPT=71045)    Result Date: 3/27/2025  CONCLUSION:   Postoperative changes of cardiothoracic surgery with aortic stent graft noted.  Heart size upper limits normal.  Moderate interstitial opacities noted diffusely throughout the lungs, slightly improved in the interim.  This may represent pulmonary edema versus inflammatory process.  No associated pleural effusion or pneumothorax.  Right upper extremity PICC terminates in the lower SVC.   LOCATION:  [facility]      Dictated by (CST): An Childs MD on 3/27/2025 at 12:36 PM     Finalized by (CST): An Childs MD on 3/27/2025 at 12:37 PM          Meds:      cefepime  2 g Intravenous Q12H    methylPREDNISolone  60 mg Intravenous Q12H    azithromycin  500 mg Intravenous  Q24H    fluticasone propionate  1 spray Each Nare Daily    docusate sodium  100 mg Oral BID    polyethylene glycol (PEG 3350)  17 g Oral Daily    insulin aspart  1-5 Units Subcutaneous TID AC and HS    pantoprazole  40 mg Intravenous Q24H      continuous dose heparin 2,200 Units/hr (03/28/25 1414)       hydrALAzine    ipratropium-albuterol    glucose **OR** glucose **OR** glucose-vitamin C **OR** dextrose **OR** glucose **OR** glucose **OR** glucose-vitamin C    acetaminophen **OR** HYDROcodone-acetaminophen **OR** HYDROcodone-acetaminophen    ondansetron    metoclopramide    HYDROmorphone **OR** HYDROmorphone **OR** [DISCONTINUED] HYDROmorphone      Supplementary Documentation:   DVT Mechanical Prophylaxis:   SCDs, Early ambuation  DVT Pharmacologic Prophylaxis   Medication    heparin (Porcine) 40065 units/250mL infusion PE/DVT/THROMBUS CONTINUOUS                Code Status: DNAR/Full Treatment  Arzola: Arzola catheter in place  Arzola Duration (in days):   Central line:    YNES:

## 2025-03-28 NOTE — PROGRESS NOTES
Pulmonary Progress Note        NAME: Cy Monsalve - ROOM: [address] [place] [address]-A - MRN: [id number] - Age: 69 year old - : 1955        Last 24hrs: pt transferred to [facility] yesterday, started on steroids, had CTA done that showed no PE and attila infiltrates, this AM he feels ok, slightly better, having runs of A-fib    OBJECTIVE:  Vitals:    25 0530 25 0600 25 0700 25 0815   BP:  114/58 117/62    BP Location:       Pulse: 61 69 66    Resp: 12 14 12    Temp:       TempSrc:       SpO2: 97% 95% 96% 95%   Weight:           Oxygen Therapy  SpO2: 95 %  O2 Device: High flow/High humidity  Mode: Spontaneous/Timed  FiO2 (%): 50 %  O2 Flow Rate (L/min): 35 L/min  Pulse Oximetry Type: Continuous  Oximetry Probe Site Changed: No  Pulse Ox Probe Location: Right hand                  Intake/Output Summary (Last 24 hours) at 3/28/2025 0822  Last data filed at 3/28/2025 0600  Gross per 24 hour   Intake 2121.8 ml   Output 2125 ml   Net -3.2 ml       Scheduled Medication:   methylPREDNISolone  60 mg Intravenous Q12H    azithromycin  500 mg Intravenous Q24H    meropenem  1 g Intravenous Q8H    fluticasone propionate  1 spray Each Nare Daily    sodium chloride  500 mL Intravenous Once    docusate sodium  100 mg Oral BID    polyethylene glycol (PEG 3350)  17 g Oral Daily    insulin aspart  1-5 Units Subcutaneous TID AC and HS    pantoprazole  40 mg Intravenous Q24H     Continuous Infusing Medication:   continuous dose heparin 2,050 Units/hr (25 0503)    dexmedetomidine Stopped (25 0545)       Lungs: diminished BS attila  Heart: IR IR no m/r/g  Abdomen: soft, non-tender; bowel sounds normal; no masses,  no organomegaly  Extremities: extremities normal, atraumatic, no cyanosis or edema    Labs reviewed as noted below      Imaging: ct chest reviewed as noted above    ASSESSMENT/PLAN:    S/p complex aortic repair 3/21  RLE weakness and started on high dose steroids per neuro- now discontinued  Wean pressors as  tolerated to keep SBP >160  S/p MRI spine.   Neurochecks.   Per vascular surgery and neurocritcal care.   Lumbar drain removed  Acute Hypoxic Resp failure  Extubated 3/21 post op.   - due to a combination of Pulm Edema, atelectasis, and PNA  -continue HHFNC; wean FiO2 as tolerated, attempt HFNC today  Possible PNA  -cont janna (3/24- ), azithro added by ICU (3/27- )  -steroids per ICU  -monitor cultures  Pulm Edema/Pulm HTN  -elevated RVSP noted on echo from 3/25  -Lasix- held by ICU  -monitor renal fxn- relatively stable  Atelectasis  -reviewed IS with patient and discussed technique  FEN  PO diet  Proph  SCD and subcutaneous heparin.   Dispo  Full code   Will follow.   -discussed w/ ICU team      Cctime 35 minutes

## 2025-03-28 NOTE — PLAN OF CARE
Assumed care of patient following shift report. AOx4. Weaned vapotherm to 6L high flow. Congested, productive cough. IS/flutter. Sinus arrhythmia on tele, very frequent PVCs, cardiology consulted, metoprolol added. SBP goal , SBP>160 requiring hydralazine x1. Arzola draining clear yellow urine. BM x1. Tolerating diet. Reporting abdominal pain, see MAR. Up to chair with 2 assist/walker. Wife at bedside, updated on plan of care.

## 2025-03-28 NOTE — PROGRESS NOTES
[facility]   part of Merged with [facility]     Vascular Surgery Progress Note    Cy Monsalve Patient Status:  Inpatient    1955 MRN [id number]   Location [facility] 4SW-A Attending Neil Oquendo MD   Hosp Day # 8 PCP Kam Alberts MD     Objective:   Temp: 98.5 °F (36.9 °C)  Pulse: 66  Resp: 12  BP: 117/62  FiO2 (%): 60 %      Events Yesterday/Overnight:  Patient is a 69 year old male, POD 7, fenestrated endovascular arch repair with fenestrated thoracoabdominal aneurysm repair, left carotid to subclavian transposition with endovascular septotomy of dissection with Dr. Oquendo and Dr. Yanez. Patient on vepotherm 60% at 35 L. On precedex discontinued on 3/28. Levophed stopped on 3/26. /62. Patient's WBC today is 7.2. Hemoglobin is 9.6.  PICC line has been placed. Heparin drip infusing.    Lumbar drain removed 3/26. Clear fluid from the patient's back was identified on the bed sheet yesterday during transfer, compression dressing applied. Patient is alert, answering questions appropriately. He is able to drink and eat well. Patient denies any pain.      Exam:  Lumbar drain access site assessed, no active drainage, tegaderm replaced. Neurologically intact. Palpable bilateral DP and PT pulses. Bilateral groin access sites soft, flat, no hematoma.     Impression/Plan:    Continue heparin drip.     Pulmonary to follow.      Continue physical therapy.          Medications:  Current Facility-Administered Medications   Medication Dose Route Frequency    methylPREDNISolone sodium succinate (Solu-MEDROL) injection 60 mg  60 mg Intravenous Q12H    azithromycin (Zithromax) 500 mg in sodium chloride 0.9% 250mL IVPB premix  500 mg Intravenous Q24H    heparin (Porcine) 41846 units/250mL infusion PE/DVT/THROMBUS CONTINUOUS  200-3,000 Units/hr Intravenous Continuous    dexmedeTOMIDine in sodium chloride 0.9% (Precedex) 400 mcg/100mL infusion premix  0.2-1.5 mcg/kg/hr (Dosing Weight) Intravenous Continuous     meropenem (Merrem) 1 g in sodium chloride 0.9% 100 mL IVPB-MBP  1 g Intravenous Q8H    hydrALAzine (Apresoline) 20 mg/mL injection 10 mg  10 mg Intravenous Q4H PRN    fluticasone propionate (Flonase) 50 MCG/ACT nasal suspension 1 spray  1 spray Each Nare Daily    ipratropium-albuterol (Duoneb) 0.5-2.5 (3) MG/3ML inhalation solution 3 mL  3 mL Nebulization Q4H PRN    sodium chloride 0.9 % IV bolus 500 mL  500 mL Intravenous Once    docusate sodium (Colace) cap 100 mg  100 mg Oral BID    polyethylene glycol (PEG 3350) (Miralax) 17 g oral packet 17 g  17 g Oral Daily    glucose (Dex4) 15 GM/59ML oral liquid 15 g  15 g Oral Q15 Min PRN    Or    glucose (Glutose) 40% oral gel 15 g  15 g Oral Q15 Min PRN    Or    glucose-vitamin C (Dex-4) chewable tab 4 tablet  4 tablet Oral Q15 Min PRN    Or    dextrose 50% injection 50 mL  50 mL Intravenous Q15 Min PRN    Or    glucose (Dex4) 15 GM/59ML oral liquid 30 g  30 g Oral Q15 Min PRN    Or    glucose (Glutose) 40% oral gel 30 g  30 g Oral Q15 Min PRN    Or    glucose-vitamin C (Dex-4) chewable tab 8 tablet  8 tablet Oral Q15 Min PRN    insulin aspart (NovoLOG) 100 Units/mL FlexPen 1-5 Units  1-5 Units Subcutaneous TID AC and HS    pantoprazole (Protonix) 40 mg in sodium chloride 0.9% PF 10 mL IV push  40 mg Intravenous Q24H    acetaminophen (Tylenol) tab 650 mg  650 mg Oral Q4H PRN    Or    HYDROcodone-acetaminophen (Norco) 5-325 MG per tab 1 tablet  1 tablet Oral Q4H PRN    Or    HYDROcodone-acetaminophen (Norco) 5-325 MG per tab 2 tablet  2 tablet Oral Q4H PRN    ondansetron (Zofran) 4 MG/2ML injection 4 mg  4 mg Intravenous Q6H PRN    metoclopramide (Reglan) 5 mg/mL injection 10 mg  10 mg Intravenous Q8H PRN    HYDROmorphone (Dilaudid) 1 MG/ML injection 0.2 mg  0.2 mg Intravenous Q2H PRN    Or    HYDROmorphone (Dilaudid) 1 MG/ML injection 0.4 mg  0.4 mg Intravenous Q2H PRN       Laboratory/Data:    LABS:  Recent Labs   Lab 03/22/25  0423 03/22/25  0425 03/25/25  0447  03/25/25  0826 03/26/25 0417 03/26/25  1426 03/27/25  0507 03/28/25  0404   WBC  --    < > 9.8  --  15.3* 13.4* 9.5 7.2   HGB  --    < > 10.6*  --  10.8* 10.5* 10.0* 9.6*   MCV  --    < > 82.4  --  84.6 84.5 84.9 84.2   PLT  --    < > 85.0*  --  93.0* 85.0* 82.0* 88.0*   INR 1.63*  --   --  1.53* 1.53*  --   --   --     < > = values in this interval not displayed.       Recent Labs   Lab 03/25/25 0444 03/25/25 2021 03/26/25 0417 03/26/25  1426 03/27/25  0507 03/27/25  1611 03/28/25  0404    141 141 141 140 138 139   K 3.8  3.8 3.7 3.6  3.6 4.0 4.0  4.0 3.9 4.0    99 99 99 97* 95* 97*   CO2 27.0 33.0* 33.0* 34.0* 36.0* 34.0* 35.0*   BUN 35* 42* 47* 55* 68* 67* 70*   CREATSERUM 1.45* 1.57* 1.57* 1.52* 1.59* 1.60* 1.47*   * 127* 141* 130* 154* 160* 151*   CA 8.4* 8.4* 8.6* 8.7 8.4* 8.9 8.4*   MG 1.8 2.0 2.0  --  2.1 2.0 2.1   PHOS 1.7*  --  3.4  --  3.8  --   --      Recent Labs   Lab 03/22/25  0423 03/25/25  0826 03/26/25  0417 03/26/25  1426 03/27/25  1127 03/27/25 1957 03/28/25  0404   PTP 19.5* 18.5* 18.6*  --   --   --   --    INR 1.63* 1.53* 1.53*  --   --   --   --    PTT 28.7  --   --    < > 51.4* 62.9* 109.5*    < > = values in this interval not displayed.     Recent Labs   Lab 03/22/25  0423 03/25/25  0444 03/25/25 2021 03/27/25  0507 03/28/25  0404   ALT 8*  --  <7*  --  70*   AST 30  --  12  --  43*   ALB 3.9 3.8 3.8 3.3 3.1*     No results for input(s): \"TROP\" in the last 168 hours.  No results found for: \"ANAS\", \"ROVERTO\", \"ANASCRN\"  Recent Labs   Lab 03/23/25  0907   HIPAB Negative       ISATU Warren  3/28/2025  7:51 AM

## 2025-03-28 NOTE — PHYSICAL THERAPY NOTE
PHYSICAL THERAPY TREATMENT NOTE - INPATIENT    Room Number: 463    Session: 3    Number of Visits to Meet Established Goals: 3  History related to current admission: Patient is a 69 year old male admitted on 3/19/2025 from Home for S/p 3/21 fenestrated abdominal aortic aneurysm repair with left carotid to subclavian transposition on  with Dr. Yanez and Dr. Oquendo and 3/20 lumbar drain.  Reporting RLE weakness 3/22 AM worsening to BLE MRI T spine w/o obvious compression or signs of cord ischemia.      HOME SITUATION  Type of Home: House  Home Layout: Two level           Stairs to Bedroom: 16    Railing: Yes    Lives With: Spouse                Prior Level of Greer: Pt states that he lives in a house with spouse. Pt  reports that he was IND with all his ADLs and gait. Pt has no history of falls.  Presenting Problem: S/p 3/21 fenestrated abdominal aortic aneurysm repair with left carotid to subclavian transposition on  with Dr. Yanez and Dr. Oquendo and 3/20 lumbar drain  Co-Morbidities : HTN, Peripheral Neuropathy, Hx of toe amputation L great toe, mesenteric ischemia s/p SMA bypass with right external iliac artery to SMA bypass, Short gut syndrome    PHYSICAL THERAPY ASSESSMENT   Patient demonstrates fair progress this session, goals  remain in progress.      Patient is requiring moderate assist and maximum assist as a result of the following impairments: decreased functional strength, impaired static and dynamic balance, impaired coordination, impaired motor planning, and medical status.     Patient continues to function below baseline with bed mobility, transfers, gait, stair negotiation, maintaining seated position, and standing prolonged periods.  Next session anticipate patient to progress transfers and gait.  Physical Therapy will continue to follow patient for duration of hospitalization.    Patient continues to benefit from continued skilled PT services: to facilitate return to prior level of  function as patient demonstrates high motivation with excellent tolerance to an intensive therapy program .    PLAN DURING HOSPITALIZATION  Nursing Mobility Recommendation :  (2 assist RW)  PT Device Recommendation: Rolling walker  PT Treatment Plan: Bed mobility, Body mechanics, Gait training, Strengthening, Transfer training, Stair training, Balance training  Frequency (Obs): 3-5x/week     CURRENT GOALS     Goal #1 Patient is able to demonstrate supine - sit EOB @ level: minimum assistance      Goal #2 Patient is able to demonstrate transfers EOB to/from BSC at assistance level: minimum assistance      Goal #3 Patient is able to sit at the EOB for 10 mins with CGA.       Goal #4 Patient is able to ambulate 50 feet with assist device: walker - rolling at assistance level: moderate assistance   Goal #5     Goal #6     Goal Comments: Goals established on 3/22/2025  3/28/2025 all goals ongoing achieved     SUBJECTIVE  \"I was giving everyone a hard time this morning\"     OBJECTIVE  Precautions: Bed/chair alarm (>180 SBP, Lumbar Drain, Surgical boot on the L foot)  *after session SBP goal changes to > 160    WEIGHT BEARING RESTRICTION     PAIN ASSESSMENT   Ratin  Location: denies pain       BALANCE                                                                                                                       Static Sitting: Fair -  Dynamic Sitting: Fair -           Static Standing: Poor +  Dynamic Standing: Poor    ACTIVITY TOLERANCE                         O2 WALK       AM-PAC '6-Clicks' INPATIENT SHORT FORM - BASIC MOBILITY  How much difficulty does the patient currently have...  Patient Difficulty: Turning over in bed (including adjusting bedclothes, sheets and blankets)?: A Little   Patient Difficulty: Sitting down on and standing up from a chair with arms (e.g., wheelchair, bedside commode, etc.): A Lot   Patient Difficulty: Moving from lying on back to sitting on the side of the bed?: A Little   How much  help from another person does the patient currently need...   Help from Another: Moving to and from a bed to a chair (including a wheelchair)?: A Lot   Help from Another: Need to walk in hospital room?: A Lot   Help from Another: Climbing 3-5 steps with a railing?: Total     AM-PAC Score:  Raw Score: 13   Approx Degree of Impairment: 64.91%   Standardized Score (AM-PAC Scale): 36.74   CMS Modifier (G-Code): CL    FUNCTIONAL ABILITY STATUS  Gait Assessment   Functional Mobility/Gait Assessment  Gait Assistance: Moderate assistance  Distance (ft): 1  Assistive Device: Rolling walker  Pattern: Shuffle    Skilled Therapy Provided  Pt presents in bed.  Therapist cued pt for body mechanics, log roll and sequencing for t/f EOB   Pt demos fair seated balance  Pt cued for hand placement for STS t/f, unable to achieve full stance on first attempt, bed elevated, STS mod/max x 2 second attempt  Pt took steps to BSC c RW min to mod A x 2   Increased time to recover 2/2 elevated HR and desat, cued for PLB c good return demo  Cued for seated therex c rest breaks, educated on EC     Bed Mobility:  Rolling: Mod A    Supine<>Sit: Mod A   Sit<>Supine: NT     Transfer Mobility:  Sit<>Stand: Mod A x 2  Stand<>Sit:  Mod A x 2  Gait: min x 2 c RW     Therapist's Comments: 's - 160's c activity   /63 seated upright   6L O2 HFNC       Patient End of Session: Up in chair, Needs met, Call light within reach, RN aware of session/findings, All patient questions and concerns addressed, Hospital anti-slip socks, Alarm set    PT Session Time: 26 minutes  Gait Trainin minutes  Therapeutic Activity: 15 minutes  Therapeutic Exercise: 5 minutes   Neuromuscular Re-education: 6 minutes

## 2025-03-28 NOTE — OCCUPATIONAL THERAPY NOTE
OCCUPATIONAL THERAPY TREATMENT NOTE - INPATIENT     Room Number: 6615/6615-A  Session: 2   Number of Visits to Meet Established Goals: 7    Presenting Problem: S/p 3/21 fenestrated abdominal aortic aneurysm repair with left carotid to subclavian transposition on  with Dr. Yanez and Dr. Oquendo and 3/20 lumbar drain    ASSESSMENT   Patient demonstrates fair progress this session, goals remain in progress.    Patient continues to function below baseline with  all ADL .   Contributing factors to remaining limitations include decreased functional strength, decreased functional reach, decreased endurance, impaired standing balance, and increased O2 needs from baseline.  Next session anticipate patient to progress upper body dressing and transfers.  Occupational Therapy will continue to follow patient for duration of hospitalization.    Patient continues to benefit from continued skilled OT services: to facilitate return to prior level of function as patient demonstrates high motivation with excellent tolerance to an intensive therapy program.     History: Patient is a 69 year old male admitted on 3/19/2025 with Presenting Problem: S/p 3/21 fenestrated abdominal aortic aneurysm repair with left carotid to subclavian transposition on  with Dr. Yanez and Dr. Oquendo and 3/20 lumbar drain. Co-Morbidities : HTN, Peripheral Neuropathy, Hx of toe amputation L great toe, mesenteric ischemia s/p SMA bypass with right external iliac artery to SMA bypass, Short gut syndrome     **3/25 acute respiratory failure, possible pneumonia, on vapotherm    TREATMENT SESSION:  Patient Start of Session: supine    FUNCTIONAL TRANSFER ASSESSMENT  Sit to Stand: Edge of Bed; Chair  Edge of Bed: -- (mod of 2)  Chair: Not Tested    BED MOBILITY  Supine to Sit : Moderate Assist  Sit to Supine (OT): Not Tested  Scooting: SBA    BALANCE ASSESSMENT  Static Sitting: Stand-by Assist  Static Standing: Minimal Assist    O2 SATURATIONS       EDUCATION  PROVIDED  Patient Education : Role of Occupational Therapy; Discharge Recommendations; Plan of Care; Functional Transfer Techniques; Energy Conservation  Patient's Response to Education: Verbalized Understanding    Equipment used: rw    Therapist comments: on HFNC 6L, O2 89% with activity, 90-91% at rest. HR 150s with activity, 120-130s at rest. SBP 130s EOB.     Supine > sit > seated rest > stand with RW > chair transfer > extended seated rest > education on pacing, breathing techniques > 10 reps shoulder rolls and shoulder squeezes > seated grooming    Patient End of Session: Up in chair, Needs met, Call light within reach, RN aware of session/findings, All patient questions and concerns addressed, Hospital anti-slip socks    SUBJECTIVE  Pt verbalizes frustration with feeling he is not being listened to by staff.     PAIN ASSESSMENT  Ratin  Location: denies  Management Techniques: Repositioning     OBJECTIVE  Precautions: Bed/chair alarm (>180 SBP, Lumbar Drain, Surgical boot on the L foot)    AM-PAC ‘6-Clicks’ Inpatient Daily Activity Short Form  -   Putting on and taking off regular lower body clothing?: A Lot  -   Bathing (including washing, rinsing, drying)?: A Lot  -   Toileting, which includes using toilet, bedpan or urinal? : A Lot  -   Putting on and taking off regular upper body clothing?: A Lot  -   Taking care of personal grooming such as brushing teeth?: A Little  -   Eating meals?: A Little    AM-PAC Score:  Score: 14  Approx Degree of Impairment: 59.67%  Standardized Score (AM-PAC Scale): 33.39    PLAN  OT Device Recommendations: TBD  OT Treatment Plan: Balance activities, Energy conservation/work simplification techniques, ADL training, Functional transfer training, UE strengthening/ROM, Patient/Family education, Equipment eval/education, Compensatory technique education  Rehab Potential : Good  Frequency: 3x/week    OT Goals:   Ongoing 3/28  ADL Goals   Patient will perform upper body dressing:   with setup  Patient will perform lower body dressing:  with min assist  Patient will perform toileting: with min assist     Functional Transfer Goals  Patient will transfer from supine to sit:  with min assist  Patient will transfer to toilet:  with min assist     UE Exercise Program Goal  Patient will be independent with bilateral AROM HEP (home exercise program).     Additional Goals  Pt will incorporate 2 energy conservation techniques into ADL.    OT Session Time: 23 minutes  Self-Care Home Management: 8 minutes  Therapeutic Activity: 10 minutes

## 2025-03-28 NOTE — CONSULTS
[facility] Cardiology  Consultation Note      Cy Monsalve Patient Status:  Inpatient    1955 MRN [id number]   Location [facility] 4SW-A Attending Neil Oquendo MD   Hosp Day # 8 PCP Kam Alberts MD     Reason for consult: Post op, arrhythmias    Primary cardiologist: Russell     History of Present Illness:  Cy Monsalve is a 69 year old male who presented to [facility] on 3/19/2025 for elective complex aortic surgery, see below. Complicated aortic history involving multiple surgeries, AVR, complications etc. Cardiology consulted regarding tachyarhythmias seen on tele. He has felt some palpitations with this. Denies CP. Has needed O2 post op but denies SOB at present. Has been smoking. Being treated for PNA with Abx and steroids. Lasix on hold and has received IVF for HOLLIS, now improving. He is usually on coumadin and is now on heparin gtt.     Medications:  Current Facility-Administered Medications   Medication Dose Route Frequency    ceFEPIme (Maxpime) 2 g in sodium chloride 0.9% 100 mL IVPB-MBP  2 g Intravenous Q12H    methylPREDNISolone sodium succinate (Solu-MEDROL) injection 60 mg  60 mg Intravenous Q12H    azithromycin (Zithromax) 500 mg in sodium chloride 0.9% 250mL IVPB premix  500 mg Intravenous Q24H    heparin (Porcine) 12064 units/250mL infusion PE/DVT/THROMBUS CONTINUOUS  200-3,000 Units/hr Intravenous Continuous    hydrALAzine (Apresoline) 20 mg/mL injection 10 mg  10 mg Intravenous Q4H PRN    fluticasone propionate (Flonase) 50 MCG/ACT nasal suspension 1 spray  1 spray Each Nare Daily    ipratropium-albuterol (Duoneb) 0.5-2.5 (3) MG/3ML inhalation solution 3 mL  3 mL Nebulization Q4H PRN    docusate sodium (Colace) cap 100 mg  100 mg Oral BID    polyethylene glycol (PEG 3350) (Miralax) 17 g oral packet 17 g  17 g Oral Daily    glucose (Dex4) 15 GM/59ML oral liquid 15 g  15 g Oral Q15 Min PRN    Or    glucose (Glutose) 40% oral gel 15 g  15 g Oral Q15 Min PRN    Or     glucose-vitamin C (Dex-4) chewable tab 4 tablet  4 tablet Oral Q15 Min PRN    Or    dextrose 50% injection 50 mL  50 mL Intravenous Q15 Min PRN    Or    glucose (Dex4) 15 GM/59ML oral liquid 30 g  30 g Oral Q15 Min PRN    Or    glucose (Glutose) 40% oral gel 30 g  30 g Oral Q15 Min PRN    Or    glucose-vitamin C (Dex-4) chewable tab 8 tablet  8 tablet Oral Q15 Min PRN    insulin aspart (NovoLOG) 100 Units/mL FlexPen 1-5 Units  1-5 Units Subcutaneous TID AC and HS    pantoprazole (Protonix) 40 mg in sodium chloride 0.9% PF 10 mL IV push  40 mg Intravenous Q24H    acetaminophen (Tylenol) tab 650 mg  650 mg Oral Q4H PRN    Or    HYDROcodone-acetaminophen (Norco) 5-325 MG per tab 1 tablet  1 tablet Oral Q4H PRN    Or    HYDROcodone-acetaminophen (Norco) 5-325 MG per tab 2 tablet  2 tablet Oral Q4H PRN    ondansetron (Zofran) 4 MG/2ML injection 4 mg  4 mg Intravenous Q6H PRN    metoclopramide (Reglan) 5 mg/mL injection 10 mg  10 mg Intravenous Q8H PRN    HYDROmorphone (Dilaudid) 1 MG/ML injection 0.2 mg  0.2 mg Intravenous Q2H PRN    Or    HYDROmorphone (Dilaudid) 1 MG/ML injection 0.4 mg  0.4 mg Intravenous Q2H PRN       Past Medical History:    AAA (abdominal aortic aneurysm)    Abdominal aneurysm    Abdominal aortic aneurysm (AAA)    AAA surgery done    Anxiety state    Back pain    Naproxen twice a week    Back problem    minor    Calculus of kidney    Deep vein thrombosis (HCC)    to colon    Esophageal reflux    Hearing impairment    Lt ear tinnitus    High blood pressure    History of recent hospitalization    10/2019- was at [facility]5 weeks (ICU) with Dissected Aorta/ Post op colon problem/and post op cardiac arrest    Ileostomy present (HCC)    Peripheral vascular disease    Unspecified essential hypertension    Visual impairment    glasses       Past Surgical History:   Procedure Laterality Date    Colonoscopy  10/15/09  [facility]    Screening: small polypoid fold in sigmoid (no polyp tissue seen on  path), int hemorrhoids; next colonoscopy in     Other  2017    AAA repair    Other      10/23/19- Dissected Aorta surgery    Other surgical history      Eye surgery for lazy eye    Other surgical history  10/15/09  [facility]    EGD (heartburn): normal    Other surgical history      AAA repair    Other surgical history      multiple toe amputations    Part removal colon w end colostomy      10/2019       Family History  family history includes Diabetes in his father; Heart Disorder in his father and mother; Hypertension in his sister.    Social History   reports that he has been smoking cigars and cigarettes. He has never used smokeless tobacco. He reports current alcohol use. He reports that he does not use drugs.     Allergies  Allergies[1]    Review of Systems:  As per HPI, otherwise 10 point ROS is negative in detail.    Physical Exam:  Blood pressure 140/73, pulse 118, temperature 98.6 °F (37 °C), temperature source Temporal, resp. rate 20, weight 193 lb 12.6 oz (87.9 kg), SpO2 92%.  Temp (24hrs), Av.7 °F (37.1 °C), Min:98.4 °F (36.9 °C), Max:99 °F (37.2 °C)    Wt Readings from Last 3 Encounters:   25 193 lb 12.6 oz (87.9 kg)   25 153 lb 3.5 oz (69.5 kg)   25 185 lb (83.9 kg)       General: Awake and alert; in no acute distress  HEENT: Extraocular movements are intact; sclerae are anicteric; scalp is atraumatic  Neck: Supple; no JVD; no carotid bruits  Cardiac: Regular, with frequent ectopic beats, tachy, normal S1 and S2, no murmurs, rubs, or gallops are appreciated  Lungs: Clear to auscultation bilaterally; no accessory muscle use is noted, no wheezes, rhonci or rales  Abdomen: Soft, non-distended, non-tender; bowel sounds are normoactive  Extremities: Warm, no edema, clubbing or cyanosis; moves all 4 extremities normally, distal pulses intact and equal  Psychiatric: Normal mood and affect; answers questions appropriately  Dermatologic: No rashes; normal skin turgor    Diagnostic  testing:    Labs:   Lab Results   Component Value Date    INR 1.53 (H) 03/26/2025    INR 1.53 (H) 03/25/2025        Lab Results   Component Value Date    WBC 7.2 03/28/2025    HGB 9.6 03/28/2025    HCT 28.8 03/28/2025    PLT 88.0 03/28/2025    CREATSERUM 1.47 03/28/2025    BUN 70 03/28/2025     03/28/2025    K 4.0 03/28/2025    CL 97 03/28/2025    CO2 35.0 03/28/2025     03/28/2025    CA 8.4 03/28/2025    ALB 3.1 03/28/2025    ALKPHO 61 03/28/2025    BILT 1.1 03/28/2025    TP 5.4 03/28/2025    AST 43 03/28/2025    ALT 70 03/28/2025    PTT 61.5 03/28/2025    MG 2.1 03/28/2025    PGLU 115 03/28/2025       Cardiac diagnostics:    EKG 3/28/2025:   Sinus tachycardia with frequent Premature ventricular complexes   Nonspecific ST abnormality     Echo 3/25/25  1. Left ventricle: The cavity size was normal. Wall thickness was mildly      increased. Systolic function was vigorous. The estimated ejection      fraction was 65-70%.   2. Right ventricle: The cavity size was normal. Systolic function was      normal. Systolic pressure was moderately increased.   3. Aortic valve: Well visualized. No significant valve disease.      Transvalvular velocity was minimally increased. There was no significant      evidence for stenosis. The peak systolic velocity was 1.78m/sec. The mean      systolic gradient was 7mm Hg.   4. Pulmonary arteries: Systolic pressure was moderately increased. The peak      systolic pressure is 45mm Hg.   5. Pericardium, extracardiac: There was no pericardial effusion.       Impression:  Complex aortic history:   Aortic dissection - type A, s/p bioAVR, graft and hemiarch repair 10/2019, [facility]. Then underwent repair of abdominal aortic dissection. Suffered from mesenteric ischemia requiring subtotal colectomy with colostomy. Also subsequently required amputation of toes due to ischemia. Episodes deemed provoked by surgery. Subsequently underwent reversal of colostomy. Was discharged on  anticoagulation. Embolic events - s/p amputation 4.5 toes on left, tip of big toe on R, 2/3 of his colon (post op aortic surgery 10/19), and 3 feet of small bowel (10/23). On coumadin.   H/o EVAR 2018    Current issues  CTA 12/24 reviewed, \"New discontinuity of the left internal iliac stents with focal contrast extravasation suggestive of type III endoleak. Excluded aneurysm sac has increased to 4.3 cm compared to 3.2 cm on 10/2023 study. Slight increase in additional aneurysms/excluded aneurysm sacs involving the bilateral internal iliac arteries\"  -->  s/p 3/21/25 fenestrated endovascular arch repair with fenestrated thoracoabdominal aneurysm repair, left carotid to subclavian transposition with endovascular septotomy of dissection with Dr. Oquendo and Dr. Yanez.     Hypoxic resp failure  - PNA, ateletasis    Sinus tach / PSVT / frequent PACs, PVCs    HOLLIS - Cr 2, improving    Chronic issues  CAD - mild CAC on CT 12/24. No symptoms. Nuc stress 1/2025 normal   H/o hematuria - kidney stones - 12/4/2023   Renal artery stenosis s/p stent 12/2023  DVT - 1/2024 venous Doppler bilateral done for bilateral edema-no acute DVT. 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  -> on coumadin usually, thought to have hypercoag state  Heterozygous prothrombin gene mutation   PAF - per records transient episode post op 2019, no documented recurrence.   Smoking - not motivated to quit.   COPD    Recommendations:  Start metoprolol 25mg BID  Continue IV heparin. Coumadin on hold presently.   Lasix on hold. Wean O2. Follow renal function.   Abx and steroids per pulm/crit care team    Thank you for allowing our practice to participate in the care of your patient. Please do not hesitate to contact me if you have any questions.    Wilbur Wells MD  Interventional Cardiology  [facility]  Office: [phone]         [1]   Allergies  Allergen Reactions    Amoxicillin ITCHING     Itchiness on hands and feet      Clindamycin OTHER  (SEE COMMENTS)     Upset stomach

## 2025-03-29 LAB
ALBUMIN SERPL-MCNC: 2.8 G/DL (ref 3.2–4.8)
ALBUMIN/GLOB SERPL: 1.2 {RATIO} (ref 1–2)
ALP LIVER SERPL-CCNC: 63 U/L
ALT SERPL-CCNC: 64 U/L
ANION GAP SERPL CALC-SCNC: 6 MMOL/L (ref 0–18)
APTT PPP: 134.5 SECONDS (ref 23–36)
APTT PPP: 73.1 SECONDS (ref 23–36)
AST SERPL-CCNC: 26 U/L (ref ?–34)
BASOPHILS # BLD AUTO: 0.01 X10(3) UL (ref 0–0.2)
BASOPHILS NFR BLD AUTO: 0.1 %
BILIRUB SERPL-MCNC: 1 MG/DL (ref 0.2–1.1)
BUN BLD-MCNC: 69 MG/DL (ref 9–23)
CALCIUM BLD-MCNC: 8 MG/DL (ref 8.7–10.6)
CHLORIDE SERPL-SCNC: 99 MMOL/L (ref 98–112)
CO2 SERPL-SCNC: 34 MMOL/L (ref 21–32)
CREAT BLD-MCNC: 1.47 MG/DL
EGFRCR SERPLBLD CKD-EPI 2021: 51 ML/MIN/1.73M2 (ref 60–?)
EOSINOPHIL # BLD AUTO: 0 X10(3) UL (ref 0–0.7)
EOSINOPHIL NFR BLD AUTO: 0 %
ERYTHROCYTE [DISTWIDTH] IN BLOOD BY AUTOMATED COUNT: 15.4 %
GLOBULIN PLAS-MCNC: 2.3 G/DL (ref 2–3.5)
GLUCOSE BLD-MCNC: 129 MG/DL (ref 70–99)
GLUCOSE BLD-MCNC: 152 MG/DL (ref 70–99)
GLUCOSE BLD-MCNC: 154 MG/DL (ref 70–99)
GLUCOSE BLD-MCNC: 183 MG/DL (ref 70–99)
GLUCOSE BLD-MCNC: 212 MG/DL (ref 70–99)
HCT VFR BLD AUTO: 27 %
HGB BLD-MCNC: 9.3 G/DL
IMM GRANULOCYTES # BLD AUTO: 0.08 X10(3) UL (ref 0–1)
IMM GRANULOCYTES NFR BLD: 1.1 %
LYMPHOCYTES # BLD AUTO: 0.25 X10(3) UL (ref 1–4)
LYMPHOCYTES NFR BLD AUTO: 3.4 %
MAGNESIUM SERPL-MCNC: 2.2 MG/DL (ref 1.6–2.6)
MCH RBC QN AUTO: 28.9 PG (ref 26–34)
MCHC RBC AUTO-ENTMCNC: 34.4 G/DL (ref 31–37)
MCV RBC AUTO: 83.9 FL
MONOCYTES # BLD AUTO: 0.46 X10(3) UL (ref 0.1–1)
MONOCYTES NFR BLD AUTO: 6.3 %
NEUTROPHILS # BLD AUTO: 6.55 X10 (3) UL (ref 1.5–7.7)
NEUTROPHILS # BLD AUTO: 6.55 X10(3) UL (ref 1.5–7.7)
NEUTROPHILS NFR BLD AUTO: 89.1 %
OSMOLALITY SERPL CALC.SUM OF ELEC: 311 MOSM/KG (ref 275–295)
PLATELET # BLD AUTO: 91 10(3)UL (ref 150–450)
PLATELETS.RETICULATED NFR BLD AUTO: 6.3 % (ref 0–7)
POTASSIUM SERPL-SCNC: 3.9 MMOL/L (ref 3.5–5.1)
PROT SERPL-MCNC: 5.1 G/DL (ref 5.7–8.2)
RBC # BLD AUTO: 3.22 X10(6)UL
SODIUM SERPL-SCNC: 139 MMOL/L (ref 136–145)
WBC # BLD AUTO: 7.4 X10(3) UL (ref 4–11)

## 2025-03-29 PROCEDURE — 99232 SBSQ HOSP IP/OBS MODERATE 35: CPT | Performed by: EMERGENCY MEDICINE

## 2025-03-29 RX ORDER — PANTOPRAZOLE SODIUM 40 MG/1
40 TABLET, DELAYED RELEASE ORAL EVERY 24 HOURS
Status: COMPLETED | OUTPATIENT
Start: 2025-03-29 | End: 2025-03-29

## 2025-03-29 RX ORDER — MILRINONE LACTATE 0.2 MG/ML
INJECTION, SOLUTION INTRAVENOUS CONTINUOUS
Status: DISCONTINUED | OUTPATIENT
Start: 2025-03-29 | End: 2025-03-29

## 2025-03-29 RX ORDER — AZITHROMYCIN 250 MG/1
500 TABLET, FILM COATED ORAL EVERY 24 HOURS
Status: COMPLETED | OUTPATIENT
Start: 2025-03-29 | End: 2025-03-29

## 2025-03-29 RX ORDER — PEPPERMINT OIL
1 OIL (ML) MISCELLANEOUS AS NEEDED
Status: DISCONTINUED | OUTPATIENT
Start: 2025-03-29 | End: 2025-04-18

## 2025-03-29 RX ORDER — METHYLPREDNISOLONE SODIUM SUCCINATE 125 MG/2ML
60 INJECTION INTRAMUSCULAR; INTRAVENOUS DAILY
Status: COMPLETED | OUTPATIENT
Start: 2025-03-29 | End: 2025-03-31

## 2025-03-29 NOTE — PROGRESS NOTES
[facility] Hospitalist Progress Note     CC: Hospital Follow up    PCP: Kam Alberts MD       Assessment/Plan:     Active Problems:    Pre-op testing    Thoracoabdominal aortic aneurysm (TAAA)    Bilateral leg weakness      69 year old male with a past medical history of hypertension, GERD, anxiety, renal calculi, DVT, ascending aortic dissection s/p repair, 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  Postoperative course complicated by HOLLIS on CKD as well as right lower extremity weakness along with hypoxic respiratory failure.      History of prior infrarenal endovascular AAA repair with bilateral KYLER for hypogastric and common iliac aneurysms  History of mesenteric ischemia s/p SMA bypass with right external iliac artery to SMA bypass  History of aortic dissection s/p repair, aortic dissection distal to left subclavian  Hx of DVT   S/p complex aortic repair 3/21  -Complicated aortic pathology with multiple repairs.   - high risk procedure with risks of complications including spinal cord ischemia, mesenteric ischemia, possible renal failure.  -s/p lumbar drain with neurointerventional to limit risk of spinal cord ischemia 3/20  -Further recommendations and management per vascular surgery  - previously on coumadin, now held per vascular  -Status post complex aortic repair 3/21 with vascular-see op report for details  - SBP goal >160, per vascualr  - pressors per vascular  - in medical ICU   -s/p lumbar drain removal 3/26    Right lower extremity weakness-improving  - In the setting of complex aortic repair 3/21  - Seen by neurointensivist, recommendations reviewed  - Hemoglobin goal per neuro ICU greater than 10, received 1 unit of PRBC 3/22,  plan for another unit 3/23  - Plan for 1 unit of platelets 3/23  - Started on IV steroids as rescue measure in the setting of worsening weakness on the right lower extremity-per ICU  - Frequent neurovascular assessment per protocol    Acute hypoxemic respiratory failure w/ threat to bodily function 2/2 possible pneumonia versus edema  Leukocytosis  -Discussed with pulmonology  -Switched to cefepime   -CBC reviewed, white count 7.4.  Serial CBCs ordered.  -On 6 L NC this morning     HOLLIS on CKD-improving  -Creatinine reviewed today - 1.47 (baseline 1.2)  -Continue to monitor with serial RFP's  -Avoid nephrotoxic agents  - Cr bumped post operatively - given prolonged surgery and complicated repair  - given IVF, follow BMP  - discussed with vascular surgery- defer decision for renal consult to vascular      Hypertension  -hold home HCTZ     Allergic rhinitis  -Continue home fluticasone as needed     Short gut syndrome  - resume lamotil if diarrhea resumes    Normocytic anemia  -Hgb reviewed -10.6 this morning  -Serial CBC's     Dispo: ICU, possible transfer to floor      Outpatient or previous hospital records reviewed. Questions/concerns were discussed with patient and/or family by bedside. Discussed with vascular surgery, pulmonology, intensivist.      Lamar Childs DO  [facility]  Hospitalist  Contact via [email or web address]         Subjective:     Weaned to 6L O2 NC, breathing improved.     OBJECTIVE:    Blood pressure 134/66, pulse 90, temperature 98 °F (36.7 °C), temperature source Temporal, resp. rate 23, weight 193 lb 12.6 oz (87.9 kg), SpO2 94%.    Temp:  [98 °F (36.7 °C)-98.6 °F (37 °C)] 98 °F (36.7 °C)  Pulse:  [] 90  Resp:  [12-23] 23  BP: (106-168)/(47-96) 134/66  SpO2:  [91 %-96 %] 94 %  FiO2 (%):  [50 %] 50 %      Intake/Output:    Intake/Output Summary (Last 24 hours) at 3/29/2025 1141  Last data filed at 3/29/2025 0640  Gross per 24 hour   Intake 929.3 ml   Output 1950 ml   Net  -1020.7 ml       Last 3 Weights   03/27/25 0600 193 lb 12.6 oz (87.9 kg)   03/26/25 0400 193 lb 12.6 oz (87.9 kg)   03/25/25 0600 200 lb 2.8 oz (90.8 kg)   03/24/25 0900 200 lb 3.2 oz (90.8 kg)   03/24/25 0500 203 lb 0.7 oz (92.1 kg)   03/23/25 0516 204 lb 2.3 oz (92.6 kg)   03/22/25 0637 153 lb 3.5 oz (69.5 kg)   03/20/25 1231 192 lb 15.9 oz (87.5 kg)   03/19/25 1444 193 lb (87.5 kg)   03/22/25 1113 153 lb 3.5 oz (69.5 kg)   03/07/25 1536 185 lb (83.9 kg)       Exam   Gen: No acute distress, alert and oriented x3, no focal neurologic deficits, right-sided Mullin, arterial line and lumbar drain noted  Heent: NC AT, mucous memb clear, neck supple  Pulm: Lungs clear, normal respiratory effort  CV: Heart with regular rate and rhythm, no peripheral edema  Abd: Abdomen soft, nontender, nondistended, bowel sounds present  MSK:expected rom, no hematoma appreciated bilaterally, 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, 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, strentgh 4/5 on R, pulses intact  Skin: no rashes or lesions  Neuro: AO*3, motor intact, no sensory deficits  Psyc: appropriate mood and affect      Data Review:       Labs:     Recent Labs   Lab 03/26/25  0417 03/26/25  1426 03/27/25  0507 03/28/25  0404 03/29/25  0407   RBC 3.83   < > 3.52* 3.42* 3.22*   HGB 10.8*   < > 10.0* 9.6* 9.3*   HCT 32.4*   < > 29.9* 28.8* 27.0*   MCV 84.6   < > 84.9 84.2 83.9   MCH 28.2   < > 28.4 28.1 28.9   MCHC 33.3   < > 33.4 33.3 34.4   RDW 15.8   < > 15.9 15.5 15.4   NEPRELIM 14.35*  --   --  6.62 6.55   WBC 15.3*   < > 9.5 7.2 7.4   PLT 93.0*   < > 82.0* 88.0* 91.0*    < > = values in this interval not displayed.         Recent Labs   Lab 03/27/25  1611 03/28/25  0404 03/29/25  0407   * 151* 154*   BUN 67* 70* 69*   CREATSERUM 1.60* 1.47* 1.47*   EGFRCR 46* 51* 51*   CA 8.9 8.4* 8.0*    139 139   K 3.9 4.0 3.9   CL 95* 97* 99   CO2 34.0* 35.0* 34.0*       Recent Labs   Lab 03/25/25  0444 03/25/25 2021 03/27/25  0500  03/28/25  0404 03/29/25  0407   ALT  --  <7*  --  70* 64*   AST  --  12  --  43* 26   ALB 3.8 3.8 3.3 3.1* 2.8*         Imaging:  CT CHEST+ABDOMEN+PELVIS(CPT=71250/86337)    Result Date: 3/27/2025  CONCLUSION:  1. Extensive stent graft and side branch stenting of the thoracoabdominal aorta.  Segments of aneurysmal dilatation with measurements as given above.  No perivascular hematoma.  Aneurysmal dilatation of the right hypogastric artery extends beyond the distal margin of the stent. 2. Opacities are seen in both lungs.  These may be due to atelectasis.  The potential for pneumonia should be correlated with clinical suspicion for infection. 3. Retained contrast in the left kidney.  This appearance may reflect an infarcted segment. 4. Sensitivity decreased without IV contrast.  Endoleak is not excluded by this study.  5. Details as above.  Continued clinical correlation recommended.   Findings discussed with Dr. Oquendo by telephone.     LOCATION:  [facility]    Dictated by (CST): Isai Ralph MD on 3/27/2025 at 1:31 PM     Finalized by (CST): Isai Ralph MD on 3/27/2025 at 1:55 PM       XR CHEST AP PORTABLE  (CPT=71045)    Result Date: 3/27/2025  CONCLUSION:   Postoperative changes of cardiothoracic surgery with aortic stent graft noted.  Heart size upper limits normal.  Moderate interstitial opacities noted diffusely throughout the lungs, slightly improved in the interim.  This may represent pulmonary edema versus inflammatory process.  No associated pleural effusion or pneumothorax.  Right upper extremity PICC terminates in the lower SVC.   LOCATION:  [facility]      Dictated by (CST): An Childs MD on 3/27/2025 at 12:36 PM     Finalized by (CST): An Childs MD on 3/27/2025 at 12:37 PM          Meds:      methylPREDNISolone  60 mg Intravenous Daily    azithromycin  500 mg Oral Q24H    cefepime  2 g Intravenous Q12H    metoprolol tartrate  25 mg Oral 2x Daily(Beta Blocker)    fluticasone propionate  1 spray Each Nare  Daily    docusate sodium  100 mg Oral BID    polyethylene glycol (PEG 3350)  17 g Oral Daily    insulin aspart  1-5 Units Subcutaneous TID AC and HS    pantoprazole  40 mg Intravenous Q24H      continuous dose heparin 2,100 Units/hr (03/29/25 1106)       peppermint oil    hydrALAzine    ipratropium-albuterol    glucose **OR** glucose **OR** glucose-vitamin C **OR** dextrose **OR** glucose **OR** glucose **OR** glucose-vitamin C    acetaminophen **OR** HYDROcodone-acetaminophen **OR** HYDROcodone-acetaminophen    ondansetron    metoclopramide    HYDROmorphone **OR** HYDROmorphone **OR** [DISCONTINUED] HYDROmorphone      Supplementary Documentation:   DVT Mechanical Prophylaxis:   SCDs, Early ambuation  DVT Pharmacologic Prophylaxis   Medication    heparin (Porcine) 72570 units/250mL infusion PE/DVT/THROMBUS CONTINUOUS                Code Status: DNAR/Full Treatment  Arzola: No urinary catheter in place  Arzola Duration (in days):   Central line:    YNES:

## 2025-03-29 NOTE — PLAN OF CARE
A&O. Orders received for precedex gtt overnight. Weaned off this morning. Currently on 5L NC. Maintaining SBP within ordered goal of 90 to 160. NSR overnight when asleep. Two Maisha Arzola removed this morning.

## 2025-03-29 NOTE — PROGRESS NOTES
Patient was stable overnight.  No acute overnight events reported to me.  This morning the patient is down to 6 L nasal cannula breathing comfortably satting 92 to 94%.    On exam he was awake alert oriented.  Says he did not sleep that well although he was given Precedex overnight it did not seem to aid much.  Patient says he just feels tired overall and is hopeful to get out of the hospital soon.    Patient also hoping to have a bowel movement was on the bedpan when I saw him this morning.  Abdomen is soft good pulses in bilateral upper and lower extremities.    His labs show a sodium of 139 potassium 3.9 bicarb of 34 creatinine of 1.4 AST and ALT were relatively normal his ALT was slightly elevated at 64 although downtrending.  White blood cell count was 7 hemoglobin stable at 9.3 and platelet count was 91,000 which is stable to increasing from the 22nd.  He was supratherapeutic and will be adjusted per protocol    In summary this is a 69-year-old male who presents to us with hypoxic respiratory failure after a fenestrated endovascular arch repair and a fenestrated thoracoabdominal aneurysm repair  Problems  Hypoxic respiratory failure improving  Abdominal aortic aneurysm status post aortic dissection previous repair  Mesenteric ischemia status post SMA bypass  Hypertension  GERD  Heavy tobacco use  Acute kidney injury on chronic kidney disease  Uremia    Plan  Neuro  He has [place] for postoperative pain has not needed much  He received Precedex overnight for sedation I do not think he needs Precedex at this point  Can use other sleep aid such as melatonin or trazodone in the future    Cardiovascular  Blood pressure is at target  The patient is on metoprolol 25 mg twice daily    History of prothrombotic state unspecified patient remains on heparin therapeutic    Respiratory  Hypoxic respiratory failure  Improving  Weaning his steroids to 60 daily from 60 twice daily will finish a total of 5 days course he has  received 2 days will get 3 additional days starting today of 60.    Continue use incentive spirometer  Continue to use Acapella  Out of bed into a chair  He is now on 6 L nasal cannula  He probably can ambulate in the ICU with PT OT    Renal metabolic  Creatinine is stable at 1.4 close to his baseline  He does have worsening uremia which could be due to overdiuresis plus or minus steroid use no signs of GI bleeding hemoglobin has been stable  Will follow BUN will reducing his dose of steroids today    Infectious disease  I discontinued meropenem and switched him to cefepime he has 5 further doses of cefepime to complete antibiotics for 7 days  I am going to complete azithromycin for 3 total days his Legionella is negative  Azithromycin also has immunomodulatory activities separate from an antiinfectious properties.    Will continue to follow    Endocrine  No active issues remains on insulin sliding scale remains on 60 of Solu-Medrol    Tubes lines and drains  He has a PICC line who pulled his Arzola yesterday he has no arterial line    Disposition is the ICU for now possibly can be transferred out of the ICU today as his hypoxia has improved we will see how he ambulates and if he is able to get up out of bed and go to CTU 7.  Will discuss with vascular surgery as well    Critical care attending    Date of service was March 29, 2025

## 2025-03-29 NOTE — RESPIRATORY THERAPY NOTE
Patient weaned off of Vapotherm to 6 L nasal cannula this afternoon. Tolerating well. Spo2 at or above 92%.    Vapotherm standby in room    Will continue to follow RT protocol.    Viktoriya Cagle, RRT

## 2025-03-29 NOTE — PROGRESS NOTES
Pulmonary Progress Note        NAME: Cy Monsalve - ROOM: [address] [place], [place] [address]A - MRN: [id number] - Age: 69 year old - : 1955        Last 24hrs: weaned to 6L NC yesterday, feels breathing is better    OBJECTIVE:  Vitals:    25 0400 25 0500 25 0600 25 0700   BP: 113/55 112/47 120/69 123/67   BP Location:       Pulse: 61 71 60 61   Resp: 12 15 12 14   Temp:       TempSrc:       SpO2: 94% 92% 93% 91%   Weight:           Oxygen Therapy  SpO2: 91 %  O2 Device: Nasal cannula  Mode: Spontaneous/Timed  FiO2 (%): 50 %  O2 Flow Rate (L/min): 5 L/min  Pulse Oximetry Type: Continuous  Oximetry Probe Site Changed: No  Pulse Ox Probe Location: Right hand                  Intake/Output Summary (Last 24 hours) at 3/29/2025 0829  Last data filed at 3/29/2025 0640  Gross per 24 hour   Intake 929.3 ml   Output 1950 ml   Net -1020.7 ml       Scheduled Medication:   methylPREDNISolone  60 mg Intravenous Daily    cefepime  2 g Intravenous Q12H    metoprolol tartrate  25 mg Oral 2x Daily(Beta Blocker)    azithromycin  500 mg Intravenous Q24H    fluticasone propionate  1 spray Each Nare Daily    docusate sodium  100 mg Oral BID    polyethylene glycol (PEG 3350)  17 g Oral Daily    insulin aspart  1-5 Units Subcutaneous TID AC and HS    pantoprazole  40 mg Intravenous Q24H     Continuous Infusing Medication:   continuous dose heparin 2,100 Units/hr (25 0750)       Lungs: diminished BS attila  Heart: IR IR no m/r/g  Abdomen: soft, non-tender; bowel sounds normal; no masses,  no organomegaly  Extremities: extremities normal, atraumatic, no cyanosis or edema    Labs reviewed as noted below      Imaging: ct chest reviewed as noted above    ASSESSMENT/PLAN:    S/p complex aortic repair 3/21  RLE weakness and started on high dose steroids per neuro- now discontinued  Wean pressors as tolerated to keep SBP >160  S/p MRI spine.   Neurochecks.   Per vascular surgery.   Lumbar drain removed  Acute Hypoxic Resp  failure  Extubated 3/21 post op.   - due to a combination of Pulm Edema, atelectasis, and PNA  -wean O2 as tolerated  Possible PNA  - janna (3/24- 3/28), azithro added by ICU (3/27- ), cefepime per ICU (3/28-)   -steroids per ICU  -monitor cultures- NGTD  Pulm Edema/Pulm HTN  -elevated RVSP noted on echo from 3/25  -Lasix- held by ICU  -monitor renal fxn- relatively stable  Atelectasis  -reviewed IS with patient and discussed technique  FEN  PO diet  Proph  SCD and subcutaneous heparin.   Dispo  Full code   Will follow.

## 2025-03-29 NOTE — PLAN OF CARE
AOx4. 5L O2, unable to wean. Congested productive cough. Sinus rhythm on tele, irregular rate, multiple PVCs. Normotensive. Afebrile. Due to void s/p moon removal, managing per retention protocol, see flowsheets. BM x2. Tolerates diet. Reports pain to abdomen, control with norco.

## 2025-03-29 NOTE — PROGRESS NOTES
[facility]   Division of Cardiology   Progress Note     Cy Monsalve Patient Status:  Inpatient    1955 MRN [id number]   Location [facility] 4SW-A Attending Neil Oquendo MD   Hosp Day # 9 PCP Kam Alberts MD     Reason for consult: Post op, arrhythmias    Primary cardiologist: [place]        Subjective:  No overnight events.  Now sinus.  No cardiac complaints.       Physical Exam:  Blood pressure 144/57, pulse 73, temperature 98 °F (36.7 °C), temperature source Temporal, resp. rate 15, weight 193 lb 12.6 oz (87.9 kg), SpO2 91%.  Temp (24hrs), Av.3 °F (36.8 °C), Min:98 °F (36.7 °C), Max:98.6 °F (37 °C)    Wt Readings from Last 3 Encounters:   25 193 lb 12.6 oz (87.9 kg)   25 153 lb 3.5 oz (69.5 kg)   25 185 lb (83.9 kg)       General: Awake and alert; in no acute distress  HEENT: Extraocular movements are intact; sclerae are anicteric; scalp is atraumatic  Neck: Supple; no JVD; no carotid bruits  Cardiac: Regular, with frequent ectopic beats, tachy, normal S1 and S2, no murmurs, rubs, or gallops are appreciated  Lungs: Clear to auscultation bilaterally; no accessory muscle use is noted, no wheezes, rhonci or rales  Abdomen: Soft, non-distended, non-tender; bowel sounds are normoactive  Extremities: Warm, no edema, clubbing or cyanosis; moves all 4 extremities normally, distal pulses intact and equal  Psychiatric: Normal mood and affect; answers questions appropriately  Dermatologic: No rashes; normal skin turgor       Diagnostic testing:  Labs:   Lab Results   Component Value Date    INR 1.53 (H) 2025    INR 1.53 (H) 2025        Lab Results   Component Value Date    WBC 7.4 2025    HGB 9.3 2025    HCT 27.0 2025    PLT 91.0 2025    CREATSERUM 1.47 2025    BUN 69 2025     2025    K 3.9 2025    CL 99 2025    CO2 34.0 2025     2025    CA 8.0  03/29/2025    ALB 2.8 03/29/2025    ALKPHO 63 03/29/2025    BILT 1.0 03/29/2025    TP 5.1 03/29/2025    AST 26 03/29/2025    ALT 64 03/29/2025    PTT 73.1 03/29/2025    MG 2.2 03/29/2025    PGLU 183 03/29/2025        Cardiac diagnostics:  EKG 3/28/2025: ST.  PVCs.  NSST.  Echo 3/25/25  1. Left ventricle: The cavity size was normal. Wall thickness was mildly      increased. Systolic function was vigorous. The estimated ejection      fraction was 65-70%.   2. Right ventricle: The cavity size was normal. Systolic function was      normal. Systolic pressure was moderately increased.   3. Aortic valve: Well visualized. No significant valve disease. Transvalvular velocity was minimally increased. There was no significant evidence for stenosis. The peak systolic velocity was 1.78m/sec. The mean systolic gradient was 7mm Hg.   4. Pulmonary arteries: Systolic pressure was moderately increased. The peak systolic pressure is 45mm Hg.   5. Pericardium, extracardiac: There was no pericardial effusion.        Impression:  Complex aortic history:   Aortic dissection - type A, s/p bioAVR, graft and hemiarch repair 10/2019, [facility]. Then underwent repair of abdominal aortic dissection. Suffered from mesenteric ischemia requiring subtotal colectomy with colostomy. Also subsequently required amputation of toes due to ischemia. Episodes deemed provoked by surgery. Subsequently underwent reversal of colostomy. Was discharged on anticoagulation. Embolic events - s/p amputation 4.5 toes on left, tip of big toe on R, 2/3 of his colon (post op aortic surgery 10/19), and 3 feet of small bowel (10/23). On coumadin.   H/o EVAR 2018    Current issues  CTA 12/24 reviewed, \"New discontinuity of the left internal iliac stents with focal contrast extravasation suggestive of type III endoleak. Excluded aneurysm sac has increased to 4.3 cm compared to 3.2 cm on 10/2023 study. Slight increase in additional aneurysms/excluded aneurysm sacs  involving the bilateral internal iliac arteries\"  -->  s/p 3/21/25 fenestrated endovascular arch repair with fenestrated thoracoabdominal aneurysm repair, left carotid to subclavian transposition with endovascular septotomy of dissection with Dr. Oquendo and Dr. Yanez.     Hypoxic resp failure  - PNA, ateletasis    Sinus tach / PSVT / frequent PACs, PVCs    HOLLIS - Cr 2, improving    Chronic issues  CAD - mild CAC on CT 12/24. No symptoms. Nuc stress 1/2025 normal   Nephrolithiasis - 12/4/2023   Renal artery stenosis s/p stent 12/2023  DVT - 1/2024 venous Doppler bilateral done for bilateral edema-no acute DVT. 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  -> on coumadin usually, thought to have hypercoag state  Heterozygous prothrombin gene mutation   PAF - per records transient episode post op 2019, no documented recurrence.   Smoking - not motivated to quit.   COPD    Recommendations:  Metoprolol 25mg BID  Continue IV heparin. Coumadin on hold presently, restart per vascular.   Abx and steroids per pulm/crit care team         Brendan Medrano MD  General, Interventional  Structural & Endovascular  Cardiology

## 2025-03-30 LAB
ALBUMIN SERPL-MCNC: 3.2 G/DL (ref 3.2–4.8)
ALBUMIN/GLOB SERPL: 1.5 {RATIO} (ref 1–2)
ALP LIVER SERPL-CCNC: 69 U/L
ALT SERPL-CCNC: 77 U/L
ANION GAP SERPL CALC-SCNC: 7 MMOL/L (ref 0–18)
APTT PPP: 105.1 SECONDS (ref 23–36)
APTT PPP: 199 SECONDS (ref 23–36)
APTT PPP: 90.6 SECONDS (ref 23–36)
AST SERPL-CCNC: 30 U/L (ref ?–34)
BASOPHILS # BLD AUTO: 0.02 X10(3) UL (ref 0–0.2)
BASOPHILS NFR BLD AUTO: 0.2 %
BILIRUB SERPL-MCNC: 1 MG/DL (ref 0.2–1.1)
BUN BLD-MCNC: 65 MG/DL (ref 9–23)
CALCIUM BLD-MCNC: 8.4 MG/DL (ref 8.7–10.6)
CHLORIDE SERPL-SCNC: 100 MMOL/L (ref 98–112)
CO2 SERPL-SCNC: 34 MMOL/L (ref 21–32)
CREAT BLD-MCNC: 1.34 MG/DL
EGFRCR SERPLBLD CKD-EPI 2021: 57 ML/MIN/1.73M2 (ref 60–?)
EOSINOPHIL # BLD AUTO: 0 X10(3) UL (ref 0–0.7)
EOSINOPHIL NFR BLD AUTO: 0 %
ERYTHROCYTE [DISTWIDTH] IN BLOOD BY AUTOMATED COUNT: 15.3 %
GLOBULIN PLAS-MCNC: 2.1 G/DL (ref 2–3.5)
GLUCOSE BLD-MCNC: 110 MG/DL (ref 70–99)
GLUCOSE BLD-MCNC: 132 MG/DL (ref 70–99)
GLUCOSE BLD-MCNC: 138 MG/DL (ref 70–99)
GLUCOSE BLD-MCNC: 146 MG/DL (ref 70–99)
GLUCOSE BLD-MCNC: 186 MG/DL (ref 70–99)
HCT VFR BLD AUTO: 27.2 %
HGB BLD-MCNC: 9.3 G/DL
IMM GRANULOCYTES # BLD AUTO: 0.14 X10(3) UL (ref 0–1)
IMM GRANULOCYTES NFR BLD: 1.5 %
LYMPHOCYTES # BLD AUTO: 0.3 X10(3) UL (ref 1–4)
LYMPHOCYTES NFR BLD AUTO: 3.3 %
MAGNESIUM SERPL-MCNC: 2.3 MG/DL (ref 1.6–2.6)
MCH RBC QN AUTO: 28.9 PG (ref 26–34)
MCHC RBC AUTO-ENTMCNC: 34.2 G/DL (ref 31–37)
MCV RBC AUTO: 84.5 FL
MONOCYTES # BLD AUTO: 0.92 X10(3) UL (ref 0.1–1)
MONOCYTES NFR BLD AUTO: 10.2 %
NEUTROPHILS # BLD AUTO: 7.68 X10 (3) UL (ref 1.5–7.7)
NEUTROPHILS # BLD AUTO: 7.68 X10(3) UL (ref 1.5–7.7)
NEUTROPHILS NFR BLD AUTO: 84.8 %
OSMOLALITY SERPL CALC.SUM OF ELEC: 313 MOSM/KG (ref 275–295)
PLATELET # BLD AUTO: 81 10(3)UL (ref 150–450)
PLATELETS.RETICULATED NFR BLD AUTO: 8.9 % (ref 0–7)
POTASSIUM SERPL-SCNC: 3.9 MMOL/L (ref 3.5–5.1)
PROT SERPL-MCNC: 5.3 G/DL (ref 5.7–8.2)
RBC # BLD AUTO: 3.22 X10(6)UL
SODIUM SERPL-SCNC: 141 MMOL/L (ref 136–145)
WBC # BLD AUTO: 9.1 X10(3) UL (ref 4–11)

## 2025-03-30 PROCEDURE — 99232 SBSQ HOSP IP/OBS MODERATE 35: CPT | Performed by: EMERGENCY MEDICINE

## 2025-03-30 RX ORDER — WARFARIN SODIUM 7.5 MG/1
7.5 TABLET ORAL
Status: COMPLETED | OUTPATIENT
Start: 2025-03-30 | End: 2025-03-30

## 2025-03-30 RX ORDER — AMLODIPINE BESYLATE 10 MG/1
10 TABLET ORAL DAILY
Status: DISCONTINUED | OUTPATIENT
Start: 2025-03-30 | End: 2025-04-18

## 2025-03-30 RX ORDER — WARFARIN SODIUM 5 MG/1
5 TABLET ORAL NIGHTLY
Status: DISCONTINUED | OUTPATIENT
Start: 2025-03-30 | End: 2025-03-30 | Stop reason: DRUGHIGH

## 2025-03-30 NOTE — PLAN OF CARE
Pt aox4, follows commands. Neurovascular checks intact. Tolerating diet. Encouraged out of bed. Pt up in chair. BP elevated, MD aware see mar.

## 2025-03-30 NOTE — PROGRESS NOTES
Patient looks well this morning.  Says he feels \"great.\"  Says he feels the best that he has since has been in the hospital and is ready to get up and start working with PT and OT today.    On exam he is awake alert oriented in no distress.  His abdomen is soft nontender nondistended.  He has good pulses in bilateral upper and lower extremities.  Has been going to the bathroom.  He is down to 4 L oxygen.    His labs show a sodium of 141 potassium 3.9 bicarb of 34 creatinine of 1.34 slight downtrend in his BUN today to 65 as we backed off diuretics and steroids.  PTT is 199 adjusting his heparin drip accordingly.    White blood cell count is 9 hemoglobin is 9 platelet count is 81,000 which is a drop off from admission of at least 50% does seem to be somewhat consumptive however and is not dropping further.  I do not think this is heparin-induced thrombocytopenia at this time.  We will follow-up.    In summary 69-year-old male presents to us with hypoxic respiratory failure after fenestrated endovascular arch repair and fenestrated thoracoabdominal aneurysm repair    Problems  Hypoxic respiratory failure  Abdominal aortic aneurysm status post dissection and repair  Mesenteric ischemia  Thrombocytopenia  Hypertension  GERD  Acute kidney injury improving  Uremia improving    Plan  Patient's pain is well-controlled  Trazodone at night for sleep aid    Cardiovascular blood pressure is at target  He remains on metoprolol  Remains on heparin drip  He does have thrombocytopenia and like to follow-up CBC later at the [facility] platelet count is still dropping then we may consider obtaining a PF4 to rule out heparin-induced thrombocytopenia.    Respiratory  Hypoxic respiratory failure  Weaning his steroids currently on 60 mg of methylprednisolone and going to give him 2 more days of Methylpred    Incentive spirometer  Out of bed to a chair  Lilliam  On 5 L now down to 4 L nasal cannula  Working with PT and OT    Renal  metabolic  Creatinine is stable    Infectious disease  He remains on cefepime he is finishing a total of 7 days  Azithromycin follows up today    Endocrine  He is on Solu-Medrol 60 for 2 days  He is on an insulin sliding scale    Tubes lines and drains  He has a PICC line  No Arzola    Disposition is the ICU  If okay with vascular transfer to CTU 7 today    Date of service is March 30, 2025

## 2025-03-30 NOTE — PROGRESS NOTES
[facility] Pharmacy Dosing Service  Warfarin (Coumadin) Initial Dosing    Cy Monsalve is a 69 year old patient for whom pharmacy has been consulted to dose warfarin (COUMADIN) for  hypercoagulable state, h/o VTE by Dr. Oquendo.  Based on this indication, goal INR is 2-3.    Pertinent Patient Medical History:  DVT, aortic dissection, SMA bypass for mesenteric ischemia, HTN  Potential Drug Interactions:  Cefepime, Zithromax    Latest INR: No results for input(s): \"INR\" in the last 72 hours.    Other Anticoagulants:  heparin drip  Home regimen (if applicable):  6mg Fri, Sa; 4mg ROW   Date/Time last dose was given (if applicable): ~ 3/16    Based on above -  1.  For today, Give warfarin (COUMADIN) 7.5 mg at 2100 tonight    2.  PT/INR ordered daily while on warfarin    3.  Pharmacy will continue to follow.  We appreciate the opportunity to assist in the care of this patient.    Zhanna Sam, PharmD  3/30/2025  1:27 PM

## 2025-03-30 NOTE — PLAN OF CARE
A&O. 5L NC. Heparin gtt per PE/DVT protocol. 2 loose/soft Bms. Voiding via primofit. Neurovascular checks unchanged.

## 2025-03-30 NOTE — PROGRESS NOTES
Doing well  Down to 4 L oxygen    Groin soft  Neck incision healing well    If doing well tomorrow will transfer to telemetry  Start Coumadin tonight

## 2025-03-30 NOTE — PROGRESS NOTES
Pulmonary Progress Note        NAME: Cy Monsalve - ROOM: [address] [place], [place] [address]A - MRN: [id number] - Age: 69 year old - : 1955        Last 24hrs: weaned to 4L, satting in the low 90s    OBJECTIVE:  Vitals:    25 0300 25 0400 25 0530 25 0700   BP: 134/61 134/73 143/71 144/68   BP Location:  Left arm     Pulse: 65 62 84 66   Resp:    Temp:  99.1 °F (37.3 °C)     TempSrc:  Temporal     SpO2: 96% 96% 94% 95%   Weight:           Oxygen Therapy  SpO2: 95 %  O2 Device: Nasal cannula  Mode: Spontaneous/Timed  FiO2 (%): 50 %  O2 Flow Rate (L/min): 4 L/min  Pulse Oximetry Type: Continuous  Oximetry Probe Site Changed: No  Pulse Ox Probe Location: Left hand                  Intake/Output Summary (Last 24 hours) at 3/30/2025 0910  Last data filed at 3/30/2025 0544  Gross per 24 hour   Intake 859.6 ml   Output 1400 ml   Net -540.4 ml       Scheduled Medication:   methylPREDNISolone  60 mg Intravenous Daily    cefepime  2 g Intravenous Q12H    metoprolol tartrate  25 mg Oral 2x Daily(Beta Blocker)    fluticasone propionate  1 spray Each Nare Daily    docusate sodium  100 mg Oral BID    polyethylene glycol (PEG 3350)  17 g Oral Daily    insulin aspart  1-5 Units Subcutaneous TID AC and HS     Continuous Infusing Medication:   continuous dose heparin 1,850 Units/hr (25 0704)       Lungs: diminished BS attila  Heart: few ectopic beats no m/r/g  Abdomen: soft, non-tender; bowel sounds normal; no masses,  no organomegaly  Extremities: extremities normal, atraumatic, no cyanosis or edema    Labs reviewed as noted below      Imaging: ct chest reviewed as noted above    ASSESSMENT/PLAN:    S/p complex aortic repair 3/21  RLE weakness and started on high dose steroids per neuro- now discontinued  Wean pressors as tolerated to keep SBP >160  S/p MRI spine.   Neurochecks.   Per vascular surgery.   Lumbar drain removed  Acute Hypoxic Resp failure  Extubated 3/21 post op.   -due to a combination of Pulm  Edema, atelectasis, and PNA  -wean O2 as tolerated  Possible PNA  - janna (3/24- 3/28), azithro added by ICU (3/27- ), cefepime per ICU (3/28-)   -steroids per ICU  -monitor cultures- NGTD  Pulm Edema/Pulm HTN  -elevated RVSP noted on echo from 3/25  -Lasix- held by ICU  Atelectasis  -reviewed IS with patient and discussed technique  FEN  PO diet  Proph  SCD and subcutaneous heparin.   Dispo  Full code   Will follow.   -stable for floor

## 2025-03-31 LAB
ALBUMIN SERPL-MCNC: 3.2 G/DL (ref 3.2–4.8)
ALBUMIN/GLOB SERPL: 1.5 {RATIO} (ref 1–2)
ALP LIVER SERPL-CCNC: 74 U/L
ALT SERPL-CCNC: 68 U/L
ANION GAP SERPL CALC-SCNC: 6 MMOL/L (ref 0–18)
APTT PPP: 95.7 SECONDS (ref 23–36)
AST SERPL-CCNC: 19 U/L (ref ?–34)
BILIRUB SERPL-MCNC: 1.2 MG/DL (ref 0.2–1.1)
BUN BLD-MCNC: 51 MG/DL (ref 9–23)
CALCIUM BLD-MCNC: 8.3 MG/DL (ref 8.7–10.6)
CHLORIDE SERPL-SCNC: 103 MMOL/L (ref 98–112)
CO2 SERPL-SCNC: 32 MMOL/L (ref 21–32)
CREAT BLD-MCNC: 1.25 MG/DL
EGFRCR SERPLBLD CKD-EPI 2021: 62 ML/MIN/1.73M2 (ref 60–?)
ERYTHROCYTE [DISTWIDTH] IN BLOOD BY AUTOMATED COUNT: 15.4 %
GLOBULIN PLAS-MCNC: 2.2 G/DL (ref 2–3.5)
GLUCOSE BLD-MCNC: 103 MG/DL (ref 70–99)
GLUCOSE BLD-MCNC: 106 MG/DL (ref 70–99)
GLUCOSE BLD-MCNC: 130 MG/DL (ref 70–99)
GLUCOSE BLD-MCNC: 257 MG/DL (ref 70–99)
GLUCOSE BLD-MCNC: 92 MG/DL (ref 70–99)
HCT VFR BLD AUTO: 29.1 %
HGB BLD-MCNC: 9.6 G/DL
INR BLD: 1.34 (ref 0.8–1.2)
MCH RBC QN AUTO: 28.2 PG (ref 26–34)
MCHC RBC AUTO-ENTMCNC: 33 G/DL (ref 31–37)
MCV RBC AUTO: 85.6 FL
OSMOLALITY SERPL CALC.SUM OF ELEC: 306 MOSM/KG (ref 275–295)
PLATELET # BLD AUTO: 86 10(3)UL (ref 150–450)
PLATELETS.RETICULATED NFR BLD AUTO: 8.5 % (ref 0–7)
POTASSIUM SERPL-SCNC: 3.9 MMOL/L (ref 3.5–5.1)
PROT SERPL-MCNC: 5.4 G/DL (ref 5.7–8.2)
PROTHROMBIN TIME: 16.7 SECONDS (ref 11.6–14.8)
RBC # BLD AUTO: 3.4 X10(6)UL
SODIUM SERPL-SCNC: 141 MMOL/L (ref 136–145)
WBC # BLD AUTO: 14 X10(3) UL (ref 4–11)

## 2025-03-31 PROCEDURE — 99233 SBSQ HOSP IP/OBS HIGH 50: CPT | Performed by: EMERGENCY MEDICINE

## 2025-03-31 RX ORDER — WARFARIN SODIUM 7.5 MG/1
7.5 TABLET ORAL
Status: COMPLETED | OUTPATIENT
Start: 2025-03-31 | End: 2025-03-31

## 2025-03-31 RX ORDER — DOXEPIN HYDROCHLORIDE 50 MG/1
1 CAPSULE ORAL DAILY
Status: DISCONTINUED | OUTPATIENT
Start: 2025-04-01 | End: 2025-04-18

## 2025-03-31 NOTE — PROGRESS NOTES
Doing well   On tele   Has palpable post tib bilateral   Still on oxygen     Continue physical therapy   Must shower daily

## 2025-03-31 NOTE — OCCUPATIONAL THERAPY NOTE
Attempted to see patient for OT services this pm. Patient with visitors at bedside, politely requesting therapist return later after visit is completed. Will reattempt as able. RN aware.

## 2025-03-31 NOTE — PLAN OF CARE
A&Ox4. 3L NC. Bps WDL. Cold left foot and unable to doppler a pedal pulse at around 2200. Good strength, denies pain/numbness/tingling to LLE. MD notified. Bilateral groin sites remain bruised and soft. Heparin gtt infusing per PE/DVT protocol. aPTT therapeutic. Patient received first dose of coumadin last night. Two BM's. Voiding via primofit. Able to doppler left pedal pulse and palpable +2 pos tib pulse at 0400 assessment. Strength remains the same and pt continues to deny any changes in sensation.

## 2025-03-31 NOTE — PROGRESS NOTES
[facility] Cardiology  Consultation Note      Cy Monsalve Patient Status:  Inpatient    1955 MRN [id number]   Location [facility] 4SW-A Attending Neil Oquendo MD   Hosp Day # 11 PCP Kam Alberts MD     Reason for consult: Post op, arrhythmias    Events: Stable. Denies CP or SOB. Some L foot pain.     Primary cardiologist: Russell     History of Present Illness:  Cy Monsalve is a 69 year old male who presented to [facility] on 3/19/2025 for elective complex aortic surgery, see below. Complicated aortic history involving multiple surgeries, AVR, complications etc. Cardiology consulted regarding tachyarhythmias seen on tele. He has felt some palpitations with this. Denies CP. Has needed O2 post op but denies SOB at present. Has been smoking. Being treated for PNA with Abx and steroids. Lasix on hold and has received IVF for HOLLIS, now improving. He is usually on coumadin and is now on heparin gtt.     Medications:  Current Facility-Administered Medications   Medication Dose Route Frequency    [START ON 2025] multivitamin (Tab-A-Immanuel/Beta Carotene) tab 1 tablet  1 tablet Oral Daily    warfarin (Coumadin) tab 7.5 mg  7.5 mg Oral Once at night    amLODIPine (Norvasc) tab 10 mg  10 mg Oral Daily    peppermint oil liquid 1 mL  1 mL Other PRN    metoprolol tartrate (Lopressor) tab 25 mg  25 mg Oral 2x Daily(Beta Blocker)    heparin (Porcine) 88275 units/250mL infusion PE/DVT/THROMBUS CONTINUOUS  200-3,000 Units/hr Intravenous Continuous    hydrALAzine (Apresoline) 20 mg/mL injection 10 mg  10 mg Intravenous Q4H PRN    fluticasone propionate (Flonase) 50 MCG/ACT nasal suspension 1 spray  1 spray Each Nare Daily    ipratropium-albuterol (Duoneb) 0.5-2.5 (3) MG/3ML inhalation solution 3 mL  3 mL Nebulization Q4H PRN    docusate sodium (Colace) cap 100 mg  100 mg Oral BID    polyethylene glycol (PEG 3350) (Miralax) 17 g oral packet 17 g  17 g Oral Daily    glucose (Dex4) 15 GM/59ML  oral liquid 15 g  15 g Oral Q15 Min PRN    Or    glucose (Glutose) 40% oral gel 15 g  15 g Oral Q15 Min PRN    Or    glucose-vitamin C (Dex-4) chewable tab 4 tablet  4 tablet Oral Q15 Min PRN    Or    dextrose 50% injection 50 mL  50 mL Intravenous Q15 Min PRN    Or    glucose (Dex4) 15 GM/59ML oral liquid 30 g  30 g Oral Q15 Min PRN    Or    glucose (Glutose) 40% oral gel 30 g  30 g Oral Q15 Min PRN    Or    glucose-vitamin C (Dex-4) chewable tab 8 tablet  8 tablet Oral Q15 Min PRN    insulin aspart (NovoLOG) 100 Units/mL FlexPen 1-5 Units  1-5 Units Subcutaneous TID AC and HS    acetaminophen (Tylenol) tab 650 mg  650 mg Oral Q4H PRN    Or    HYDROcodone-acetaminophen (Norco) 5-325 MG per tab 1 tablet  1 tablet Oral Q4H PRN    Or    HYDROcodone-acetaminophen (Norco) 5-325 MG per tab 2 tablet  2 tablet Oral Q4H PRN    ondansetron (Zofran) 4 MG/2ML injection 4 mg  4 mg Intravenous Q6H PRN    metoclopramide (Reglan) 5 mg/mL injection 10 mg  10 mg Intravenous Q8H PRN    HYDROmorphone (Dilaudid) 1 MG/ML injection 0.2 mg  0.2 mg Intravenous Q2H PRN    Or    HYDROmorphone (Dilaudid) 1 MG/ML injection 0.4 mg  0.4 mg Intravenous Q2H PRN       Past Medical History:    AAA (abdominal aortic aneurysm)    Abdominal aneurysm    Abdominal aortic aneurysm (AAA)    AAA surgery done    Anxiety state    Back pain    Naproxen twice a week    Back problem    minor    Calculus of kidney    Deep vein thrombosis (HCC)    to colon    Esophageal reflux    Hearing impairment    Lt ear tinnitus    High blood pressure    History of recent hospitalization    10/2019- was at [facility] x5 weeks (ICU) with Dissected Aorta/ Post op colon problem/and post op cardiac arrest    Ileostomy present (HCC)    Peripheral vascular disease    Unspecified essential hypertension    Visual impairment    glasses       Past Surgical History:   Procedure Laterality Date    Colonoscopy  10/15/09  [facility]    Screening: small polypoid fold in sigmoid (no polyp  tissue seen on path), int hemorrhoids; next colonoscopy in 2019    Other  2017    AAA repair    Other      10/23/19- Dissected Aorta surgery    Other surgical history      Eye surgery for lazy eye    Other surgical history  10/15/09  [facility]    EGD (heartburn): normal    Other surgical history      AAA repair    Other surgical history      multiple toe amputations    Part removal colon w end colostomy      10/2019       Family History  family history includes Diabetes in his father; Heart Disorder in his father and mother; Hypertension in his sister.    Social History   reports that he has been smoking cigars and cigarettes. He has never used smokeless tobacco. He reports current alcohol use. He reports that he does not use drugs.     Allergies  Allergies[1]    Review of Systems:  As per HPI, otherwise 10 point ROS is negative in detail.    Physical Exam:  Blood pressure 153/87, pulse 104, temperature 98.3 °F (36.8 °C), temperature source Oral, resp. rate 19, weight 199 lb 15.3 oz (90.7 kg), SpO2 95%.  Temp (24hrs), Av.5 °F (36.9 °C), Min:97.8 °F (36.6 °C), Max:99.4 °F (37.4 °C)    Wt Readings from Last 3 Encounters:   25 199 lb 15.3 oz (90.7 kg)   25 153 lb 3.5 oz (69.5 kg)   25 185 lb (83.9 kg)       General: Awake and alert; in no acute distress  HEENT: Extraocular movements are intact; sclerae are anicteric; scalp is atraumatic  Neck: Supple; no JVD; no carotid bruits  Cardiac: Regular, with frequent ectopic beats, tachy, normal S1 and S2, no murmurs, rubs, or gallops are appreciated  Lungs: Clear to auscultation bilaterally; no accessory muscle use is noted, no wheezes, rhonci or rales  Abdomen: Soft, non-distended, non-tender; bowel sounds are normoactive  Extremities: Warm, no edema, clubbing or cyanosis; moves all 4 extremities normally, distal pulses intact and equal  Psychiatric: Normal mood and affect; answers questions appropriately  Dermatologic: No rashes; normal skin  turgor    Diagnostic testing:    Labs:   Lab Results   Component Value Date    INR 1.34 (H) 03/31/2025    INR 1.53 (H) 03/26/2025        Lab Results   Component Value Date    WBC 14.0 03/31/2025    HGB 9.6 03/31/2025    HCT 29.1 03/31/2025    PLT 86.0 03/31/2025    CREATSERUM 1.25 03/31/2025    BUN 51 03/31/2025     03/31/2025    K 3.9 03/31/2025     03/31/2025    CO2 32.0 03/31/2025     03/31/2025    CA 8.3 03/31/2025    ALB 3.2 03/31/2025    ALKPHO 74 03/31/2025    BILT 1.2 03/31/2025    TP 5.4 03/31/2025    AST 19 03/31/2025    ALT 68 03/31/2025    PTT 95.7 03/31/2025    INR 1.34 03/31/2025    PTP 16.7 03/31/2025    PGLU 130 03/31/2025       Cardiac diagnostics:    EKG 3/28/2025:   Sinus tachycardia with frequent Premature ventricular complexes   Nonspecific ST abnormality     Echo 3/25/25  1. Left ventricle: The cavity size was normal. Wall thickness was mildly      increased. Systolic function was vigorous. The estimated ejection      fraction was 65-70%.   2. Right ventricle: The cavity size was normal. Systolic function was      normal. Systolic pressure was moderately increased.   3. Aortic valve: Well visualized. No significant valve disease.      Transvalvular velocity was minimally increased. There was no significant      evidence for stenosis. The peak systolic velocity was 1.78m/sec. The mean      systolic gradient was 7mm Hg.   4. Pulmonary arteries: Systolic pressure was moderately increased. The peak      systolic pressure is 45mm Hg.   5. Pericardium, extracardiac: There was no pericardial effusion.       Impression:  Complex aortic history:   Aortic dissection - type A, s/p bioAVR, graft and hemiarch repair 10/2019, [facility]. Then underwent repair of abdominal aortic dissection. Suffered from mesenteric ischemia requiring subtotal colectomy with colostomy. Also subsequently required amputation of toes due to ischemia. Episodes deemed provoked by surgery. Subsequently  underwent reversal of colostomy. Was discharged on anticoagulation. Embolic events - s/p amputation 4.5 toes on left, tip of big toe on R, 2/3 of his colon (post op aortic surgery 10/19), and 3 feet of small bowel (10/23). On coumadin.   H/o EVAR 2018    Current issues  CTA 12/24 reviewed, \"New discontinuity of the left internal iliac stents with focal contrast extravasation suggestive of type III endoleak. Excluded aneurysm sac has increased to 4.3 cm compared to 3.2 cm on 10/2023 study. Slight increase in additional aneurysms/excluded aneurysm sacs involving the bilateral internal iliac arteries\"  -->  s/p 3/21/25 fenestrated endovascular arch repair with fenestrated thoracoabdominal aneurysm repair, left carotid to subclavian transposition with endovascular septotomy of dissection with Dr. Oquendo and Dr. Yanez.     Hypoxic resp failure  - PNA, ateletasis    Sinus tach / PSVT / frequent PACs, PVCs    HOLLIS - Cr 2, improving    Chronic issues  CAD - mild CAC on CT 12/24. No symptoms. Nuc stress 1/2025 normal   H/o hematuria - kidney stones - 12/4/2023   Renal artery stenosis s/p stent 12/2023  DVT - 1/2024 venous Doppler bilateral done for bilateral edema-no acute DVT. 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  -> on coumadin usually, thought to have hypercoag state  Heterozygous prothrombin gene mutation   PAF - per records transient episode post op 2019, no documented recurrence.   Smoking - not motivated to quit.   COPD    Recommendations:  Continue metoprolol 25mg BID  Continue IV heparin. Coumadin on hold presently.   Lasix on hold. Follow renal function.   Wean O2. S/p Abx   Steroids per pulm/crit care team    Thank you for allowing our practice to participate in the care of your patient. Please do not hesitate to contact me if you have any questions.    Wilbur Wells MD  Interventional Cardiology  [facility]  Office: [phone]       [1]   Allergies  Allergen Reactions    Amoxicillin ITCHING      Itchiness on hands and feet      Clindamycin OTHER (SEE COMMENTS)     Upset stomach

## 2025-03-31 NOTE — PROGRESS NOTES
S: He is feeling ok. He has a cough. He's not having too much dyspnea    Meds:   [START ON 4/1/2025] multivitamin  1 tablet Oral Daily    amLODIPine  10 mg Oral Daily    metoprolol tartrate  25 mg Oral 2x Daily(Beta Blocker)    fluticasone propionate  1 spray Each Nare Daily    docusate sodium  100 mg Oral BID    polyethylene glycol (PEG 3350)  17 g Oral Daily    insulin aspart  1-5 Units Subcutaneous TID AC and HS       Prn Meds:    peppermint oil    hydrALAzine    ipratropium-albuterol    glucose **OR** glucose **OR** glucose-vitamin C **OR** dextrose **OR** glucose **OR** glucose **OR** glucose-vitamin C    acetaminophen **OR** HYDROcodone-acetaminophen **OR** HYDROcodone-acetaminophen    ondansetron    metoclopramide    HYDROmorphone **OR** HYDROmorphone **OR** [DISCONTINUED] HYDROmorphone    Infusions:   continuous dose heparin 1,700 Units/hr (03/30/25 2349)       OBJECTIVE:  Vitals:    03/31/25 0900 03/31/25 1004 03/31/25 1100 03/31/25 1210   BP: 153/74 149/72 153/85 158/74   Pulse: 72 91 99 94   Resp: 14 15 22 16   Temp: 98 °F (36.7 °C)   98.7 °F (37.1 °C)   TempSrc: Temporal      SpO2: 93% 95% 90% 94%   Weight:         O2: 3LPM    Gen - Alert, oriented x 3, in no apparent distress  Lungs - CTAB  CV - regular rate & rhythm. Normal S1, S2. No murmurs   Extremities - No cyanosis, clubbing, edema appreciated.      Labs:  Recent Labs   Lab 03/29/25  0407 03/30/25  0417 03/31/25  0511   WBC 7.4 9.1 14.0*   HGB 9.3* 9.3* 9.6*   PLT 91.0* 81.0* 86.0*     Recent Labs   Lab 03/25/25  0444 03/25/25  2021 03/26/25  0417 03/26/25  1426 03/27/25  0507 03/27/25  1611 03/28/25  0404 03/29/25  0407 03/30/25  0417 03/31/25  0511      < > 141   < > 140   < > 139 139 141 141   K 3.8  3.8   < > 3.6  3.6   < > 4.0  4.0   < > 4.0 3.9 3.9 3.9      < > 99   < > 97*   < > 97* 99 100 103   CO2 27.0   < > 33.0*   < > 36.0*   < > 35.0* 34.0* 34.0* 32.0   BUN 35*   < > 47*   < > 68*   < > 70* 69* 65* 51*    CREATSERUM 1.45*   < > 1.57*   < > 1.59*   < > 1.47* 1.47* 1.34* 1.25   *   < > 141*   < > 154*   < > 151* 154* 132* 106*   ANIONGAP 9   < > 9   < > 7   < > 7 6 7 6   ALB 3.8   < >  --   --  3.3  --  3.1* 2.8* 3.2 3.2   CA 8.4*   < > 8.6*   < > 8.4*   < > 8.4* 8.0* 8.4* 8.3*   MG 1.8   < > 2.0  --  2.1   < > 2.1 2.2 2.3  --    PHOS 1.7*  --  3.4  --  3.8  --   --   --   --   --    TP  --    < >  --   --   --   --  5.4* 5.1* 5.3* 5.4*    < > = values in this interval not displayed.     Recent Labs   Lab 03/29/25  0407 03/30/25 0417 03/31/25  0511   ALKPHO 63 69 74   AST 26 30 19   ALT 64* 77* 68*   BILT 1.0 1.0 1.2*     Recent Labs   Lab 03/25/25  0826 03/26/25  0417 03/26/25  1426 03/30/25  1159 03/30/25  1948 03/31/25  0605   INR 1.53* 1.53*  --   --   --  1.34*   PTT  --   --    < > 105.1* 90.6* 95.7*    < > = values in this interval not displayed.     Recent Labs   Lab 03/26/25  0417 03/27/25  1320   CRP  --  15.80*   PCT 0.82* 0.64*     Recent Labs   Lab 03/25/25  1447   ABGPHT 7.47*   CIJHTL7L 43   IBTLW7L 104*   ABGHCO3 30.3*   ABGBE 6.9*   LACTIBG 2.2*       Recent Labs   Lab 03/26/25  0812 03/27/25  1239   COVID19 Not Detected  --    INFAPCR Not Detected  --    INFBPCR Not Detected  --    STREPPNEUMAG  --  Negative   LEGIONAG  --  Negative       Imaging reviewed    ASSESSMENT AND PLAN      Cy Monsalve is a 69 year old man with a history of aortic dissection, DVT, pAF, CAD, who was admitted 3/19 for complex aortic aneurysm repair    Aortic aneurysm - s/p complex aortic repair 3/21  -per vascular surgery  Acute hypoxemic respiratory failure - after vascular surgery; due to a combination of Pulm Edema, atelectasis, and PNA  -wean O2 as tolerated  -antibiotics completed  -encourage IS use  Possible pneumonia - CT Scan showed patchy ground glass opacities bilaterally  -treated with janna (3/24- 3/28), azithro (3/27-3/29 ), then cefepime (3/28-3/30)   -completed steroids per ICU team  Pulm  Edema/Pulm hypertension - elevated RVSP noted on echo 3/25  -Lasix prn   pAfib  -per cardiology  History of DVT - normally on coumadin  -IV heparin  Proph  -patient is on IV heparin.   Dispo  -Full code   -stable for floor    We will continue to follow with you     Neftaly Jacobs M.D.  Pulmonary/Critical Care

## 2025-03-31 NOTE — PLAN OF CARE
Pt aox4, follows commands. Up with 2 assist and walker to the chair. Poor appetite. Groin sites warm soft and with bruising. Neurovascular checks remain unchanged throughout shift. Heparin gtt infusing. Report called. Pt sent with belongings.

## 2025-03-31 NOTE — CM/SW NOTE
BENITO consulted for DC planning. Pt transferred to floor. Chart reviewed, pt previously seen by BENITO/CM department. DC planning to [facility], PMR consulted on 3/24. PMR in agreement for [facility], sat >90% O2 with 5L O2 or less at rest and with activity.  noted pt on 2-3L of O2. Message sent to  liaison confirming DC plans. BENITO/CM to remain available for continued DC planning.     ISAAC Julien

## 2025-03-31 NOTE — PROGRESS NOTES
CRITICAL CARE PROGRESS NOTE    Patient Name: Cy Monsalve  : 1955  MRN: [id number]  Admit Date: 3/19/2025  Length of Stay: 10 Days    Subjective/24h events: Patient was doing well but left foot is much colder and had intermittent loss of dopplerable dorsalis pedis last night this morning has dopplerable DP and PT but still quite cold compared to the right patient without any pain    Assessment and Plan: 69-year-old male prolonged hospital course status post fenestrated endovascular arch repair fenestrated thoracoabdominal aneurysm repair with hypoxemic respiratory failure     Neuro/Psych: No encephalopathy or delirium patient's energy improving   Continue PT OT  Continue trazodone for sleep at night    Cardiovascular: History of hypertension blood pressure remains at goal    Hypertension/A-fib on metoprolol    Post extensive vascular surgery left foot now cold despite having dopplerable pulses  Query thrombocytopenia??  And further workup will speak to vascular today    Pulmonary: Hypoxemic respiratory failure currently on 3 L nasal cannula 2 more days Methylpred  Continue Acapella device  Incentive spirometer  Pulmonary following    GI: No vidal abdominal pathology based on exam  History of GERD continue PPI    Renal/Metabolic: Acute kidney injury that was present earlier in the hospital course now resolved and stable    Heme: Anemia postoperative stable  Thrombocytopenia has been fairly consistent postoperatively we will do a thorough review and consider HIT T-score if high will send PF4  On heparin drip for A-fib    ID: No fever mild leukocytosis noted today at 14 we will monitor complete 7 days of cefepime and azithromycin stopped yesterday    Endocrine: Continue sliding scale  Methylpred for 2 more days    Transfer to floor?  After better handle on left leg  ICU checklist  Feeding:   Analgesia:   Sedation:   Thromboembolism PPX: Heparin drip  Stress Ulcer PPX: PPI  Glucose control:   Bowel Regimen:    Lines/drains/tubes: PICC line  Deescalation of antibiotics:   Therapies:PT/OT/ST when appropriate    Code Status: DNAR/Full Treatment          Physical Exam  Left foot visibly colder with dopplerable pulses    Hemodynamics  24h Vitals:  VitalLast Value (24 Hour)24 Hour Range  Temp98.3 °F (36.8 °C)Temp  Min: 97.9 °F (36.6 °C)  Max: 99.1 °F (37.3 °C)  [id number]  Min: 55  Max: 94  BP (Non-Invasive)134/59 BP  Min: 134/59  Max: 185/96  BP (A-Line) No data recorded  CVP  could not be evaluated. This SmartLink does not work with rows of the type:   [id number]  Min: 11  Max: 25  [id number] %SpO2  Min: 90 %  Max: 97 %  O2 Therapy

## 2025-03-31 NOTE — PLAN OF CARE
Assumed care at 1430  Patient alert, oriented x 4  VSS, 1-2L throughout shift, sinus arrhythmia on tele  Pain managed by norco  Up x 2 assist, walker, and gait belt  Poor appetite  Pulses per doppler  Heparin gtt infusing throughout shift   Call light within reach    Patient and family updated on plan of care

## 2025-03-31 NOTE — PROGRESS NOTES
Vascular Surgery Progress Note    No acute events overnight- patient in bed this morning. Bilateral groins are soft/flat- minimal ecchymosis. Right palpable DP- Left lower extremity with doppler signals- patient denies any pain. Left neck dressing taken down- incision is healing well. Currently on 3L nasal cannula- vitals stable. Heparin gtt infusing.     Plan    -Okay to transfer out of ICU  -Continue working with PT/OT

## 2025-03-31 NOTE — DIETARY NOTE
[facility]   part of [facility]  NUTRITION ASSESSMENT    Pt does not meet malnutrition criteria at this time.      NUTRITION INTERVENTION:    Meal and Snacks - Continue Regular Diet as tolerated; Monitor and encourage adequate PO intake.   Medical Food Supplements - Change to Magic Shake chocolate daily and Magic Cup chocolate daily.   Nutrition Education - Discussed importance of PO intake for healing 3/25. Reviewed foods high in Calories to help w/ wt maintenance. Pt/family receptive to education, no barriers noted.    Vitamin and Mineral Supplements - Recommend Multivitamin with minerals      PATIENT STATUS: 3/31: Transferred to ICU 3/27. Visited pt at bedside. He reports his appetite is fair and about the same as a couple days ago. Does not like hospital food much which is making it hard for him to eat much per his report. Denies GI symptoms at this time but does report having loose stools often at baseline. Reports consuming ~1 Magic Shake/day and agreeable to also trying chocolate Magic Cup. Continued to encourage PO and ONS intake; all questions answered at this time.    3/25: 69/M admitted on 3/19 with AAA, POD#4 for fenestrated endovascular arch repair with fenestrated thoracoabdominal aneurysm repair, left carotid to subclavian transposition with endovascular septotomy of dissection. Pt seen for consult \"Patient refusing Ensure, low appetite, needs protein for wound healing.\"  Pt seen with family at bedside.  Pt reports not a robost appetite, but ate some cornflakes this AM and working on a donut at bedside.  Denies n/v/d. On Vapotherm due to suspected fluid o/l. Pt notes his appetite fluctuated PTA.  He had lost significant wt over the past several years due to medical issues. EMR records do not indicate any wt loss over the past year.  Discussed importance of nutrition and protein for healing.  Pt aware and feels his appetite will return eventually. Pt had Ensure ordered and dislikes it.  He  declines this and other ONS offered. He does note that he did tolerate a Boost milkshake previously - will try Magic Shake.  Pt agreeable.  Offered to order food for pt, he declined.    PMH: AAA (abdominal aortic aneurysm), Deep vein thrombosis, Esophageal reflux, Ileostomy, Peripheral vascular disease, essential hypertension    ANTHROPOMETRICS:  Ht:  6'5\"  Wt: 90.7 kg (199 lb 15.3 oz).   BMI: Body mass index is 23.71 kg/m².  IBW: 94.5 kg    WEIGHT HISTORY:   Weight loss: No  Wt Readings from Last 10 Encounters:   03/31/25 90.7 kg (199 lb 15.3 oz)   03/22/25 69.5 kg (153 lb 3.5 oz)   03/07/25 83.9 kg (185 lb)   03/23/22 107.3 kg (236 lb 9.6 oz)   12/28/21 106.5 kg (234 lb 12.8 oz)   08/05/21 101.3 kg (223 lb 6.4 oz)   03/16/21 96.2 kg (212 lb)   12/02/20 97.7 kg (215 lb 6.4 oz)   10/27/20 92.1 kg (203 lb)   08/20/20 90 kg (198 lb 6.4 oz)        NUTRITION:  Diet:       Procedures    Regular/General diet Is Patient on Accuchecks? Yes      Food Allergies: No  Cultural/Ethnic/Faith Preferences Addressed: Yes    Percent Meals Eaten (last 3 days)       Date/Time Percent Meals Eaten (%)    03/28/25 2000 25 %    03/29/25 2100 50 %    03/30/25 0934 50 %    03/30/25 1610 40 %            GI system review: WNL; Last BM Date: 03/31/25  Skin and wounds: surgical wound to neck, stage I pressure injury to coccyx    NUTRITION RELATED PHYSICAL FINDINGS:     1. Body Fat/Muscle Mass: no wasting noted / well nourished     2. Fluid Accumulation: none per RN documentation    NUTRITION PRESCRIPTION:  90.8 kg Actual Body Weight  Calories: 8106-5073 calories/day (25-30 kcal/kg)  Protein: 109-136 grams protein/day (1.2-1.5 grams protein/kg)  Fluid: ~1 ml/kcal or per MD discretion    NUTRITION DIAGNOSIS/PROBLEM:  Inadequate oral intake related to insufficient appetite resulting in inadequate nutrition intake as evidenced by documented/reported insufficient oral intake      MONITOR AND EVALUATE/NUTRITION GOALS:  PO intake of 75% of meals  TID - Not met, Continues  PO intake of 75% of oral nutrition supplement/s - Not met, Continues  Weight stable within 1 to 2 lbs during admission - Ongoing  Provide nutrition adequate for wound healing - Ongoing      MEDICATIONS:  Colace, Novolog (3u x 24h), miralax    LABS:  Reviewed     Pt is at Moderate nutrition risk    Gaby Paredes RD, LDN, [facility]  Clinical Dietitian  Spectra: [phone]

## 2025-03-31 NOTE — SLP NOTE
SPEECH DAILY NOTE - INPATIENT    ASSESSMENT & PLAN   ASSESSMENT  Pt seen for dysphagia tx to assess tolerance with recommended diet, ensure proper utilization of aspiration precautions and provide pt/family education.  Patient received alert and oriented in bed. Patient reported good tolerance of PO intake. No overt s/s of aspiration observed. Aspiration precautions reviewed. Recommend patient continue a regular diet and thin liquids. No further SLP services recommended at this time as patient tolerating a generalized diet without dysphagia symptoms. Education provided re: recommendations.     Diet Recommendations - Solids: Regular  Diet Recommendations - Liquids: Thin Liquids       Aspiration Precautions: Upright position, Slow rate, Small bites, Small sips  Medication Administration Recommendations: One pill at a time (in puree as needed)    Patient Experiencing Pain: No                Treatment Plan  Treatment Plan/Recommendations: No further inpatient SLP service warranted    Interdisciplinary Communication: Discussed with RN            GOALS  Goal #1 The patient will tolerate regular consistency and thin liquids without overt signs or symptoms of aspiration with 95 % accuracy over 1-2 session(s).  Met   Goal #2 The patient/family/caregiver will demonstrate understanding and implementation of aspiration precautions and swallow strategies independently over 1-2 session(s).     Met        FOLLOW UP  Follow Up Needed (Documentation Required): No  SLP Follow-up Date: 03/28/25  Duration: 1 week    Session: 2    If you have any questions, please contact BHAVIK Laswon

## 2025-03-31 NOTE — PROGRESS NOTES
[facility] Hospitalist Progress Note     CC: Hospital Follow up    PCP: Kam Alberts MD     Subjective:     - in icu, cont to have cough w/ sputum production    OBJECTIVE:    Blood pressure 149/72, pulse 91, temperature 98 °F (36.7 °C), temperature source Temporal, resp. rate 15, weight 199 lb 15.3 oz (90.7 kg), SpO2 95%.    Temp:  [97.8 °F (36.6 °C)-99.4 °F (37.4 °C)] 98 °F (36.7 °C)  Pulse:  [72-96] 91  Resp:  [11-25] 15  BP: (134-185)/(55-96) 149/72  SpO2:  [91 %-97 %] 95 %      Intake/Output:    Intake/Output Summary (Last 24 hours) at 3/31/2025 1105  Last data filed at 3/31/2025 0900  Gross per 24 hour   Intake 1212.2 ml   Output 2450 ml   Net -1237.8 ml       Last 3 Weights   03/31/25 0300 199 lb 15.3 oz (90.7 kg)   03/30/25 0200 197 lb 5 oz (89.5 kg)   03/27/25 0600 193 lb 12.6 oz (87.9 kg)   03/26/25 0400 193 lb 12.6 oz (87.9 kg)   03/25/25 0600 200 lb 2.8 oz (90.8 kg)   03/24/25 0900 200 lb 3.2 oz (90.8 kg)   03/24/25 0500 203 lb 0.7 oz (92.1 kg)   03/23/25 0516 204 lb 2.3 oz (92.6 kg)   03/22/25 0637 153 lb 3.5 oz (69.5 kg)   03/20/25 1231 192 lb 15.9 oz (87.5 kg)   03/19/25 1444 193 lb (87.5 kg)   03/22/25 1113 153 lb 3.5 oz (69.5 kg)   03/07/25 1536 185 lb (83.9 kg)       Exam   Gen: No acute distress, alert and oriented x3, no focal neurologic deficits, right-sided York, arterial line and lumbar drain noted  Heent: NC AT, mucous memb clear, neck supple  Pulm: Lungs clear, normal respiratory effort  CV: Heart with regular rate and rhythm, no peripheral edema  Abd: Abdomen soft, nontender, nondistended, bowel sounds present  MSK:expected rom, no hematoma appreciated bilaterally, 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, 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, strentgh 4/5 on R, pulses intact  Skin: no rashes or lesions  Neuro: AO*3, motor intact, no sensory deficits  Psyc: appropriate mood and affect      Data Review:       Labs:     Recent Labs   Lab 03/28/25  0404 03/29/25  0407 03/30/25  0417 03/31/25  0511    RBC 3.42* 3.22* 3.22* 3.40*   HGB 9.6* 9.3* 9.3* 9.6*   HCT 28.8* 27.0* 27.2* 29.1*   MCV 84.2 83.9 84.5 85.6   MCH 28.1 28.9 28.9 28.2   MCHC 33.3 34.4 34.2 33.0   RDW 15.5 15.4 15.3 15.4   NEPRELIM 6.62 6.55 7.68  --    WBC 7.2 7.4 9.1 14.0*   PLT 88.0* 91.0* 81.0* 86.0*         Recent Labs   Lab 03/29/25  0407 03/30/25  0417 03/31/25  0511   * 132* 106*   BUN 69* 65* 51*   CREATSERUM 1.47* 1.34* 1.25   EGFRCR 51* 57* 62   CA 8.0* 8.4* 8.3*    141 141   K 3.9 3.9 3.9   CL 99 100 103   CO2 34.0* 34.0* 32.0       Recent Labs   Lab 03/25/25 2021 03/27/25  0507 03/28/25  0404 03/29/25  0407 03/30/25  0417 03/31/25  0511   ALT <7*  --  70* 64* 77* 68*   AST 12  --  43* 26 30 19   ALB 3.8 3.3 3.1* 2.8* 3.2 3.2         Imaging:  No results found.      Meds:      amLODIPine  10 mg Oral Daily    metoprolol tartrate  25 mg Oral 2x Daily(Beta Blocker)    fluticasone propionate  1 spray Each Nare Daily    docusate sodium  100 mg Oral BID    polyethylene glycol (PEG 3350)  17 g Oral Daily    insulin aspart  1-5 Units Subcutaneous TID AC and HS      continuous dose heparin 1,700 Units/hr (03/30/25 8098)       peppermint oil    hydrALAzine    ipratropium-albuterol    glucose **OR** glucose **OR** glucose-vitamin C **OR** dextrose **OR** glucose **OR** glucose **OR** glucose-vitamin C    acetaminophen **OR** HYDROcodone-acetaminophen **OR** HYDROcodone-acetaminophen    ondansetron    metoclopramide    HYDROmorphone **OR** HYDROmorphone **OR** [DISCONTINUED] HYDROmorphone         Assessment/Plan:     Active Problems:    Pre-op testing    Thoracoabdominal aortic aneurysm (TAAA)    Bilateral leg weakness      69 year old male with a past medical history of hypertension, GERD, anxiety, renal calculi, DVT, ascending aortic dissection s/p repair, aortic dissection distal to left subclavian, infrarenal endovascular AAA repair with bilateral KYLER for hypogastric and common iliac aneurysms, mesenteric ischemia s/p SMA  bypass with right external iliac artery to SMA bypass, who is presenting to the hospital for direct admission for complex AAA repair with s/p lumbar drain.  Postoperative course complicated by HOLLIS on CKD as well as right lower extremity weakness along with hypoxic respiratory failure.      History of prior infrarenal endovascular AAA repair with bilateral KYLER for hypogastric and common iliac aneurysms  History of mesenteric ischemia s/p SMA bypass with right external iliac artery to SMA bypass  History of aortic dissection s/p repair, aortic dissection distal to left subclavian  S/p complex thoraco abdominal aortic repair 3/21  Hx of DVT   -Complicated aortic pathology with multiple repairs in the past. Underwent thoracoabdominal aneurysm repair 3/21  -s/p lumbar drain with neurointerventional to limit risk of spinal cord ischemia 3/20 and removed 3/26  -On heparin ggt, monitoring heparin assay for adverse effects including bleeding  -Further management per vascular/ICU teams     Acute hypoxemic respiratory failure w/ threat to bodily function 2/2 possible pneumonia versus edema  Leukocytosis  -Completed 3 days of azithro (3/27-3/29), janna (3/24-3/28), now switched to cefepime per ICU (3/28-3/30)  - tx w/ iv steroids - iv steroids completed, completed abx as above   - wean o2 as tolerated     Right lower extremity weakness-improving  - In the setting of complex aortic repair 3/21  - Seen by neurointensivist, recommendations reviewed  - Improved overall  -- PT/OT     HOLLIS on CKD-improving  -Creatinine reviewed today - 1.34 (baseline 1.2)  -Continue to monitor with serial RFP's  -Avoid nephrotoxic agents  - Cr bumped post operatively - given prolonged surgery and complicated repair  - improving      Hypertension  -hold home HCTZ     Allergic rhinitis  -Continue home fluticasone as needed     Short gut syndrome  - resume lamotil if diarrhea resumes    Normocytic anemia  -Hgb reviewed -9.3 this morning  -Serial CBC's          Supplementary Documentation:   DVT Mechanical Prophylaxis:   SCDs, Early ambuation  DVT Pharmacologic Prophylaxis   Medication    heparin (Porcine) 94657 units/250mL infusion PE/DVT/THROMBUS CONTINUOUS                Code Status: DNAR/Full Treatment  Arzola: External urinary catheter in place  Arzola Duration (in days):   Central line:    YNES: stable to transfer to floor, YNES per vascular        Dispo: inpatient, will follow     DO TARA Ansari Hospitalist  Pager: [phone]  Answering Service: [phone]

## 2025-03-31 NOTE — PROGRESS NOTES
[facility] Dosing Service  Warfarin (Coumadin) Subsequent Dosing    Cy Monsalve is a 69 year old patient for whom pharmacy is dosing warfarin (Coumadin). Goal INR is 2-3    Recent Labs   Lab 03/25/25  0826 03/26/25  0417 03/31/25  0605   INR 1.53* 1.53* 1.34*       Consulted by:  Dr. Oquendo  Indication:  h/o DVT  Potential Drug Interactions:  completed azithromycin & cefepime 3/30  Other Anticoagulants:  IV heparin  Home regimen:  warfarin 6 mg on Fridays and 4 mg all other days of the week    Inpatient Dosing History:    Date 3/30 3/31       INR 1.53 (3/26) 1.34       Coumadin dose 7.5 mg                 Based on above -  1.  For today, Give warfarin (COUMADIN) 7.5 mg at 2100 tonight  2   PT/INR ordered daily while on warfarin  3.  Pharmacy will continue to follow.  We appreciate the opportunity to assist in the care of this patient.    Claritza Elise, PharmD  3/31/2025  1:48 PM

## 2025-04-01 LAB
ALBUMIN SERPL-MCNC: 3.4 G/DL (ref 3.2–4.8)
ALP LIVER SERPL-CCNC: 73 U/L
ALT SERPL-CCNC: 50 U/L
ANION GAP SERPL CALC-SCNC: 6 MMOL/L (ref 0–18)
APTT PPP: 33.8 SECONDS (ref 23–36)
APTT PPP: 63.1 SECONDS (ref 23–36)
AST SERPL-CCNC: 13 U/L (ref ?–34)
BASOPHILS # BLD AUTO: 0.02 X10(3) UL (ref 0–0.2)
BASOPHILS NFR BLD AUTO: 0.1 %
BILIRUB DIRECT SERPL-MCNC: 0.6 MG/DL (ref ?–0.3)
BILIRUB SERPL-MCNC: 1.3 MG/DL (ref 0.2–1.1)
BUN BLD-MCNC: 46 MG/DL (ref 9–23)
CALCIUM BLD-MCNC: 8.5 MG/DL (ref 8.7–10.6)
CHLORIDE SERPL-SCNC: 103 MMOL/L (ref 98–112)
CO2 SERPL-SCNC: 31 MMOL/L (ref 21–32)
CREAT BLD-MCNC: 1.35 MG/DL
EGFRCR SERPLBLD CKD-EPI 2021: 56 ML/MIN/1.73M2 (ref 60–?)
EOSINOPHIL # BLD AUTO: 0.01 X10(3) UL (ref 0–0.7)
EOSINOPHIL NFR BLD AUTO: 0.1 %
ERYTHROCYTE [DISTWIDTH] IN BLOOD BY AUTOMATED COUNT: 15.8 %
GLUCOSE BLD-MCNC: 102 MG/DL (ref 70–99)
GLUCOSE BLD-MCNC: 117 MG/DL (ref 70–99)
GLUCOSE BLD-MCNC: 120 MG/DL (ref 70–99)
GLUCOSE BLD-MCNC: 126 MG/DL (ref 70–99)
GLUCOSE BLD-MCNC: 90 MG/DL (ref 70–99)
HCT VFR BLD AUTO: 28.8 %
HGB BLD-MCNC: 9.9 G/DL
IMM GRANULOCYTES # BLD AUTO: 0.41 X10(3) UL (ref 0–1)
IMM GRANULOCYTES NFR BLD: 2.3 %
INR BLD: 1.62 (ref 0.8–1.2)
LYMPHOCYTES # BLD AUTO: 0.71 X10(3) UL (ref 1–4)
LYMPHOCYTES NFR BLD AUTO: 3.9 %
MAGNESIUM SERPL-MCNC: 2.2 MG/DL (ref 1.6–2.6)
MCH RBC QN AUTO: 28.8 PG (ref 26–34)
MCHC RBC AUTO-ENTMCNC: 34.4 G/DL (ref 31–37)
MCV RBC AUTO: 83.7 FL
MONOCYTES # BLD AUTO: 1.27 X10(3) UL (ref 0.1–1)
MONOCYTES NFR BLD AUTO: 7.1 %
NEUTROPHILS # BLD AUTO: 15.56 X10 (3) UL (ref 1.5–7.7)
NEUTROPHILS # BLD AUTO: 15.56 X10(3) UL (ref 1.5–7.7)
NEUTROPHILS NFR BLD AUTO: 86.5 %
OSMOLALITY SERPL CALC.SUM OF ELEC: 302 MOSM/KG (ref 275–295)
PLATELET # BLD AUTO: 91 10(3)UL (ref 150–450)
PLATELETS.RETICULATED NFR BLD AUTO: 9.3 % (ref 0–7)
POTASSIUM SERPL-SCNC: 4.5 MMOL/L (ref 3.5–5.1)
PROT SERPL-MCNC: 5.7 G/DL (ref 5.7–8.2)
PROTHROMBIN TIME: 19.3 SECONDS (ref 11.6–14.8)
RBC # BLD AUTO: 3.44 X10(6)UL
SODIUM SERPL-SCNC: 140 MMOL/L (ref 136–145)
WBC # BLD AUTO: 18 X10(3) UL (ref 4–11)

## 2025-04-01 RX ORDER — ARGATROBAN 1 MG/ML
0.25 INJECTION, SOLUTION INTRAVENOUS CONTINUOUS
Status: DISCONTINUED | OUTPATIENT
Start: 2025-04-01 | End: 2025-04-08

## 2025-04-01 NOTE — PLAN OF CARE
Assumed care at 0700  Patient alert, oriented x4  Norco given for pain  1-2L O2 throughout day  Increase in PVC's today. Cardiology aware  HIT prelim lab (+). Hematology consulted. Heparin drip stopped. Argatroban started  Worked with PT/OT  Up to bathroom for a shower  Poor PO Intake    Plan of care discussed with patient and spouse

## 2025-04-01 NOTE — PLAN OF CARE
Patient up in bathroom for head to toe shower. Sitting on rolling commode. Up with 1 assist and walker. Able to take a few steps with walker independently

## 2025-04-01 NOTE — OCCUPATIONAL THERAPY NOTE
OCCUPATIONAL THERAPY TREATMENT NOTE - INPATIENT     Room Number: 7613/7613-A  Session: 3   Number of Visits to Meet Established Goals: 7    Presenting Problem: S/p 3/21 fenestrated abdominal aortic aneurysm repair with left carotid to subclavian transposition on  with Dr. Yanez and Dr. Oquendo and 3/20 lumbar drain    ASSESSMENT   Patient demonstrates fair progress this session, goals remain in progress.    Patient continues to function below baseline with toileting, upper body dressing, lower body dressing, grooming, bed mobility, transfers, static sitting balance, dynamic sitting balance, static standing balance, dynamic standing balance, maintaining seated position, functional standing tolerance, and energy conservation strategies.   Contributing factors to remaining limitations include decreased functional strength, decreased functional reach, decreased endurance, impaired coordination, impaired motor planning, decreased muscular endurance, medical status, and decreased compliance/participation.  Next session anticipate patient to progress toileting, upper body dressing, lower body dressing, grooming, bed mobility, transfers, static sitting balance, dynamic sitting balance, static standing balance, dynamic standing balance, maintaining seated position, functional standing tolerance, and energy conservation strategies.  Occupational Therapy will continue to follow patient for duration of hospitalization.    Patient continues to benefit from continued skilled OT services: to promote return to prior level of function and safety with continuous assistance and gradual rehabilitative therapy.       WEIGHT BEARING RESTRICTION  R Lower Extremity: Full Weight Bearing  L Lower Extremity: Full Weight Bearing      Recommendations for nursing staff:   Transfers: Gaby Rawls  Toileting location: commode    TREATMENT SESSION:  Patient Start of Session: supine in bed for session    FUNCTIONAL TRANSFER ASSESSMENT  Sit to  Stand: Edge of Bed  Edge of Bed: Minimal Assist  Chair: Not Tested  Commode Transfer: Minimal Assist (Min A on first attempt but the 2/2 fatigue max A and unable to clear butt and required Gaby Steady, t/f back to pt 2/2 pt declining further mobility and chair)    BED MOBILITY  Rolling: Contact Guard Assist  Supine to Sit : Minimal Assist  Sit to Supine (OT): Minimal Assist  Scooting: min A to EOB    BALANCE ASSESSMENT  Static Sitting: Contact Guard Assist  Static Standing: Minimal Assist (Min A to stand and Max A to t/f from commode to bed but Min A to t/f from bed to commode)    FUNCTIONAL ADL ASSESSMENT  Grooming Seated: Not Tested  UB Dressing Seated: Minimal Assist (for robe on back)  LB Dressing Seated: Moderate Assist (for socks)  LB Dressing Standing: Moderate Assist (for brief up and down)  Toileting Standing: Maximum Assist (for terry-care)    ACTIVITY TOLERANCE: Pt with decreased tolerance this session           BP: 126/70  BP Location: Left arm  BP Method: Automatic  Patient Position: Sitting    O2 SATURATIONS  Oxygen Therapy  SPO2% on Room Air at Rest: 82  SPO2% on Oxygen at Rest: 94  At rest oxygen flow (liters per minute): 1  SPO2% Ambulation on Oxygen: 91 (only with steps to commode)  Ambulation oxygen flow (liters per minute): 1    EDUCATION PROVIDED  Patient Education : Role of Occupational Therapy; Discharge Recommendations; Plan of Care; Functional Transfer Techniques; Energy Conservation  Patient's Response to Education: Verbalized Understanding    Equipment used: RW  Demonstrates functional use, Would benefit from additional trial      Exercises:    Exercises Repetitions Comments   Scapular elevation     Scapular retraction     Shoulder rolls     Shoulder flexion     Shoulder abduction     Shoulder internal/external rotation     Forward punch     Elbow flexion     Elbow extension     Forearm pronation/supination     Wrist flexion/extension     Gross grasp/fist pumps     Ankle pumps     Knee  extension     Marching         Therapist comments: Pt with decreased willingness to participate this session    Patient End of Session: In bed, With  staff, Needs met, Call light within reach, RN aware of session/findings, All patient questions and concerns addressed, Hospital anti-slip socks, Alarm set, Family present    SUBJECTIVE  Pt stated, \"I just want to be in bed.\"    PAIN ASSESSMENT  Ratin  Location: no pain at this time  Management Techniques: Repositioning     OBJECTIVE  Precautions: Bed/chair alarm (surgical boot L foot? SBP )    AM-PAC ‘6-Clicks’ Inpatient Daily Activity Short Form  -   Putting on and taking off regular lower body clothing?: A Lot  -   Bathing (including washing, rinsing, drying)?: A Lot  -   Toileting, which includes using toilet, bedpan or urinal? : A Lot  -   Putting on and taking off regular upper body clothing?: A Little  -   Taking care of personal grooming such as brushing teeth?: A Little  -   Eating meals?: A Little    AM-PAC Score:  Score: 15  Approx Degree of Impairment: 56.46%  Standardized Score (AM-PAC Scale): 34.69    PLAN  OT Device Recommendations: TBD  OT Treatment Plan: Balance activities, Energy conservation/work simplification techniques, ADL training, Functional transfer training, UE strengthening/ROM, Patient/Family education, Equipment eval/education, Compensatory technique education  Rehab Potential : Good  Frequency: 3x/week    OT Goals:   ADL Goals   Patient will perform upper body dressing:  with setup  Patient will perform lower body dressing:  with min assist  Patient will perform toileting: with min assist     Functional Transfer Goals  Patient will transfer from supine to sit:  with min assist  Patient will transfer to toilet:  with min assist     UE Exercise Program Goal  Patient will be independent with bilateral AROM HEP (home exercise program).     Additional Goals  Pt will incorporate 2 energy conservation techniques into ADL.    OT  Session Time: 25 minutes  Self-Care Home Management: 15 minutes  Therapeutic Activity: 10 minutes  Neuromuscular Re-education: 0 minutes  Therapeutic Exercise: 0 minutes  Cognitive Skills: 0 minutes  Sensory Integrative: 0 minutes  Orthotic Management and Trainin minutes  Can add/delete any of these

## 2025-04-01 NOTE — PROGRESS NOTES
Pharmacy Dosing Service: Warfarin (Coumadin)  Cy Monsalve is a 69 year old male for whom pharmacy has been dosing warfarin (Coumadin). Goal INR is 2-3 for hypercoagulable condition.    Recent Labs   Lab 03/26/25  0417 03/31/25  0605 04/01/25  0546   INR 1.53* 1.34* 1.62*     Consulted by: Dr. Oquendo   Pertinent Patient Medical History: h/o DVT, aortic dissection, SMA bypass for mesenteric ischemia  Potential Drug Interactions:  completed azithromycin & cefepime 3/30 (could increase INR)  Other Anticoagulants:  Argatroban for presumed HIT  Home regimen (if applicable):  warfarin 6 mg on Fridays and 4 mg all other days of the week       Inpatient Dosing History  Date 3/30 3/31 4/01      INR 1.53 (lab from 3/26) 1.34 1.62      Coumadin dose 7.5 mg 7.5 mg HOLD        Based on above -  1.  For today, Hold warfarin (COUMADIN) dose while patient is thrombocytopenic (Platelets <150,000)  2   PT/INR ordered daily while on coumadin  3.  Pharmacy will continue to follow.  We appreciate the opportunity to assist in his care.    Luann Lemon, PharmD  4/1/2025  10:44 AM

## 2025-04-01 NOTE — PROGRESS NOTES
[facility] Hospitalist Progress Note     CC: Hospital Follow up    PCP: Kam Alberts MD     Subjective:     - no new complaints, dyspnea/cough improving     OBJECTIVE:    Blood pressure 158/86, pulse 73, temperature 97.7 °F (36.5 °C), temperature source Oral, resp. rate 20, weight 199 lb 15.3 oz (90.7 kg), SpO2 91%.    Temp:  [97.6 °F (36.4 °C)-98.7 °F (37.1 °C)] 97.7 °F (36.5 °C)  Pulse:  [] 73  Resp:  [13-22] 20  BP: (137-159)/(72-87) 158/86  SpO2:  [90 %-95 %] 91 %      Intake/Output:    Intake/Output Summary (Last 24 hours) at 4/1/2025 0907  Last data filed at 4/1/2025 0539  Gross per 24 hour   Intake 240 ml   Output 2050 ml   Net -1810 ml       Last 3 Weights   03/31/25 0300 199 lb 15.3 oz (90.7 kg)   03/30/25 0200 197 lb 5 oz (89.5 kg)   03/27/25 0600 193 lb 12.6 oz (87.9 kg)   03/26/25 0400 193 lb 12.6 oz (87.9 kg)   03/25/25 0600 200 lb 2.8 oz (90.8 kg)   03/24/25 0900 200 lb 3.2 oz (90.8 kg)   03/24/25 0500 203 lb 0.7 oz (92.1 kg)   03/23/25 0516 204 lb 2.3 oz (92.6 kg)   03/22/25 0637 153 lb 3.5 oz (69.5 kg)   03/20/25 1231 192 lb 15.9 oz (87.5 kg)   03/19/25 1444 193 lb (87.5 kg)   03/22/25 1113 153 lb 3.5 oz (69.5 kg)   03/07/25 1536 185 lb (83.9 kg)       Exam   Gen: No acute distress, alert and oriented x3, no focal neurologic deficits, right-sided [place], arterial line and lumbar drain noted  Heent: NC AT, mucous memb clear, neck supple  Pulm: Lungs clear, normal respiratory effort  CV: Heart with regular rate and rhythm, no peripheral edema  Abd: Abdomen soft, nontender, nondistended, bowel sounds present  MSK:expected rom, no hematoma appreciated bilaterally, 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, 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, strentgh 4/5 on R, pulses intact  Skin: no rashes or lesions  Neuro: AO*3, motor intact, no sensory deficits  Psyc: appropriate mood and affect      Data Review:       Labs:     Recent Labs   Lab 03/29/25  0407 03/30/25  0417 03/31/25  0511 04/01/25  0824   RBC  3.22* 3.22* 3.40* 3.44*   HGB 9.3* 9.3* 9.6* 9.9*   HCT 27.0* 27.2* 29.1* 28.8*   MCV 83.9 84.5 85.6 83.7   MCH 28.9 28.9 28.2 28.8   MCHC 34.4 34.2 33.0 34.4   RDW 15.4 15.3 15.4 15.8   NEPRELIM 6.55 7.68  --  15.56*   WBC 7.4 9.1 14.0* 18.0*   PLT 91.0* 81.0* 86.0* 91.0*         Recent Labs   Lab 03/30/25  0417 03/31/25  0511 04/01/25  0824   * 106* 102*   BUN 65* 51* 46*   CREATSERUM 1.34* 1.25 1.35*   EGFRCR 57* 62 56*   CA 8.4* 8.3* 8.5*    141 140   K 3.9 3.9 4.5    103 103   CO2 34.0* 32.0 31.0       Recent Labs   Lab 03/25/25  2021 03/27/25  0507 03/28/25  0404 03/29/25  0407 03/30/25  0417 03/31/25  0511   ALT <7*  --  70* 64* 77* 68*   AST 12  --  43* 26 30 19   ALB 3.8 3.3 3.1* 2.8* 3.2 3.2         Imaging:  No results found.      Meds:      multivitamin  1 tablet Oral Daily    amLODIPine  10 mg Oral Daily    metoprolol tartrate  25 mg Oral 2x Daily(Beta Blocker)    fluticasone propionate  1 spray Each Nare Daily    docusate sodium  100 mg Oral BID    polyethylene glycol (PEG 3350)  17 g Oral Daily    insulin aspart  1-5 Units Subcutaneous TID AC and HS      continuous dose heparin 1,850 Units/hr (04/01/25 0625)       peppermint oil    hydrALAzine    ipratropium-albuterol    glucose **OR** glucose **OR** glucose-vitamin C **OR** dextrose **OR** glucose **OR** glucose **OR** glucose-vitamin C    acetaminophen **OR** HYDROcodone-acetaminophen **OR** HYDROcodone-acetaminophen    ondansetron    metoclopramide    HYDROmorphone **OR** HYDROmorphone **OR** [DISCONTINUED] HYDROmorphone         Assessment/Plan:     Active Problems:    Pre-op testing    Thoracoabdominal aortic aneurysm (TAAA)    Bilateral leg weakness      69 year old male with a past medical history of hypertension, GERD, anxiety, renal calculi, DVT, ascending aortic dissection s/p repair, aortic dissection distal to left subclavian, infrarenal endovascular AAA repair with bilateral KYLER for hypogastric and common iliac  aneurysms, mesenteric ischemia s/p SMA bypass with right external iliac artery to SMA bypass, who is presenting to the hospital for direct admission for complex AAA repair with s/p lumbar drain.  Postoperative course complicated by HOLLIS on CKD as well as right lower extremity weakness along with hypoxic respiratory failure.      History of prior infrarenal endovascular AAA repair with bilateral KYLER for hypogastric and common iliac aneurysms  History of mesenteric ischemia s/p SMA bypass with right external iliac artery to SMA bypass  History of aortic dissection s/p repair, aortic dissection distal to left subclavian  S/p complex thoraco abdominal aortic repair 3/21  Hx of DVT   -Complicated aortic pathology with multiple repairs in the past. Underwent thoracoabdominal aneurysm repair 3/21  -s/p lumbar drain with neurointerventional to limit risk of spinal cord ischemia 3/20 and removed 3/26  -On heparin ggt, monitoring heparin assay for adverse effects including bleeding  -Further management per vascular    Acute hypoxemic respiratory failure w/ threat to bodily function 2/2 possible pneumonia versus edema  Leukocytosis  -Completed 3 days of azithro (3/27-3/29), janna (3/24-3/28), now switched to cefepime per ICU (3/28-3/30)  - tx w/ iv steroids x 5 days, iv steroids completed, completed abx as above   - wean o2 as tolerated     Thrombocytopenia  - d/w RN - HIT prelim is positive, confirmation to follow  - consulted hematology d/w Dr. Vernon, will stop heparin and start argatroban    Leukocytosis  - no new infectious complaints, likely 2/2 steroids  - trend     Right lower extremity weakness-improving  - In the setting of complex aortic repair 3/21  - Seen by neurointensivist, recommendations reviewed  - Improved overall  -- PT/OT     HOLLIS on CKD-improving  -Creatinine reviewed today - 1.34 (baseline 1.2)  -Continue to monitor with serial RFP's  -Avoid nephrotoxic agents  - Cr bumped post operatively - given prolonged  surgery and complicated repair  - improving      Hypertension  -hold home HCTZ     Allergic rhinitis  -Continue home fluticasone as needed     Short gut syndrome  - resume lamotil if diarrhea resumes    Normocytic anemia  - stable, trend     H/o dvt  - see above         Supplementary Documentation:   DVT Mechanical Prophylaxis:   SCDs, Early ambuation  DVT Pharmacologic Prophylaxis   Medication    warfarin (Coumadin) tab 8.5 mg                Code Status: DNAR/Full Treatment  Arzola: External urinary catheter in place  Arzola Duration (in days):   Central line:    YNES: stable to transfer to floor, YNES per vascular        Dispo: inpatient, will follow  - likely dc to rehab once therapeutic on coumadin     DO FILIPE AnsariG Hospitalist  Pager: [phone]  Answering Service: [phone]

## 2025-04-01 NOTE — PROGRESS NOTES
S: He is not having any shortness of breath or pain today.     Meds:   multivitamin  1 tablet Oral Daily    amLODIPine  10 mg Oral Daily    metoprolol tartrate  25 mg Oral 2x Daily(Beta Blocker)    fluticasone propionate  1 spray Each Nare Daily    docusate sodium  100 mg Oral BID    polyethylene glycol (PEG 3350)  17 g Oral Daily    insulin aspart  1-5 Units Subcutaneous TID AC and HS       Prn Meds:    peppermint oil    hydrALAzine    ipratropium-albuterol    glucose **OR** glucose **OR** glucose-vitamin C **OR** dextrose **OR** glucose **OR** glucose **OR** glucose-vitamin C    acetaminophen **OR** HYDROcodone-acetaminophen **OR** HYDROcodone-acetaminophen    ondansetron    metoclopramide    HYDROmorphone **OR** HYDROmorphone **OR** [DISCONTINUED] HYDROmorphone    Infusions:   continuous dose heparin 1,850 Units/hr (04/01/25 0625)       OBJECTIVE:  Vitals:    03/31/25 1730 03/31/25 2000 04/01/25 0000 04/01/25 0539   BP:  155/84 159/84 152/82   Pulse: 100 84 69 83   Resp: 16 16 13 18   Temp:  97.6 °F (36.4 °C) 98 °F (36.7 °C) 97.8 °F (36.6 °C)   TempSrc:  Oral Oral Oral   SpO2: 94% 92% 93% 92%   Weight:         O2: 2LPM    Gen - Alert, oriented x 3, in no apparent distress  Lungs - CTAB  CV - regular rate & rhythm. Normal S1, S2. No murmurs   Extremities - No cyanosis, clubbing, edema appreciated.      Labs:  Recent Labs   Lab 03/29/25  0407 03/30/25  0417 03/31/25  0511   WBC 7.4 9.1 14.0*   HGB 9.3* 9.3* 9.6*   PLT 91.0* 81.0* 86.0*     Recent Labs   Lab 03/26/25  0417 03/26/25  1426 03/27/25  0507 03/27/25  1611 03/28/25  0404 03/29/25  0407 03/30/25  0417 03/31/25  0511      < > 140   < > 139 139 141 141   K 3.6  3.6   < > 4.0  4.0   < > 4.0 3.9 3.9 3.9   CL 99   < > 97*   < > 97* 99 100 103   CO2 33.0*   < > 36.0*   < > 35.0* 34.0* 34.0* 32.0   BUN 47*   < > 68*   < > 70* 69* 65* 51*   CREATSERUM 1.57*   < > 1.59*   < > 1.47* 1.47* 1.34* 1.25   *   < > 154*   < > 151* 154* 132*  106*   ANIONGAP 9   < > 7   < > 7 6 7 6   ALB  --   --  3.3  --  3.1* 2.8* 3.2 3.2   CA 8.6*   < > 8.4*   < > 8.4* 8.0* 8.4* 8.3*   MG 2.0  --  2.1   < > 2.1 2.2 2.3  --    PHOS 3.4  --  3.8  --   --   --   --   --    TP  --   --   --   --  5.4* 5.1* 5.3* 5.4*    < > = values in this interval not displayed.     Recent Labs   Lab 03/29/25  0407 03/30/25  0417 03/31/25  0511   ALKPHO 63 69 74   AST 26 30 19   ALT 64* 77* 68*   BILT 1.0 1.0 1.2*     Recent Labs   Lab 03/26/25  0417 03/26/25  1426 03/30/25  1948 03/31/25  0605 04/01/25  0546   INR 1.53*  --   --  1.34* 1.62*   PTT  --    < > 90.6* 95.7* 63.1*    < > = values in this interval not displayed.     Recent Labs   Lab 03/26/25  0417 03/27/25  1320   CRP  --  15.80*   PCT 0.82* 0.64*     Recent Labs   Lab 03/25/25  1447   ABGPHT 7.47*   GDRFEC8D 43   ASAZZ0J 104*   ABGHCO3 30.3*   ABGBE 6.9*   LACTIBG 2.2*       Recent Labs   Lab 03/26/25  0812 03/27/25  1239   COVID19 Not Detected  --    INFAPCR Not Detected  --    INFBPCR Not Detected  --    STREPPNEUMAG  --  Negative   LEGIONAG  --  Negative       ASSESSMENT AND PLAN      Cy Monsalve is a 69 year old man with a history of aortic dissection, DVT, pAF, CAD, who was admitted 3/19 for complex aortic aneurysm repair    Aortic aneurysm - s/p complex aortic repair 3/21  -per vascular surgery  Acute hypoxemic respiratory failure - after aortic aneurysm surgery; due to a combination of Pulm Edema, atelectasis, and PNA  -wean O2 as tolerated  -antibiotics completed  -encourage IS use  Possible pneumonia - CT Scan showed patchy ground glass opacities bilaterally. Sputum cultures grew candida  -treated with janna (3/24- 3/28) and azithro (3/27-3/29 ), then cefepime (3/28-3/30)   -completed steroids per ICU team  Pulm Edema/Pulm hypertension - elevated RVSP noted on echo 3/25  -Lasix prn   pAfib  -per cardiology  History of DVT - normally on coumadin  -IV heparin; eventually resume coumadin when ok with  others  Proph  -patient is on IV heparin.   Dispo  -Full code   -inpatient     We will continue to follow with you     Neftaly Jacobs M.D.  Pulmonary/Critical Care

## 2025-04-01 NOTE — PROGRESS NOTES
[facility]   part of [facility]     Vascular Surgery Progress Note    Cy Monsalve Patient Status:  Inpatient    1955 MRN [id number]   Location [facility] 7NE-A Attending Neil Oquendo MD   Hosp Day # 12 PCP Kam Alberts MD     Objective:   Temp: 97.7 °F (36.5 °C)  Pulse: 73  Resp: 20  BP: 158/86      Events Yesterday/Overnight:  Patient is a 69 year old male, POD 11, fenestrated endovascular arch repair with fenestrated thoracoabdominal aneurysm repair, left carotid to subclavian transposition with endovascular septotomy of dissection with Dr. Oquendo and Dr. Yanez.  BP stable at 158/86.  Pain well-controlled.  On 2 L NC. WBC 18, hemoglobin 9.9.       Exam:  Palpable bilateral DP and PT pulses.  Bilateral groin access sites healing well, no ecchymosis, no s/s of infection.    Impression/Plan:    Patient strongly recommended to shower today.  Can hold heparin during shower    Continue physical therapy.    Continue diet, high protein encouraged.    Medications:  Current Facility-Administered Medications   Medication Dose Route Frequency    multivitamin (Tab-A-Immanuel/Beta Carotene) tab 1 tablet  1 tablet Oral Daily    amLODIPine (Norvasc) tab 10 mg  10 mg Oral Daily    peppermint oil liquid 1 mL  1 mL Other PRN    metoprolol tartrate (Lopressor) tab 25 mg  25 mg Oral 2x Daily(Beta Blocker)    heparin (Porcine) 30913 units/250mL infusion PE/DVT/THROMBUS CONTINUOUS  200-3,000 Units/hr Intravenous Continuous    hydrALAzine (Apresoline) 20 mg/mL injection 10 mg  10 mg Intravenous Q4H PRN    fluticasone propionate (Flonase) 50 MCG/ACT nasal suspension 1 spray  1 spray Each Nare Daily    ipratropium-albuterol (Duoneb) 0.5-2.5 (3) MG/3ML inhalation solution 3 mL  3 mL Nebulization Q4H PRN    docusate sodium (Colace) cap 100 mg  100 mg Oral BID    polyethylene glycol (PEG 3350) (Miralax) 17 g oral packet 17 g  17 g Oral Daily    glucose (Dex4) 15 GM/59ML oral liquid 15 g  15 g Oral Q15 Min PRN     Or    glucose (Glutose) 40% oral gel 15 g  15 g Oral Q15 Min PRN    Or    glucose-vitamin C (Dex-4) chewable tab 4 tablet  4 tablet Oral Q15 Min PRN    Or    dextrose 50% injection 50 mL  50 mL Intravenous Q15 Min PRN    Or    glucose (Dex4) 15 GM/59ML oral liquid 30 g  30 g Oral Q15 Min PRN    Or    glucose (Glutose) 40% oral gel 30 g  30 g Oral Q15 Min PRN    Or    glucose-vitamin C (Dex-4) chewable tab 8 tablet  8 tablet Oral Q15 Min PRN    insulin aspart (NovoLOG) 100 Units/mL FlexPen 1-5 Units  1-5 Units Subcutaneous TID AC and HS    acetaminophen (Tylenol) tab 650 mg  650 mg Oral Q4H PRN    Or    HYDROcodone-acetaminophen (Norco) 5-325 MG per tab 1 tablet  1 tablet Oral Q4H PRN    Or    HYDROcodone-acetaminophen (Norco) 5-325 MG per tab 2 tablet  2 tablet Oral Q4H PRN    ondansetron (Zofran) 4 MG/2ML injection 4 mg  4 mg Intravenous Q6H PRN    metoclopramide (Reglan) 5 mg/mL injection 10 mg  10 mg Intravenous Q8H PRN    HYDROmorphone (Dilaudid) 1 MG/ML injection 0.2 mg  0.2 mg Intravenous Q2H PRN    Or    HYDROmorphone (Dilaudid) 1 MG/ML injection 0.4 mg  0.4 mg Intravenous Q2H PRN       Laboratory/Data:    LABS:  Recent Labs   Lab 03/26/25  0417 03/26/25  1426 03/28/25  0404 03/29/25  0407 03/30/25  0417 03/31/25  0511 03/31/25  0605 04/01/25  0546 04/01/25  0824   WBC 15.3*   < > 7.2 7.4 9.1 14.0*  --   --  18.0*   HGB 10.8*   < > 9.6* 9.3* 9.3* 9.6*  --   --  9.9*   MCV 84.6   < > 84.2 83.9 84.5 85.6  --   --  83.7   PLT 93.0*   < > 88.0* 91.0* 81.0* 86.0*  --   --  91.0*   INR 1.53*  --   --   --   --   --  1.34* 1.62*  --     < > = values in this interval not displayed.       Recent Labs   Lab 03/26/25  0417 03/26/25  1426 03/27/25  0507 03/27/25  1611 03/28/25  0404 03/29/25  0407 03/30/25  0417 03/31/25  0511      < > 140 138 139 139 141 141   K 3.6  3.6   < > 4.0  4.0 3.9 4.0 3.9 3.9 3.9   CL 99   < > 97* 95* 97* 99 100 103   CO2 33.0*   < > 36.0* 34.0* 35.0* 34.0* 34.0* 32.0   BUN 47*   < >  68* 67* 70* 69* 65* 51*   CREATSERUM 1.57*   < > 1.59* 1.60* 1.47* 1.47* 1.34* 1.25   *   < > 154* 160* 151* 154* 132* 106*   CA 8.6*   < > 8.4* 8.9 8.4* 8.0* 8.4* 8.3*   MG 2.0  --  2.1 2.0 2.1 2.2 2.3  --    PHOS 3.4  --  3.8  --   --   --   --   --     < > = values in this interval not displayed.     Recent Labs   Lab 03/26/25  0417 03/26/25  1426 03/30/25  1948 03/31/25  0605 04/01/25  0546   PTP 18.6*  --   --  16.7* 19.3*   INR 1.53*  --   --  1.34* 1.62*   PTT  --    < > 90.6* 95.7* 63.1*    < > = values in this interval not displayed.     Recent Labs   Lab 03/25/25  2021 03/27/25  0507 03/28/25  0404 03/29/25  0407 03/30/25  0417 03/31/25  0511   ALT <7*  --  70* 64* 77* 68*   AST 12  --  43* 26 30 19   ALB 3.8 3.3 3.1* 2.8* 3.2 3.2     No results for input(s): \"TROP\" in the last 168 hours.  No results found for: \"ANAS\", \"ROVERTO\", \"ANASCRN\"  No results for input(s): \"PCACT\", \"PSACT\", \"AT3ACT\", \"HIPAB\", \"PATHI\", \"STALA\", \"DRVVTRATIO\", \"DRVVT\", \"STACLOT\", \"CARIGG\", \"S5FR3WEPBM\", \"Q5RF5UJSRL\", \"RA\", \"HAVIGM\", \"HBCIGM\", \"HCVAB\", \"HBSAG\", \"HBCAB\", \"HBVDNAINTERP\", \"ANAS\", \"C3\", \"C4\" in the last 168 hours.    ISATU Warren  4/1/2025  9:03 AM

## 2025-04-01 NOTE — PLAN OF CARE
PT A/O, 93% ON 1 L NC, SR, VOIDING PER EXTERNAL CATHETER, IN EVENING RLE WARM, LLE COOL, PULSES + PER DOPPLER, CAN BE FELT ON RT, LATER DURING NIGHT, LT FOOT- WARM, HEPARIN GTT INFUSING, COUMADIN GIVEN, LABS DRAWN FROM PICC, GIVEN NORCO IN EVENING WITH RELIEF, INSTRUCTED PT ON POC    Problem: HEMATOLOGIC - ADULT  Goal: Free from bleeding injury  Description: (Example usage: patient with low platelets)INTERVENTIONS:- Avoid intramuscular injections, enemas and rectal medication administration- Ensure safe mobilization of patient- Hold pressure on venipuncture sites to achieve adequate hemostasis- Assess for signs and symptoms of internal bleeding- Monitor lab trends- Patient is to report abnormal signs of bleeding to staff- Avoid use of toothpicks and dental floss- Use electric shaver for shaving- Use soft bristle tooth brush- Limit straining and forceful nose blowing  Outcome: Progressing     Problem: PAIN - ADULT  Goal: Verbalizes/displays adequate comfort level or patient's stated pain goal  Description: INTERVENTIONS:- Encourage pt to monitor pain and request assistance- Assess pain using appropriate pain scale- Administer analgesics based on type and severity of pain and evaluate response- Implement non-pharmacological measures as appropriate and evaluate response- Consider cultural and social influences on pain and pain management- Manage/alleviate anxiety- Utilize distraction and/or relaxation techniques- Monitor for opioid side effects- Notify MD/LIP if interventions unsuccessful or patient reports new pain- Anticipate increased pain with activity and pre-medicate as appropriate  Outcome: Progressing

## 2025-04-01 NOTE — PHYSICAL THERAPY NOTE
PHYSICAL THERAPY TREATMENT NOTE - INPATIENT    Room Number: 7613/7613-A     Session: 4     Number of Visits to Meet Established Goals: 3    Presenting Problem: S/p 3/21 fenestrated abdominal aortic aneurysm repair with left carotid to subclavian transposition on  with Dr. Yanez and Dr. Oquendo and 3/20 lumbar drain  Co-Morbidities : HTN, Peripheral Neuropathy, Hx of toe amputation L great toe, mesenteric ischemia s/p SMA bypass with right external iliac artery to SMA bypass, Short gut syndrome    History related to current admission: Patient is a 69 year old male admitted on 3/19/2025 from Home for S/p 3/21 fenestrated abdominal aortic aneurysm repair with left carotid to subclavian transposition on  with Dr. Yanez and Dr. Oquendo and 3/20 lumbar drain.  Reporting RLE weakness 3/22 AM worsening to BLE MRI T/L spine w/o obvious compression or signs of cord ischemia.      HOME SITUATION  Type of Home: House  Home Layout: Two level      Stairs to Bedroom: 16    Railing: Yes    Lives With: Spouse       Prior Level of [place]: Pt states that he lives in a house with spouse. Pt  reports that he was IND with all his ADLs and gait. Pt has no history of falls.    PHYSICAL THERAPY ASSESSMENT   Patient demonstrates limited progress this session, goals  remain in progress.      Patient is requiring minimal assist and moderate assist as a result of the following impairments: decreased functional strength, decreased endurance/aerobic capacity, impaired static and dynamic balance, impaired motor planning, decreased muscular endurance, and medical status.     Patient continues to function below baseline with bed mobility, transfers, and gait.  Next session anticipate patient to progress bed mobility, transfers, and gait.  Physical Therapy will continue to follow patient for duration of hospitalization.    Patient continues to benefit from continued skilled PT services: to promote return to prior level of function and  safety with continuous assistance and gradual rehabilitative therapy .    Not safe to perform transfer in/out of current shower set up at this time. Would likely be able to shower if zero entry and in commode with wheels.    PLAN DURING HOSPITALIZATION  Nursing Mobility Recommendation : Lift Equipment  PT Device Recommendation: Rolling walker  PT Treatment Plan: Bed mobility, Body mechanics, Gait training, Strengthening, Transfer training, Stair training, Balance training  Frequency (Obs): 3-5x/week     CURRENT GOALS   Goal #1 Patient is able to demonstrate supine - sit EOB @ level: CGA assistance   MET Raquel 4/1   Goal #2 Patient is able to demonstrate transfers EOB to/from [facility] at assistance level: CGA assistance   MET Raquel 4/1   Goal #3 Patient is able to sit at the EOB for 10 mins with CGA.       Goal #4 Patient is able to ambulate 50 feet with assist device: walker - rolling at assistance level: moderate assistance   Goal #5     Goal #6     Goal Comments: Goals established on 3/22/2025  4/1/2025 all goals ongoing    SUBJECTIVE  \"Wow I'm just beat\"    OBJECTIVE  Precautions: Bed/chair alarm (surgical boot L foot? SBP )    WEIGHT BEARING RESTRICTION  R Lower Extremity: Full Weight Bearing  L Lower Extremity: Full Weight Bearing    PAIN ASSESSMENT   Rating: Unable to rate  Location: R shoulder  Management Techniques: Body mechanics    BALANCE                                                                                                                       Static Sitting: Poor +  Dynamic Sitting: Poor +           Static Standing: Poor +  Dynamic Standing: Poor    ACTIVITY TOLERANCE                         O2 WALK       AM-PAC '6-Clicks' INPATIENT SHORT FORM - BASIC MOBILITY  How much difficulty does the patient currently have...  Patient Difficulty: Turning over in bed (including adjusting bedclothes, sheets and blankets)?: A Little   Patient Difficulty: Sitting down on and standing up from a chair with arms  (e.g., wheelchair, bedside commode, etc.): A Lot   Patient Difficulty: Moving from lying on back to sitting on the side of the bed?: A Little   How much help from another person does the patient currently need...   Help from Another: Moving to and from a bed to a chair (including a wheelchair)?: A Little   Help from Another: Need to walk in hospital room?: A Lot   Help from Another: Climbing 3-5 steps with a railing?: A Lot     AM-PAC Score:  Raw Score: 15   Approx Degree of Impairment: 57.7%   Standardized Score (AM-PAC Scale): 39.45   CMS Modifier (G-Code): CK    FUNCTIONAL ABILITY STATUS  Gait Assessment   Functional Mobility/Gait Assessment  Gait Assistance: Minimum assistance  Distance (ft): 2  Assistive Device: Rolling walker  Pattern: Shuffle    Skilled Therapy Provided    Bed Mobility:  Rolling: NT   Supine<>Sit: supervision   Sit<>Supine: supervision     Transfer Mobility:  Sit<>Stand: modA-maxA   Stand<>Sit: modA   Gait: Raquel    Therapist's Comments: RN cleared for session. Pt agreeable for therapy, received supine. Pt req encouragement to participate during session. Often asks repeated questions regarding \"what's next\" despite reviewing plan. BP WNL. Instructed on sequencing of transfer. Unable to rise from standing from commode, req sarastedy for return to bed. Poor activity tolerance. Not safe to perform transfer in/out of shower. Instructed to call for nursing staff for any needs and OOB mobility.       Patient End of Session: In bed, Needs met, Call light within reach, RN aware of session/findings, All patient questions and concerns addressed, Hospital anti-slip socks, Alarm set, Discussed recommendations with /    PT Session Time: 30 minutes  Gait Training: 10 minutes  Therapeutic Activity: 15 minutes

## 2025-04-01 NOTE — CM/SW NOTE
Pt discussed with RN and PT/OT. PT/OT utilized a danny zamarripa today during session, pt showed poor activity tolerance and wished to return to bed throughout session. Per RN, pt has performed multiple transfers today with one person assist and took a few steps.     Pt will need to consistently demonstrate ability to participate in 3 hours of therapy/day for acute rehab.     ISAAC Julien

## 2025-04-02 LAB
ANION GAP SERPL CALC-SCNC: 8 MMOL/L (ref 0–18)
APTT PPP: 65.1 SECONDS (ref 23–36)
BASOPHILS # BLD AUTO: 0.02 X10(3) UL (ref 0–0.2)
BASOPHILS NFR BLD AUTO: 0.1 %
BUN BLD-MCNC: 42 MG/DL (ref 9–23)
CALCIUM BLD-MCNC: 8.4 MG/DL (ref 8.7–10.6)
CHLORIDE SERPL-SCNC: 103 MMOL/L (ref 98–112)
CO2 SERPL-SCNC: 29 MMOL/L (ref 21–32)
CREAT BLD-MCNC: 1.36 MG/DL
EGFRCR SERPLBLD CKD-EPI 2021: 56 ML/MIN/1.73M2 (ref 60–?)
EOSINOPHIL # BLD AUTO: 0.02 X10(3) UL (ref 0–0.7)
EOSINOPHIL NFR BLD AUTO: 0.1 %
ERYTHROCYTE [DISTWIDTH] IN BLOOD BY AUTOMATED COUNT: 16 %
GLUCOSE BLD-MCNC: 107 MG/DL (ref 70–99)
GLUCOSE BLD-MCNC: 120 MG/DL (ref 70–99)
GLUCOSE BLD-MCNC: 123 MG/DL (ref 70–99)
GLUCOSE BLD-MCNC: 94 MG/DL (ref 70–99)
GLUCOSE BLD-MCNC: 98 MG/DL (ref 70–99)
HCT VFR BLD AUTO: 27.4 %
HGB BLD-MCNC: 9.4 G/DL
IMM GRANULOCYTES # BLD AUTO: 0.21 X10(3) UL (ref 0–1)
IMM GRANULOCYTES NFR BLD: 1.4 %
INR BLD: 3.28 (ref 0.8–1.2)
LYMPHOCYTES # BLD AUTO: 0.65 X10(3) UL (ref 1–4)
LYMPHOCYTES NFR BLD AUTO: 4.2 %
MAGNESIUM SERPL-MCNC: 2.1 MG/DL (ref 1.6–2.6)
MCH RBC QN AUTO: 28.8 PG (ref 26–34)
MCHC RBC AUTO-ENTMCNC: 34.3 G/DL (ref 31–37)
MCV RBC AUTO: 84 FL
MONOCYTES # BLD AUTO: 1.21 X10(3) UL (ref 0.1–1)
MONOCYTES NFR BLD AUTO: 7.9 %
NEUTROPHILS # BLD AUTO: 13.21 X10 (3) UL (ref 1.5–7.7)
NEUTROPHILS # BLD AUTO: 13.21 X10(3) UL (ref 1.5–7.7)
NEUTROPHILS NFR BLD AUTO: 86.3 %
OSMOLALITY SERPL CALC.SUM OF ELEC: 300 MOSM/KG (ref 275–295)
PLATELET # BLD AUTO: 82 10(3)UL (ref 150–450)
PLATELETS.RETICULATED NFR BLD AUTO: 9.4 % (ref 0–7)
POTASSIUM SERPL-SCNC: 4.3 MMOL/L (ref 3.5–5.1)
PROTHROMBIN TIME: 33.6 SECONDS (ref 11.6–14.8)
RBC # BLD AUTO: 3.26 X10(6)UL
SODIUM SERPL-SCNC: 140 MMOL/L (ref 136–145)
WBC # BLD AUTO: 15.3 X10(3) UL (ref 4–11)

## 2025-04-02 NOTE — PLAN OF CARE
Assumed care at 0730  Patient alert, oriented x 4  VSS, RA throughout shift, SA w/ PVCs on tele  Pedal pulses palpable  R neck incision CDI  PRN norco for pain   Up x 2 and pivot  Up to chair and in bathroom  Patient refused shower but was bathed in bathroom instead  Voiding via external cath  (+) BM this shift  Tolerating diet well  Argatroban gtt per order  Call light within reach     Plan to continue argatroban gtt  Patient and family updated on plan of care

## 2025-04-02 NOTE — PROGRESS NOTES
S: He is having some mild dyspnea and cough.     Meds:   multivitamin  1 tablet Oral Daily    amLODIPine  10 mg Oral Daily    metoprolol tartrate  25 mg Oral 2x Daily(Beta Blocker)    fluticasone propionate  1 spray Each Nare Daily    docusate sodium  100 mg Oral BID    polyethylene glycol (PEG 3350)  17 g Oral Daily    insulin aspart  1-5 Units Subcutaneous TID AC and HS       Prn Meds:    peppermint oil    hydrALAzine    ipratropium-albuterol    glucose **OR** glucose **OR** glucose-vitamin C **OR** dextrose **OR** glucose **OR** glucose **OR** glucose-vitamin C    acetaminophen **OR** HYDROcodone-acetaminophen **OR** HYDROcodone-acetaminophen    ondansetron    metoclopramide    HYDROmorphone **OR** HYDROmorphone **OR** [DISCONTINUED] HYDROmorphone    Infusions:   argatroban 0.25 mcg/kg/min (04/01/25 1248)       OBJECTIVE:  Vitals:    04/01/25 2030 04/02/25 0011 04/02/25 0300 04/02/25 0407   BP: 124/73 136/84  139/82   Pulse: 85 85 99 94   Resp: 14 15  15   Temp: 97.6 °F (36.4 °C) 98.3 °F (36.8 °C)  97.9 °F (36.6 °C)   TempSrc: Oral Temporal  Temporal   SpO2: 90% 90% 93% 93%   Weight:         O2: 2LPM    Gen - Alert, oriented x 3, in no apparent distress  Lungs - rare wheezes heard   CV - regular rate & rhythm. Normal S1, S2. No murmurs   Abdomen - nontender  Extremities - No cyanosis, clubbing, edema appreciated.      Labs:  Recent Labs   Lab 03/31/25  0511 04/01/25  0824 04/02/25  0556   WBC 14.0* 18.0* 15.3*   HGB 9.6* 9.9* 9.4*   PLT 86.0* 91.0* 82.0*     Recent Labs   Lab 03/27/25  0507 03/27/25  1611 03/30/25  0417 03/31/25  0511 04/01/25  0824 04/01/25  1522 04/02/25  0555      < > 141 141 140  --  140   K 4.0  4.0   < > 3.9 3.9 4.5  --  4.3   CL 97*   < > 100 103 103  --  103   CO2 36.0*   < > 34.0* 32.0 31.0  --  29.0   BUN 68*   < > 65* 51* 46*  --  42*   CREATSERUM 1.59*   < > 1.34* 1.25 1.35*  --  1.36*   *   < > 132* 106* 102*  --  94   ANIONGAP 7   < > 7 6 6  --  8   ALB  3.3   < > 3.2 3.2 3.4  --   --    CA 8.4*   < > 8.4* 8.3* 8.5*  --  8.4*   MG 2.1   < > 2.3  --   --  2.2 2.1   PHOS 3.8  --   --   --   --   --   --    TP  --    < > 5.3* 5.4* 5.7  --   --     < > = values in this interval not displayed.     Recent Labs   Lab 03/30/25  0417 03/31/25  0511 04/01/25  0824   ALKPHO 69 74 73   AST 30 19 13   ALT 77* 68* 50*   BILT 1.0 1.2* 1.3*     Recent Labs   Lab 03/31/25  0605 04/01/25  0546 04/01/25  1154   INR 1.34* 1.62*  --    PTT 95.7* 63.1* 33.8     Recent Labs   Lab 03/27/25  1320   CRP 15.80*   PCT 0.64*       Recent Labs   Lab 03/26/25  0812 03/27/25  1239   COVID19 Not Detected  --    INFAPCR Not Detected  --    INFBPCR Not Detected  --    STREPPNEUMAG  --  Negative   LEGIONAG  --  Negative       ASSESSMENT AND PLAN      Cy Monsalve is a 69 year old man with a history of aortic dissection, DVT, pAF, CAD, and tobacco use who was admitted 3/19 for complex aortic aneurysm repair    Aortic aneurysm - s/p complex aortic repair 3/21  -per vascular surgery  Acute hypoxemic respiratory failure - after aortic aneurysm repair; due to a combination of pulmonary edema, atelectasis, and pneumonia. Improved.   -wean O2 as able  -antibiotics completed  -encourage IS use  -lasix prn   Possible pneumonia - CT Scan showed patchy ground glass opacities bilaterally. 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  Pulmonary/Critical Care

## 2025-04-02 NOTE — CONSULTS
[facility] HEMATOLOGY ONCOLOGY  Report of Consultation    Cy Monsalve Patient Status:  Inpatient    1955 MRN [id number]   Location [address] Attending Neil Oquendo MD   Hosp Day # 13 PCP Kam Alberts MD     ADMIT DATE AND TIME: 3/19/2025  1:15 PM    ADMIT DIAGNOSIS: Abdominal aortic aneurysm (AAA) [I71.40]          REASON FOR CONSULTATION:     Heparin-induced thrombocytopenia      HISTORY OF PRESENTING ILLNESS     Cy Monsalve is a 69 year old male with recent complex AAA repair and long postoperative course complicated by HOLLIS and hypoxia respiratory failure was on anticoagulation with heparin was noted to have thrombocytopenia.  Heparin-induced platelet antibody test was noted to be positive on perlim testing.  Hematology was consulted.  Argatroban has been started.    No bleeding history  No history of clot formation.      PERTINENT HISTORY:     History:  Past Medical History:    AAA (abdominal aortic aneurysm)    Abdominal aneurysm    Abdominal aortic aneurysm (AAA)    AAA surgery done    Anxiety state    Back pain    Naproxen twice a week    Back problem    minor    Calculus of kidney    Deep vein thrombosis (HCC)    to colon    Esophageal reflux    Hearing impairment    Lt ear tinnitus    High blood pressure    History of recent hospitalization    10/2019- was at [facility] x5 weeks (ICU) with Dissected Aorta/ Post op colon problem/and post op cardiac arrest    Ileostomy present (HCC)    Peripheral vascular disease    Unspecified essential hypertension    Visual impairment    glasses     Past Surgical History:   Procedure Laterality Date    Colonoscopy  10/15/09  [facility]    Screening: small polypoid fold in sigmoid (no polyp tissue seen on path), int hemorrhoids; next colonoscopy in 2019    Other  2017    AAA repair    Other      10/23/19- Dissected Aorta surgery    Other surgical history      Eye surgery for lazy eye    Other surgical history   10/15/09  [facility]    EGD (heartburn): normal    Other surgical history      AAA repair    Other surgical history      multiple toe amputations    Part removal colon w end colostomy      10/2019     Family History   Problem Relation Age of Onset    Diabetes Father     Heart Disorder Father         CHF    Heart Disorder Mother          age 70, aortic aneurysm    Hypertension Sister        SOCIAL HX   reports that he has been smoking cigars and cigarettes. He has never used smokeless tobacco. He reports current alcohol use. He reports that he does not use drugs.    Allergies:  Allergies[1]    Medications:    Pre-Admission Meds:  Current Outpatient Medications   Medication Instructions    diphenoxylate-atropine 2.5-0.025 MG Oral Tab 1 tablet, 4 times daily PRN    Fexofenadine-Pseudoephed -240 MG Oral Tablet 24 Hr 1 tablet, Oral, DAILY PRN    Fluticasone Propionate 50 MCG/ACT Nasal Suspension 1 spray, DAILY PRN    hydroCHLOROthiazide 25 mg, Daily    Loperamide-Simethicone (IMODIUM ADVANCED OR) 2 Doses, Every afternoon at 1400    Naproxen Sodium 550 mg, Oral, 2 times daily with meals    tadalafil (CIALIS) 5 mg, Oral, Daily as needed       Current Inpatient Meds:   multivitamin  1 tablet Oral Daily    amLODIPine  10 mg Oral Daily    metoprolol tartrate  25 mg Oral 2x Daily(Beta Blocker)    fluticasone propionate  1 spray Each Nare Daily    docusate sodium  100 mg Oral BID    polyethylene glycol (PEG 3350)  17 g Oral Daily    insulin aspart  1-5 Units Subcutaneous TID AC and HS       Infusion   argatroban 0.25 mcg/kg/min (25 1248)       PRN    peppermint oil    hydrALAzine    ipratropium-albuterol    glucose **OR** glucose **OR** glucose-vitamin C **OR** dextrose **OR** glucose **OR** glucose **OR** glucose-vitamin C    acetaminophen **OR** HYDROcodone-acetaminophen **OR** HYDROcodone-acetaminophen    ondansetron    metoclopramide    HYDROmorphone **OR** HYDROmorphone **OR** [DISCONTINUED]  HYDROmorphone    Review of Systems:  A 12-point review of systems was done with pertinent positives and negatives per the HPI.       Physical Exam:   Blood pressure 139/82, pulse 94, temperature 97.9 °F (36.6 °C), temperature source Temporal, resp. rate 15, weight 199 lb 15.3 oz (90.7 kg), SpO2 93%.    Performance Status: 3   General: Patient is alert and oriented x 3, not in acute distress.  Lean built   HEENT: No Icterus. Oropharynx moist   Neck:  No palpable lymphadenopathy. Neck is supple.   Chest: Clear to auscultation.   Heart: Regular rate and rhythm.    Abdomen: Soft, nondistended and non tender   Extremities: Pedal pulses are present. No edema.   Neurological: Grossly intact.    Lymphatics: There is no palpable lymphadenopathy           DIAGNOSTIC WORK UP:     Laboratory Data:      Recent Results (from the past 24 hours)   CBC With Differential With Platelet    Collection Time: 04/01/25  8:24 AM   Result Value Ref Range    WBC 18.0 (H) 4.0 - 11.0 x10(3) uL    RBC 3.44 (L) 3.80 - 5.80 x10(6)uL    HGB 9.9 (L) 13.0 - 17.5 g/dL    HCT 28.8 (L) 39.0 - 53.0 %    PLT 91.0 (L) 150.0 - 450.0 10(3)uL    Immature Platelet Fraction 9.3 (H) 0.0 - 7.0 %    MCV 83.7 80.0 - 100.0 fL    MCH 28.8 26.0 - 34.0 pg    MCHC 34.4 31.0 - 37.0 g/dL    RDW 15.8 %    Neutrophil Absolute Prelim 15.56 (H) 1.50 - 7.70 x10 (3) uL    Neutrophil Absolute 15.56 (H) 1.50 - 7.70 x10(3) uL    Lymphocyte Absolute 0.71 (L) 1.00 - 4.00 x10(3) uL    Monocyte Absolute 1.27 (H) 0.10 - 1.00 x10(3) uL    Eosinophil Absolute 0.01 0.00 - 0.70 x10(3) uL    Basophil Absolute 0.02 0.00 - 0.20 x10(3) uL    Immature Granulocyte Absolute 0.41 0.00 - 1.00 x10(3) uL    Neutrophil % 86.5 %    Lymphocyte % 3.9 %    Monocyte % 7.1 %    Eosinophil % 0.1 %    Basophil % 0.1 %    Immature Granulocyte % 2.3 %   Basic Metabolic Panel (8)    Collection Time: 04/01/25  8:24 AM   Result Value Ref Range    Glucose 102 (H) 70 - 99 mg/dL    Sodium 140 136 - 145 mmol/L     Potassium 4.5 3.5 - 5.1 mmol/L    Chloride 103 98 - 112 mmol/L    CO2 31.0 21.0 - 32.0 mmol/L    Anion Gap 6 0 - 18 mmol/L    BUN 46 (H) 9 - 23 mg/dL    Creatinine 1.35 (H) 0.70 - 1.30 mg/dL    Calcium, Total 8.5 (L) 8.7 - 10.6 mg/dL    Calculated Osmolality 302 (H) 275 - 295 mOsm/kg    eGFR-Cr 56 (L) >=60 mL/min/1.73m2   Hepatic Function Panel (7)    Collection Time: 04/01/25  8:24 AM   Result Value Ref Range    AST 13 <34 U/L    ALT 50 (H) 10 - 49 U/L    Alkaline Phosphatase 73 45 - 117 U/L    Bilirubin, Total 1.3 (H) 0.2 - 1.1 mg/dL    Bilirubin, Direct 0.6 (H) <=0.3 mg/dL    Total Protein 5.7 5.7 - 8.2 g/dL    Albumin 3.4 3.2 - 4.8 g/dL   PTT, Activated    Collection Time: 04/01/25 11:54 AM   Result Value Ref Range    PTT 33.8 23.0 - 36.0 seconds   POCT Glucose    Collection Time: 04/01/25 12:34 PM   Result Value Ref Range    POC Glucose 90 70 - 99 mg/dL   Magnesium    Collection Time: 04/01/25  3:22 PM   Result Value Ref Range    Magnesium 2.2 1.6 - 2.6 mg/dL   POCT Glucose    Collection Time: 04/01/25  4:56 PM   Result Value Ref Range    POC Glucose 120 (H) 70 - 99 mg/dL   POCT Glucose    Collection Time: 04/01/25  9:27 PM   Result Value Ref Range    POC Glucose 117 (H) 70 - 99 mg/dL   POCT Glucose    Collection Time: 04/02/25  5:36 AM   Result Value Ref Range    POC Glucose 98 70 - 99 mg/dL   Basic Metabolic Panel (8)    Collection Time: 04/02/25  5:55 AM   Result Value Ref Range    Glucose 94 70 - 99 mg/dL    Sodium 140 136 - 145 mmol/L    Potassium 4.3 3.5 - 5.1 mmol/L    Chloride 103 98 - 112 mmol/L    CO2 29.0 21.0 - 32.0 mmol/L    Anion Gap 8 0 - 18 mmol/L    BUN 42 (H) 9 - 23 mg/dL    Creatinine 1.36 (H) 0.70 - 1.30 mg/dL    Calcium, Total 8.4 (L) 8.7 - 10.6 mg/dL    Calculated Osmolality 300 (H) 275 - 295 mOsm/kg    eGFR-Cr 56 (L) >=60 mL/min/1.73m2   Magnesium    Collection Time: 04/02/25  5:55 AM   Result Value Ref Range    Magnesium 2.1 1.6 - 2.6 mg/dL   CBC With Differential With Platelet     Collection Time: 04/02/25  5:56 AM   Result Value Ref Range    WBC 15.3 (H) 4.0 - 11.0 x10(3) uL    RBC 3.26 (L) 3.80 - 5.80 x10(6)uL    HGB 9.4 (L) 13.0 - 17.5 g/dL    HCT 27.4 (L) 39.0 - 53.0 %    PLT 82.0 (L) 150.0 - 450.0 10(3)uL    Immature Platelet Fraction 9.4 (H) 0.0 - 7.0 %    MCV 84.0 80.0 - 100.0 fL    MCH 28.8 26.0 - 34.0 pg    MCHC 34.3 31.0 - 37.0 g/dL    RDW 16.0 %    Neutrophil Absolute Prelim 13.21 (H) 1.50 - 7.70 x10 (3) uL    Neutrophil Absolute 13.21 (H) 1.50 - 7.70 x10(3) uL    Lymphocyte Absolute 0.65 (L) 1.00 - 4.00 x10(3) uL    Monocyte Absolute 1.21 (H) 0.10 - 1.00 x10(3) uL    Eosinophil Absolute 0.02 0.00 - 0.70 x10(3) uL    Basophil Absolute 0.02 0.00 - 0.20 x10(3) uL    Immature Granulocyte Absolute 0.21 0.00 - 1.00 x10(3) uL    Neutrophil % 86.3 %    Lymphocyte % 4.2 %    Monocyte % 7.9 %    Eosinophil % 0.1 %    Basophil % 0.1 %    Immature Granulocyte % 1.4 %       Recent Labs   Lab 03/30/25  0417 03/31/25  0511 04/01/25  0824 04/02/25  0556   RBC 3.22* 3.40* 3.44* 3.26*   HGB 9.3* 9.6* 9.9* 9.4*   HCT 27.2* 29.1* 28.8* 27.4*   MCV 84.5 85.6 83.7 84.0   MCH 28.9 28.2 28.8 28.8   MCHC 34.2 33.0 34.4 34.3   RDW 15.3 15.4 15.8 16.0   NEPRELIM 7.68  --  15.56* 13.21*   WBC 9.1 14.0* 18.0* 15.3*   PLT 81.0* 86.0* 91.0* 82.0*     Platelet Count::    Lab Results   Component Value Date    PLT 82.0 (L) 04/02/2025    PLT 91.0 (L) 04/01/2025    PLT 86.0 (L) 03/31/2025    PLT 81.0 (L) 03/30/2025    PLT 91.0 (L) 03/29/2025    PLT 88.0 (L) 03/28/2025    PLT 82.0 (L) 03/27/2025    PLT 85.0 (L) 03/26/2025    PLT 93.0 (L) 03/26/2025    PLT 85.0 (L) 03/25/2025    PLT 71.0 (L) 03/24/2025    PLT 86.0 (L) 03/23/2025    PLT 74.0 (L) 03/23/2025    .0 03/22/2025    .0 03/21/2025        CULTURE RESULTS  Hospital Encounter on 03/19/25   1. Blood Culture     Status: None    Collection Time: 03/24/25 10:05 AM    Specimen: Blood,peripheral   Result Value Ref Range    Blood Culture Result No Growth 5  Days N/A         Imaging:    CT CHEST+ABDOMEN+PELVIS(CPT=71250/03853)    Result Date: 3/27/2025  CONCLUSION:  1. Extensive stent graft and side branch stenting of the thoracoabdominal aorta.  Segments of aneurysmal dilatation with measurements as given above.  No perivascular hematoma.  Aneurysmal dilatation of the right hypogastric artery extends beyond the distal margin of the stent. 2. Opacities are seen in both lungs.  These may be due to atelectasis.  The potential for pneumonia should be correlated with clinical suspicion for infection. 3. Retained contrast in the left kidney.  This appearance may reflect an infarcted segment. 4. Sensitivity decreased without IV contrast.  Endoleak is not excluded by this study.  5. Details as above.  Continued clinical correlation recommended.   Findings discussed with Dr. Oquendo by telephone.     LOCATION:  [facility]    Dictated by (CST): Isai Ralph MD on 3/27/2025 at 1:31 PM     Finalized by (CST): Isai Ralph MD on 3/27/2025 at 1:55 PM       XR CHEST AP PORTABLE  (CPT=71045)    Result Date: 3/27/2025  CONCLUSION:   Postoperative changes of cardiothoracic surgery with aortic stent graft noted.  Heart size upper limits normal.  Moderate interstitial opacities noted diffusely throughout the lungs, slightly improved in the interim.  This may represent pulmonary edema versus inflammatory process.  No associated pleural effusion or pneumothorax.  Right upper extremity PICC terminates in the lower SVC.   LOCATION:  [facility]      Dictated by (CST): An Childs MD on 3/27/2025 at 12:36 PM     Finalized by (CST): An Childs MD on 3/27/2025 at 12:37 PM       XR CHEST AP PORTABLE  (CPT=71045)    Result Date: 3/25/2025  CONCLUSION:   Worsening airspace disease concerning for multifocal pneumonia, but the vascularity is congested as well, with cardiac enlargement present and the findings may also reflect asymmetric pulmonary edema and CHF.   LOCATION:  [id number]      Dictated by (CST):  Shawn Kumar MD on 3/25/2025 at 3:22 PM     Finalized by (CST): Shawn Kumar MD on 3/25/2025 at 3:23 PM       XR CHEST AP PORTABLE  (CPT=71045)    Result Date: 3/24/2025  CONCLUSION:  The patient is status post sternotomy.  Heart size is normal.  Normal vascularity A descending thoracic aortic stent graft is identified unchanged in appearance.  There is a graft along the superior aspect of the transverse aorta.  There are adjacent surgical clips.  There are surgical clips in the left neck also.  A right [place] catheter is identified the tip it is in the upper SVC.  No pneumothorax.  There is some consolidation and ground-glass opacities in the left lower lobe with a small left pleural effusion either representing atelectasis or pneumonia.  There are ground-glass opacities in the right suprahilar and right infrahilar region suggesting foci of pneumonia versus atelectasis.  Multi focal pneumonia is favored.  Trace blunting the right CP angle.   LOCATION:  [id number]      Dictated by (CST): Neil Tong MD on 3/24/2025 at 5:57 AM     Finalized by (CST): Neil Tong MD on 3/24/2025 at 6:00 AM       MRI SPINE LUMBAR (CPT=72148)    Result Date: 3/22/2025  CONCLUSION:  1. No acute process involving the distal cord, conus or cauda equina. 2. Multilevel disc disease and facet arthropathy. 3. There is mild to moderate spinal canal stenosis at L3-4 and L4-5.  There is multilevel moderate to severe subarticular stenosis and mild to moderate neural foraminal stenosis involving the levels of L2-3 through L5-S1.  Please see the body of the report above for specific details regarding each individual lumbar level.   LOCATION:  [id number]   Dictated by (CST): Sparkle High DO on 3/22/2025 at 3:16 PM     Finalized by (CST): Sparkle High DO on 3/22/2025 at 3:21 PM       MRI SPINE THORACIC (CPT=72146)    Result Date: 3/22/2025  CONCLUSION:  Exam is somewhat suboptimal due to metal artifacts relating to patient's  abdominal aortic stenting.  There is no evidence of acute cord injury/cord infarct.    Dictated by (CST): Sparkle High DO on 3/22/2025 at 2:54 PM     Finalized by (CST): Sparkle High DO on 3/22/2025 at 2:58 PM       XR CHEST AP PORTABLE  (CPT=71045)    Result Date: 3/21/2025  CONCLUSION:   1. Endotracheal tube tip is 10.6 cm above the shailesh.  2. Right internal jugular central line has tip in the SVC.  Possible drainage catheter or other rectangular foreign body overlying the left supraclavicular region.  3. Aortic endograft is noted.  4. Median sternotomy wires are noted.  5. Interstitial abnormalities throughout the lungs.  6. No other foreign bodies are identified.    LOCATION:  [facility]      Dictated by (CST): Peter Villagomez MD on 3/21/2025 at 9:42 PM     Finalized by (CST): Peter Villagomez MD on 3/21/2025 at 9:45 PM       IR LUMBAR DRAINAGE    Result Date: 3/20/2025  CONCLUSION:   Successful lumbar drain placement, as described above.   LOCATION:  [facility]       Dictated by (CST): Deejay Haney MD on 3/20/2025 at 2:05 PM     Finalized by (CST): Deejay Haney MD on 3/20/2025 at 2:19 PM          PROBLEM LIST:     Active Problems:    Pre-op testing    Thoracoabdominal aortic aneurysm (TAAA)    Bilateral leg weakness          ASSESSMENT AND PLAN:     Cy Monsalve is a 69 year old male with new onset of thrombocytopenia likely heparin-induced thrombocytopenia.    Status post AAA repair with complicated postop course with hypoxia and HOLLIS.  Currently on the floor.    Thrombocytopenia  Platelets were normal until 3/22/2025  Thereafter they had declined into the 70-90 while on heparin  Preliminary test result for heparin-induced platelet antibody was positive.  Final report is pending.  Currently on argatroban instead of heparin as the anticoagulant.    Development of thrombocytopenia while on heparin is always suggestive of underlying antibodies developed as a result of heparin exposure.  While  waiting for the confirmation test it is important to treat as HIT with argatroban.    We will add serotonin release assay to the HIPA test.  Pharmacy to dose the argatroban  We will monitor clinically.          Thank you for allowing me to participate in the care of your patient.    Simón Vernon MD      This note was prepared using Dragon [employer] voice recognition dictation software. As a result errors may occur. When identified these errors have been corrected. While every attempt is made to correct errors during dictation discrepancies may still exist. Please call me to clarify any errors.          [1]   Allergies  Allergen Reactions    Amoxicillin ITCHING     Itchiness on hands and feet      Clindamycin OTHER (SEE COMMENTS)     Upset stomach

## 2025-04-02 NOTE — PROGRESS NOTES
[facility] Pharmacy Dosing Service  Argatroban Subsequent Dosing       Cy Monsalve is a 69 year old patient on argatroban for Heparin-Induced Thrombocytopenia (HIT)    Goal PTT is 46-93     PTT   Date Value Ref Range Status   04/02/2025 65.1 (H) 23.0 - 36.0 seconds Final     Comment:       The aPTT Heparin Therapeutic Range is approximately 65- 104 seconds. The therapeutic range has been validated against 0.3-0.7 heparin anti-Xa units/mL.     Elevations of the aPTT in patients not receiving anticoagulant therapy (Heparin, etc.), may be seen in Factor deficiency, vitamin K deficiency, factor inhibitors, liver disease, etc.   Clinical correlation is recommended.            INR   Date Value Ref Range Status   04/02/2025 3.28 (H) 0.80 - 1.20 Final     Comment:     Therapeutic INR range for patients on warfarin:  2.0-3.0 for most medical conditions and surgical prophylaxis   2.5-3.5 for mechanical heart valves and recurrent thromboembolism    Direct thrombin inhibitors (e.g. argatroban, bivalirudin), factor Xa inhibitors (e.g. apixaban, rivaroxaban), and conditions such as coagulation factor deficiency, vitamin K deficiency, and liver disease will prolong the prothrombin time/INR.    Unfractionated heparin and low molecular weight heparin do not affect the prothrombin time/INR up to a concentration of 1 IU/mL and 1.5 IU/mL, respectively.         Heparin Induced Plt Antibody   Date Value Ref Range Status   03/31/2025 Reactive- Confirmed (A) Negative Final       Current dose is 0.25mcg/kg/min    Based on PTT will continue current dose     Will recheck PTT with AM labs and adjust based on result for goal PTT of 46-93.     We will continue to follow and appreciate the opportunity to assist in the care of this patient.    Thank you,    Luann Lemon, PharmD  4/2/2025 11:52 AM

## 2025-04-02 NOTE — PROGRESS NOTES
[facility] Hospitalist Progress Note     CC: Hospital Follow up    PCP: Kam Alberts MD     Subjective:     - no new complaints, dyspnea/cough improving     OBJECTIVE:    Blood pressure 143/74, pulse 82, temperature 97.9 °F (36.6 °C), temperature source Oral, resp. rate 16, weight 199 lb 15.3 oz (90.7 kg), SpO2 92%.    Temp:  [97.3 °F (36.3 °C)-98.3 °F (36.8 °C)] 97.9 °F (36.6 °C)  Pulse:  [82-99] 82  Resp:  [14-17] 16  BP: (124-143)/(63-88) 143/74  SpO2:  [90 %-95 %] 92 %      Intake/Output:    Intake/Output Summary (Last 24 hours) at 4/2/2025 0939  Last data filed at 4/2/2025 0300  Gross per 24 hour   Intake 600 ml   Output 1250 ml   Net -650 ml       Last 3 Weights   03/31/25 0300 199 lb 15.3 oz (90.7 kg)   03/30/25 0200 197 lb 5 oz (89.5 kg)   03/27/25 0600 193 lb 12.6 oz (87.9 kg)   03/26/25 0400 193 lb 12.6 oz (87.9 kg)   03/25/25 0600 200 lb 2.8 oz (90.8 kg)   03/24/25 0900 200 lb 3.2 oz (90.8 kg)   03/24/25 0500 203 lb 0.7 oz (92.1 kg)   03/23/25 0516 204 lb 2.3 oz (92.6 kg)   03/22/25 0637 153 lb 3.5 oz (69.5 kg)   03/20/25 1231 192 lb 15.9 oz (87.5 kg)   03/19/25 1444 193 lb (87.5 kg)   03/22/25 1113 153 lb 3.5 oz (69.5 kg)   03/07/25 1536 185 lb (83.9 kg)       Exam   Gen: No acute distress, alert and oriented x3, no focal neurologic deficits, right-sided [place], arterial line and lumbar drain noted  Heent: NC AT, mucous memb clear, neck supple  Pulm: Lungs clear, normal respiratory effort  CV: Heart with regular rate and rhythm, no peripheral edema  Abd: Abdomen soft, nontender, nondistended, bowel sounds present  MSK:expected rom, no hematoma appreciated bilaterally, 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, 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, strentgh 4/5 on R, pulses intact  Skin: no rashes or lesions  Neuro: AO*3, motor intact, no sensory deficits  Psyc: appropriate mood and affect      Data Review:       Labs:     Recent Labs   Lab 03/30/25  0417 03/31/25  0511 04/01/25  0824 04/02/25  0556   RBC 3.22*  3.40* 3.44* 3.26*   HGB 9.3* 9.6* 9.9* 9.4*   HCT 27.2* 29.1* 28.8* 27.4*   MCV 84.5 85.6 83.7 84.0   MCH 28.9 28.2 28.8 28.8   MCHC 34.2 33.0 34.4 34.3   RDW 15.3 15.4 15.8 16.0   NEPRELIM 7.68  --  15.56* 13.21*   WBC 9.1 14.0* 18.0* 15.3*   PLT 81.0* 86.0* 91.0* 82.0*         Recent Labs   Lab 03/31/25  0511 04/01/25  0824 04/02/25  0555   * 102* 94   BUN 51* 46* 42*   CREATSERUM 1.25 1.35* 1.36*   EGFRCR 62 56* 56*   CA 8.3* 8.5* 8.4*    140 140   K 3.9 4.5 4.3    103 103   CO2 32.0 31.0 29.0       Recent Labs   Lab 03/28/25  0404 03/29/25  0407 03/30/25  0417 03/31/25  0511 04/01/25  0824   ALT 70* 64* 77* 68* 50*   AST 43* 26 30 19 13   ALB 3.1* 2.8* 3.2 3.2 3.4         Imaging:  No results found.      Meds:      multivitamin  1 tablet Oral Daily    amLODIPine  10 mg Oral Daily    metoprolol tartrate  25 mg Oral 2x Daily(Beta Blocker)    fluticasone propionate  1 spray Each Nare Daily    docusate sodium  100 mg Oral BID    polyethylene glycol (PEG 3350)  17 g Oral Daily    insulin aspart  1-5 Units Subcutaneous TID AC and HS      argatroban 0.25 mcg/kg/min (04/01/25 1248)       peppermint oil    hydrALAzine    ipratropium-albuterol    glucose **OR** glucose **OR** glucose-vitamin C **OR** dextrose **OR** glucose **OR** glucose **OR** glucose-vitamin C    acetaminophen **OR** HYDROcodone-acetaminophen **OR** HYDROcodone-acetaminophen    ondansetron    metoclopramide    HYDROmorphone **OR** HYDROmorphone **OR** [DISCONTINUED] HYDROmorphone         Assessment/Plan:     Active Problems:    Pre-op testing    Thoracoabdominal aortic aneurysm (TAAA)    Bilateral leg weakness      69 year old male with a past medical history of hypertension, GERD, anxiety, renal calculi, DVT, ascending aortic dissection s/p repair, aortic dissection distal to left subclavian, infrarenal endovascular AAA repair with bilateral KYLER for hypogastric and common iliac aneurysms, mesenteric ischemia s/p SMA bypass with  right external iliac artery to SMA bypass, who is presenting to the hospital for direct admission for complex AAA repair with s/p lumbar drain.  Postoperative course complicated by HOLLIS on CKD as well as right lower extremity weakness along with hypoxic respiratory failure.      History of prior infrarenal endovascular AAA repair with bilateral KYLER for hypogastric and common iliac aneurysms  History of mesenteric ischemia s/p SMA bypass with right external iliac artery to SMA bypass  History of aortic dissection s/p repair, aortic dissection distal to left subclavian  S/p complex thoraco abdominal aortic repair 3/21  Hx of DVT   -Complicated aortic pathology with multiple repairs in the past. 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   - HIT neg 3/23, prelim positive on 3/31  - hematology consulted, heparin stopped, argatroban started,   - SUNDEEP pending  - warfarin and argatroban per hematology / pharmacy to dose      Acute hypoxemic respiratory failure w/ threat to bodily function 2/2 possible pneumonia versus edema  Leukocytosis  -Completed 3 days of azithro (3/27-3/29), janna (3/24-3/28), now switched to cefepime per ICU (3/28-3/30)  - tx w/ iv steroids x 5 days, iv steroids completed, completed abx as above   - wean o2 as tolerated         Leukocytosis  - no new infectious complaints, likely 2/2 steroids  - trend     Right lower extremity weakness-improving  - In the setting of complex aortic repair 3/21  - Seen by neurointensivist, recommendations reviewed  - Improved overall  -- PT/OT     HOLLIS on CKD-improving  -Creatinine reviewed today - 1.34 (baseline 1.2)  -Continue to monitor with serial RFP's  -Avoid nephrotoxic agents  - Cr bumped post operatively - given prolonged surgery and complicated repair  - improving      Hypertension  -hold home  HCTZ     Allergic rhinitis  -Continue home fluticasone as needed     Short gut syndrome  - resume lamotil if diarrhea resumes    Normocytic anemia  - stable, trend     H/o dvt  - see above         Supplementary Documentation:   DVT Mechanical Prophylaxis:   SCDs, Early ambuation  DVT Pharmacologic Prophylaxis   Medication    argatroban 50 mg/50mL infusion premix                Code Status: DNAR/Full Treatment  Arzola: External urinary catheter in place  Arzola Duration (in days):   Central line:    YNES: stable to transfer to floor, YNES per vascular        Dispo: inpatient - on argatroban currently, dc to [facility]  when shahzad davison/ consultants     DO TARA Ansari Hospitalist  Pager: [phone]  Answering Service: [phone]

## 2025-04-02 NOTE — PROGRESS NOTES
[facility] Cardiology  Consultation Note      Cy Monsalve Patient Status:  Inpatient    1955 MRN [id number]   Location [facility] 4SW-A Attending Neil Oquendo MD   Hosp Day # 13 PCP Kam Alberts MD     Reason for consult: Post op, arrhythmias    Events: Stable. Denies CP or SOB. No sig L foot pain.     Primary cardiologist: Russell     History of Present Illness:  Cy Monsalve is a 69 year old male who presented to [facility] on 3/19/2025 for elective complex aortic surgery, see . Complicated aortic history involving multiple surgeries, AVR, complications etc. Cardiology consulted regarding tachyarhythmias seen on tele. He has felt some palpitations with this. Denies CP. Has needed O2 post op but denies SOB at present. Has been smoking. Being treated for PNA with Abx and steroids. Lasix on hold and has received IVF for HOLLIS, now improving. He is usually on coumadin and is now on heparin gtt.     Medications:  Current Facility-Administered Medications   Medication Dose Route Frequency    argatroban 50 mg/50mL infusion premix  0.25 mcg/kg/min Intravenous Continuous    multivitamin (Tab-A-Immanuel/Beta Carotene) tab 1 tablet  1 tablet Oral Daily    amLODIPine (Norvasc) tab 10 mg  10 mg Oral Daily    peppermint oil liquid 1 mL  1 mL Other PRN    metoprolol tartrate (Lopressor) tab 25 mg  25 mg Oral 2x Daily(Beta Blocker)    hydrALAzine (Apresoline) 20 mg/mL injection 10 mg  10 mg Intravenous Q4H PRN    fluticasone propionate (Flonase) 50 MCG/ACT nasal suspension 1 spray  1 spray Each Nare Daily    ipratropium-albuterol (Duoneb) 0.5-2.5 (3) MG/3ML inhalation solution 3 mL  3 mL Nebulization Q4H PRN    docusate sodium (Colace) cap 100 mg  100 mg Oral BID    polyethylene glycol (PEG 3350) (Miralax) 17 g oral packet 17 g  17 g Oral Daily    glucose (Dex4) 15 GM/59ML oral liquid 15 g  15 g Oral Q15 Min PRN    Or    glucose (Glutose) 40% oral gel 15 g  15 g Oral Q15 Min PRN    Or     glucose-vitamin C (Dex-4) chewable tab 4 tablet  4 tablet Oral Q15 Min PRN    Or    dextrose 50% injection 50 mL  50 mL Intravenous Q15 Min PRN    Or    glucose (Dex4) 15 GM/59ML oral liquid 30 g  30 g Oral Q15 Min PRN    Or    glucose (Glutose) 40% oral gel 30 g  30 g Oral Q15 Min PRN    Or    glucose-vitamin C (Dex-4) chewable tab 8 tablet  8 tablet Oral Q15 Min PRN    insulin aspart (NovoLOG) 100 Units/mL FlexPen 1-5 Units  1-5 Units Subcutaneous TID AC and HS    acetaminophen (Tylenol) tab 650 mg  650 mg Oral Q4H PRN    Or    HYDROcodone-acetaminophen (Norco) 5-325 MG per tab 1 tablet  1 tablet Oral Q4H PRN    Or    HYDROcodone-acetaminophen (Norco) 5-325 MG per tab 2 tablet  2 tablet Oral Q4H PRN    ondansetron (Zofran) 4 MG/2ML injection 4 mg  4 mg Intravenous Q6H PRN    metoclopramide (Reglan) 5 mg/mL injection 10 mg  10 mg Intravenous Q8H PRN    HYDROmorphone (Dilaudid) 1 MG/ML injection 0.2 mg  0.2 mg Intravenous Q2H PRN    Or    HYDROmorphone (Dilaudid) 1 MG/ML injection 0.4 mg  0.4 mg Intravenous Q2H PRN       Past Medical History:    AAA (abdominal aortic aneurysm)    Abdominal aneurysm    Abdominal aortic aneurysm (AAA)    AAA surgery done    Anxiety state    Back pain    Naproxen twice a week    Back problem    minor    Calculus of kidney    Deep vein thrombosis (HCC)    to colon    Esophageal reflux    Hearing impairment    Lt ear tinnitus    High blood pressure    History of recent hospitalization    10/2019- was at [facility] x5 [facility] (ICU) with Dissected Aorta/ Post op colon problem/and post op cardiac arrest    Ileostomy present (HCC)    Peripheral vascular disease    Unspecified essential hypertension    Visual impairment    glasses       Past Surgical History:   Procedure Laterality Date    Colonoscopy  10/15/09  [facility]    Screening: small polypoid fold in sigmoid (no polyp tissue seen on path), int hemorrhoids; next colonoscopy in 2019    Other  02/2017    AAA repair    Other      10/23/19-  Dissected Aorta surgery    Other surgical history      Eye surgery for lazy eye    Other surgical history  10/15/09  [facility]    EGD (heartburn): normal    Other surgical history      AAA repair    Other surgical history      multiple toe amputations    Part removal colon w end colostomy      10/2019       Family History  family history includes Diabetes in his father; Heart Disorder in his father and mother; Hypertension in his sister.    Social History   reports that he has been smoking cigars and cigarettes. He has never used smokeless tobacco. He reports current alcohol use. He reports that he does not use drugs.     Allergies  Allergies[1]    Review of Systems:  As per HPI, otherwise 10 point ROS is negative in detail.    Physical Exam:  Blood pressure 133/78, pulse 80, temperature 98 °F (36.7 °C), temperature source Oral, resp. rate 18, weight 199 lb 15.3 oz (90.7 kg), SpO2 95%.  Temp (24hrs), Av.9 °F (36.6 °C), Min:97.6 °F (36.4 °C), Max:98.3 °F (36.8 °C)    Wt Readings from Last 3 Encounters:   25 199 lb 15.3 oz (90.7 kg)   25 153 lb 3.5 oz (69.5 kg)   25 185 lb (83.9 kg)       General: Awake and alert; in no acute distress  HEENT: Extraocular movements are intact; sclerae are anicteric; scalp is atraumatic  Neck: Supple; no JVD; no carotid bruits  Cardiac: Regular, with frequent ectopic beats, tachy, normal S1 and S2, no murmurs, rubs, or gallops are appreciated  Lungs: Clear to auscultation bilaterally; no accessory muscle use is noted, no wheezes, rhonci or rales  Abdomen: Soft, non-distended, non-tender; bowel sounds are normoactive  Extremities: Warm, no edema, clubbing or cyanosis; moves all 4 extremities normally, distal pulses intact and equal  Psychiatric: Normal mood and affect; answers questions appropriately  Dermatologic: No rashes; normal skin turgor    Diagnostic testing:    Labs:   Lab Results   Component Value Date    INR 3.28 (H) 2025    INR 1.62 (H) 2025         Lab Results   Component Value Date    WBC 15.3 04/02/2025    HGB 9.4 04/02/2025    HCT 27.4 04/02/2025    PLT 82.0 04/02/2025    CREATSERUM 1.36 04/02/2025    BUN 42 04/02/2025     04/02/2025    K 4.3 04/02/2025     04/02/2025    CO2 29.0 04/02/2025    GLU 94 04/02/2025    CA 8.4 04/02/2025    PTT 65.1 04/02/2025    INR 3.28 04/02/2025    PTP 33.6 04/02/2025    MG 2.1 04/02/2025    PGLU 120 04/02/2025       Cardiac diagnostics:    EKG 3/28/2025:   Sinus tachycardia with frequent Premature ventricular complexes   Nonspecific ST abnormality     Echo 3/25/25  1. Left ventricle: The cavity size was normal. Wall thickness was mildly      increased. Systolic function was vigorous. The estimated ejection      fraction was 65-70%.   2. Right ventricle: The cavity size was normal. Systolic function was      normal. Systolic pressure was moderately increased.   3. Aortic valve: Well visualized. No significant valve disease.      Transvalvular velocity was minimally increased. There was no significant      evidence for stenosis. The peak systolic velocity was 1.78m/sec. The mean      systolic gradient was 7mm Hg.   4. Pulmonary arteries: Systolic pressure was moderately increased. The peak      systolic pressure is 45mm Hg.   5. Pericardium, extracardiac: There was no pericardial effusion.       Impression:  Complex aortic history:   Aortic dissection - type A, s/p bioAVR, graft and hemiarch repair 10/2019, [facility]. Then underwent repair of abdominal aortic dissection. Suffered from mesenteric ischemia requiring subtotal colectomy with colostomy. Also subsequently required amputation of toes due to ischemia. Episodes deemed provoked by surgery. Subsequently underwent reversal of colostomy. Was discharged on anticoagulation. Embolic events - s/p amputation 4.5 toes on left, tip of big toe on R, 2/3 of his colon (post op aortic surgery 10/19), and 3 feet of small bowel (10/23). On coumadin.   H/o EVAR  2018    Current issues  CTA 12/24 reviewed, \"New discontinuity of the left internal iliac stents with focal contrast extravasation suggestive of type III endoleak. Excluded aneurysm sac has increased to 4.3 cm compared to 3.2 cm on 10/2023 study. Slight increase in additional aneurysms/excluded aneurysm sacs involving the bilateral internal iliac arteries\"  -->  s/p 3/21/25 fenestrated endovascular arch repair with fenestrated thoracoabdominal aneurysm repair, left carotid to subclavian transposition with endovascular septotomy of dissection with Dr. Oquendo and Dr. Yanez.     Hypoxic resp failure  - PNA, ateletasis    Sinus tach / PSVT / frequent PACs, PVCs    HOLLIS - Cr 2, improving    Chronic issues  CAD - mild CAC on CT 12/24. No symptoms. Nuc stress 1/2025 normal   H/o hematuria - kidney stones - 12/4/2023   Renal artery stenosis s/p stent 12/2023  DVT - 1/2024 venous Doppler bilateral done for bilateral edema-no acute DVT. 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  -> on coumadin usually, thought to have hypercoag state  Heterozygous prothrombin gene mutation   PAF - per records transient episode post op 2019, no documented recurrence.   Smoking - not motivated to quit.   COPD  Thrombocytopenia    Recommendations:  Continue metoprolol 25mg BID  HIPA reactive. On argatroban. Per heme. Coumadin on hold presently.   Wean O2. S/p Abx   Consider resuming lasix if slow progress with O2 weaning, renal function appears relatively stable  S/p steroids per pulm/crit care team  Per vascular    Thank you for allowing our practice to participate in the care of your patient. Please do not hesitate to contact me if you have any questions.    Wilbur Wells MD  Interventional Cardiology  [facility]  Office: [phone]       [1]   Allergies  Allergen Reactions    Amoxicillin ITCHING     Itchiness on hands and feet      Clindamycin OTHER (SEE COMMENTS)     Upset stomach

## 2025-04-02 NOTE — PROGRESS NOTES
[facility] Cardiology  Consultation Note      Cy Monsalve Patient Status:  Inpatient    1955 MRN [id number]   Location [facility] 4SW-A Attending Neil Oquendo MD   Hosp Day # 12 PCP Kam Alberts MD     Reason for consult: Post op, arrhythmias    Events: Stable. Denies CP or SOB. Minimal L foot pain.     Primary cardiologist: Russell     History of Present Illness:  Cy Monsalve is a 69 year old male who presented to [facility] on 3/19/2025 for elective complex aortic surgery, see . Complicated aortic history involving multiple surgeries, AVR, complications etc. Cardiology consulted regarding tachyarhythmias seen on tele. He has felt some palpitations with this. Denies CP. Has needed O2 post op but denies SOB at present. Has been smoking. Being treated for PNA with Abx and steroids. Lasix on hold and has received IVF for HOLLIS, now improving. He is usually on coumadin and is now on heparin gtt.     Medications:  Current Facility-Administered Medications   Medication Dose Route Frequency    argatroban 50 mg/50mL infusion premix  0.25 mcg/kg/min Intravenous Continuous    multivitamin (Tab-A-Immanuel/Beta Carotene) tab 1 tablet  1 tablet Oral Daily    amLODIPine (Norvasc) tab 10 mg  10 mg Oral Daily    peppermint oil liquid 1 mL  1 mL Other PRN    metoprolol tartrate (Lopressor) tab 25 mg  25 mg Oral 2x Daily(Beta Blocker)    hydrALAzine (Apresoline) 20 mg/mL injection 10 mg  10 mg Intravenous Q4H PRN    fluticasone propionate (Flonase) 50 MCG/ACT nasal suspension 1 spray  1 spray Each Nare Daily    ipratropium-albuterol (Duoneb) 0.5-2.5 (3) MG/3ML inhalation solution 3 mL  3 mL Nebulization Q4H PRN    docusate sodium (Colace) cap 100 mg  100 mg Oral BID    polyethylene glycol (PEG 3350) (Miralax) 17 g oral packet 17 g  17 g Oral Daily    glucose (Dex4) 15 GM/59ML oral liquid 15 g  15 g Oral Q15 Min PRN    Or    glucose (Glutose) 40% oral gel 15 g  15 g Oral Q15 Min PRN    Or     glucose-vitamin C (Dex-4) chewable tab 4 tablet  4 tablet Oral Q15 Min PRN    Or    dextrose 50% injection 50 mL  50 mL Intravenous Q15 Min PRN    Or    glucose (Dex4) 15 GM/59ML oral liquid 30 g  30 g Oral Q15 Min PRN    Or    glucose (Glutose) 40% oral gel 30 g  30 g Oral Q15 Min PRN    Or    glucose-vitamin C (Dex-4) chewable tab 8 tablet  8 tablet Oral Q15 Min PRN    insulin aspart (NovoLOG) 100 Units/mL FlexPen 1-5 Units  1-5 Units Subcutaneous TID AC and HS    acetaminophen (Tylenol) tab 650 mg  650 mg Oral Q4H PRN    Or    HYDROcodone-acetaminophen (Norco) 5-325 MG per tab 1 tablet  1 tablet Oral Q4H PRN    Or    HYDROcodone-acetaminophen (Norco) 5-325 MG per tab 2 tablet  2 tablet Oral Q4H PRN    ondansetron (Zofran) 4 MG/2ML injection 4 mg  4 mg Intravenous Q6H PRN    metoclopramide (Reglan) 5 mg/mL injection 10 mg  10 mg Intravenous Q8H PRN    HYDROmorphone (Dilaudid) 1 MG/ML injection 0.2 mg  0.2 mg Intravenous Q2H PRN    Or    HYDROmorphone (Dilaudid) 1 MG/ML injection 0.4 mg  0.4 mg Intravenous Q2H PRN       Past Medical History:    AAA (abdominal aortic aneurysm)    Abdominal aneurysm    Abdominal aortic aneurysm (AAA)    AAA surgery done    Anxiety state    Back pain    Naproxen twice a week    Back problem    minor    Calculus of kidney    Deep vein thrombosis (HCC)    to colon    Esophageal reflux    Hearing impairment    Lt ear tinnitus    High blood pressure    History of recent hospitalization    10/2019- was at [facility] x5 weeks (ICU) with Dissected Aorta/ Post op colon problem/and post op cardiac arrest    Ileostomy present (HCC)    Peripheral vascular disease    Unspecified essential hypertension    Visual impairment    glasses       Past Surgical History:   Procedure Laterality Date    Colonoscopy  10/15/09  [facility]    Screening: small polypoid fold in sigmoid (no polyp tissue seen on path), int hemorrhoids; next colonoscopy in 2019    Other  02/2017    AAA repair    Other      10/23/19-  Dissected Aorta surgery    Other surgical history      Eye surgery for lazy eye    Other surgical history  10/15/09  [facility]    EGD (heartburn): normal    Other surgical history      AAA repair    Other surgical history      multiple toe amputations    Part removal colon w end colostomy      10/2019       Family History  family history includes Diabetes in his father; Heart Disorder in his father and mother; Hypertension in his sister.    Social History   reports that he has been smoking cigars and cigarettes. He has never used smokeless tobacco. He reports current alcohol use. He reports that he does not use drugs.     Allergies  Allergies[1]    Review of Systems:  As per HPI, otherwise 10 point ROS is negative in detail.    Physical Exam:  Blood pressure 124/73, pulse 85, temperature 97.6 °F (36.4 °C), temperature source Oral, resp. rate 14, weight 199 lb 15.3 oz (90.7 kg), SpO2 90%.  Temp (24hrs), Av.7 °F (36.5 °C), Min:97.3 °F (36.3 °C), Max:98 °F (36.7 °C)    Wt Readings from Last 3 Encounters:   25 199 lb 15.3 oz (90.7 kg)   25 153 lb 3.5 oz (69.5 kg)   25 185 lb (83.9 kg)       General: Awake and alert; in no acute distress  HEENT: Extraocular movements are intact; sclerae are anicteric; scalp is atraumatic  Neck: Supple; no JVD; no carotid bruits  Cardiac: Regular, with frequent ectopic beats, tachy, normal S1 and S2, no murmurs, rubs, or gallops are appreciated  Lungs: Clear to auscultation bilaterally; no accessory muscle use is noted, no wheezes, rhonci or rales  Abdomen: Soft, non-distended, non-tender; bowel sounds are normoactive  Extremities: Warm, no edema, clubbing or cyanosis; moves all 4 extremities normally, distal pulses intact and equal  Psychiatric: Normal mood and affect; answers questions appropriately  Dermatologic: No rashes; normal skin turgor    Diagnostic testing:    Labs:   Lab Results   Component Value Date    INR 1.62 (H) 2025    INR 1.34 (H) 2025         Lab Results   Component Value Date    WBC 18.0 04/01/2025    HGB 9.9 04/01/2025    HCT 28.8 04/01/2025    PLT 91.0 04/01/2025    CREATSERUM 1.35 04/01/2025    BUN 46 04/01/2025     04/01/2025    K 4.5 04/01/2025     04/01/2025    CO2 31.0 04/01/2025     04/01/2025    CA 8.5 04/01/2025    ALB 3.4 04/01/2025    ALKPHO 73 04/01/2025    BILT 1.3 04/01/2025    TP 5.7 04/01/2025    AST 13 04/01/2025    ALT 50 04/01/2025    PTT 33.8 04/01/2025    INR 1.62 04/01/2025    PTP 19.3 04/01/2025    MG 2.2 04/01/2025    PGLU 117 04/01/2025       Cardiac diagnostics:    EKG 3/28/2025:   Sinus tachycardia with frequent Premature ventricular complexes   Nonspecific ST abnormality     Echo 3/25/25  1. Left ventricle: The cavity size was normal. Wall thickness was mildly      increased. Systolic function was vigorous. The estimated ejection      fraction was 65-70%.   2. Right ventricle: The cavity size was normal. Systolic function was      normal. Systolic pressure was moderately increased.   3. Aortic valve: Well visualized. No significant valve disease.      Transvalvular velocity was minimally increased. There was no significant      evidence for stenosis. The peak systolic velocity was 1.78m/sec. The mean      systolic gradient was 7mm Hg.   4. Pulmonary arteries: Systolic pressure was moderately increased. The peak      systolic pressure is 45mm Hg.   5. Pericardium, extracardiac: There was no pericardial effusion.       Impression:  Complex aortic history:   Aortic dissection - type A, s/p bioAVR, graft and hemiarch repair 10/2019, [facility]. Then underwent repair of abdominal aortic dissection. Suffered from mesenteric ischemia requiring subtotal colectomy with colostomy. Also subsequently required amputation of toes due to ischemia. Episodes deemed provoked by surgery. Subsequently underwent reversal of colostomy. Was discharged on anticoagulation. Embolic events - s/p amputation 4.5 toes on left, tip  of big toe on R, 2/3 of his colon (post op aortic surgery 10/19), and 3 feet of small bowel (10/23). On coumadin.   H/o EVAR 2018    Current issues  CTA 12/24 reviewed, \"New discontinuity of the left internal iliac stents with focal contrast extravasation suggestive of type III endoleak. Excluded aneurysm sac has increased to 4.3 cm compared to 3.2 cm on 10/2023 study. Slight increase in additional aneurysms/excluded aneurysm sacs involving the bilateral internal iliac arteries\"  -->  s/p 3/21/25 fenestrated endovascular arch repair with fenestrated thoracoabdominal aneurysm repair, left carotid to subclavian transposition with endovascular septotomy of dissection with Dr. Oquendo and Dr. Yanez.     Hypoxic resp failure  - PNA, ateletasis    Sinus tach / PSVT / frequent PACs, PVCs    HOLLIS - Cr 2, improving    Chronic issues  CAD - mild CAC on CT 12/24. No symptoms. Nuc stress 1/2025 normal   H/o hematuria - kidney stones - 12/4/2023   Renal artery stenosis s/p stent 12/2023  DVT - 1/2024 venous Doppler bilateral done for bilateral edema-no acute DVT. 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  -> on coumadin usually, thought to have hypercoag state  Heterozygous prothrombin gene mutation   PAF - per records transient episode post op 2019, no documented recurrence.   Smoking - not motivated to quit.   COPD  Thrombocytopenia    Recommendations:  Continue metoprolol 25mg BID  HIPA reportedly prelim + and heparin was changed to argatroban. Now appears to be negative from 3/23, repeat pending. Per heme. Coumadin on hold presently.   Wean O2. S/p Abx   Consider resuming lasix if slow progress with O2 weaning, renal function appears relatively stable  S/p steroids per pulm/crit care team  Per vascular    Thank you for allowing our practice to participate in the care of your patient. Please do not hesitate to contact me if you have any questions.    Wilbur Wells MD  Interventional Cardiology  [facility]  Office:  [phone]       [1]   Allergies  Allergen Reactions    Amoxicillin ITCHING     Itchiness on hands and feet      Clindamycin OTHER (SEE COMMENTS)     Upset stomach

## 2025-04-02 NOTE — PROGRESS NOTES
[facility]   part of [facility]     Vascular Surgery Progress Note    Cy Monsalve Patient Status:  Inpatient    1955 MRN [id number]   Location [facility] 7NE-A Attending Neil Oquendo MD   Hosp Day # 13 PCP Kam Alberts MD     Objective:   Temp: 97.9 °F (36.6 °C)  Pulse: 82  Resp: 16  BP: 143/74      Events Yesterday/Overnight:  Patient is a 69 year old male, POD 12, fenestrated endovascular arch repair with fenestrated thoracoabdominal aneurysm repair, left carotid to subclavian transposition with endovascular septotomy of dissection with Dr. Oquendo and Dr. Yanez.  BP stable at 143/74.  Pain well-controlled.  On 2 L NC. WBC 15.3, hemoglobin 9.4. Heparin discontinued and argatroban started due to thrombocytopenia. Patient showered yesterday.       Exam:  Palpable bilateral DP and PT pulses.  Bilateral groin access sites healed, no hematoma, no s/s of infection.     Impression/Plan:    Continue physical therapy.     Continue dietary intake, high protein diet encouraged.      Medications:  Current Facility-Administered Medications   Medication Dose Route Frequency    argatroban 50 mg/50mL infusion premix  0.25 mcg/kg/min Intravenous Continuous    multivitamin (Tab-A-Immanuel/Beta Carotene) tab 1 tablet  1 tablet Oral Daily    amLODIPine (Norvasc) tab 10 mg  10 mg Oral Daily    peppermint oil liquid 1 mL  1 mL Other PRN    metoprolol tartrate (Lopressor) tab 25 mg  25 mg Oral 2x Daily(Beta Blocker)    hydrALAzine (Apresoline) 20 mg/mL injection 10 mg  10 mg Intravenous Q4H PRN    fluticasone propionate (Flonase) 50 MCG/ACT nasal suspension 1 spray  1 spray Each Nare Daily    ipratropium-albuterol (Duoneb) 0.5-2.5 (3) MG/3ML inhalation solution 3 mL  3 mL Nebulization Q4H PRN    docusate sodium (Colace) cap 100 mg  100 mg Oral BID    polyethylene glycol (PEG 3350) (Miralax) 17 g oral packet 17 g  17 g Oral Daily    glucose (Dex4) 15 GM/59ML oral liquid 15 g  15 g Oral Q15 Min PRN    Or     glucose (Glutose) 40% oral gel 15 g  15 g Oral Q15 Min PRN    Or    glucose-vitamin C (Dex-4) chewable tab 4 tablet  4 tablet Oral Q15 Min PRN    Or    dextrose 50% injection 50 mL  50 mL Intravenous Q15 Min PRN    Or    glucose (Dex4) 15 GM/59ML oral liquid 30 g  30 g Oral Q15 Min PRN    Or    glucose (Glutose) 40% oral gel 30 g  30 g Oral Q15 Min PRN    Or    glucose-vitamin C (Dex-4) chewable tab 8 tablet  8 tablet Oral Q15 Min PRN    insulin aspart (NovoLOG) 100 Units/mL FlexPen 1-5 Units  1-5 Units Subcutaneous TID AC and HS    acetaminophen (Tylenol) tab 650 mg  650 mg Oral Q4H PRN    Or    HYDROcodone-acetaminophen (Norco) 5-325 MG per tab 1 tablet  1 tablet Oral Q4H PRN    Or    HYDROcodone-acetaminophen (Norco) 5-325 MG per tab 2 tablet  2 tablet Oral Q4H PRN    ondansetron (Zofran) 4 MG/2ML injection 4 mg  4 mg Intravenous Q6H PRN    metoclopramide (Reglan) 5 mg/mL injection 10 mg  10 mg Intravenous Q8H PRN    HYDROmorphone (Dilaudid) 1 MG/ML injection 0.2 mg  0.2 mg Intravenous Q2H PRN    Or    HYDROmorphone (Dilaudid) 1 MG/ML injection 0.4 mg  0.4 mg Intravenous Q2H PRN       Laboratory/Data:    LABS:  Recent Labs   Lab 03/29/25  0407 03/30/25  0417 03/31/25  0511 03/31/25  0605 04/01/25  0546 04/01/25  0824 04/02/25  0555 04/02/25  0556   WBC 7.4 9.1 14.0*  --   --  18.0*  --  15.3*   HGB 9.3* 9.3* 9.6*  --   --  9.9*  --  9.4*   MCV 83.9 84.5 85.6  --   --  83.7  --  84.0   PLT 91.0* 81.0* 86.0*  --   --  91.0*  --  82.0*   INR  --   --   --  1.34* 1.62*  --  3.28*  --        Recent Labs   Lab 03/27/25  0507 03/27/25  1611 03/28/25  0404 03/29/25  0407 03/30/25  0417 03/31/25  0511 04/01/25  0824 04/01/25  1522 04/02/25  0555      < > 139 139 141 141 140  --  140   K 4.0  4.0   < > 4.0 3.9 3.9 3.9 4.5  --  4.3   CL 97*   < > 97* 99 100 103 103  --  103   CO2 36.0*   < > 35.0* 34.0* 34.0* 32.0 31.0  --  29.0   BUN 68*   < > 70* 69* 65* 51* 46*  --  42*   CREATSERUM 1.59*   < > 1.47* 1.47* 1.34*  1.25 1.35*  --  1.36*   *   < > 151* 154* 132* 106* 102*  --  94   CA 8.4*   < > 8.4* 8.0* 8.4* 8.3* 8.5*  --  8.4*   MG 2.1   < > 2.1 2.2 2.3  --   --  2.2 2.1   PHOS 3.8  --   --   --   --   --   --   --   --     < > = values in this interval not displayed.     Recent Labs   Lab 03/31/25  0605 04/01/25  0546 04/01/25  1154 04/02/25  0555   PTP 16.7* 19.3*  --  33.6*   INR 1.34* 1.62*  --  3.28*   PTT 95.7* 63.1* 33.8 65.1*     Recent Labs   Lab 03/28/25  0404 03/29/25  0407 03/30/25  0417 03/31/25  0511 04/01/25  0824   ALT 70* 64* 77* 68* 50*   AST 43* 26 30 19 13   ALB 3.1* 2.8* 3.2 3.2 3.4     No results for input(s): \"TROP\" in the last 168 hours.  No results found for: \"ANAS\", \"ROVERTO\", \"ANASCRN\"  No results for input(s): \"PCACT\", \"PSACT\", \"AT3ACT\", \"HIPAB\", \"PATHI\", \"STALA\", \"DRVVTRATIO\", \"DRVVT\", \"STACLOT\", \"CARIGG\", \"E2VO4AKOXC\", \"U5JM7APGQN\", \"RA\", \"HAVIGM\", \"HBCIGM\", \"HCVAB\", \"HBSAG\", \"HBCAB\", \"HBVDNAINTERP\", \"ANAS\", \"C3\", \"C4\" in the last 168 hours.    ISATU Warren  4/2/2025  8:40 AM

## 2025-04-02 NOTE — CM/SW NOTE
SW met with pt at bedside. Reviewed DC goal to [facility]. This is pending his participation and consistency working with therapies. Informed him that if he demonstrates poor activity tolerance then would be DC planning to [facility] (nursing home setting). Pt provided understanding and says he do what he needs to do to get to [facility].     SW to remain available.    ISAAC Julien

## 2025-04-02 NOTE — PROGRESS NOTES
Pharmacy Dosing Service: Warfarin (Coumadin)  Cy Monsalve is a 69 year old male for whom pharmacy has been dosing warfarin (Coumadin). Goal INR is 2-3 (prior to argatroban)    Recent Labs   Lab 03/31/25  0605 04/01/25  0546 04/02/25  0555   INR 1.34* 1.62* 3.28*     Potential Drug Interactions:  Argatroban (Argatroban increases the lab measurement of INR, INR of 4 on Warfarin co-therapy is approximately equivalent to INR 2 to 2.5 on warfarin alone)  Other Anticoagulants:  Argatroban  Home regimen (if applicable):  warfarin 6 mg on Fridays and 4 mg all other days of the week     Lab Results   Component Value Date    PLT 82.0 (L) 04/02/2025      Inpatient Dosing History  Date 3/30 3/31 4/01  4/2       INR 1.53 (lab from 3/26) 1.34 1.62  3.28       Coumadin dose 7.5 mg 7.5 mg  HELD              Based on above -  1.  For today, Hold warfarin (COUMADIN) dose until platelets are above 150,000 x103 uL  2   PT/INR ordered daily while on coumadin  3.  Pharmacy will continue to follow.  We appreciate the opportunity to assist in his care.    Luann Lemon, PharmD  4/2/2025  12:19 PM

## 2025-04-03 ENCOUNTER — APPOINTMENT (OUTPATIENT)
Dept: CT IMAGING | Facility: HOSPITAL | Age: 70
End: 2025-04-03
Attending: SURGERY
Payer: MEDICARE

## 2025-04-03 LAB
ANION GAP SERPL CALC-SCNC: 7 MMOL/L (ref 0–18)
APTT PPP: 72.7 SECONDS (ref 23–36)
BASOPHILS # BLD AUTO: 0.01 X10(3) UL (ref 0–0.2)
BASOPHILS NFR BLD AUTO: 0.1 %
BUN BLD-MCNC: 41 MG/DL (ref 9–23)
CALCIUM BLD-MCNC: 8.2 MG/DL (ref 8.7–10.6)
CHLORIDE SERPL-SCNC: 104 MMOL/L (ref 98–112)
CO2 SERPL-SCNC: 29 MMOL/L (ref 21–32)
CREAT BLD-MCNC: 1.43 MG/DL
EGFRCR SERPLBLD CKD-EPI 2021: 52 ML/MIN/1.73M2 (ref 60–?)
EOSINOPHIL # BLD AUTO: 0.04 X10(3) UL (ref 0–0.7)
EOSINOPHIL NFR BLD AUTO: 0.3 %
ERYTHROCYTE [DISTWIDTH] IN BLOOD BY AUTOMATED COUNT: 16.4 %
GLUCOSE BLD-MCNC: 111 MG/DL (ref 70–99)
GLUCOSE BLD-MCNC: 118 MG/DL (ref 70–99)
GLUCOSE BLD-MCNC: 132 MG/DL (ref 70–99)
GLUCOSE BLD-MCNC: 96 MG/DL (ref 70–99)
GLUCOSE BLD-MCNC: 97 MG/DL (ref 70–99)
HCT VFR BLD AUTO: 26.3 %
HGB BLD-MCNC: 8.9 G/DL
IMM GRANULOCYTES # BLD AUTO: 0.12 X10(3) UL (ref 0–1)
IMM GRANULOCYTES NFR BLD: 1 %
INR BLD: 3.01 (ref 0.8–1.2)
LYMPHOCYTES # BLD AUTO: 0.62 X10(3) UL (ref 1–4)
LYMPHOCYTES NFR BLD AUTO: 5 %
MAGNESIUM SERPL-MCNC: 2 MG/DL (ref 1.6–2.6)
MCH RBC QN AUTO: 28.7 PG (ref 26–34)
MCHC RBC AUTO-ENTMCNC: 33.8 G/DL (ref 31–37)
MCV RBC AUTO: 84.8 FL
MONOCYTES # BLD AUTO: 1.09 X10(3) UL (ref 0.1–1)
MONOCYTES NFR BLD AUTO: 8.8 %
NEUTROPHILS # BLD AUTO: 10.49 X10 (3) UL (ref 1.5–7.7)
NEUTROPHILS # BLD AUTO: 10.49 X10(3) UL (ref 1.5–7.7)
NEUTROPHILS NFR BLD AUTO: 84.8 %
OSMOLALITY SERPL CALC.SUM OF ELEC: 300 MOSM/KG (ref 275–295)
PLATELET # BLD AUTO: 88 10(3)UL (ref 150–450)
PLATELETS.RETICULATED NFR BLD AUTO: 8 % (ref 0–7)
POTASSIUM SERPL-SCNC: 4.5 MMOL/L (ref 3.5–5.1)
PROTHROMBIN TIME: 31.4 SECONDS (ref 11.6–14.8)
RBC # BLD AUTO: 3.1 X10(6)UL
SODIUM SERPL-SCNC: 140 MMOL/L (ref 136–145)
WBC # BLD AUTO: 12.4 X10(3) UL (ref 4–11)

## 2025-04-03 PROCEDURE — 71275 CT ANGIOGRAPHY CHEST: CPT | Performed by: SURGERY

## 2025-04-03 PROCEDURE — 74174 CTA ABD&PLVS W/CONTRAST: CPT | Performed by: SURGERY

## 2025-04-03 NOTE — PLAN OF CARE
Assumed care at 1930  Alert and oriented x4   SA on tele and on 1L nc   Denies any pain/discomfort  Neurovascular checks, see flowsheets   Voiding via external  BM (+)  Argatroban gtt infusing per order  Neck & back incisions intact, see flowsheets  Rt PICC dressing changed   Up w/ 2 & pivot   Call light w/in reach

## 2025-04-03 NOTE — CONSULTS
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 The therapeutic range has been validated against 0.3-0.7 heparin anti-Xa units/mL.     Elevations of the aPTT in patients not receiving anticoagulant therapy (Heparin, etc.), may be seen in Factor deficiency, vitamin K deficiency, factor inhibitors, liver disease, etc.   Clinical correlation is recommended.            INR   Date Value Ref Range Status   04/03/2025 3.01 (H) 0.80 - 1.20 Final     Comment:     Therapeutic INR range for patients on warfarin:  2.0-3.0 for most medical conditions and surgical prophylaxis   2.5-3.5 for mechanical heart valves and recurrent thromboembolism    Direct thrombin inhibitors (e.g. argatroban, bivalirudin), factor Xa inhibitors (e.g. apixaban, rivaroxaban), and conditions such as coagulation factor deficiency, vitamin K deficiency, and liver disease will prolong the prothrombin time/INR.    Unfractionated heparin and low molecular weight heparin do not affect the prothrombin time/INR up to a concentration of 1 IU/mL and 1.5 IU/mL, respectively.         Heparin Induced Plt Antibody   Date Value Ref Range Status   03/31/2025 Reactive- Confirmed (A) Negative Final       Current dose is 0.25mcg/kg/min    Based on PTT no dose change required     Will recheck PTT with AM labs and adjust based on result for goal PTT of 46-93.     We will continue to follow and appreciate the opportunity to assist in the care of this patient.    Thank you,    Anya Soares, [facility]  4/3/2025 7:50 AM

## 2025-04-03 NOTE — PROGRESS NOTES
[facility]   part of [facility]     Vascular Surgery Progress Note    Cy Monsalve Patient Status:  Inpatient    1955 MRN [id number]   Location [facility] 7NE-A Attending Neil Oquendo MD   Hosp Day # 14 PCP Kam Alberts MD     Objective:   Temp: 98.2 °F (36.8 °C)  Pulse: 82  Resp: 16  BP: 134/62      Events Yesterday/Overnight:  Patient is a 69 year old male, POD 13, fenestrated endovascular arch repair with fenestrated thoracoabdominal aneurysm repair, left carotid to subclavian transposition with endovascular septotomy of dissection with Dr. Oquendo and Dr. Yanez.  BP stable at 134/62.  Pain well-controlled.  On 1 L NC. WBC 12.4, hemoglobin 8.9.       Exam:  Palpable bilateral DP and PT pulses.  Bilateral groin access sites healed, no hematoma, no s/s of infection.     Impression/Plan:    Continue physical therapy.     Continue dietary intake, high protein diet encouraged.        Medications:  Current Facility-Administered Medications   Medication Dose Route Frequency    argatroban 50 mg/50mL infusion premix  0.25 mcg/kg/min Intravenous Continuous    multivitamin (Tab-A-Immanuel/Beta Carotene) tab 1 tablet  1 tablet Oral Daily    amLODIPine (Norvasc) tab 10 mg  10 mg Oral Daily    peppermint oil liquid 1 mL  1 mL Other PRN    metoprolol tartrate (Lopressor) tab 25 mg  25 mg Oral 2x Daily(Beta Blocker)    hydrALAzine (Apresoline) 20 mg/mL injection 10 mg  10 mg Intravenous Q4H PRN    fluticasone propionate (Flonase) 50 MCG/ACT nasal suspension 1 spray  1 spray Each Nare Daily    ipratropium-albuterol (Duoneb) 0.5-2.5 (3) MG/3ML inhalation solution 3 mL  3 mL Nebulization Q4H PRN    docusate sodium (Colace) cap 100 mg  100 mg Oral BID    polyethylene glycol (PEG 3350) (Miralax) 17 g oral packet 17 g  17 g Oral Daily    glucose (Dex4) 15 GM/59ML oral liquid 15 g  15 g Oral Q15 Min PRN    Or    glucose (Glutose) 40% oral gel 15 g  15 g Oral Q15 Min PRN    Or    glucose-vitamin C (Dex-4)  chewable tab 4 tablet  4 tablet Oral Q15 Min PRN    Or    dextrose 50% injection 50 mL  50 mL Intravenous Q15 Min PRN    Or    glucose (Dex4) 15 GM/59ML oral liquid 30 g  30 g Oral Q15 Min PRN    Or    glucose (Glutose) 40% oral gel 30 g  30 g Oral Q15 Min PRN    Or    glucose-vitamin C (Dex-4) chewable tab 8 tablet  8 tablet Oral Q15 Min PRN    insulin aspart (NovoLOG) 100 Units/mL FlexPen 1-5 Units  1-5 Units Subcutaneous TID AC and HS    acetaminophen (Tylenol) tab 650 mg  650 mg Oral Q4H PRN    Or    HYDROcodone-acetaminophen (Norco) 5-325 MG per tab 1 tablet  1 tablet Oral Q4H PRN    Or    HYDROcodone-acetaminophen (Norco) 5-325 MG per tab 2 tablet  2 tablet Oral Q4H PRN    ondansetron (Zofran) 4 MG/2ML injection 4 mg  4 mg Intravenous Q6H PRN    metoclopramide (Reglan) 5 mg/mL injection 10 mg  10 mg Intravenous Q8H PRN    HYDROmorphone (Dilaudid) 1 MG/ML injection 0.2 mg  0.2 mg Intravenous Q2H PRN    Or    HYDROmorphone (Dilaudid) 1 MG/ML injection 0.4 mg  0.4 mg Intravenous Q2H PRN       Laboratory/Data:    LABS:  Recent Labs   Lab 03/30/25 0417 03/31/25  0511 03/31/25  0605 04/01/25  0546 04/01/25  0824 04/02/25  0555 04/02/25  0556 04/03/25  0525   WBC 9.1 14.0*  --   --  18.0*  --  15.3* 12.4*   HGB 9.3* 9.6*  --   --  9.9*  --  9.4* 8.9*   MCV 84.5 85.6  --   --  83.7  --  84.0 84.8   PLT 81.0* 86.0*  --   --  91.0*  --  82.0* 88.0*   INR  --   --  1.34* 1.62*  --  3.28*  --  3.01*       Recent Labs   Lab 03/29/25  0407 03/30/25 0417 03/31/25  0511 04/01/25  0824 04/01/25  1522 04/02/25  0555 04/03/25  0525    141 141 140  --  140 140   K 3.9 3.9 3.9 4.5  --  4.3 4.5   CL 99 100 103 103  --  103 104   CO2 34.0* 34.0* 32.0 31.0  --  29.0 29.0   BUN 69* 65* 51* 46*  --  42* 41*   CREATSERUM 1.47* 1.34* 1.25 1.35*  --  1.36* 1.43*   * 132* 106* 102*  --  94 97   CA 8.0* 8.4* 8.3* 8.5*  --  8.4* 8.2*   MG 2.2 2.3  --   --  2.2 2.1 2.0     Recent Labs   Lab 04/01/25  0546 04/01/25  5954  04/02/25  0555 04/03/25  0525   PTP 19.3*  --  33.6* 31.4*   INR 1.62*  --  3.28* 3.01*   PTT 63.1* 33.8 65.1* 72.7*     Recent Labs   Lab 03/28/25  0404 03/29/25  0407 03/30/25  0417 03/31/25  0511 04/01/25  0824   ALT 70* 64* 77* 68* 50*   AST 43* 26 30 19 13   ALB 3.1* 2.8* 3.2 3.2 3.4     No results for input(s): \"TROP\" in the last 168 hours.  No results found for: \"ANAS\", \"ROVERTO\", \"ANASCRN\"  Recent Labs   Lab 03/31/25  1100   HIPAB Reactive- Confirmed*       ISATU Warren  4/3/2025  8:34 AM

## 2025-04-03 NOTE — CM/SW NOTE
Care Progression Note:  Length of stay: 14  GMLOS:   Avoidable Delays:   Code Status: DNAR/Full    Acute Medical Issue/Factors:   Pt admitted for scheduled fenestrated abdominal aneurysm repair with Dr Oquendo and Dr Yanez on 3/20/25.  s/p lumbar drain with neurointerventional to limit risk of spinal cord ischemia 3/20 and removed 3/26   Post op complicated by hypoxia and HOLLIS  Acute Hypoxic Respiratory Failure- possible pneumonia vs pulm edema.  Pt had increased O2 needs during stay, required Vapotherm/ICU, antibiotics, lasix, steroids. 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 Plt 88 Pharmacy is following/dosing     Discharge Barriers: pending improvement w/thrombocytopenia  Expected discharge date:    Expected next site of care: TBD  Original plan was for Acute Rehab and pt wants [facility].  He has not been participating consistently with therapy.     / available for discharge planning.     Gauri GENTILEA MSN, RN Case Manager  [phone]

## 2025-04-03 NOTE — PROGRESS NOTES
S: He is feeling ok today. He denies shortness of breath or cough.     Meds:   multivitamin  1 tablet Oral Daily    amLODIPine  10 mg Oral Daily    metoprolol tartrate  25 mg Oral 2x Daily(Beta Blocker)    fluticasone propionate  1 spray Each Nare Daily    docusate sodium  100 mg Oral BID    polyethylene glycol (PEG 3350)  17 g Oral Daily    insulin aspart  1-5 Units Subcutaneous TID AC and HS       Prn Meds:    peppermint oil    hydrALAzine    ipratropium-albuterol    glucose **OR** glucose **OR** glucose-vitamin C **OR** dextrose **OR** glucose **OR** glucose **OR** glucose-vitamin C    acetaminophen **OR** HYDROcodone-acetaminophen **OR** HYDROcodone-acetaminophen    ondansetron    metoclopramide    HYDROmorphone **OR** HYDROmorphone **OR** [DISCONTINUED] HYDROmorphone    Infusions:   argatroban 0.25 mcg/kg/min (04/02/25 1026)       OBJECTIVE:  Vitals:    04/02/25 2111 04/03/25 0039 04/03/25 0420 04/03/25 0500   BP: 126/80 128/72 134/62    Pulse: 88 80 84 82   Resp: 16 16 16    Temp: 98 °F (36.7 °C) 98.2 °F (36.8 °C) 98.2 °F (36.8 °C)    TempSrc: Oral Temporal Temporal    SpO2: 93% 93% 94% 93%   Weight:         O2: 2LPM    Gen - Alert, oriented x 3, in no apparent distress  Lungs - crackles heard at bases. No wheezing.  CV - regular rate & rhythm. Normal S1, S2. No murmurs   Abdomen - nontender, soft  Extremities - No cyanosis, clubbing, edema appreciated.      Labs:  Recent Labs   Lab 04/01/25  0824 04/02/25  0556 04/03/25  0525   WBC 18.0* 15.3* 12.4*   HGB 9.9* 9.4* 8.9*   PLT 91.0* 82.0* 88.0*     Recent Labs   Lab 03/30/25  0417 03/31/25  0511 04/01/25  0824 04/01/25  1522 04/02/25  0555 04/03/25  0525    141 140  --  140 140   K 3.9 3.9 4.5  --  4.3 4.5    103 103  --  103 104   CO2 34.0* 32.0 31.0  --  29.0 29.0   BUN 65* 51* 46*  --  42* 41*   CREATSERUM 1.34* 1.25 1.35*  --  1.36* 1.43*   * 106* 102*  --  94 97   ANIONGAP 7 6 6  --  8 7   ALB 3.2 3.2 3.4  --   --   --     CA 8.4* 8.3* 8.5*  --  8.4* 8.2*   MG 2.3  --   --  2.2 2.1 2.0   TP 5.3* 5.4* 5.7  --   --   --      Recent Labs   Lab 03/30/25  0417 03/31/25  0511 04/01/25  0824   ALKPHO 69 74 73   AST 30 19 13   ALT 77* 68* 50*   BILT 1.0 1.2* 1.3*     Recent Labs   Lab 04/01/25  0546 04/01/25  1154 04/02/25  0555 04/03/25  0525   INR 1.62*  --  3.28* 3.01*   PTT 63.1* 33.8 65.1* 72.7*     Recent Labs   Lab 03/27/25  1320   CRP 15.80*   PCT 0.64*       Recent Labs   Lab 03/27/25  1239   STREPPNEUMAG Negative   LEGIONAG Negative       ASSESSMENT AND PLAN      Cy Monsalve is a 69 year old man with a history of aortic dissection, DVT, pAF, CAD, and tobacco use who was admitted 3/19 for complex aortic aneurysm repair    Acute hypoxemic respiratory failure - after aortic aneurysm repair; due to a combination of pulmonary edema, atelectasis, and pneumonia. Improved.   -wean O2 as able  -antibiotics completed  -encourage IS use  -lasix prn   Possible pneumonia - CT Scan showed patchy ground glass opacities bilaterally. Sputum cultures grew candida. 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 when ok with all services     We will continue to follow with you     Neftaly Jacobs M.D.  Pulmonary/Critical Care

## 2025-04-03 NOTE — PROGRESS NOTES
[facility] HEMATOLOGY ONCOLOGY  Progress Note    Cy Monsalve Patient Status:  Inpatient    1955 MRN [id number]   Location [facility] 7NE-A Attending Neil Oquendo MD   Hosp Day # 14 PCP Kam Alberts MD     ADMISSION DATE AND TIME: 3/19/2025  1:15 PM    ADMIT DIAGNOSIS: Abdominal aortic aneurysm (AAA) [I71.40]    SUBJECTIVE:    No new issues     OBJECTIVE:  Blood pressure 134/62, pulse 82, temperature 98.2 °F (36.8 °C), temperature source Temporal, resp. rate 16, weight 199 lb 15.3 oz (90.7 kg), SpO2 93%.    PHYSICAL EXAM:  General: afebrile, alert and oriented x 3, pleasant  HEENT: no icterus  CV: Regular rate and rhythm  Lungs: CTA  Abdomen: soft, non distended and non tender  Extremity: no cyanosis       LABS:  Recent Results (from the past 24 hours)   POCT Glucose    Collection Time: 25 12:14 PM   Result Value Ref Range    POC Glucose 120 (H) 70 - 99 mg/dL   POCT Glucose    Collection Time: 25  3:58 PM   Result Value Ref Range    POC Glucose 107 (H) 70 - 99 mg/dL   POCT Glucose    Collection Time: 25  9:27 PM   Result Value Ref Range    POC Glucose 123 (H) 70 - 99 mg/dL   POCT Glucose    Collection Time: 25  5:24 AM   Result Value Ref Range    POC Glucose 111 (H) 70 - 99 mg/dL   CBC With Differential With Platelet    Collection Time: 25  5:25 AM   Result Value Ref Range    WBC 12.4 (H) 4.0 - 11.0 x10(3) uL    RBC 3.10 (L) 3.80 - 5.80 x10(6)uL    HGB 8.9 (L) 13.0 - 17.5 g/dL    HCT 26.3 (L) 39.0 - 53.0 %    PLT 88.0 (L) 150.0 - 450.0 10(3)uL    Immature Platelet Fraction 8.0 (H) 0.0 - 7.0 %    MCV 84.8 80.0 - 100.0 fL    MCH 28.7 26.0 - 34.0 pg    MCHC 33.8 31.0 - 37.0 g/dL    RDW 16.4 %    Neutrophil Absolute Prelim 10.49 (H) 1.50 - 7.70 x10 (3) uL    Neutrophil Absolute 10.49 (H) 1.50 - 7.70 x10(3) uL    Lymphocyte Absolute 0.62 (L) 1.00 - 4.00 x10(3) uL    Monocyte Absolute 1.09 (H) 0.10 - 1.00 x10(3) uL    Eosinophil Absolute 0.04 0.00 -  0.70 x10(3) uL    Basophil Absolute 0.01 0.00 - 0.20 x10(3) uL    Immature Granulocyte Absolute 0.12 0.00 - 1.00 x10(3) uL    Neutrophil % 84.8 %    Lymphocyte % 5.0 %    Monocyte % 8.8 %    Eosinophil % 0.3 %    Basophil % 0.1 %    Immature Granulocyte % 1.0 %   Basic Metabolic Panel (8)    Collection Time: 04/03/25  5:25 AM   Result Value Ref Range    Glucose 97 70 - 99 mg/dL    Sodium 140 136 - 145 mmol/L    Potassium 4.5 3.5 - 5.1 mmol/L    Chloride 104 98 - 112 mmol/L    CO2 29.0 21.0 - 32.0 mmol/L    Anion Gap 7 0 - 18 mmol/L    BUN 41 (H) 9 - 23 mg/dL    Creatinine 1.43 (H) 0.70 - 1.30 mg/dL    Calcium, Total 8.2 (L) 8.7 - 10.6 mg/dL    Calculated Osmolality 300 (H) 275 - 295 mOsm/kg    eGFR-Cr 52 (L) >=60 mL/min/1.73m2   Magnesium    Collection Time: 04/03/25  5:25 AM   Result Value Ref Range    Magnesium 2.0 1.6 - 2.6 mg/dL   Prothrombin Time (PT)    Collection Time: 04/03/25  5:25 AM   Result Value Ref Range    PT 31.4 (H) 11.6 - 14.8 seconds    INR 3.01 (H) 0.80 - 1.20   PTT, Activated    Collection Time: 04/03/25  5:25 AM   Result Value Ref Range    PTT 72.7 (H) 23.0 - 36.0 seconds     Platelet Count::    Lab Results   Component Value Date    PLT 88.0 (L) 04/03/2025    PLT 82.0 (L) 04/02/2025    PLT 91.0 (L) 04/01/2025        CULTURES  Hospital Encounter on 03/19/25   1. Blood Culture     Status: None    Collection Time: 03/24/25 10:05 AM    Specimen: Blood,peripheral   Result Value Ref Range    Blood Culture Result No Growth 5 Days N/A       IMAGING:    No results found.      MEDICATIONS:  Medications reviewed.     multivitamin  1 tablet Oral Daily    amLODIPine  10 mg Oral Daily    metoprolol tartrate  25 mg Oral 2x Daily(Beta Blocker)    fluticasone propionate  1 spray Each Nare Daily    docusate sodium  100 mg Oral BID    polyethylene glycol (PEG 3350)  17 g Oral Daily    insulin aspart  1-5 Units Subcutaneous TID AC and HS      argatroban 0.25 mcg/kg/min (04/02/25 1026)       peppermint oil     hydrALAzine    ipratropium-albuterol    glucose **OR** glucose **OR** glucose-vitamin C **OR** dextrose **OR** glucose **OR** glucose **OR** glucose-vitamin C    acetaminophen **OR** HYDROcodone-acetaminophen **OR** HYDROcodone-acetaminophen    ondansetron    metoclopramide    HYDROmorphone **OR** HYDROmorphone **OR** [DISCONTINUED] HYDROmorphone    ASSESSMENT AND PLAN:     Active Problems:    Pre-op testing    Thoracoabdominal aortic aneurysm (TAAA)    Bilateral leg weakness        Cy Monsalve is a 69 year old male with 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     Status post AAA repair with complicated postop course with hypoxia and HOLLIS.  Currently on the floor.     Thrombocytopenia  Platelets were normal until 3/22/2025  Thereafter they had declined into the 70-90 while on heparin  Heparin-induced platelet antibody was positive.    SUNDEEP pending  Currently on argatroban  Platelets are 88    We will continue argatroban   Pharmacy dosing          Thank you for allowing me to participate in the care of your patient.     Simón Vernon MD        This note was prepared using Dragon Medical voice recognition dictation software. As a result errors may occur. When identified these errors have been corrected. While every attempt is made to correct errors during dictation discrepancies may still exist. Please call me to clarify any errors.

## 2025-04-03 NOTE — PHYSICAL THERAPY NOTE
Attempted to see Pt this PM - RN aware of attempt.  Pt refused to sit on commode, prefers to use bed pan.  Discussed willingness to participate in bowel and bladder program.  Pt is up one person assist with RW.    Will f/u later today if time permits, after all other patients re-attempted per tentative schedule.

## 2025-04-03 NOTE — PLAN OF CARE
Assumed care at 0730  Patient alert, oriented x 4  VSS, weaned to RA, SA w/ PVCs on tele  Pedal pulses palpable  R neck incision CDI  PRN norco for pain   Up x 2 and pivot  Patient refused shower  Voiding via external cath  (+) BM this shift, incontinent  Tolerating diet well  Argatroban gtt per order  CTA done this shift  Call light within reach      Plan to continue argatroban gtt  Patient and family updated on plan of care

## 2025-04-03 NOTE — CONSULTS
[facility] Dosing Service  Warfarin (Coumadin) Subsequent Dosing    Cy Monsalve is a 69 year old patient for whom pharmacy is dosing warfarin (Coumadin). Goal INR is 2-3    Recent Labs   Lab 03/31/25  0605 04/01/25  0546 04/02/25  0555 04/03/25  0525   INR 1.34* 1.62* 3.28* 3.01*       Consulted by:  Dr. Oquendo  Indication:  Hypercoagulable condition    Potential Drug Interactions:  Argatroban (Argatroban increases the lab measurement of INR, INR of 4 on Warfarin co-therapy is approximately equivalent to INR 2 to 2.5 on warfarin alone)   Other Anticoagulants:  Argatroban for HIT  Home regimen (if applicable):  Warfarin 6 mg on Fridays and 4 mg all other days of the     Lab Results   Component Value Date     PLT 88.0 (L) 04/03/2025         Inpatient Dosing History:    Date 3/31 4/1 4/2 4/3     INR 1.34 1.62 3.28 3.01     Coumadin dose 7.5 mg held held               Based on above -  1.  For today, Hold Warfarin dose until PLT >150,000 x103   2   PT/INR ordered daily while on warfarin  3.  Pharmacy will continue to follow.  We appreciate the opportunity to assist in the care of this patient.    Anya Soares [facility]  4/3/2025  8:45 AM

## 2025-04-03 NOTE — PROGRESS NOTES
[facility] Hospitalist Progress Note     CC: Hospital Follow up    PCP: Kam Alberts MD     Subjective:     -doing well no new complaints, just feels fatigued     OBJECTIVE:    Blood pressure 144/67, pulse 75, temperature 98 °F (36.7 °C), temperature source Oral, resp. rate 18, weight 199 lb 15.3 oz (90.7 kg), SpO2 94%.    Temp:  [98 °F (36.7 °C)-98.2 °F (36.8 °C)] 98 °F (36.7 °C)  Pulse:  [75-91] 75  Resp:  [16-18] 18  BP: (126-144)/(62-80) 144/67  SpO2:  [90 %-95 %] 94 %      Intake/Output:    Intake/Output Summary (Last 24 hours) at 4/3/2025 1036  Last data filed at 4/3/2025 0039  Gross per 24 hour   Intake 360 ml   Output 1300 ml   Net -940 ml       Last 3 Weights   03/31/25 0300 199 lb 15.3 oz (90.7 kg)   03/30/25 0200 197 lb 5 oz (89.5 kg)   03/27/25 0600 193 lb 12.6 oz (87.9 kg)   03/26/25 0400 193 lb 12.6 oz (87.9 kg)   03/25/25 0600 200 lb 2.8 oz (90.8 kg)   03/24/25 0900 200 lb 3.2 oz (90.8 kg)   03/24/25 0500 203 lb 0.7 oz (92.1 kg)   03/23/25 0516 204 lb 2.3 oz (92.6 kg)   03/22/25 0637 153 lb 3.5 oz (69.5 kg)   03/20/25 1231 192 lb 15.9 oz (87.5 kg)   03/19/25 1444 193 lb (87.5 kg)   03/22/25 1113 153 lb 3.5 oz (69.5 kg)   03/07/25 1536 185 lb (83.9 kg)       Exam   Gen: No acute distress, alert and oriented x3, no focal neurologic deficits, right-sided [place], arterial line and lumbar drain noted  Heent: NC AT, mucous memb clear, neck supple  Pulm: Lungs clear, normal respiratory effort  CV: Heart with regular rate and rhythm, no peripheral edema  Abd: Abdomen soft, nontender, nondistended, bowel sounds present  MSK:expected rom, no hematoma appreciated bilaterally, 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, 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, strentgh 4/5 on R, pulses intact  Skin: no rashes or lesions  Neuro: AO*3, motor intact, no sensory deficits  Psyc: appropriate mood and affect      Data Review:       Labs:     Recent Labs   Lab 04/01/25  0824 04/02/25  0556 04/03/25  0525   RBC 3.44* 3.26* 3.10*    HGB 9.9* 9.4* 8.9*   HCT 28.8* 27.4* 26.3*   MCV 83.7 84.0 84.8   MCH 28.8 28.8 28.7   MCHC 34.4 34.3 33.8   RDW 15.8 16.0 16.4   NEPRELIM 15.56* 13.21* 10.49*   WBC 18.0* 15.3* 12.4*   PLT 91.0* 82.0* 88.0*         Recent Labs   Lab 04/01/25  0824 04/02/25  0555 04/03/25  0525   * 94 97   BUN 46* 42* 41*   CREATSERUM 1.35* 1.36* 1.43*   EGFRCR 56* 56* 52*   CA 8.5* 8.4* 8.2*    140 140   K 4.5 4.3 4.5    103 104   CO2 31.0 29.0 29.0       Recent Labs   Lab 03/28/25  0404 03/29/25  0407 03/30/25  0417 03/31/25  0511 04/01/25  0824   ALT 70* 64* 77* 68* 50*   AST 43* 26 30 19 13   ALB 3.1* 2.8* 3.2 3.2 3.4         Imaging:  No results found.      Meds:      multivitamin  1 tablet Oral Daily    amLODIPine  10 mg Oral Daily    metoprolol tartrate  25 mg Oral 2x Daily(Beta Blocker)    fluticasone propionate  1 spray Each Nare Daily    docusate sodium  100 mg Oral BID    polyethylene glycol (PEG 3350)  17 g Oral Daily    insulin aspart  1-5 Units Subcutaneous TID AC and HS      argatroban 0.25 mcg/kg/min (04/02/25 1026)       peppermint oil    hydrALAzine    ipratropium-albuterol    glucose **OR** glucose **OR** glucose-vitamin C **OR** dextrose **OR** glucose **OR** glucose **OR** glucose-vitamin C    acetaminophen **OR** HYDROcodone-acetaminophen **OR** HYDROcodone-acetaminophen    ondansetron    metoclopramide    HYDROmorphone **OR** HYDROmorphone **OR** [DISCONTINUED] HYDROmorphone         Assessment/Plan:     Active Problems:    Pre-op testing    Thoracoabdominal aortic aneurysm (TAAA)    Bilateral leg weakness      69 year old male with a past medical history of hypertension, GERD, anxiety, renal calculi, DVT, ascending aortic dissection s/p repair, aortic dissection distal to left subclavian, infrarenal endovascular AAA repair with bilateral KYLER for hypogastric and common iliac aneurysms, mesenteric ischemia s/p SMA bypass with right external iliac artery to SMA bypass, who is presenting to  the hospital for direct admission for complex AAA repair with s/p lumbar drain.  Postoperative course complicated by HOLLIS on CKD as well as right lower extremity weakness along with hypoxic respiratory failure.      History of prior infrarenal endovascular AAA repair with bilateral KYLER for hypogastric and common iliac aneurysms  History of mesenteric ischemia s/p SMA bypass with right external iliac artery to SMA bypass  History of aortic dissection s/p repair, aortic dissection distal to left subclavian  S/p complex thoraco abdominal aortic repair 3/21  Hx of DVT   -Complicated aortic pathology with multiple repairs in the past. 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   - HIT neg 3/23, positive on 3/31  - hematology consulted, heparin stopped, argatroban started,   - SUNDEEP pending  - warfarin and argatroban per hematology / pharmacy to dose    - INR technically remains subtherapeutic     Acute hypoxemic respiratory failure w/ threat to bodily function 2/2 possible pneumonia versus edema  Leukocytosis  -Completed 3 days of azithro (3/27-3/29), janna (3/24-3/28), now switched to cefepime per ICU (3/28-3/30)  - tx w/ iv steroids x 5 days, iv steroids completed, completed abx as above   - wean o2 as tolerated     Leukocytosis  - no new infectious complaints, likely 2/2 steroids  - trend     Right lower extremity weakness-improving  - In the setting of complex aortic repair 3/21  - Seen by neurointensivist, recommendations reviewed  - Improved overall  -- PT/OT     HOLLIS on CKD-improving  -Creatinine reviewed today - 1.34 (baseline 1.2)  -Continue to monitor with serial RFP's  -Avoid nephrotoxic agents  - Cr bumped post operatively - given prolonged surgery and complicated repair  - improving      Hypertension  -hold home HCTZ     Allergic  rhinitis  -Continue home fluticasone as needed     Short gut syndrome  - resume lamotil if diarrhea resumes    Normocytic anemia  - stable, trend     H/o dvt  - see above         Supplementary Documentation:   DVT Mechanical Prophylaxis:   SCDs, Early ambuation  DVT Pharmacologic Prophylaxis   Medication    argatroban 50 mg/50mL infusion premix                Code Status: DNAR/Full Treatment  Arzola: External urinary catheter in place  Arzola Duration (in days):   Central line:            Dispo: inpatient - on argatroban until therapeutic on coumadin - dc to [facility] once cleared by heme and vascular     DO TARA [facility]ari Hospitalist  Pager: [phone]  Answering Service: [phone]

## 2025-04-03 NOTE — PROGRESS NOTES
[facility] Cardiology  Consultation Note      Cy Monsalve Patient Status:  Inpatient    1955 MRN [id number]   Location [facility] 4SW-A Attending Neil Oquendo MD   Hosp Day # 14 PCP Kam Alberts MD     Reason for consult: Post op, arrhythmias    Events: Stable. Denies CP or SOB.     Primary cardiologist: Russell     History of Present Illness:  Cy Monsalve is a 69 year old male who presented to [facility] on 3/19/2025 for elective complex aortic surgery, see . Complicated aortic history involving multiple surgeries, AVR, complications etc. Cardiology consulted regarding tachyarhythmias seen on tele. He has felt some palpitations with this. Denies CP. Has needed O2 post op but denies SOB at present. Has been smoking. Being treated for PNA with Abx and steroids. Lasix on hold and has received IVF for HOLLIS, now improving. He is usually on coumadin and is now on heparin gtt.     Medications:  Current Facility-Administered Medications   Medication Dose Route Frequency    argatroban 50 mg/50mL infusion premix  0.25 mcg/kg/min Intravenous Continuous    multivitamin (Tab-A-Immanuel/Beta Carotene) tab 1 tablet  1 tablet Oral Daily    amLODIPine (Norvasc) tab 10 mg  10 mg Oral Daily    peppermint oil liquid 1 mL  1 mL Other PRN    metoprolol tartrate (Lopressor) tab 25 mg  25 mg Oral 2x Daily(Beta Blocker)    hydrALAzine (Apresoline) 20 mg/mL injection 10 mg  10 mg Intravenous Q4H PRN    fluticasone propionate (Flonase) 50 MCG/ACT nasal suspension 1 spray  1 spray Each Nare Daily    ipratropium-albuterol (Duoneb) 0.5-2.5 (3) MG/3ML inhalation solution 3 mL  3 mL Nebulization Q4H PRN    docusate sodium (Colace) cap 100 mg  100 mg Oral BID    polyethylene glycol (PEG 3350) (Miralax) 17 g oral packet 17 g  17 g Oral Daily    glucose (Dex4) 15 GM/59ML oral liquid 15 g  15 g Oral Q15 Min PRN    Or    glucose (Glutose) 40% oral gel 15 g  15 g Oral Q15 Min PRN    Or    glucose-vitamin C (Dex-4)  chewable tab 4 tablet  4 tablet Oral Q15 Min PRN    Or    dextrose 50% injection 50 mL  50 mL Intravenous Q15 Min PRN    Or    glucose (Dex4) 15 GM/59ML oral liquid 30 g  30 g Oral Q15 Min PRN    Or    glucose (Glutose) 40% oral gel 30 g  30 g Oral Q15 Min PRN    Or    glucose-vitamin C (Dex-4) chewable tab 8 tablet  8 tablet Oral Q15 Min PRN    insulin aspart (NovoLOG) 100 Units/mL FlexPen 1-5 Units  1-5 Units Subcutaneous TID AC and HS    acetaminophen (Tylenol) tab 650 mg  650 mg Oral Q4H PRN    Or    HYDROcodone-acetaminophen (Norco) 5-325 MG per tab 1 tablet  1 tablet Oral Q4H PRN    Or    HYDROcodone-acetaminophen (Norco) 5-325 MG per tab 2 tablet  2 tablet Oral Q4H PRN    ondansetron (Zofran) 4 MG/2ML injection 4 mg  4 mg Intravenous Q6H PRN    metoclopramide (Reglan) 5 mg/mL injection 10 mg  10 mg Intravenous Q8H PRN    HYDROmorphone (Dilaudid) 1 MG/ML injection 0.2 mg  0.2 mg Intravenous Q2H PRN    Or    HYDROmorphone (Dilaudid) 1 MG/ML injection 0.4 mg  0.4 mg Intravenous Q2H PRN       Past Medical History:    AAA (abdominal aortic aneurysm)    Abdominal aneurysm    Abdominal aortic aneurysm (AAA)    AAA surgery done    Anxiety state    Back pain    Naproxen twice a week    Back problem    minor    Calculus of kidney    Deep vein thrombosis (HCC)    to colon    Esophageal reflux    Hearing impairment    Lt ear tinnitus    High blood pressure    History of recent hospitalization    10/2019- was at [facility] x5 weeks (ICU) with Dissected Aorta/ Post op colon problem/and post op cardiac arrest    Ileostomy present (HCC)    Peripheral vascular disease    Unspecified essential hypertension    Visual impairment    glasses       Past Surgical History:   Procedure Laterality Date    Colonoscopy  10/15/09  [facility]    Screening: small polypoid fold in sigmoid (no polyp tissue seen on path), int hemorrhoids; next colonoscopy in 2019    Other  02/2017    AAA repair    Other      10/23/19- Dissected Aorta surgery     Other surgical history      Eye surgery for lazy eye    Other surgical history  10/15/09  [facility]    EGD (heartburn): normal    Other surgical history      AAA repair    Other surgical history      multiple toe amputations    Part removal colon w end colostomy      10/2019       Family History  family history includes Diabetes in his father; Heart Disorder in his father and mother; Hypertension in his sister.    Social History   reports that he has been smoking cigars and cigarettes. He has never used smokeless tobacco. He reports current alcohol use. He reports that he does not use drugs.     Allergies  Allergies[1]    Review of Systems:  As per HPI, otherwise 10 point ROS is negative in detail.    Physical Exam:  Blood pressure 136/72, pulse 83, temperature 97.8 °F (36.6 °C), temperature source Oral, resp. rate 17, weight 199 lb 15.3 oz (90.7 kg), SpO2 94%.  Temp (24hrs), Av °F (36.7 °C), Min:97.8 °F (36.6 °C), Max:98.2 °F (36.8 °C)    Wt Readings from Last 3 Encounters:   25 199 lb 15.3 oz (90.7 kg)   25 153 lb 3.5 oz (69.5 kg)   25 185 lb (83.9 kg)       General: Awake and alert; in no acute distress  HEENT: Extraocular movements are intact; sclerae are anicteric; scalp is atraumatic  Neck: Supple; no JVD; no carotid bruits  Cardiac: Regular, with frequent ectopic beats, tachy, normal S1 and S2, no murmurs, rubs, or gallops are appreciated  Lungs: Clear to auscultation bilaterally; no accessory muscle use is noted, no wheezes, rhonci or rales  Abdomen: Soft, non-distended, non-tender; bowel sounds are normoactive  Extremities: Warm, no edema, clubbing or cyanosis; moves all 4 extremities normally, distal pulses intact and equal  Psychiatric: Normal mood and affect; answers questions appropriately  Dermatologic: No rashes; normal skin turgor    Diagnostic testing:    Labs:   Lab Results   Component Value Date    INR 3.01 (H) 2025    INR 3.28 (H) 2025        Lab Results   Component  Value Date    WBC 12.4 04/03/2025    HGB 8.9 04/03/2025    HCT 26.3 04/03/2025    PLT 88.0 04/03/2025    CREATSERUM 1.43 04/03/2025    BUN 41 04/03/2025     04/03/2025    K 4.5 04/03/2025     04/03/2025    CO2 29.0 04/03/2025    GLU 97 04/03/2025    CA 8.2 04/03/2025    PTT 72.7 04/03/2025    INR 3.01 04/03/2025    PTP 31.4 04/03/2025    MG 2.0 04/03/2025    PGLU 118 04/03/2025       Cardiac diagnostics:    EKG 3/28/2025:   Sinus tachycardia with frequent Premature ventricular complexes   Nonspecific ST abnormality     Echo 3/25/25  1. Left ventricle: The cavity size was normal. Wall thickness was mildly      increased. Systolic function was vigorous. The estimated ejection      fraction was 65-70%.   2. Right ventricle: The cavity size was normal. Systolic function was      normal. Systolic pressure was moderately increased.   3. Aortic valve: Well visualized. No significant valve disease.      Transvalvular velocity was minimally increased. There was no significant      evidence for stenosis. The peak systolic velocity was 1.78m/sec. The mean      systolic gradient was 7mm Hg.   4. Pulmonary arteries: Systolic pressure was moderately increased. The peak      systolic pressure is 45mm Hg.   5. Pericardium, extracardiac: There was no pericardial effusion.       Impression:  Complex aortic history:   Aortic dissection - type A, s/p bioAVR, graft and hemiarch repair 10/2019, [facility]. Then underwent repair of abdominal aortic dissection. Suffered from mesenteric ischemia requiring subtotal colectomy with colostomy. Also subsequently required amputation of toes due to ischemia. Episodes deemed provoked by surgery. Subsequently underwent reversal of colostomy. Was discharged on anticoagulation. Embolic events - s/p amputation 4.5 toes on left, tip of big toe on R, 2/3 of his colon (post op aortic surgery 10/19), and 3 feet of small bowel (10/23). On coumadin.   H/o EVAR 2018    Current issues  CTA 12/24  reviewed, \"New discontinuity of the left internal iliac stents with focal contrast extravasation suggestive of type III endoleak. Excluded aneurysm sac has increased to 4.3 cm compared to 3.2 cm on 10/2023 study. Slight increase in additional aneurysms/excluded aneurysm sacs involving the bilateral internal iliac arteries\"  -->  s/p 3/21/25 fenestrated endovascular arch repair with fenestrated thoracoabdominal aneurysm repair, left carotid to subclavian transposition with endovascular septotomy of dissection with Dr. Oquendo and Dr. Yanez.     Hypoxic resp failure  - PNA, ateletasis    Sinus tach / PSVT / frequent PACs, PVCs    HOLLIS - Cr 2, improving    Chronic issues  CAD - mild CAC on CT 12/24. No symptoms. Nuc stress 1/2025 normal   H/o hematuria - kidney stones - 12/4/2023   Renal artery stenosis s/p stent 12/2023  DVT - 1/2024 venous Doppler bilateral done for bilateral edema-no acute DVT. 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  -> on coumadin usually, thought to have hypercoag state  Heterozygous prothrombin gene mutation   PAF - per records transient episode post op 2019, no documented recurrence.   Smoking - not motivated to quit.   COPD  Thrombocytopenia    Recommendations:  Continue metoprolol 25mg BID  HIPA reactive. On argatroban. Per heme. Coumadin on hold presently.   Wean O2. S/p Abx   Consider resuming lasix if slow progress with O2 weaning, renal function appears relatively stable  S/p steroids per pulm/crit care team  Per vascular    Thank you for allowing our practice to participate in the care of your patient. Please do not hesitate to contact me if you have any questions.    Wilbur Wells MD  Interventional Cardiology  [facility]  Office: [phone]       [1]   Allergies  Allergen Reactions    Amoxicillin ITCHING     Itchiness on hands and feet      Clindamycin OTHER (SEE COMMENTS)     Upset stomach

## 2025-04-04 LAB
ANION GAP SERPL CALC-SCNC: 7 MMOL/L (ref 0–18)
APTT PPP: 65.9 SECONDS (ref 23–36)
BASOPHILS # BLD AUTO: 0 X10(3) UL (ref 0–0.2)
BASOPHILS NFR BLD AUTO: 0 %
BUN BLD-MCNC: 35 MG/DL (ref 9–23)
CALCIUM BLD-MCNC: 8.4 MG/DL (ref 8.7–10.6)
CHLORIDE SERPL-SCNC: 105 MMOL/L (ref 98–112)
CO2 SERPL-SCNC: 27 MMOL/L (ref 21–32)
CREAT BLD-MCNC: 1.25 MG/DL
EGFRCR SERPLBLD CKD-EPI 2021: 62 ML/MIN/1.73M2 (ref 60–?)
EOSINOPHIL # BLD AUTO: 0.05 X10(3) UL (ref 0–0.7)
EOSINOPHIL NFR BLD AUTO: 0.5 %
ERYTHROCYTE [DISTWIDTH] IN BLOOD BY AUTOMATED COUNT: 16.5 %
GLUCOSE BLD-MCNC: 104 MG/DL (ref 70–99)
GLUCOSE BLD-MCNC: 116 MG/DL (ref 70–99)
GLUCOSE BLD-MCNC: 141 MG/DL (ref 70–99)
GLUCOSE BLD-MCNC: 163 MG/DL (ref 70–99)
GLUCOSE BLD-MCNC: 93 MG/DL (ref 70–99)
HCT VFR BLD AUTO: 26.5 %
HGB BLD-MCNC: 8.7 G/DL
IMM GRANULOCYTES # BLD AUTO: 0.08 X10(3) UL (ref 0–1)
IMM GRANULOCYTES NFR BLD: 0.7 %
INR BLD: 2.39 (ref 0.8–1.2)
LYMPHOCYTES # BLD AUTO: 0.68 X10(3) UL (ref 1–4)
LYMPHOCYTES NFR BLD AUTO: 6.2 %
MAGNESIUM SERPL-MCNC: 2 MG/DL (ref 1.6–2.6)
MCH RBC QN AUTO: 28.2 PG (ref 26–34)
MCHC RBC AUTO-ENTMCNC: 32.8 G/DL (ref 31–37)
MCV RBC AUTO: 85.8 FL
MONOCYTES # BLD AUTO: 1.08 X10(3) UL (ref 0.1–1)
MONOCYTES NFR BLD AUTO: 9.9 %
NEUTROPHILS # BLD AUTO: 9 X10 (3) UL (ref 1.5–7.7)
NEUTROPHILS # BLD AUTO: 9 X10(3) UL (ref 1.5–7.7)
NEUTROPHILS NFR BLD AUTO: 82.7 %
OSMOLALITY SERPL CALC.SUM OF ELEC: 296 MOSM/KG (ref 275–295)
PLATELET # BLD AUTO: 115 10(3)UL (ref 150–450)
POTASSIUM SERPL-SCNC: 4.4 MMOL/L (ref 3.5–5.1)
PROTHROMBIN TIME: 26.2 SECONDS (ref 11.6–14.8)
RBC # BLD AUTO: 3.09 X10(6)UL
SODIUM SERPL-SCNC: 139 MMOL/L (ref 136–145)
WBC # BLD AUTO: 10.9 X10(3) UL (ref 4–11)

## 2025-04-04 NOTE — PROGRESS NOTES
[facility]   part of [facility]     Vascular Surgery Progress Note    Cy Monsalve Patient Status:  Inpatient    1955 MRN [id number]   Location [facility] 7NE-A Attending Neli Oquendo MD   Hosp Day # 15 PCP Kam Alberts MD     Objective:   Temp: 97.6 °F (36.4 °C)  Pulse: 84  Resp: 20  BP: 131/63      Events Yesterday/Overnight:  Patient is a 69 year old male, POD 14, fenestrated endovascular arch repair with fenestrated thoracoabdominal aneurysm repair, left carotid to subclavian transposition with endovascular septotomy of dissection with Dr. Oquendo and Dr. Yanez.  BP stable at 131/63.  Pain well-controlled.  On room air. WBC 10.9, hemoglobin 8.7.       Exam:  Palpable bilateral DP and PT pulses.  Bilateral groin access sites healed, no hematoma.       Impression/Plan:    Continue physical therapy.     Continue dietary intake, high protein diet encouraged.    Shower today.      Medications:  Current Facility-Administered Medications   Medication Dose Route Frequency    argatroban 50 mg/50mL infusion premix  0.25 mcg/kg/min Intravenous Continuous    multivitamin (Tab-A-Immanuel/Beta Carotene) tab 1 tablet  1 tablet Oral Daily    amLODIPine (Norvasc) tab 10 mg  10 mg Oral Daily    peppermint oil liquid 1 mL  1 mL Other PRN    metoprolol tartrate (Lopressor) tab 25 mg  25 mg Oral 2x Daily(Beta Blocker)    hydrALAzine (Apresoline) 20 mg/mL injection 10 mg  10 mg Intravenous Q4H PRN    fluticasone propionate (Flonase) 50 MCG/ACT nasal suspension 1 spray  1 spray Each Nare Daily    ipratropium-albuterol (Duoneb) 0.5-2.5 (3) MG/3ML inhalation solution 3 mL  3 mL Nebulization Q4H PRN    docusate sodium (Colace) cap 100 mg  100 mg Oral BID    polyethylene glycol (PEG 3350) (Miralax) 17 g oral packet 17 g  17 g Oral Daily    glucose (Dex4) 15 GM/59ML oral liquid 15 g  15 g Oral Q15 Min PRN    Or    glucose (Glutose) 40% oral gel 15 g  15 g Oral Q15 Min PRN    Or    glucose-vitamin C (Dex-4)  chewable tab 4 tablet  4 tablet Oral Q15 Min PRN    Or    dextrose 50% injection 50 mL  50 mL Intravenous Q15 Min PRN    Or    glucose (Dex4) 15 GM/59ML oral liquid 30 g  30 g Oral Q15 Min PRN    Or    glucose (Glutose) 40% oral gel 30 g  30 g Oral Q15 Min PRN    Or    glucose-vitamin C (Dex-4) chewable tab 8 tablet  8 tablet Oral Q15 Min PRN    insulin aspart (NovoLOG) 100 Units/mL FlexPen 1-5 Units  1-5 Units Subcutaneous TID AC and HS    acetaminophen (Tylenol) tab 650 mg  650 mg Oral Q4H PRN    Or    HYDROcodone-acetaminophen (Norco) 5-325 MG per tab 1 tablet  1 tablet Oral Q4H PRN    Or    HYDROcodone-acetaminophen (Norco) 5-325 MG per tab 2 tablet  2 tablet Oral Q4H PRN    ondansetron (Zofran) 4 MG/2ML injection 4 mg  4 mg Intravenous Q6H PRN    metoclopramide (Reglan) 5 mg/mL injection 10 mg  10 mg Intravenous Q8H PRN    HYDROmorphone (Dilaudid) 1 MG/ML injection 0.2 mg  0.2 mg Intravenous Q2H PRN    Or    HYDROmorphone (Dilaudid) 1 MG/ML injection 0.4 mg  0.4 mg Intravenous Q2H PRN       Laboratory/Data:    LABS:  Recent Labs   Lab 03/31/25  0511 03/31/25  0605 04/01/25  0546 04/01/25  0824 04/02/25  0555 04/02/25  0556 04/03/25  0525 04/04/25  0556 04/04/25  0557   WBC 14.0*  --   --  18.0*  --  15.3* 12.4* 10.9  --    HGB 9.6*  --   --  9.9*  --  9.4* 8.9* 8.7*  --    MCV 85.6  --   --  83.7  --  84.0 84.8 85.8  --    PLT 86.0*  --   --  91.0*  --  82.0* 88.0* 115.0*  --    INR  --  1.34* 1.62*  --  3.28*  --  3.01*  --  2.39*       Recent Labs   Lab 03/30/25  0417 03/31/25  0511 04/01/25  0824 04/01/25  1522 04/02/25  0555 04/03/25  0525 04/04/25  0557    141 140  --  140 140 139   K 3.9 3.9 4.5  --  4.3 4.5 4.4    103 103  --  103 104 105   CO2 34.0* 32.0 31.0  --  29.0 29.0 27.0   BUN 65* 51* 46*  --  42* 41* 35*   CREATSERUM 1.34* 1.25 1.35*  --  1.36* 1.43* 1.25   * 106* 102*  --  94 97 93   CA 8.4* 8.3* 8.5*  --  8.4* 8.2* 8.4*   MG 2.3  --   --  2.2 2.1 2.0 2.0     Recent Labs    Lab 04/02/25  0555 04/03/25  0525 04/04/25  0557   PTP 33.6* 31.4* 26.2*   INR 3.28* 3.01* 2.39*   PTT 65.1* 72.7* 65.9*     Recent Labs   Lab 03/29/25  0407 03/30/25  0417 03/31/25  0511 04/01/25  0824   ALT 64* 77* 68* 50*   AST 26 30 19 13   ALB 2.8* 3.2 3.2 3.4     No results for input(s): \"TROP\" in the last 168 hours.  No results found for: \"ANAS\", \"ROVERTO\", \"ANASCRN\"  Recent Labs   Lab 03/31/25  1100   HIPAB Reactive- Confirmed*       ISATU Warren  4/4/2025  11:52 AM

## 2025-04-04 NOTE — PROGRESS NOTES
S: He is feeling ok. He denies shortness of breath or cough. He still has some mild postop pain.    Meds:   multivitamin  1 tablet Oral Daily    amLODIPine  10 mg Oral Daily    metoprolol tartrate  25 mg Oral 2x Daily(Beta Blocker)    fluticasone propionate  1 spray Each Nare Daily    docusate sodium  100 mg Oral BID    polyethylene glycol (PEG 3350)  17 g Oral Daily    insulin aspart  1-5 Units Subcutaneous TID AC and HS       Prn Meds:    peppermint oil    hydrALAzine    ipratropium-albuterol    glucose **OR** glucose **OR** glucose-vitamin C **OR** dextrose **OR** glucose **OR** glucose **OR** glucose-vitamin C    acetaminophen **OR** HYDROcodone-acetaminophen **OR** HYDROcodone-acetaminophen    ondansetron    metoclopramide    HYDROmorphone **OR** HYDROmorphone **OR** [DISCONTINUED] HYDROmorphone    Infusions:   argatroban 0.25 mcg/kg/min (04/03/25 1732)       OBJECTIVE:  Vitals:    04/03/25 1900 04/03/25 2045 04/04/25 0030 04/04/25 0500   BP:  121/65 130/69 138/78   Pulse: 71 63 78 78   Resp:  18 18 18   Temp:  97.7 °F (36.5 °C) 98.2 °F (36.8 °C) 98 °F (36.7 °C)   TempSrc:  Oral Temporal Temporal   SpO2:  95% 91% 90%   Weight:         O2: room air    Gen - Alert, oriented x 3, in no apparent distress  Lungs - ctab  CV - regular rate & rhythm. Normal S1, S2. No murmurs   Abdomen - nontender, soft  Extremities - No cyanosis, clubbing, edema appreciated.      Labs:  Recent Labs   Lab 04/02/25  0556 04/03/25  0525 04/04/25  0556   WBC 15.3* 12.4* 10.9   HGB 9.4* 8.9* 8.7*   PLT 82.0* 88.0* 115.0*     Recent Labs   Lab 03/30/25  0417 03/31/25  0511 04/01/25  0824 04/01/25  1522 04/02/25  0555 04/03/25  0525 04/04/25  0557    141 140  --  140 140 139   K 3.9 3.9 4.5  --  4.3 4.5 4.4    103 103  --  103 104 105   CO2 34.0* 32.0 31.0  --  29.0 29.0 27.0   BUN 65* 51* 46*  --  42* 41* 35*   CREATSERUM 1.34* 1.25 1.35*  --  1.36* 1.43* 1.25   * 106* 102*  --  94 97 93   ANIONGAP 7 6 6  --   8 7 7   ALB 3.2 3.2 3.4  --   --   --   --    CA 8.4* 8.3* 8.5*  --  8.4* 8.2* 8.4*   MG 2.3  --   --    < > 2.1 2.0 2.0   TP 5.3* 5.4* 5.7  --   --   --   --     < > = values in this interval not displayed.     Recent Labs   Lab 03/30/25  0417 03/31/25  0511 04/01/25  0824   ALKPHO 69 74 73   AST 30 19 13   ALT 77* 68* 50*   BILT 1.0 1.2* 1.3*     Recent Labs   Lab 04/02/25  0555 04/03/25  0525 04/04/25  0557   INR 3.28* 3.01* 2.39*   PTT 65.1* 72.7* 65.9*         ASSESSMENT AND PLAN      Cy Monsalve is a 69 year old man with a history of aortic dissection, DVT, pAF, CAD, and tobacco use who was admitted 3/19 for complex aortic aneurysm repair    Acute hypoxemic respiratory failure - after aortic aneurysm repair; due to a combination of pulmonary edema, atelectasis, and pneumonia. Improved, now on room air  -supplemental oxygen prn   -antibiotics completed  -encourage IS use  -lasix prn   Possible pneumonia - CT Scan showed patchy ground glass opacities bilaterally. Sputum cultures grew candida. 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   -per hospitalist  Proph  -patient is on IV argatroban  Dispo  -Full code   -plan is for discharge to [facility] when ok with all services     We will sign off now. Please call us if we can be of further assistance. Patient can follow up as needed.     Neftaly Jacobs M.D.  Pulmonary/Critical Care

## 2025-04-04 NOTE — PLAN OF CARE
Assumed care at 1930  Alert and oriented x4   SA on tele and on RA  Denies any pain/discomfort  Neurovascular checks, see flowsheets   Voiding via external  BM (+)  Argatroban gtt infusing per order  Neck & back incisions intact, see flowsheets   Up w/ 2 & pivot   Call light w/in reach

## 2025-04-04 NOTE — DIETARY NOTE
[facility]   part of [facility]  NUTRITION ASSESSMENT    Pt does not meet malnutrition criteria at this time.      NUTRITION INTERVENTION:    Meal and Snacks - Continue Regular Diet as tolerated; Monitor and encourage adequate PO intake.   Medical Food Supplements - Continue Magic Shake chocolate daily and Magic Cup chocolate daily.   Nutrition Education - Discussed importance of PO intake for healing 3/25. Reviewed foods high in Calories to help w/ wt maintenance. Pt/family receptive to education, no barriers noted.    Vitamin and Mineral Supplements - Recommend Multivitamin with minerals      PATIENT STATUS:   4/4 - Pt sitting in chair, reports he ate a frosty from [employer] and cereal for breakfast this AM.  Denies n/v/d. Reports he is drinking his protein shake and eating his magic cup when he gets it.  Trying to eat, but dislikes food in hospital.  He has no questions/concerns over diet at this time.      3/31: Transferred to ICU 3/27. Visited pt at bedside. He reports his appetite is fair and about the same as a couple days ago. Does not like hospital food much which is making it hard for him to eat much per his report. Denies GI symptoms at this time but does report having loose stools often at baseline. Reports consuming ~1 Magic Shake/day and agreeable to also trying chocolate Magic Cup. Continued to encourage PO and ONS intake; all questions answered at this time.    3/25: 69/M admitted on 3/19 with AAA, POD#4 for fenestrated endovascular arch repair with fenestrated thoracoabdominal aneurysm repair, left carotid to subclavian transposition with endovascular septotomy of dissection. Pt seen for consult \"Patient refusing Ensure, low appetite, needs protein for wound healing.\"  Pt seen with family at bedside.  Pt reports not a robost appetite, but ate some cornflakes this AM and working on a donut at bedside.  Denies n/v/d. On Vapotherm due to suspected fluid o/l. Pt notes his appetite fluctuated  PTA.  He had lost significant wt over the past several years due to medical issues. EMR records do not indicate any wt loss over the past year.  Discussed importance of nutrition and protein for healing.  Pt aware and feels his appetite will return eventually. Pt had Ensure ordered and dislikes it.  He declines this and other ONS offered. He does note that he did tolerate a Boost milkshake previously - will try Magic Shake.  Pt agreeable.  Offered to order food for pt, he declined.    PMH: AAA (abdominal aortic aneurysm), Deep vein thrombosis, Esophageal reflux, Ileostomy, Peripheral vascular disease, essential hypertension    ANTHROPOMETRICS:  Ht:  6'5\"  Wt: 90.7 kg (199 lb 15.3 oz).   BMI: Body mass index is 23.71 kg/m².  IBW: 94.5 kg    WEIGHT HISTORY:   Weight loss: No  Wt Readings from Last 10 Encounters:   03/31/25 90.7 kg (199 lb 15.3 oz)   03/22/25 69.5 kg (153 lb 3.5 oz)   03/07/25 83.9 kg (185 lb)   03/23/22 107.3 kg (236 lb 9.6 oz)   12/28/21 106.5 kg (234 lb 12.8 oz)   08/05/21 101.3 kg (223 lb 6.4 oz)   03/16/21 96.2 kg (212 lb)   12/02/20 97.7 kg (215 lb 6.4 oz)   10/27/20 92.1 kg (203 lb)   08/20/20 90 kg (198 lb 6.4 oz)        NUTRITION:  Diet:       Procedures    Regular/General diet Is Patient on Accuchecks? Yes      Food Allergies: No  Cultural/Ethnic/Mandaen Preferences Addressed: Yes    Percent Meals Eaten (last 3 days)       Date/Time Percent Meals Eaten (%)    04/01/25 1900 25 %    04/02/25 1026 100 %    04/02/25 1600 100 %    04/03/25 0800 100 %    04/03/25 1300 100 %            GI system review: WNL; Last BM Date: 04/03/25  Skin and wounds: surgical wound to neck, stage I pressure injury to coccyx    NUTRITION RELATED PHYSICAL FINDINGS:     1. Body Fat/Muscle Mass: no wasting noted / well nourished     2. Fluid Accumulation: none per RN documentation    NUTRITION PRESCRIPTION:  90.7 kg Actual Body Weight  Calories: 8187-8830 calories/day (25-30 kcal/kg)  Protein: 109-136 grams protein/day  (1.2-1.5 grams protein/kg)  Fluid: ~1 ml/kcal or per MD discretion    NUTRITION DIAGNOSIS/PROBLEM:  Inadequate oral intake related to insufficient appetite resulting in inadequate nutrition intake as evidenced by documented/reported insufficient oral intake      MONITOR AND EVALUATE/NUTRITION GOALS:  PO intake of 75% of meals TID - Not met, Continues  PO intake of 75% of oral nutrition supplement/s - Not met, Continues  Weight stable within 1 to 2 lbs during admission - Ongoing  Provide nutrition adequate for wound healing - Ongoing      MEDICATIONS:  MVI, Colace, Novolog (0u x 24h), miralax    LABS:  Reviewed     Pt is at Low nutrition risk    Susu Prakash RD, LDN, [facility]  Clinical Dietitian  Phone [phone]

## 2025-04-04 NOTE — CONSULTS
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    Goal PTT is 46-93     PTT   Date Value Ref Range Status   04/04/2025 65.9 (H) 23.0 - 36.0 seconds Final     Comment:       The aPTT Heparin Therapeutic Range is approximately 65- 104 seconds. The therapeutic range has been validated against 0.3-0.7 heparin anti-Xa units/mL.     Elevations of the aPTT in patients not receiving anticoagulant therapy (Heparin, etc.), may be seen in Factor deficiency, vitamin K deficiency, factor inhibitors, liver disease, etc.   Clinical correlation is recommended.            INR   Date Value Ref Range Status   04/04/2025 2.39 (H) 0.80 - 1.20 Final     Comment:     Therapeutic INR range for patients on warfarin:  2.0-3.0 for most medical conditions and surgical prophylaxis   2.5-3.5 for mechanical heart valves and recurrent thromboembolism    Direct thrombin inhibitors (e.g. argatroban, bivalirudin), factor Xa inhibitors (e.g. apixaban, rivaroxaban), and conditions such as coagulation factor deficiency, vitamin K deficiency, and liver disease will prolong the prothrombin time/INR.    Unfractionated heparin and low molecular weight heparin do not affect the prothrombin time/INR up to a concentration of 1 IU/mL and 1.5 IU/mL, respectively.         Heparin Induced Plt Antibody   Date Value Ref Range Status   03/31/2025 Reactive- Confirmed (A) Negative Final       Current dose is 0.25mcg/kg/min    Based on PTT no dose change required     Will recheck PTT with AM labs and adjust based on result for goal PTT of 46-93.     We will continue to follow and appreciate the opportunity to assist in the care of this patient.    Thank you,    Anya Soares, [facility]  4/4/2025 7:14 AM

## 2025-04-04 NOTE — OCCUPATIONAL THERAPY NOTE
OCCUPATIONAL THERAPY TREATMENT NOTE - INPATIENT     Room Number: 7613/7613-A  Session: 4   Number of Visits to Meet Established Goals: 7    Presenting Problem: S/p 3/21 fenestrated abdominal aortic aneurysm repair with left carotid to subclavian transposition on  with Dr. Yanez and Dr. Oquendo and 3/20 lumbar drain    ASSESSMENT   Patient demonstrates good  progress this session, goals remain in progress.    Patient continues to function below baseline with toileting, upper body dressing, lower body dressing, grooming, bed mobility, transfers, static sitting balance, dynamic sitting balance, static standing balance, dynamic standing balance, maintaining seated position, functional standing tolerance, and energy conservation strategies.   Contributing factors to remaining limitations include decreased functional strength, decreased functional reach, decreased endurance, impaired coordination, impaired motor planning, decreased muscular endurance, medical status, and decreased compliance/participation.  Next session anticipate patient to progress toileting, upper body dressing, lower body dressing, grooming, bed mobility, transfers, static sitting balance, dynamic sitting balance, static standing balance, dynamic standing balance, maintaining seated position, functional standing tolerance, and energy conservation strategies.  Occupational Therapy will continue to follow patient for duration of hospitalization.    Pt requires significant encouragement, but participates in all presented tasks.     Patient continues to benefit from continued skilled OT services: to promote return to prior level of function and safety with continuous assistance and gradual rehabilitative therapy.       WEIGHT BEARING RESTRICTION  R Lower Extremity: Full Weight Bearing  L Lower Extremity: Full Weight Bearing      Recommendations for nursing staff:   Transfers: 2 assist with RW  Toileting location: commode    TREATMENT SESSION:  Patient  Start of Session: supine    FUNCTIONAL TRANSFER ASSESSMENT  Sit to Stand: Edge of Bed  Edge of Bed: -- (min of 2)  Chair: Minimal Assist  Commode Transfer: Minimal Assist    BED MOBILITY  Rolling: Contact Guard Assist  Supine to Sit : Minimal Assist  Sit to Supine (OT): Not Tested  Scooting: NT    BALANCE ASSESSMENT  Static Sitting: Supervision  Static Standing: Contact Guard Assist    FUNCTIONAL ADL ASSESSMENT  Grooming Seated: Not Tested  UB Dressing Seated: Not Tested  LB Dressing Seated: Moderate Assist (Pt lifts feet to facilitate, but needs assist to don socks)  LB Dressing Standing: Not Tested  Toileting Standing: Maximum Assist (posterior terry care, maintains standing balance CGA during task)    ACTIVITY TOLERANCE: HR 90s-100s with activity. O2 > 90%. Feels SOB with activity. HR 70s at rest. Several minutes seated rest between activities for recovery                         O2 SATURATIONS       EDUCATION PROVIDED  Patient Education : Role of Occupational Therapy; Plan of Care; Discharge Recommendations; Functional Transfer Techniques; Energy Conservation  Patient's Response to Education: Verbalized Understanding    Equipment used: RW  Demonstrates functional use, Would benefit from additional trial      Exercises:    Exercises Repetitions Comments   Scapular elevation     Scapular retraction     Shoulder rolls     Shoulder flexion     Shoulder abduction     Shoulder internal/external rotation     Forward punch 10    Elbow flexion 10    Elbow extension 10    Forearm pronation/supination     Wrist flexion/extension     Gross grasp/fist pumps 10    Ankle pumps     Knee extension 10    Marching         Therapist comments: Supine > sit > HEP > stand with RW (2 trials to achieve) > second stand EOB > chair > 4 ft ambulation with RW and min assist > commode > standing terry care > chair.     Uncontrolled stand to sit on all trials. Increased difficulty standing from EOB. Educated throughout session on strategies for  building endurance, importance of rest breaks followed by further activity. Pt frustrated easily but participatory with encouragement.     Educated on recommendation of chair for meals, commode for toileting with nursing staff over the weekend. Pt verbally in agreement, however anticipate need for encouragement with follow through.     Patient End of Session: Up in chair, Call light within reach, Needs met, RN aware of session/findings, All patient questions and concerns addressed, Hospital anti-slip socks, Alarm set    SUBJECTIVE  Pt stated, \"I don't think I can, I need to poop\" RE walking. After encouragement, able to walk 4 ft to commode.     PAIN ASSESSMENT  Ratin  Location: denies  Management Techniques: Repositioning     OBJECTIVE  Precautions: Bed/chair alarm (surgical boot L foot? SBP )    AM-PAC ‘6-Clicks’ Inpatient Daily Activity Short Form  -   Putting on and taking off regular lower body clothing?: A Lot  -   Bathing (including washing, rinsing, drying)?: A Lot  -   Toileting, which includes using toilet, bedpan or urinal? : A Lot  -   Putting on and taking off regular upper body clothing?: A Little  -   Taking care of personal grooming such as brushing teeth?: A Little  -   Eating meals?: A Little    AM-PAC Score:  Score: 15  Approx Degree of Impairment: 56.46%  Standardized Score (AM-PAC Scale): 34.69    PLAN  OT Device Recommendations: TBD  OT Treatment Plan: Balance activities, Energy conservation/work simplification techniques, ADL training, Functional transfer training, UE strengthening/ROM, Patient/Family education, Equipment eval/education, Compensatory technique education  Rehab Potential : Good  Frequency: 3x/week    OT Goals: goals ongoing 4/4  ADL Goals   Patient will perform upper body dressing:  with setup  Patient will perform lower body dressing:  with min assist  Patient will perform toileting: with min assist     Functional Transfer Goals  Patient will transfer from supine to  sit:  with min assist  Patient will transfer to toilet:  with min assist     UE Exercise Program Goal  Patient will be independent with bilateral AROM HEP (home exercise program).     Additional Goals  Pt will incorporate 2 energy conservation techniques into ADL.    OT Session Time: 42 minutes  Self-Care Home Management: 15 minutes  Therapeutic Activity: 15 minutes  Neuromuscular Re-education: 0 minutes  Therapeutic Exercise: 12 minutes  Cognitive Skills: 0 minutes  Sensory Integrative: 0 minutes  Orthotic Management and Trainin minutes  Can add/delete any of these

## 2025-04-04 NOTE — PLAN OF CARE
Assumed patient care at 0730  Argatroban gtt infusing per order; plan for next PTT tomorrow am.  Pharmacy dosing  Neurovascular checks intact  Up to chair  BM x1   Discharge planning in progress; plan to dc pending platelets

## 2025-04-04 NOTE — PROGRESS NOTES
[facility] HEMATOLOGY ONCOLOGY  Progress Note    Cy Monsalve Patient Status:  Inpatient    1955 MRN [id number]   Location [facility] 7NE-A Attending Neil Oquendo MD   Hosp Day # 15 PCP Kam Alberts MD     ADMISSION DATE AND TIME: 3/19/2025  1:15 PM    ADMIT DIAGNOSIS: Abdominal aortic aneurysm (AAA) [I71.40]    SUBJECTIVE:    Resting      OBJECTIVE:  Blood pressure 138/78, pulse 78, temperature 98 °F (36.7 °C), temperature source Temporal, resp. rate 18, weight 199 lb 15.3 oz (90.7 kg), SpO2 90%.    PHYSICAL EXAM:  General: afebrile, alert and oriented x 3, pleasant  No distress    LABS:  Recent Results (from the past 24 hours)   POCT Glucose    Collection Time: 25 12:21 PM   Result Value Ref Range    POC Glucose 132 (H) 70 - 99 mg/dL   POCT Glucose    Collection Time: 25  4:16 PM   Result Value Ref Range    POC Glucose 118 (H) 70 - 99 mg/dL   POCT Glucose    Collection Time: 25  9:04 PM   Result Value Ref Range    POC Glucose 96 70 - 99 mg/dL   POCT Glucose    Collection Time: 25  5:30 AM   Result Value Ref Range    POC Glucose 104 (H) 70 - 99 mg/dL   CBC With Differential With Platelet    Collection Time: 25  5:56 AM   Result Value Ref Range    WBC 10.9 4.0 - 11.0 x10(3) uL    RBC 3.09 (L) 3.80 - 5.80 x10(6)uL    HGB 8.7 (L) 13.0 - 17.5 g/dL    HCT 26.5 (L) 39.0 - 53.0 %    .0 (L) 150.0 - 450.0 10(3)uL    MCV 85.8 80.0 - 100.0 fL    MCH 28.2 26.0 - 34.0 pg    MCHC 32.8 31.0 - 37.0 g/dL    RDW 16.5 %    Neutrophil Absolute Prelim 9.00 (H) 1.50 - 7.70 x10 (3) uL    Neutrophil Absolute 9.00 (H) 1.50 - 7.70 x10(3) uL    Lymphocyte Absolute 0.68 (L) 1.00 - 4.00 x10(3) uL    Monocyte Absolute 1.08 (H) 0.10 - 1.00 x10(3) uL    Eosinophil Absolute 0.05 0.00 - 0.70 x10(3) uL    Basophil Absolute 0.00 0.00 - 0.20 x10(3) uL    Immature Granulocyte Absolute 0.08 0.00 - 1.00 x10(3) uL    Neutrophil % 82.7 %    Lymphocyte % 6.2 %    Monocyte % 9.9 %     Eosinophil % 0.5 %    Basophil % 0.0 %    Immature Granulocyte % 0.7 %   Basic Metabolic Panel (8)    Collection Time: 04/04/25  5:57 AM   Result Value Ref Range    Glucose 93 70 - 99 mg/dL    Sodium 139 136 - 145 mmol/L    Potassium 4.4 3.5 - 5.1 mmol/L    Chloride 105 98 - 112 mmol/L    CO2 27.0 21.0 - 32.0 mmol/L    Anion Gap 7 0 - 18 mmol/L    BUN 35 (H) 9 - 23 mg/dL    Creatinine 1.25 0.70 - 1.30 mg/dL    Calcium, Total 8.4 (L) 8.7 - 10.6 mg/dL    Calculated Osmolality 296 (H) 275 - 295 mOsm/kg    eGFR-Cr 62 >=60 mL/min/1.73m2   Magnesium    Collection Time: 04/04/25  5:57 AM   Result Value Ref Range    Magnesium 2.0 1.6 - 2.6 mg/dL   Prothrombin Time (PT)    Collection Time: 04/04/25  5:57 AM   Result Value Ref Range    PT 26.2 (H) 11.6 - 14.8 seconds    INR 2.39 (H) 0.80 - 1.20   PTT, Activated    Collection Time: 04/04/25  5:57 AM   Result Value Ref Range    PTT 65.9 (H) 23.0 - 36.0 seconds     Platelet Count::    Lab Results   Component Value Date    .0 (L) 04/04/2025    PLT 88.0 (L) 04/03/2025    PLT 82.0 (L) 04/02/2025        CULTURES  Hospital Encounter on 03/19/25   1. Blood Culture     Status: None    Collection Time: 03/24/25 10:05 AM    Specimen: Blood,peripheral   Result Value Ref Range    Blood Culture Result No Growth 5 Days N/A       IMAGING:    CTA CHEST CTA ABDOMEN CTA PELVIS (CPT=71275/49404)    Result Date: 4/3/2025  CONCLUSION:  1. When compared to most recent CT of the chest, abdomen, and pelvis performed 3/27/2025, decreased size in left pleural effusion with improvement of bilateral parenchymal opacities.  Continued follow-up recommended to ensure resolution as clinically indicated. 2. Postsurgical changes of a stent graft placement involving the thoracic and abdominal aorta extending into branches as noted on prior CT and as detailed above.  No extravasation of contrast. 3. There is decreased enhancement involving the left superior renal cortex suggesting ischemia, correlate  clinically. 4. Improvement of subcutaneous edema.  Removal of Arzola catheter. 5. Continued clinical correlation recommended.   LOCATION:  [facility]   Dictated by (CST): Anju Jaimes MD on 4/03/2025 at 7:55 PM     Finalized by (CST): Anju Jaimes MD on 4/03/2025 at 8:17 PM          MEDICATIONS:  Medications reviewed.     multivitamin  1 tablet Oral Daily    amLODIPine  10 mg Oral Daily    metoprolol tartrate  25 mg Oral 2x Daily(Beta Blocker)    fluticasone propionate  1 spray Each Nare Daily    docusate sodium  100 mg Oral BID    polyethylene glycol (PEG 3350)  17 g Oral Daily    insulin aspart  1-5 Units Subcutaneous TID AC and HS      argatroban 0.25 mcg/kg/min (04/03/25 5606)       peppermint oil    hydrALAzine    ipratropium-albuterol    glucose **OR** glucose **OR** glucose-vitamin C **OR** dextrose **OR** glucose **OR** glucose **OR** glucose-vitamin C    acetaminophen **OR** HYDROcodone-acetaminophen **OR** HYDROcodone-acetaminophen    ondansetron    metoclopramide    HYDROmorphone **OR** HYDROmorphone **OR** [DISCONTINUED] HYDROmorphone    ASSESSMENT AND PLAN:     Active Problems:    Pre-op testing    Thoracoabdominal aortic aneurysm (TAAA)    Bilateral leg weakness        Cy Monsalve is a 69 year old male with 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     Status post AAA repair with complicated postop course with hypoxia and HOLLIS.  Currently on the floor.     Thrombocytopenia  Platelets were normal until 3/22/2025  Thereafter they had declined into the 70-90 while on heparin  Heparin-induced platelet antibody was positive.    SUNDEEP pending  Currently on argatroban  Platelets are 115  This is good progress  We will continue argatroban with pharmacy dosing  Warfarin once platelets over 150 K    We will monitor platelets        Thank you for allowing me to participate in the care of your patient.     Simón Vernon MD        This note was prepared using Dragon Medical voice recognition  dictation software. As a result errors may occur. When identified these errors have been corrected. While every attempt is made to correct errors during dictation discrepancies may still exist. Please call me to clarify any errors.

## 2025-04-04 NOTE — PROGRESS NOTES
[facility] Hospitalist Progress Note     CC: Hospital Follow up    PCP: Kam Alberts MD     Subjective:     Sitting in bedside recliner. Pain controlled with norco mostly. Diarrhea resolved today , states he does not work well with laxatives. No abdominal pain. Family at bedside.     OBJECTIVE:    Blood pressure 129/69, pulse 76, temperature 98.2 °F (36.8 °C), temperature source Oral, resp. rate 18, weight 199 lb 15.3 oz (90.7 kg), SpO2 95%.    Temp:  [97.6 °F (36.4 °C)-98.2 °F (36.8 °C)] 98.2 °F (36.8 °C)  Pulse:  [63-84] 76  Resp:  [18-20] 18  BP: (121-138)/(63-78) 129/69  SpO2:  [90 %-95 %] 95 %      Intake/Output:    Intake/Output Summary (Last 24 hours) at 4/4/2025 1453  Last data filed at 4/4/2025 0900  Gross per 24 hour   Intake 360 ml   Output 1800 ml   Net -1440 ml       Last 3 Weights   03/31/25 0300 199 lb 15.3 oz (90.7 kg)   03/30/25 0200 197 lb 5 oz (89.5 kg)   03/27/25 0600 193 lb 12.6 oz (87.9 kg)   03/26/25 0400 193 lb 12.6 oz (87.9 kg)   03/25/25 0600 200 lb 2.8 oz (90.8 kg)   03/24/25 0900 200 lb 3.2 oz (90.8 kg)   03/24/25 0500 203 lb 0.7 oz (92.1 kg)   03/23/25 0516 204 lb 2.3 oz (92.6 kg)   03/22/25 0637 153 lb 3.5 oz (69.5 kg)   03/20/25 1231 192 lb 15.9 oz (87.5 kg)   03/19/25 1444 193 lb (87.5 kg)   03/22/25 1113 153 lb 3.5 oz (69.5 kg)   03/07/25 1536 185 lb (83.9 kg)       Exam   Gen: No acute distress, alert and oriented x3, no focal neurologic deficits, right-sided Ramsay, arterial line and lumbar drain noted  Heent: NC AT, mucous memb clear, neck supple  Pulm: Lungs clear, normal respiratory effort  CV: Heart with regular rate and rhythm, no peripheral edema  Abd: Abdomen soft, nontender, nondistended, bowel sounds present  MSK:expected rom, no hematoma appreciated bilaterally, 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, 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, strentgh 4/5 on R, pulses intact  Skin: no rashes or lesions  Neuro: AO*3, motor intact, no sensory deficits  Psyc: appropriate mood and  affect      Data Review:       Labs:     Recent Labs   Lab 04/02/25  0556 04/03/25  0525 04/04/25  0556   RBC 3.26* 3.10* 3.09*   HGB 9.4* 8.9* 8.7*   HCT 27.4* 26.3* 26.5*   MCV 84.0 84.8 85.8   MCH 28.8 28.7 28.2   MCHC 34.3 33.8 32.8   RDW 16.0 16.4 16.5   NEPRELIM 13.21* 10.49* 9.00*   WBC 15.3* 12.4* 10.9   PLT 82.0* 88.0* 115.0*         Recent Labs   Lab 04/02/25  0555 04/03/25  0525 04/04/25  0557   GLU 94 97 93   BUN 42* 41* 35*   CREATSERUM 1.36* 1.43* 1.25   EGFRCR 56* 52* 62   CA 8.4* 8.2* 8.4*    140 139   K 4.3 4.5 4.4    104 105   CO2 29.0 29.0 27.0       Recent Labs   Lab 03/29/25  0407 03/30/25  0417 03/31/25  0511 04/01/25  0824   ALT 64* 77* 68* 50*   AST 26 30 19 13   ALB 2.8* 3.2 3.2 3.4         Imaging:  CTA CHEST CTA ABDOMEN CTA PELVIS (CPT=71275/45657)    Result Date: 4/3/2025  CONCLUSION:  1. When compared to most recent CT of the chest, abdomen, and pelvis performed 3/27/2025, decreased size in left pleural effusion with improvement of bilateral parenchymal opacities.  Continued follow-up recommended to ensure resolution as clinically indicated. 2. Postsurgical changes of a stent graft placement involving the thoracic and abdominal aorta extending into branches as noted on prior CT and as detailed above.  No extravasation of contrast. 3. There is decreased enhancement involving the left superior renal cortex suggesting ischemia, correlate clinically. 4. Improvement of subcutaneous edema.  Removal of Arzola catheter. 5. Continued clinical correlation recommended.   LOCATION:  [facility]   Dictated by (CST): Anju Jaimes MD on 4/03/2025 at 7:55 PM     Finalized by (CST): Anju Jaimes MD on 4/03/2025 at 8:17 PM          Meds:      multivitamin  1 tablet Oral Daily    amLODIPine  10 mg Oral Daily    metoprolol tartrate  25 mg Oral 2x Daily(Beta Blocker)    fluticasone propionate  1 spray Each Nare Daily    docusate sodium  100 mg Oral BID    polyethylene glycol (PEG 3350)  17 g Oral Daily     insulin aspart  1-5 Units Subcutaneous TID AC and HS      argatroban 0.25 mcg/kg/min (04/03/25 1153)       peppermint oil    hydrALAzine    ipratropium-albuterol    glucose **OR** glucose **OR** glucose-vitamin C **OR** dextrose **OR** glucose **OR** glucose **OR** glucose-vitamin C    acetaminophen **OR** HYDROcodone-acetaminophen **OR** HYDROcodone-acetaminophen    ondansetron    metoclopramide    HYDROmorphone **OR** HYDROmorphone **OR** [DISCONTINUED] HYDROmorphone         Assessment/Plan:     Active Problems:    Pre-op testing    Thoracoabdominal aortic aneurysm (TAAA)    Bilateral leg weakness      69 year old male with a past medical history of hypertension, GERD, anxiety, renal calculi, DVT, ascending aortic dissection s/p repair, 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  Postoperative course complicated by HOLLIS on CKD as well as right lower extremity weakness along with hypoxic respiratory failure.      History of prior infrarenal endovascular AAA repair with bilateral KYLER for hypogastric and common iliac aneurysms  History of mesenteric ischemia s/p SMA bypass with right external iliac artery to SMA bypass  History of aortic dissection s/p repair, aortic dissection distal to left subclavian  S/p complex thoraco abdominal aortic repair 3/21  Hx of DVT   -Complicated aortic pathology with multiple repairs in the past. 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   - HIT neg 3/23, positive on 3/31  - hematology consulted, heparin stopped, argatroban started,   -  SUNDEEP pending  - warfarin and argatroban per hematology / pharmacy to dose    - INR technically remains subtherapeutic     Acute hypoxemic respiratory failure w/ threat to bodily function 2/2 possible pneumonia versus edema  Leukocytosis  -Completed 3 days of azithro (3/27-3/29), janna (3/24-3/28), now switched to cefepime per ICU (3/28-3/30)  - tx w/ iv steroids x 5 days, iv steroids completed, completed abx as above   - wean o2 as tolerated     Leukocytosis  - no new infectious complaints, likely 2/2 steroids  - trend     Right lower extremity weakness-improving  - In the setting of complex aortic repair 3/21  - Seen by neurointensivist, recommendations reviewed  - Improved overall  -- PT/OT     HOLLIS on CKD-improving  -Creatinine reviewed today - 1.34 (baseline 1.2)  -Continue to monitor with serial RFP's  -Avoid nephrotoxic agents  - Cr bumped post operatively - given prolonged surgery and complicated repair  - improving      Hypertension  -hold home HCTZ     Allergic rhinitis  -Continue home fluticasone as needed     Short gut syndrome  - resume lamotil if diarrhea resumes    Normocytic anemia  - stable, trend     H/o dvt  - see above         Supplementary Documentation:   DVT Mechanical Prophylaxis:   SCDs, Early ambuation  DVT Pharmacologic Prophylaxis   Medication    argatroban 50 mg/50mL infusion premix                Code Status: DNAR/Full Treatment  Arzola: External urinary catheter in place  Arzola Duration (in days):   Central line:            Dispo: inpatient - on argatroban until therapeutic on coumadin - dc to [facility] once cleared by heme and vascular     Abdon Castro, DO  [facility] Hospitalist

## 2025-04-04 NOTE — CONSULTS
[facility] Pharmacy Dosing Service  Warfarin (Coumadin) Subsequent Dosing    Cy Monsalve is a 69 year old patient for whom pharmacy is dosing warfarin (Coumadin). Goal INR is 2-3    Recent Labs   Lab 03/31/25  0605 04/01/25  0546 04/02/25  0555 04/03/25  0525 04/04/25  0557   INR 1.34* 1.62* 3.28* 3.01* 2.39*     Consulted by:  Dr. Oquendo  Indication:  Hypercoagulable condition    Potential Drug Interactions:  Argatroban (Argatroban increases the lab measurement of INR, INR of 4 on Warfarin co-therapy is approximately equivalent to INR 2 to 2.5 on warfarin alone)   Other Anticoagulants:  Argatroban for HIT  Home regimen (if applicable):  Warfarin 6 mg on Fridays and 4 mg all other days of the week    Lab Results   Component Value Date     .0 (L) 04/04/2025     Inpatient Dosing History:     Date 3/31 4/1 4/2 4/3 4/4      INR 1.34 1.62 3.28 3.01 2.39      Coumadin dose 7.5 mg held held held   held          Based on above -  1.  For today, Hold Warfarin dose until PLT >150,000 x103   2   PT/INR ordered daily while on warfarin  3.  Pharmacy will continue to follow.  We appreciate the opportunity to assist in the care of this patient.    Anya Soares [facility]  4/4/2025  2:38 PM

## 2025-04-04 NOTE — PROGRESS NOTES
[facility]  Consultation Note      Cy Monsalve Patient Status:  Inpatient    1955 MRN [id number]   Location [facility] 4SW-A Attending Neil Oquendo MD   Hosp Day # 15 PCP Kam Alberts MD     Reason for consult: Post op, arrhythmias    Events: Stable. Denies CP or SOB.     Primary cardiologist: Russell     History of Present Illness:  Cy Monsalve is a 69 year old male who presented to [facility] on 3/19/2025 for elective complex aortic surgery, see . Complicated aortic history involving multiple surgeries, AVR, complications etc. Cardiology consulted regarding tachyarhythmias seen on tele. He has felt some palpitations with this. Denies CP. Has needed O2 post op but denies SOB at present. Has been smoking. Being treated for PNA with Abx and steroids. Lasix on hold and has received IVF for HOLLIS, now improving. He is usually on coumadin and is now on heparin gtt.     Medications:  Current Facility-Administered Medications   Medication Dose Route Frequency    argatroban 50 mg/50mL infusion premix  0.25 mcg/kg/min Intravenous Continuous    multivitamin (Tab-A-Immanuel/Beta Carotene) tab 1 tablet  1 tablet Oral Daily    amLODIPine (Norvasc) tab 10 mg  10 mg Oral Daily    peppermint oil liquid 1 mL  1 mL Other PRN    metoprolol tartrate (Lopressor) tab 25 mg  25 mg Oral 2x Daily(Beta Blocker)    hydrALAzine (Apresoline) 20 mg/mL injection 10 mg  10 mg Intravenous Q4H PRN    fluticasone propionate (Flonase) 50 MCG/ACT nasal suspension 1 spray  1 spray Each Nare Daily    ipratropium-albuterol (Duoneb) 0.5-2.5 (3) MG/3ML inhalation solution 3 mL  3 mL Nebulization Q4H PRN    docusate sodium (Colace) cap 100 mg  100 mg Oral BID    polyethylene glycol (PEG 3350) (Miralax) 17 g oral packet 17 g  17 g Oral Daily    glucose (Dex4) 15 GM/59ML oral liquid 15 g  15 g Oral Q15 Min PRN    Or    glucose (Glutose) 40% oral gel 15 g  15 g Oral Q15 Min PRN    Or    glucose-vitamin C (Dex-4)  chewable tab 4 tablet  4 tablet Oral Q15 Min PRN    Or    dextrose 50% injection 50 mL  50 mL Intravenous Q15 Min PRN    Or    glucose (Dex4) 15 GM/59ML oral liquid 30 g  30 g Oral Q15 Min PRN    Or    glucose (Glutose) 40% oral gel 30 g  30 g Oral Q15 Min PRN    Or    glucose-vitamin C (Dex-4) chewable tab 8 tablet  8 tablet Oral Q15 Min PRN    insulin aspart (NovoLOG) 100 Units/mL FlexPen 1-5 Units  1-5 Units Subcutaneous TID AC and HS    acetaminophen (Tylenol) tab 650 mg  650 mg Oral Q4H PRN    Or    HYDROcodone-acetaminophen (Norco) 5-325 MG per tab 1 tablet  1 tablet Oral Q4H PRN    Or    HYDROcodone-acetaminophen (Norco) 5-325 MG per tab 2 tablet  2 tablet Oral Q4H PRN    ondansetron (Zofran) 4 MG/2ML injection 4 mg  4 mg Intravenous Q6H PRN    metoclopramide (Reglan) 5 mg/mL injection 10 mg  10 mg Intravenous Q8H PRN    HYDROmorphone (Dilaudid) 1 MG/ML injection 0.2 mg  0.2 mg Intravenous Q2H PRN    Or    HYDROmorphone (Dilaudid) 1 MG/ML injection 0.4 mg  0.4 mg Intravenous Q2H PRN       Past Medical History:    AAA (abdominal aortic aneurysm)    Abdominal aneurysm    Abdominal aortic aneurysm (AAA)    AAA surgery done    Anxiety state    Back pain    Naproxen twice a week    Back problem    minor    Calculus of kidney    Deep vein thrombosis (HCC)    to colon    Esophageal reflux    Hearing impairment    Lt ear tinnitus    High blood pressure    History of recent hospitalization    10/2019- was at [facility] x5 [facility] (ICU) with Dissected Aorta/ Post op colon problem/and post op cardiac arrest    Ileostomy present (HCC)    Peripheral vascular disease    Unspecified essential hypertension    Visual impairment    glasses       Past Surgical History:   Procedure Laterality Date    Colonoscopy  10/15/09  [facility]    Screening: small polypoid fold in sigmoid (no polyp tissue seen on path), int hemorrhoids; next colonoscopy in 2019    Other  02/2017    AAA repair    Other      10/23/19- Dissected Aorta surgery     Other surgical history      Eye surgery for lazy eye    Other surgical history  10/15/09  [facility]    EGD (heartburn): normal    Other surgical history      AAA repair    Other surgical history      multiple toe amputations    Part removal colon w end colostomy      10/2019       Family History  family history includes Diabetes in his father; Heart Disorder in his father and mother; Hypertension in his sister.    Social History   reports that he has been smoking cigars and cigarettes. He has never used smokeless tobacco. He reports current alcohol use. He reports that he does not use drugs.     Allergies  Allergies[1]    Review of Systems:  As per HPI, otherwise 10 point ROS is negative in detail.    Physical Exam:  Blood pressure 129/69, pulse 76, temperature 98.2 °F (36.8 °C), temperature source Oral, resp. rate 18, weight 199 lb 15.3 oz (90.7 kg), SpO2 95%.  Temp (24hrs), Av.9 °F (36.6 °C), Min:97.6 °F (36.4 °C), Max:98.2 °F (36.8 °C)    Wt Readings from Last 3 Encounters:   25 199 lb 15.3 oz (90.7 kg)   25 153 lb 3.5 oz (69.5 kg)   25 185 lb (83.9 kg)       General: Awake and alert; in no acute distress  HEENT: Extraocular movements are intact; sclerae are anicteric; scalp is atraumatic  Neck: Supple; no JVD; no carotid bruits  Cardiac: Regular, with frequent ectopic beats, tachy, normal S1 and S2, no murmurs, rubs, or gallops are appreciated  Lungs: Clear to auscultation bilaterally; no accessory muscle use is noted, no wheezes, rhonci or rales  Abdomen: Soft, non-distended, non-tender; bowel sounds are normoactive  Extremities: Warm, no edema, clubbing or cyanosis; moves all 4 extremities normally, distal pulses intact and equal  Psychiatric: Normal mood and affect; answers questions appropriately  Dermatologic: No rashes; normal skin turgor    Diagnostic testing:    Labs:   Lab Results   Component Value Date    INR 2.39 (H) 2025    INR 3.01 (H) 2025        Lab Results    Component Value Date    WBC 10.9 04/04/2025    HGB 8.7 04/04/2025    HCT 26.5 04/04/2025    .0 04/04/2025    CREATSERUM 1.25 04/04/2025    BUN 35 04/04/2025     04/04/2025    K 4.4 04/04/2025     04/04/2025    CO2 27.0 04/04/2025    GLU 93 04/04/2025    CA 8.4 04/04/2025    PTT 65.9 04/04/2025    INR 2.39 04/04/2025    PTP 26.2 04/04/2025    MG 2.0 04/04/2025    PGLU 163 04/04/2025       Cardiac diagnostics:    EKG 3/28/2025:   Sinus tachycardia with frequent Premature ventricular complexes   Nonspecific ST abnormality     Echo 3/25/25  1. Left ventricle: The cavity size was normal. Wall thickness was mildly      increased. Systolic function was vigorous. The estimated ejection      fraction was 65-70%.   2. Right ventricle: The cavity size was normal. Systolic function was      normal. Systolic pressure was moderately increased.   3. Aortic valve: Well visualized. No significant valve disease.      Transvalvular velocity was minimally increased. There was no significant      evidence for stenosis. The peak systolic velocity was 1.78m/sec. The mean      systolic gradient was 7mm Hg.   4. Pulmonary arteries: Systolic pressure was moderately increased. The peak      systolic pressure is 45mm Hg.   5. Pericardium, extracardiac: There was no pericardial effusion.       Impression:  Complex aortic history:   Aortic dissection - type A, s/p bioAVR, graft and hemiarch repair 10/2019, [facility]. Then underwent repair of abdominal aortic dissection. Suffered from mesenteric ischemia requiring subtotal colectomy with colostomy. Also subsequently required amputation of toes due to ischemia. Episodes deemed provoked by surgery. Subsequently underwent reversal of colostomy. Was discharged on anticoagulation. Embolic events - s/p amputation 4.5 toes on left, tip of big toe on R, 2/3 of his colon (post op aortic surgery 10/19), and 3 feet of small bowel (10/23). On coumadin.   H/o [facility] 2018    Current  issues  CTA 12/24 reviewed, \"New discontinuity of the left internal iliac stents with focal contrast extravasation suggestive of type III endoleak. Excluded aneurysm sac has increased to 4.3 cm compared to 3.2 cm on 10/2023 study. Slight increase in additional aneurysms/excluded aneurysm sacs involving the bilateral internal iliac arteries\"  -->  s/p 3/21/25 fenestrated endovascular arch repair with fenestrated thoracoabdominal aneurysm repair, left carotid to subclavian transposition with endovascular septotomy of dissection with Dr. Oquendo and Dr. Yanez.     Hypoxic resp failure  - PNA, ateletasis    Sinus tach / PSVT / frequent PACs, PVCs    HOLLIS - Cr 2, improving    Chronic issues  CAD - mild CAC on CT 12/24. No symptoms. Nuc stress 1/2025 normal   H/o hematuria - kidney stones - 12/4/2023   Renal artery stenosis s/p stent 12/2023  DVT - 1/2024 venous Doppler bilateral done for bilateral edema-no acute DVT. 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  -> on coumadin usually, thought to have hypercoag state  Heterozygous prothrombin gene mutation   PAF - per records transient episode post op 2019, no documented recurrence.   Smoking - not motivated to quit.   COPD  Thrombocytopenia - HIPA +; platelet improving, up to 115, today.    Recommendations:  Continue metoprolol 25mg BID  Continue argatroban. Coumadin on hold presently, resume once plt > 150k per heme.  COPD/PNA - Weaned off O2. S/p Abx   S/p steroids per pulm/crit care team  Post op care per vascular  PT    Will follow peripherally over the weekend. Please call with questions.     Thank you for allowing our practice to participate in the care of your patient. Please do not hesitate to contact me if you have any questions.    Wilbur Wells MD  Interventional Cardiology  [facility]  Office: [phone]       [1]   Allergies  Allergen Reactions    Amoxicillin ITCHING     Itchiness on hands and feet      Clindamycin OTHER (SEE COMMENTS)     Upset  stomach

## 2025-04-04 NOTE — PHYSICAL THERAPY NOTE
PHYSICAL THERAPY TREATMENT NOTE - INPATIENT    Room Number: 7613/7613-A     Session: 5   Patient remains appropriate for additional physical therapy treatment in order to progress towards goals as noted below.     Number of Visits to Meet Established Goals: 8    Presenting Problem: S/p 3/21 fenestrated abdominal aortic aneurysm repair with left carotid to subclavian transposition on  with Dr. Yanez and Dr. Oquendo and 3/20 lumbar drain  Co-Morbidities : HTN, Peripheral Neuropathy, Hx of toe amputation L great toe, mesenteric ischemia s/p SMA bypass with right external iliac artery to SMA bypass, Short gut syndrome    PHYSICAL THERAPY ASSESSMENT   Patient demonstrates fair progress this session, goals  remain in progress.      Patient is requiring minimal assist as a result of the following impairments: decreased functional strength, decreased endurance/aerobic capacity, impaired static and dynamic balance, impaired motor planning, decreased muscular endurance, and medical status.     Patient continues to function below baseline with bed mobility, transfers, and gait.  Next session anticipate patient to progress transfers, gait, and standing prolonged periods.  Physical Therapy will continue to follow patient for duration of hospitalization.    Patient continues to benefit from continued skilled PT services: to promote return to prior level of function and safety with continuous assistance and gradual rehabilitative therapy .    PLAN DURING HOSPITALIZATION  Nursing Mobility Recommendation : 1 Assist  PT Device Recommendation: Rolling walker  PT Treatment Plan: Bed mobility, Body mechanics, Gait training, Strengthening, Transfer training, Stair training, Balance training  Frequency (Obs): 3-5x/week     CURRENT GOALS   Goal #1 Patient is able to demonstrate supine - sit EOB @ level: CGA assistance   MET Raquel 4/1   Goal #2 Patient is able to demonstrate transfers EOB to/from BSC at assistance level: CGA assistance    MET Raquel    Goal #3 Patient is able to sit at the EOB for 10 mins with CGA.       Goal #4 Patient is able to ambulate 50 feet with assist device: walker - rolling at assistance level: moderate assistance   Goal #5     Goal #6     Goal Comments: Goals established on 3/22/2025  2025 all goals ongoing    SUBJECTIVE  \"I'm pooped\"    OBJECTIVE  Precautions: Bed/chair alarm (surgical boot L foot? SBP )    WEIGHT BEARING RESTRICTION  R Lower Extremity: Full Weight Bearing  L Lower Extremity: Full Weight Bearing    PAIN ASSESSMENT   Ratin  Location: R shoulder  Management Techniques: Body mechanics    BALANCE                                                                                                                       Static Sitting: Fair  Dynamic Sitting: Fair           Static Standing: Poor +  Dynamic Standing: Poor    ACTIVITY TOLERANCE  Pulse: 84  Heart Rate Source: Monitor                   O2 WALK  Oxygen Therapy  SPO2% on Room Air at Rest: 95  SPO2% Ambulation on Room Air: 93    AM-PAC '6-Clicks' INPATIENT SHORT FORM - BASIC MOBILITY  How much difficulty does the patient currently have...  Patient Difficulty: Turning over in bed (including adjusting bedclothes, sheets and blankets)?: A Little   Patient Difficulty: Sitting down on and standing up from a chair with arms (e.g., wheelchair, bedside commode, etc.): A Lot   Patient Difficulty: Moving from lying on back to sitting on the side of the bed?: A Little   How much help from another person does the patient currently need...   Help from Another: Moving to and from a bed to a chair (including a wheelchair)?: A Little   Help from Another: Need to walk in hospital room?: A Little   Help from Another: Climbing 3-5 steps with a railing?: A Lot     AM-PAC Score:  Raw Score: 16   Approx Degree of Impairment: 54.16%   Standardized Score (AM-PAC Scale): 40.78   CMS Modifier (G-Code): CK    FUNCTIONAL ABILITY STATUS  Gait Assessment   Functional  Mobility/Gait Assessment  Gait Assistance: Minimum assistance  Distance (ft): 2, 3, 3  Assistive Device: Rolling walker  Pattern: Shuffle (dec step/stride length)    Skilled Therapy Provided    Bed Mobility:  Rolling: NT   Supine<>Sit: supervision; HOB elevated   Sit<>Supine: NT     Transfer Mobility:  Sit<>Stand: min-modA   Stand<>Sit: Raquel   Gait: Raquel    Therapist's Comments: RN cleared for session. Pt agreeable for therapy, received supine. Pt req extensive encouragement, reassurance, and education throughout session. Increased anxiety regarding transfers and mobility. Frequent and lengthy rest breaks needed - poor activity tolerance. Pt cued on sequencing of transfers with supine<>sit, sit<>stand, as well as sequencing with RW for gait. Transferred to/from chair and commode. Instructed to call for nursing staff for any needs and OOB mobility.       Patient End of Session: Up in chair, Needs met, Call light within reach, RN aware of session/findings, All patient questions and concerns addressed, Hospital anti-slip socks, Alarm set    PT Session Time: 45 minutes  Gait Training: 15 minutes  Therapeutic Activity: 25 minutes

## 2025-04-05 LAB
ANION GAP SERPL CALC-SCNC: 6 MMOL/L (ref 0–18)
APTT PPP: 57.9 SECONDS (ref 23–36)
BASOPHILS # BLD AUTO: 0.01 X10(3) UL (ref 0–0.2)
BASOPHILS NFR BLD AUTO: 0.1 %
BUN BLD-MCNC: 32 MG/DL (ref 9–23)
CALCIUM BLD-MCNC: 8.4 MG/DL (ref 8.7–10.6)
CHLORIDE SERPL-SCNC: 106 MMOL/L (ref 98–112)
CO2 SERPL-SCNC: 27 MMOL/L (ref 21–32)
CREAT BLD-MCNC: 1.29 MG/DL
EGFRCR SERPLBLD CKD-EPI 2021: 59 ML/MIN/1.73M2 (ref 60–?)
EOSINOPHIL # BLD AUTO: 0.06 X10(3) UL (ref 0–0.7)
EOSINOPHIL NFR BLD AUTO: 0.6 %
ERYTHROCYTE [DISTWIDTH] IN BLOOD BY AUTOMATED COUNT: 16.5 %
GLUCOSE BLD-MCNC: 100 MG/DL (ref 70–99)
GLUCOSE BLD-MCNC: 105 MG/DL (ref 70–99)
GLUCOSE BLD-MCNC: 109 MG/DL (ref 70–99)
GLUCOSE BLD-MCNC: 168 MG/DL (ref 70–99)
HCT VFR BLD AUTO: 26.1 %
HGB BLD-MCNC: 8.6 G/DL
IMM GRANULOCYTES # BLD AUTO: 0.08 X10(3) UL (ref 0–1)
IMM GRANULOCYTES NFR BLD: 0.8 %
INR BLD: 2.12 (ref 0.8–1.2)
LYMPHOCYTES # BLD AUTO: 0.75 X10(3) UL (ref 1–4)
LYMPHOCYTES NFR BLD AUTO: 7.6 %
MAGNESIUM SERPL-MCNC: 1.9 MG/DL (ref 1.6–2.6)
MCH RBC QN AUTO: 28.5 PG (ref 26–34)
MCHC RBC AUTO-ENTMCNC: 33 G/DL (ref 31–37)
MCV RBC AUTO: 86.4 FL
MONOCYTES # BLD AUTO: 1.15 X10(3) UL (ref 0.1–1)
MONOCYTES NFR BLD AUTO: 11.7 %
NEUTROPHILS # BLD AUTO: 7.79 X10 (3) UL (ref 1.5–7.7)
NEUTROPHILS # BLD AUTO: 7.79 X10(3) UL (ref 1.5–7.7)
NEUTROPHILS NFR BLD AUTO: 79.2 %
OSMOLALITY SERPL CALC.SUM OF ELEC: 295 MOSM/KG (ref 275–295)
PLATELET # BLD AUTO: 115 10(3)UL (ref 150–450)
POTASSIUM SERPL-SCNC: 4.2 MMOL/L (ref 3.5–5.1)
PROTHROMBIN TIME: 23.9 SECONDS (ref 11.6–14.8)
RBC # BLD AUTO: 3.02 X10(6)UL
SODIUM SERPL-SCNC: 139 MMOL/L (ref 136–145)
WBC # BLD AUTO: 9.8 X10(3) UL (ref 4–11)

## 2025-04-05 NOTE — PLAN OF CARE
Assumed care at 1930  Alert and oriented x4   SA on tele and on RA  Denies any pain/discomfort  Neurovascular check, see flowsheets   Voiding via external  Argatroban gtt infusing per order  Neck & back incisions intact, see flowsheets   Up w/ 2 & pivot   Call light w/in reach   Report given to Bk POPE at 2100

## 2025-04-05 NOTE — PROGRESS NOTES
[facility] Dosing Service  Warfarin (Coumadin) Subsequent Dosing    Cy Monsalve is a 69 year old patient for whom pharmacy is dosing warfarin (Coumadin). Goal INR is 2-3.    Recent Labs   Lab 04/01/25  0546 04/02/25  0555 04/03/25  0525 04/04/25  0557 04/05/25  0536   INR 1.62* 3.28* 3.01* 2.39* 2.12*       Consulted by:  Dr. Oquendo  Indication:  Hypercoagulable condition    Potential Drug Interactions:  Argatroban - increases the INR lab measurement --- an INR of 4 on concomitant warfarin therapy is approximately equivalent to INR 2 to 2.5 on warfarin alone  Other Anticoagulants:  Argatroban for HIT  Home regimen:  6 mg Fri and 4 mg ROW    Inpatient Dosing History:     Date 3/31 4/1 4/2 4/3 4/4   4/5    INR 1.34 1.62 3.28 3.01 2.39   2.12   Coumadin dose 7.5 mg held held held   held         Based on above -  1.  Warfarin is on hold until the platelet count is >150K x 10^3, Hold the warfarin dose for tonight.  2   PT/INR ordered daily while on warfarin  3.  Pharmacy will continue to follow.  We appreciate the opportunity to assist in the care of this patient.    Neelima Way, PharmD  4/5/2025  3:11 PM

## 2025-04-05 NOTE — PROGRESS NOTES
[facility] Hospitalist Progress Note     CC: Hospital Follow up    PCP: Kam Alberts MD     Subjective:     No acute events. Denies new complaints, pain controlled. More willing to work with therapy after discussion today. Family at bedside.     OBJECTIVE:    Blood pressure 140/61, pulse 68, temperature 97.8 °F (36.6 °C), temperature source Oral, resp. rate 18, weight 199 lb 15.3 oz (90.7 kg), SpO2 98%.    Temp:  [97.8 °F (36.6 °C)-98.5 °F (36.9 °C)] 97.8 °F (36.6 °C)  Pulse:  [57-75] 68  Resp:  [18] 18  BP: (128-151)/(61-82) 140/61  SpO2:  [95 %-98 %] 98 %      Intake/Output:    Intake/Output Summary (Last 24 hours) at 4/5/2025 1246  Last data filed at 4/5/2025 0900  Gross per 24 hour   Intake 960 ml   Output 1300 ml   Net -340 ml       Last 3 Weights   03/31/25 0300 199 lb 15.3 oz (90.7 kg)   03/30/25 0200 197 lb 5 oz (89.5 kg)   03/27/25 0600 193 lb 12.6 oz (87.9 kg)   03/26/25 0400 193 lb 12.6 oz (87.9 kg)   03/25/25 0600 200 lb 2.8 oz (90.8 kg)   03/24/25 0900 200 lb 3.2 oz (90.8 kg)   03/24/25 0500 203 lb 0.7 oz (92.1 kg)   03/23/25 0516 204 lb 2.3 oz (92.6 kg)   03/22/25 0637 153 lb 3.5 oz (69.5 kg)   03/20/25 1231 192 lb 15.9 oz (87.5 kg)   03/19/25 1444 193 lb (87.5 kg)   03/22/25 1113 153 lb 3.5 oz (69.5 kg)   03/07/25 1536 185 lb (83.9 kg)       Exam   Gen: No acute distress, alert and oriented x3, no focal neurologic deficits, right-sided Catron, arterial line and lumbar drain noted  Heent: NC AT, mucous memb clear, neck supple  Pulm: Lungs clear, normal respiratory effort  CV: Heart with regular rate and rhythm, no peripheral edema  Abd: Abdomen soft, nontender, nondistended, bowel sounds present  MSK:expected rom, no hematoma appreciated bilaterally, 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, 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, strentgh 4/5 on R, pulses intact  Skin: no rashes or lesions  Neuro: AO*3, motor intact, no sensory deficits  Psyc: appropriate mood and affect      Data Review:       Labs:      Recent Labs   Lab 04/03/25  0525 04/04/25  0556 04/05/25  0536   RBC 3.10* 3.09* 3.02*   HGB 8.9* 8.7* 8.6*   HCT 26.3* 26.5* 26.1*   MCV 84.8 85.8 86.4   MCH 28.7 28.2 28.5   MCHC 33.8 32.8 33.0   RDW 16.4 16.5 16.5   NEPRELIM 10.49* 9.00* 7.79*   WBC 12.4* 10.9 9.8   PLT 88.0* 115.0* 115.0*         Recent Labs   Lab 04/03/25  0525 04/04/25  0557 04/05/25  0536   GLU 97 93 100*   BUN 41* 35* 32*   CREATSERUM 1.43* 1.25 1.29   EGFRCR 52* 62 59*   CA 8.2* 8.4* 8.4*    139 139   K 4.5 4.4 4.2    105 106   CO2 29.0 27.0 27.0       Recent Labs   Lab 03/30/25  0417 03/31/25  0511 04/01/25  0824   ALT 77* 68* 50*   AST 30 19 13   ALB 3.2 3.2 3.4         Imaging:  CTA CHEST CTA ABDOMEN CTA PELVIS (CPT=71275/54449)    Result Date: 4/3/2025  CONCLUSION:  1. When compared to most recent CT of the chest, abdomen, and pelvis performed 3/27/2025, decreased size in left pleural effusion with improvement of bilateral parenchymal opacities.  Continued follow-up recommended to ensure resolution as clinically indicated. 2. Postsurgical changes of a stent graft placement involving the thoracic and abdominal aorta extending into branches as noted on prior CT and as detailed above.  No extravasation of contrast. 3. There is decreased enhancement involving the left superior renal cortex suggesting ischemia, correlate clinically. 4. Improvement of subcutaneous edema.  Removal of Arzola catheter. 5. Continued clinical correlation recommended.   LOCATION:  [facility]   Dictated by (CST): Anju Jaimes MD on 4/03/2025 at 7:55 PM     Finalized by (CST): Anju Jaimes MD on 4/03/2025 at 8:17 PM          Meds:      multivitamin  1 tablet Oral Daily    amLODIPine  10 mg Oral Daily    metoprolol tartrate  25 mg Oral 2x Daily(Beta Blocker)    fluticasone propionate  1 spray Each Nare Daily    docusate sodium  100 mg Oral BID    polyethylene glycol (PEG 3350)  17 g Oral Daily    insulin aspart  1-5 Units Subcutaneous TID AC and HS       argatroban 0.25 mcg/kg/min (04/04/25 2101)       peppermint oil    hydrALAzine    ipratropium-albuterol    glucose **OR** glucose **OR** glucose-vitamin C **OR** dextrose **OR** glucose **OR** glucose **OR** glucose-vitamin C    acetaminophen **OR** HYDROcodone-acetaminophen **OR** HYDROcodone-acetaminophen    ondansetron    metoclopramide    HYDROmorphone **OR** HYDROmorphone **OR** [DISCONTINUED] HYDROmorphone         Assessment/Plan:     Active Problems:    Pre-op testing    Thoracoabdominal aortic aneurysm (TAAA)    Bilateral leg weakness      69 year old male with a past medical history of hypertension, GERD, anxiety, renal calculi, DVT, ascending aortic dissection s/p repair, 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  Postoperative course complicated by HOLLIS on CKD as well as right lower extremity weakness along with hypoxic respiratory failure.      History of prior infrarenal endovascular AAA repair with bilateral KYLER for hypogastric and common iliac aneurysms  History of mesenteric ischemia s/p SMA bypass with right external iliac artery to SMA bypass  History of aortic dissection s/p repair, aortic dissection distal to left subclavian  S/p complex thoraco abdominal aortic repair 3/21  Hx of DVT   -Complicated aortic pathology with multiple repairs in the past. 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   - HIT neg 3/23, positive on 3/31  - hematology consulted, heparin stopped, argatroban started,   - SUNDEEP pending  - warfarin and argatroban per hematology /  pharmacy to dose    - INR technically remains subtherapeutic     Acute hypoxemic respiratory failure w/ threat to bodily function 2/2 possible pneumonia versus edema  Leukocytosis  -Completed 3 days of azithro (3/27-3/29), janna (3/24-3/28), now switched to cefepime per ICU (3/28-3/30)  - tx w/ iv steroids x 5 days, iv steroids completed, completed abx as above   - wean o2 as tolerated     Leukocytosis  - no new infectious complaints, likely 2/2 steroids  - trend     Right lower extremity weakness-improving  - In the setting of complex aortic repair 3/21  - Seen by neurointensivist, recommendations reviewed  - Improved overall  -- PT/OT     HOLLIS on CKD-improving  -Creatinine reviewed today - 1.34 (baseline 1.2)  -Continue to monitor with serial RFP's  -Avoid nephrotoxic agents  - Cr bumped post operatively - given prolonged surgery and complicated repair  - improving      Hypertension  -hold home HCTZ     Allergic rhinitis  -Continue home fluticasone as needed     Short gut syndrome  - resume lamotil if diarrhea resumes    Normocytic anemia  - stable, trend     H/o dvt  - see above       Supplementary Documentation:   DVT Mechanical Prophylaxis:   SCDs, Early ambuation  DVT Pharmacologic Prophylaxis   Medication    argatroban 50 mg/50mL infusion premix                Code Status: DNAR/Full Treatment  Arzola: External urinary catheter in place  Arzola Duration (in days):   Central line:            Dispo: inpatient - on argatroban until therapeutic on coumadin - dc to [facility] once cleared by heme and vascular     Abdon Castro, DO [facility] Hospitalist

## 2025-04-05 NOTE — PROGRESS NOTES
Vascular Surgery Progress Note    /71   Pulse 73   Temp 98 °F (36.7 °C) (Oral)   Resp 18   Wt 199 lb 15.3 oz (90.7 kg)   SpO2 95%   BMI 23.71 kg/m²     Recent Labs   Lab 04/03/25  0525 04/04/25  0556 04/05/25  0536   RBC 3.10* 3.09* 3.02*   HGB 8.9* 8.7* 8.6*   HCT 26.3* 26.5* 26.1*   MCV 84.8 85.8 86.4   MCH 28.7 28.2 28.5   MCHC 33.8 32.8 33.0   RDW 16.4 16.5 16.5   NEPRELIM 10.49* 9.00* 7.79*   WBC 12.4* 10.9 9.8   PLT 88.0* 115.0* 115.0*       Recent Labs   Lab 04/03/25  0525 04/04/25  0557 04/05/25  0536   GLU 97 93 100*   BUN 41* 35* 32*   CREATSERUM 1.43* 1.25 1.29   CA 8.2* 8.4* 8.4*    139 139   K 4.5 4.4 4.2    105 106   CO2 29.0 27.0 27.0       Patient seen and examined.  No issues overnight.  Vitals and labs stable.  He is off oxygen.  His neck incision is flat with no hematoma.  He is neurovascularly intact.  I informed him that he has recovered very well after a very complicated operation.  I have emphasized the importance of him to get out of bed and ambulate.  Will assess his progress.  He may benefit from inpatient rehab evaluation.  Disposition on Monday for home vs. Inpatient rehab after PMR evaluation.     David Yanez MD  [facility]  Vascular Surgery

## 2025-04-05 NOTE — PROGRESS NOTES
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    Goal PTT is 46-93     PTT   Date Value Ref Range Status   04/05/2025 57.9 (H) 23.0 - 36.0 seconds Final     Comment:       The aPTT Heparin Therapeutic Range is approximately 65- 104 seconds. The therapeutic range has been validated against 0.3-0.7 heparin anti-Xa units/mL.     Elevations of the aPTT in patients not receiving anticoagulant therapy (Heparin, etc.), may be seen in Factor deficiency, vitamin K deficiency, factor inhibitors, liver disease, etc.   Clinical correlation is recommended.            INR   Date Value Ref Range Status   04/05/2025 2.12 (H) 0.80 - 1.20 Final     Comment:     Therapeutic INR range for patients on warfarin:  2.0-3.0 for most medical conditions and surgical prophylaxis   2.5-3.5 for mechanical heart valves and recurrent thromboembolism    Direct thrombin inhibitors (e.g. argatroban, bivalirudin), factor Xa inhibitors (e.g. apixaban, rivaroxaban), and conditions such as coagulation factor deficiency, vitamin K deficiency, and liver disease will prolong the prothrombin time/INR.    Unfractionated heparin and low molecular weight heparin do not affect the prothrombin time/INR up to a concentration of 1 IU/mL and 1.5 IU/mL, respectively.         Heparin Induced Plt Antibody   Date Value Ref Range Status   03/31/2025 Reactive- Confirmed (A) Negative Final       Current dose is 0.25 mcg/kg/min.    Based on PTT, continue current dose.     Will recheck PTT with AM labs and adjust based on result for goal PTT of 46-93.     We will continue to follow and appreciate the opportunity to assist in the care of this patient.    Thank you,  Neelima Way, PharmD  4/5/2025 3:07 PM

## 2025-04-05 NOTE — PLAN OF CARE
Assumed patient care at 0730  Neurovascular checks intact  Room air  Argatroban gtt infusing per order  Good PO intake  Up to chair with Sera steady  Plan of care discussed with patient, family, and physicians.

## 2025-04-05 NOTE — DISCHARGE INSTRUCTIONS
You may shower with soap and water starting on 04/06/2025 .  No soaking in bath or pool for 2 weeks.    You should resume all home medications as previously.    Take tylenol as needed for pain.    No heavy lifting or straining for 2 weeks. You may then resume to normal activity.    Drink plenty of fluids to stay well hydrated.     You will need to followup with Dr. Oquendo in the vascular clinic in 2 weeks.    If you have any fevers, chills, chest pain, shortness of breath, drainage, swelling or bleeding, please call the office in order to return to clinic sooner for evaluation.       Outpatient psychiatry and therapy referrals placed in patient's discharge instructions.     Below is a list of outpatient psychiatrists that also have a specialization in working with older adult populations.     [facility]  [address]  [place] [address]  [phone]     [facility]  [address]  [place], [place] [address] [phone]     [facility]   [address], [address]  [place], [place] [address] [phone]     [facility]  [address], [address]  [place], [place] [address] [phone]     [facility]  [address], [place] [address] [phone]     Dr. Samuel Burgess, DO  [facility]  [address], [address]  [place] [address]  [phone]     Dr. Aurelio Cortes MD  [facility]  [address], [address]  [place], [place] [address] [phone]

## 2025-04-05 NOTE — PLAN OF CARE
Assumed patient care approximately 2130. Patient is alert and oriented X4. SpO2 maintained on room air with saturation maintaining greater than 89%.. SA on tele, heart rate controlled. Incontinent of bladder and bowel. Dressings in place. Pain managed with PRN medications. Ambulates with x2 with sarasteady. Education provided on call light, medications, and plan of care, patient verbalize understanding.      Plan of care: Tele, SpO2, wound care, QID, pain management       Problem: CARDIOVASCULAR - ADULT  Goal: Maintains optimal cardiac output and hemodynamic stability  Description: INTERVENTIONS:- Monitor vital signs, rhythm, and trends- Monitor for bleeding, hypotension and signs of decreased cardiac output- Evaluate effectiveness of vasoactive medications to optimize hemodynamic stability- Monitor arterial and/or venous puncture sites for bleeding and/or hematoma- Assess quality of pulses, skin color and temperature- Assess for signs of decreased coronary artery perfusion - ex. Angina- Evaluate fluid balance, assess for edema, trend weights  4/5/2025 0346 by Bk Andujar RN  Outcome: Progressing  4/5/2025 0346 by Bk Andujar RN  Outcome: Progressing     Problem: HEMATOLOGIC - ADULT  Goal: Free from bleeding injury  Description: (Example usage: patient with low platelets)INTERVENTIONS:- Avoid intramuscular injections, enemas and rectal medication administration- Ensure safe mobilization of patient- Hold pressure on venipuncture sites to achieve adequate hemostasis- Assess for signs and symptoms of internal bleeding- Monitor lab trends- Patient is to report abnormal signs of bleeding to staff- Avoid use of toothpicks and dental floss- Use electric shaver for shaving- Use soft bristle tooth brush- Limit straining and forceful nose blowing  4/5/2025 0346 by Bk Andujar RN  Outcome: Progressing  4/5/2025 0346 by Bk Andujar RN  Outcome: Progressing     Problem: RESPIRATORY - ADULT  Goal: Achieves optimal  ventilation and oxygenation  Description: INTERVENTIONS:- Assess for changes in respiratory status- Assess for changes in mentation and behavior- Position to facilitate oxygenation and minimize respiratory effort- Oxygen supplementation based on oxygen saturation or ABGs- Provide Smoking Cessation handout, if applicable- Encourage broncho-pulmonary hygiene including cough, deep breathe, Incentive Spirometry- Assess the need for suctioning and perform as needed- Assess and instruct to report SOB or any respiratory difficulty- Respiratory Therapy support as indicated- Manage/alleviate anxiety- Monitor for signs/symptoms of CO2 retention  4/5/2025 0346 by Bk Andujar RN  Outcome: Progressing  4/5/2025 0346 by Bk Andujar RN  Outcome: Progressing     Problem: NEUROLOGICAL - ADULT  Goal: Achieves stable or improved neurological status  Description: INTERVENTIONS- Assess for and report changes in neurological status- Initiate measures to prevent increased intracranial pressure- Maintain blood pressure and fluid volume within ordered parameters to optimize cerebral perfusion and minimize risk of hemorrhage- Monitor temperature, glucose, and sodium. Initiate appropriate interventions as ordered  4/5/2025 0346 by Bk Andujar RN  Outcome: Progressing  4/5/2025 0346 by Bk Andujar RN  Outcome: Progressing  Goal: Achieves maximal functionality and self care  Description: INTERVENTIONS- Monitor swallowing and airway patency with patient fatigue and changes in neurological status- Encourage and assist patient to increase activity and self care with guidance from PT/OT- Encourage visually impaired, hearing impaired and aphasic patients to use assistive/communication devices  4/5/2025 0346 by Bk Andujar RN  Outcome: Progressing  4/5/2025 0346 by Bk Andujar RN  Outcome: Progressing     Problem: PAIN - ADULT  Goal: Verbalizes/displays adequate comfort level or patient's stated pain goal  Description:  INTERVENTIONS:- Encourage pt to monitor pain and request assistance- Assess pain using appropriate pain scale- Administer analgesics based on type and severity of pain and evaluate response- Implement non-pharmacological measures as appropriate and evaluate response- Consider cultural and social influences on pain and pain management- Manage/alleviate anxiety- Utilize distraction and/or relaxation techniques- Monitor for opioid side effects- Notify MD/LIP if interventions unsuccessful or patient reports new pain- Anticipate increased pain with activity and pre-medicate as appropriate  4/5/2025 0346 by Bk Andujar RN  Outcome: Progressing  4/5/2025 0346 by Bk Andujar RN  Outcome: Progressing     Problem: Diabetes/Glucose Control  Goal: Glucose maintained within prescribed range  Description: INTERVENTIONS:- Monitor Blood Glucose as ordered- Assess for signs and symptoms of hyperglycemia and hypoglycemia- Administer ordered medications to maintain glucose within target range- Assess barriers to adequate nutritional intake and initiate nutrition consult as needed- Instruct patient on self management of diabetes  4/5/2025 0346 by Bk Andujar RN  Outcome: Progressing  4/5/2025 0346 by Bk Andujar RN  Outcome: Progressing     Problem: Patient/Family Goals  Goal: Patient/Family Long Term Goal  Description: Patient's Long Term Goal: discharge to recommended facility  Interventions:- monitor O2 status  -wean from vapotherm  -IV antibiotics  -IS  -consults  -PT/OT  -tele monitoring  -labs  -medicines  - See additional Care Plan goals for specific interventions  4/5/2025 0346 by Bk Andujar RN  Outcome: Progressing  4/5/2025 0346 by Bk Andujar RN  Outcome: Progressing  Goal: Patient/Family Short Term Goal  Description: Patient's Short Term Goal: Be comfortable   Interventions: -pain controlled  -cluster care  -needs attended   See additional Care Plan goals for specific interventions  4/5/2025 0346 by  Bk Andujar, RN  Outcome: Progressing  4/5/2025 0346 by Bk Andujar, RN  Outcome: Progressing

## 2025-04-06 LAB
ANION GAP SERPL CALC-SCNC: 7 MMOL/L (ref 0–18)
APTT PPP: 51.5 SECONDS (ref 23–36)
BASOPHILS # BLD AUTO: 0.01 X10(3) UL (ref 0–0.2)
BASOPHILS NFR BLD AUTO: 0.1 %
BUN BLD-MCNC: 29 MG/DL (ref 9–23)
CALCIUM BLD-MCNC: 8.6 MG/DL (ref 8.7–10.6)
CHLORIDE SERPL-SCNC: 106 MMOL/L (ref 98–112)
CO2 SERPL-SCNC: 26 MMOL/L (ref 21–32)
CREAT BLD-MCNC: 1.17 MG/DL
EGFRCR SERPLBLD CKD-EPI 2021: 67 ML/MIN/1.73M2 (ref 60–?)
EOSINOPHIL # BLD AUTO: 0.06 X10(3) UL (ref 0–0.7)
EOSINOPHIL NFR BLD AUTO: 0.7 %
ERYTHROCYTE [DISTWIDTH] IN BLOOD BY AUTOMATED COUNT: 16.6 %
GLUCOSE BLD-MCNC: 106 MG/DL (ref 70–99)
GLUCOSE BLD-MCNC: 130 MG/DL (ref 70–99)
GLUCOSE BLD-MCNC: 138 MG/DL (ref 70–99)
GLUCOSE BLD-MCNC: 93 MG/DL (ref 70–99)
GLUCOSE BLD-MCNC: 97 MG/DL (ref 70–99)
HCT VFR BLD AUTO: 24.9 %
HGB BLD-MCNC: 8.4 G/DL
IMM GRANULOCYTES # BLD AUTO: 0.05 X10(3) UL (ref 0–1)
IMM GRANULOCYTES NFR BLD: 0.6 %
INR BLD: 1.8 (ref 0.8–1.2)
LYMPHOCYTES # BLD AUTO: 0.75 X10(3) UL (ref 1–4)
LYMPHOCYTES NFR BLD AUTO: 8.9 %
MAGNESIUM SERPL-MCNC: 1.9 MG/DL (ref 1.6–2.6)
MCH RBC QN AUTO: 29.2 PG (ref 26–34)
MCHC RBC AUTO-ENTMCNC: 33.7 G/DL (ref 31–37)
MCV RBC AUTO: 86.5 FL
MONOCYTES # BLD AUTO: 1.03 X10(3) UL (ref 0.1–1)
MONOCYTES NFR BLD AUTO: 12.3 %
NEUTROPHILS # BLD AUTO: 6.5 X10 (3) UL (ref 1.5–7.7)
NEUTROPHILS # BLD AUTO: 6.5 X10(3) UL (ref 1.5–7.7)
NEUTROPHILS NFR BLD AUTO: 77.4 %
OSMOLALITY SERPL CALC.SUM OF ELEC: 294 MOSM/KG (ref 275–295)
PLATELET # BLD AUTO: 125 10(3)UL (ref 150–450)
POTASSIUM SERPL-SCNC: 4.4 MMOL/L (ref 3.5–5.1)
PROTHROMBIN TIME: 21.1 SECONDS (ref 11.6–14.8)
RBC # BLD AUTO: 2.88 X10(6)UL
SODIUM SERPL-SCNC: 139 MMOL/L (ref 136–145)
WBC # BLD AUTO: 8.4 X10(3) UL (ref 4–11)

## 2025-04-06 NOTE — PROGRESS NOTES
[facility] Dosing Service  Warfarin (Coumadin) Subsequent Dosing    Cy Monsalve is a 69 year old patient for whom pharmacy is dosing warfarin (Coumadin). Goal INR is 2-3    Recent Labs   Lab 04/02/25  0555 04/03/25  0525 04/04/25  0557 04/05/25  0536 04/06/25  0553   INR 3.28* 3.01* 2.39* 2.12* 1.80*       Consulted by:  Dr. Oquendo  Indication:  Hypercoagulable condition    Potential Drug Interactions:  Argatroban - increases the INR lab measurement --- an INR of 4 on concomitant warfarin therapy is approximately equivalent to INR 2 to 2.5 on warfarin alone  Other Anticoagulants:  Argatroban for HIT  Home regimen:  6 mg Fri and 4 mg ROW    Inpatient Dosing History:     Date 3/31 4/1 4/2 4/3 4/4   4/5  4/6   INR 1.34 1.62 3.28 3.01 2.39   2.12 1.80   Coumadin dose 7.5 mg held held held   held  held          Based on above -  1.  Warfarin is on hold until the platelet count is >150K x 10^3, Hold the warfarin dose for tonight.  2   PT/INR ordered daily while on warfarin  3.  Pharmacy will continue to follow.  We appreciate the opportunity to assist in the care of this patient.    Neelima Way, PharmD  4/6/2025  11:46 AM

## 2025-04-06 NOTE — PLAN OF CARE
Began taking care of patient this am.  VSS.  Patient reports he feels weak and has not been walking much.  Patient ambulated a short distance with this RN and other staff.  He was only able to tolerate a short distance.  Reached out to PT as asked by Dr. Yanez, PT states will see patient tomorrow.  Patient sat up in chair most of shift.  Skin is noted to have scattered bruising and skin tears.  Mepalex changed this shift.  Patient encouraged to do ROM exercises to increase strength.  Appetite fair.    Patient c/o generalized pain, and requested the need for 1 pain medication this shift.  A golf sized hematoma noted a few inches below right groin site.  Informed Dr. Yanez and Dr. Castro.    Dr. Yanez states he will evaluate it tomorrow.    Problem: HEMATOLOGIC - ADULT  Goal: Free from bleeding injury  Description: (Example usage: patient with low platelets)INTERVENTIONS:- Avoid intramuscular injections, enemas and rectal medication administration- Ensure safe mobilization of patient- Hold pressure on venipuncture sites to achieve adequate hemostasis- Assess for signs and symptoms of internal bleeding- Monitor lab trends- Patient is to report abnormal signs of bleeding to staff- Avoid use of toothpicks and dental floss- Use electric shaver for shaving- Use soft bristle tooth brush- Limit straining and forceful nose blowing  Outcome: Progressing     Problem: SAFETY ADULT - FALL  Goal: Free from fall injury  Description: INTERVENTIONS:- Assess pt frequently for physical needs- Identify cognitive and physical deficits and behaviors that affect risk of falls.- [place] fall precautions as indicated by assessment.- Educate pt/family on patient safety including physical limitations- Instruct pt to call for assistance with activity based on assessment- Modify environment to reduce risk of injury- Provide assistive devices as appropriate- Consider OT/PT consult to assist with strengthening/mobility- Encourage  toileting schedule  Outcome: Progressing

## 2025-04-06 NOTE — PLAN OF CARE
Assumed care at 1930  A&Ox4, RA, BP within range  Denies any pain  Neurovascular checks completed  2 BM and voiding with external cath  Mepilex on bottom and upper back changed. C/D/I  Up with Sera steady x2  Patient updated on POC, all questions answered.

## 2025-04-06 NOTE — PROGRESS NOTES
[facility] HEMATOLOGY ONCOLOGY  Progress Note    Cy Monsalve Patient Status:  Inpatient    1955 MRN [id number]   Location [facility] 7NE-A Attending Neil Oquendo MD   Hosp Day # 17 PCP Kam Alberts MD     ADMISSION DATE AND TIME: 3/19/2025  1:15 PM    ADMIT DIAGNOSIS: Abdominal aortic aneurysm (AAA) [I71.40]    SUBJECTIVE:    Feels ok      OBJECTIVE:  Blood pressure 139/67, pulse 71, temperature 98.1 °F (36.7 °C), temperature source Oral, resp. rate 18, weight 199 lb 15.3 oz (90.7 kg), SpO2 94%.    PHYSICAL EXAM:  General: afebrile, alert and oriented x 3, pleasant  No distress    LABS:  Recent Results (from the past 24 hours)   POCT Glucose    Collection Time: 25  4:04 PM   Result Value Ref Range    POC Glucose 168 (H) 70 - 99 mg/dL   POCT Glucose    Collection Time: 25  9:26 PM   Result Value Ref Range    POC Glucose 109 (H) 70 - 99 mg/dL   POCT Glucose    Collection Time: 25  5:08 AM   Result Value Ref Range    POC Glucose 106 (H) 70 - 99 mg/dL   CBC With Differential With Platelet    Collection Time: 25  5:53 AM   Result Value Ref Range    WBC 8.4 4.0 - 11.0 x10(3) uL    RBC 2.88 (L) 3.80 - 5.80 x10(6)uL    HGB 8.4 (L) 13.0 - 17.5 g/dL    HCT 24.9 (L) 39.0 - 53.0 %    .0 (L) 150.0 - 450.0 10(3)uL    MCV 86.5 80.0 - 100.0 fL    MCH 29.2 26.0 - 34.0 pg    MCHC 33.7 31.0 - 37.0 g/dL    RDW 16.6 %    Neutrophil Absolute Prelim 6.50 1.50 - 7.70 x10 (3) uL    Neutrophil Absolute 6.50 1.50 - 7.70 x10(3) uL    Lymphocyte Absolute 0.75 (L) 1.00 - 4.00 x10(3) uL    Monocyte Absolute 1.03 (H) 0.10 - 1.00 x10(3) uL    Eosinophil Absolute 0.06 0.00 - 0.70 x10(3) uL    Basophil Absolute 0.01 0.00 - 0.20 x10(3) uL    Immature Granulocyte Absolute 0.05 0.00 - 1.00 x10(3) uL    Neutrophil % 77.4 %    Lymphocyte % 8.9 %    Monocyte % 12.3 %    Eosinophil % 0.7 %    Basophil % 0.1 %    Immature Granulocyte % 0.6 %   Basic Metabolic Panel (8)    Collection  Time: 04/06/25  5:53 AM   Result Value Ref Range    Glucose 93 70 - 99 mg/dL    Sodium 139 136 - 145 mmol/L    Potassium 4.4 3.5 - 5.1 mmol/L    Chloride 106 98 - 112 mmol/L    CO2 26.0 21.0 - 32.0 mmol/L    Anion Gap 7 0 - 18 mmol/L    BUN 29 (H) 9 - 23 mg/dL    Creatinine 1.17 0.70 - 1.30 mg/dL    Calcium, Total 8.6 (L) 8.7 - 10.6 mg/dL    Calculated Osmolality 294 275 - 295 mOsm/kg    eGFR-Cr 67 >=60 mL/min/1.73m2   Magnesium    Collection Time: 04/06/25  5:53 AM   Result Value Ref Range    Magnesium 1.9 1.6 - 2.6 mg/dL   Prothrombin Time (PT)    Collection Time: 04/06/25  5:53 AM   Result Value Ref Range    PT 21.1 (H) 11.6 - 14.8 seconds    INR 1.80 (H) 0.80 - 1.20   PTT, Activated    Collection Time: 04/06/25  5:53 AM   Result Value Ref Range    PTT 51.5 (H) 23.0 - 36.0 seconds   POCT Glucose    Collection Time: 04/06/25 12:29 PM   Result Value Ref Range    POC Glucose 138 (H) 70 - 99 mg/dL     Platelet Count::    Lab Results   Component Value Date    .0 (L) 04/06/2025    .0 (L) 04/05/2025    .0 (L) 04/04/2025        CULTURES  Hospital Encounter on 03/19/25   1. Blood Culture     Status: None    Collection Time: 03/24/25 10:05 AM    Specimen: Blood,peripheral   Result Value Ref Range    Blood Culture Result No Growth 5 Days N/A       IMAGING:    No results found.      MEDICATIONS:  Medications reviewed.     multivitamin  1 tablet Oral Daily    amLODIPine  10 mg Oral Daily    metoprolol tartrate  25 mg Oral 2x Daily(Beta Blocker)    fluticasone propionate  1 spray Each Nare Daily    docusate sodium  100 mg Oral BID    polyethylene glycol (PEG 3350)  17 g Oral Daily    insulin aspart  1-5 Units Subcutaneous TID AC and HS      argatroban 0.25 mcg/kg/min (04/06/25 0743)       peppermint oil    hydrALAzine    ipratropium-albuterol    glucose **OR** glucose **OR** glucose-vitamin C **OR** dextrose **OR** glucose **OR** glucose **OR** glucose-vitamin C    acetaminophen **OR**  HYDROcodone-acetaminophen **OR** HYDROcodone-acetaminophen    ondansetron    metoclopramide    HYDROmorphone **OR** HYDROmorphone **OR** [DISCONTINUED] HYDROmorphone    ASSESSMENT AND PLAN:     Active Problems:    Pre-op testing    Thoracoabdominal aortic aneurysm (TAAA)    Bilateral leg weakness        Cy Monsalve is a 69 year old male with 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     Status post AAA repair with complicated postop course with hypoxia and HOLLIS.  Currently on the floor.     Thrombocytopenia  Platelets were normal until 3/22/2025  Thereafter they had declined into the 70-90 while on heparin  Heparin-induced platelet antibody was positive.    SUNDEEP pending  Currently on argatroban  This is good progress  We will continue argatroban with pharmacy dosing  Warfarin once platelets over 125 K

## 2025-04-06 NOTE — PROGRESS NOTES
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    Goal PTT is 46-93     PTT   Date Value Ref Range Status   04/06/2025 51.5 (H) 23.0 - 36.0 seconds Final     Comment:       The aPTT Heparin Therapeutic Range is approximately 65- 104 seconds. The therapeutic range has been validated against 0.3-0.7 heparin anti-Xa units/mL.     Elevations of the aPTT in patients not receiving anticoagulant therapy (Heparin, etc.), may be seen in Factor deficiency, vitamin K deficiency, factor inhibitors, liver disease, etc.   Clinical correlation is recommended.            INR   Date Value Ref Range Status   04/06/2025 1.80 (H) 0.80 - 1.20 Final     Comment:     Therapeutic INR range for patients on warfarin:  2.0-3.0 for most medical conditions and surgical prophylaxis   2.5-3.5 for mechanical heart valves and recurrent thromboembolism    Direct thrombin inhibitors (e.g. argatroban, bivalirudin), factor Xa inhibitors (e.g. apixaban, rivaroxaban), and conditions such as coagulation factor deficiency, vitamin K deficiency, and liver disease will prolong the prothrombin time/INR.    Unfractionated heparin and low molecular weight heparin do not affect the prothrombin time/INR up to a concentration of 1 IU/mL and 1.5 IU/mL, respectively.         Heparin Induced Plt Antibody   Date Value Ref Range Status   03/31/2025 Reactive- Confirmed (A) Negative Final       Current dose is 0.25 mcg/kg/min.    Based on PTT no dose change required.    Will recheck PTT with AM labs and adjust based on result for goal PTT of 46-93.     We will continue to follow and appreciate the opportunity to assist in the care of this patient.    Thank you,    Marivel Reynolds, PharmD  4/6/2025 6:46 AM

## 2025-04-06 NOTE — PROGRESS NOTES
Vascular Surgery Progress Note    /71 (BP Location: Radial)   Pulse 67   Temp 97.6 °F (36.4 °C) (Oral)   Resp 20   Wt 199 lb 15.3 oz (90.7 kg)   SpO2 98%   BMI 23.71 kg/m²     Recent Labs   Lab 04/04/25  0556 04/05/25  0536 04/06/25  0553   RBC 3.09* 3.02* 2.88*   HGB 8.7* 8.6* 8.4*   HCT 26.5* 26.1* 24.9*   MCV 85.8 86.4 86.5   MCH 28.2 28.5 29.2   MCHC 32.8 33.0 33.7   RDW 16.5 16.5 16.6   NEPRELIM 9.00* 7.79* 6.50   WBC 10.9 9.8 8.4   .0* 115.0* 125.0*       Recent Labs   Lab 04/04/25  0557 04/05/25  0536 04/06/25  0553   GLU 93 100* 93   BUN 35* 32* 29*   CREATSERUM 1.25 1.29 1.17   CA 8.4* 8.4* 8.6*    139 139   K 4.4 4.2 4.4    106 106   CO2 27.0 27.0 26.0       Patient seen and examined.  No complaints.  Tolerating p.o.  Was able to ambulate somewhat yesterday but overall motivation to ambulate and be active is suboptimal.  Vitals and labs stable.  On examination incision flat with no hematoma.  Neurologically intact.  I emphasized the paramount importance of increased ambulation.  Will have PT work with the patient today.  I instructed the nursing staff to ambulate the patient today.  Will place PM&R consultation as I do think he would be an excellent candidate for inpatient rehabilitation to build strength.  Plan for discharge to either inpatient rehab or home with home physical therapy tomorrow.    David Yanez MD  [facility]  Vascular Surgery

## 2025-04-07 LAB
ANION GAP SERPL CALC-SCNC: 7 MMOL/L (ref 0–18)
APTT PPP: 51.8 SECONDS (ref 23–36)
BASOPHILS # BLD AUTO: 0.01 X10(3) UL (ref 0–0.2)
BASOPHILS NFR BLD AUTO: 0.1 %
BUN BLD-MCNC: 29 MG/DL (ref 9–23)
CALCIUM BLD-MCNC: 8.7 MG/DL (ref 8.7–10.6)
CHLORIDE SERPL-SCNC: 106 MMOL/L (ref 98–112)
CO2 SERPL-SCNC: 26 MMOL/L (ref 21–32)
CREAT BLD-MCNC: 1.23 MG/DL
EGFRCR SERPLBLD CKD-EPI 2021: 63 ML/MIN/1.73M2 (ref 60–?)
EOSINOPHIL # BLD AUTO: 0.08 X10(3) UL (ref 0–0.7)
EOSINOPHIL NFR BLD AUTO: 1.1 %
ERYTHROCYTE [DISTWIDTH] IN BLOOD BY AUTOMATED COUNT: 16.8 %
GLUCOSE BLD-MCNC: 105 MG/DL (ref 70–99)
GLUCOSE BLD-MCNC: 111 MG/DL (ref 70–99)
GLUCOSE BLD-MCNC: 118 MG/DL (ref 70–99)
GLUCOSE BLD-MCNC: 91 MG/DL (ref 70–99)
GLUCOSE BLD-MCNC: 95 MG/DL (ref 70–99)
HCT VFR BLD AUTO: 24.7 %
HGB BLD-MCNC: 8.2 G/DL
IMM GRANULOCYTES # BLD AUTO: 0.04 X10(3) UL (ref 0–1)
IMM GRANULOCYTES NFR BLD: 0.6 %
INR BLD: 1.71 (ref 0.8–1.2)
LYMPHOCYTES # BLD AUTO: 0.86 X10(3) UL (ref 1–4)
LYMPHOCYTES NFR BLD AUTO: 12.1 %
MAGNESIUM SERPL-MCNC: 1.9 MG/DL (ref 1.6–2.6)
MCH RBC QN AUTO: 28.9 PG (ref 26–34)
MCHC RBC AUTO-ENTMCNC: 33.2 G/DL (ref 31–37)
MCV RBC AUTO: 87 FL
MONOCYTES # BLD AUTO: 0.94 X10(3) UL (ref 0.1–1)
MONOCYTES NFR BLD AUTO: 13.3 %
NEUTROPHILS # BLD AUTO: 5.15 X10 (3) UL (ref 1.5–7.7)
NEUTROPHILS # BLD AUTO: 5.15 X10(3) UL (ref 1.5–7.7)
NEUTROPHILS NFR BLD AUTO: 72.8 %
OSMOLALITY SERPL CALC.SUM OF ELEC: 293 MOSM/KG (ref 275–295)
PLATELET # BLD AUTO: 127 10(3)UL (ref 150–450)
POTASSIUM SERPL-SCNC: 4.2 MMOL/L (ref 3.5–5.1)
PROTHROMBIN TIME: 20.2 SECONDS (ref 11.6–14.8)
RBC # BLD AUTO: 2.84 X10(6)UL
SODIUM SERPL-SCNC: 139 MMOL/L (ref 136–145)
WBC # BLD AUTO: 7.1 X10(3) UL (ref 4–11)

## 2025-04-07 RX ORDER — WARFARIN SODIUM 7.5 MG/1
7.5 TABLET ORAL
Status: COMPLETED | OUTPATIENT
Start: 2025-04-07 | End: 2025-04-07

## 2025-04-07 RX ORDER — DIPHENOXYLATE HYDROCHLORIDE AND ATROPINE SULFATE 2.5; .025 MG/1; MG/1
1 TABLET ORAL 4 TIMES DAILY PRN
Status: DISCONTINUED | OUTPATIENT
Start: 2025-04-07 | End: 2025-04-07 | Stop reason: DRUGHIGH

## 2025-04-07 RX ORDER — DIPHENOXYLATE HYDROCHLORIDE AND ATROPINE SULFATE 2.5; .025 MG/1; MG/1
2 TABLET ORAL 4 TIMES DAILY PRN
Status: DISCONTINUED | OUTPATIENT
Start: 2025-04-07 | End: 2025-04-18

## 2025-04-07 NOTE — PROGRESS NOTES
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    Goal PTT is 46-93     PTT   Date Value Ref Range Status   04/07/2025 51.8 (H) 23.0 - 36.0 seconds Final     Comment:       The aPTT Heparin Therapeutic Range is approximately 65- 104 seconds. The therapeutic range has been validated against 0.3-0.7 heparin anti-Xa units/mL.     Elevations of the aPTT in patients not receiving anticoagulant therapy (Heparin, etc.), may be seen in Factor deficiency, vitamin K deficiency, factor inhibitors, liver disease, etc.   Clinical correlation is recommended.            INR   Date Value Ref Range Status   04/07/2025 1.71 (H) 0.80 - 1.20 Final     Comment:     Therapeutic INR range for patients on warfarin:  2.0-3.0 for most medical conditions and surgical prophylaxis   2.5-3.5 for mechanical heart valves and recurrent thromboembolism    Direct thrombin inhibitors (e.g. argatroban, bivalirudin), factor Xa inhibitors (e.g. apixaban, rivaroxaban), and conditions such as coagulation factor deficiency, vitamin K deficiency, and liver disease will prolong the prothrombin time/INR.    Unfractionated heparin and low molecular weight heparin do not affect the prothrombin time/INR up to a concentration of 1 IU/mL and 1.5 IU/mL, respectively.         Heparin Induced Plt Antibody   Date Value Ref Range Status   03/31/2025 Reactive- Confirmed (A) Negative Final       Current dose is 0.25 mcg/kg/min    Based on PTT no dose change required     Will recheck PTT with AM labs 4/8 and adjust based on result for goal PTT of 46-93.     We will continue to follow and appreciate the opportunity to assist in the care of this patient.    Thank you,    Jessica Danielle, PharmD  4/7/2025 9:41 AM

## 2025-04-07 NOTE — PROGRESS NOTES
[facility] HEMATOLOGY ONCOLOGY  Progress Note    Cy Monsalve Patient Status:  Inpatient    1955 MRN [id number]   Location [facility] 7NE-A Attending Neil Oquendo MD   Hosp Day # 18 PCP Kam Alberts MD     ADMISSION DATE AND TIME: 3/19/2025  1:15 PM    ADMIT DIAGNOSIS: Abdominal aortic aneurysm (AAA) [I71.40]    SUBJECTIVE:    No events.       OBJECTIVE:  Blood pressure 150/73, pulse 76, temperature 98 °F (36.7 °C), temperature source Oral, resp. rate 19, weight 199 lb 15.3 oz (90.7 kg), SpO2 95%.    PHYSICAL EXAM:  General: afebrile, alert and oriented x 3, pleasant  No distress    LABS:  Recent Results (from the past 24 hours)   POCT Glucose    Collection Time: 25  5:53 PM   Result Value Ref Range    POC Glucose 97 70 - 99 mg/dL   POCT Glucose    Collection Time: 25  9:00 PM   Result Value Ref Range    POC Glucose 130 (H) 70 - 99 mg/dL   POCT Glucose    Collection Time: 25  5:18 AM   Result Value Ref Range    POC Glucose 105 (H) 70 - 99 mg/dL   CBC With Differential With Platelet    Collection Time: 25  6:07 AM   Result Value Ref Range    WBC 7.1 4.0 - 11.0 x10(3) uL    RBC 2.84 (L) 3.80 - 5.80 x10(6)uL    HGB 8.2 (L) 13.0 - 17.5 g/dL    HCT 24.7 (L) 39.0 - 53.0 %    .0 (L) 150.0 - 450.0 10(3)uL    MCV 87.0 80.0 - 100.0 fL    MCH 28.9 26.0 - 34.0 pg    MCHC 33.2 31.0 - 37.0 g/dL    RDW 16.8 %    Neutrophil Absolute Prelim 5.15 1.50 - 7.70 x10 (3) uL    Neutrophil Absolute 5.15 1.50 - 7.70 x10(3) uL    Lymphocyte Absolute 0.86 (L) 1.00 - 4.00 x10(3) uL    Monocyte Absolute 0.94 0.10 - 1.00 x10(3) uL    Eosinophil Absolute 0.08 0.00 - 0.70 x10(3) uL    Basophil Absolute 0.01 0.00 - 0.20 x10(3) uL    Immature Granulocyte Absolute 0.04 0.00 - 1.00 x10(3) uL    Neutrophil % 72.8 %    Lymphocyte % 12.1 %    Monocyte % 13.3 %    Eosinophil % 1.1 %    Basophil % 0.1 %    Immature Granulocyte % 0.6 %   Basic Metabolic Panel (8)    Collection Time:  04/07/25  6:07 AM   Result Value Ref Range    Glucose 91 70 - 99 mg/dL    Sodium 139 136 - 145 mmol/L    Potassium 4.2 3.5 - 5.1 mmol/L    Chloride 106 98 - 112 mmol/L    CO2 26.0 21.0 - 32.0 mmol/L    Anion Gap 7 0 - 18 mmol/L    BUN 29 (H) 9 - 23 mg/dL    Creatinine 1.23 0.70 - 1.30 mg/dL    Calcium, Total 8.7 8.7 - 10.6 mg/dL    Calculated Osmolality 293 275 - 295 mOsm/kg    eGFR-Cr 63 >=60 mL/min/1.73m2   Magnesium    Collection Time: 04/07/25  6:07 AM   Result Value Ref Range    Magnesium 1.9 1.6 - 2.6 mg/dL   Prothrombin Time (PT)    Collection Time: 04/07/25  6:07 AM   Result Value Ref Range    PT 20.2 (H) 11.6 - 14.8 seconds    INR 1.71 (H) 0.80 - 1.20   PTT, Activated    Collection Time: 04/07/25  6:07 AM   Result Value Ref Range    PTT 51.8 (H) 23.0 - 36.0 seconds   POCT Glucose    Collection Time: 04/07/25 12:29 PM   Result Value Ref Range    POC Glucose 111 (H) 70 - 99 mg/dL   POCT Glucose    Collection Time: 04/07/25  5:15 PM   Result Value Ref Range    POC Glucose 95 70 - 99 mg/dL     Platelet Count::    Lab Results   Component Value Date    .0 (L) 04/07/2025    .0 (L) 04/06/2025    .0 (L) 04/05/2025        CULTURES  Hospital Encounter on 03/19/25   1. Blood Culture     Status: None    Collection Time: 03/24/25 10:05 AM    Specimen: Blood,peripheral   Result Value Ref Range    Blood Culture Result No Growth 5 Days N/A       IMAGING:    No results found.      MEDICATIONS:  Medications reviewed.     warfarin  7.5 mg Oral Once at night    multivitamin  1 tablet Oral Daily    amLODIPine  10 mg Oral Daily    metoprolol tartrate  25 mg Oral 2x Daily(Beta Blocker)    fluticasone propionate  1 spray Each Nare Daily    docusate sodium  100 mg Oral BID    polyethylene glycol (PEG 3350)  17 g Oral Daily    insulin aspart  1-5 Units Subcutaneous TID AC and HS      argatroban 0.25 mcg/kg/min (04/06/25 0743)       diphenoxylate-atropine    peppermint oil    hydrALAzine    ipratropium-albuterol     glucose **OR** glucose **OR** glucose-vitamin C **OR** dextrose **OR** glucose **OR** glucose **OR** glucose-vitamin C    acetaminophen **OR** HYDROcodone-acetaminophen **OR** HYDROcodone-acetaminophen    ondansetron    metoclopramide    ASSESSMENT AND PLAN:     Active Problems:    Pre-op testing    Thoracoabdominal aortic aneurysm (TAAA)    Bilateral leg weakness        Cy Monsalve is a 69 year old male with 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     Status post AAA repair with complicated postop course with hypoxia and HOLLIS.  Currently on the floor.     Thrombocytopenia  Platelets were normal until 3/22/2025  Thereafter they had declined into the 70-90 while on heparin  Heparin-induced platelet antibody was positive.    SUNDEEP pending  Currently on argatroban  This is good progress  We will continue argatroban with pharmacy dosing  Warfarin

## 2025-04-07 NOTE — PROGRESS NOTES
[facility] Hospitalist Progress Note     CC: Hospital Follow up    PCP: Kam Alberts MD     Subjective:     No acute events. States pain is controlled. Extensively discussed engaging with therapy at bedside. He is amenable to try.     OBJECTIVE:    Blood pressure 131/77, pulse 72, temperature 97.8 °F (36.6 °C), temperature source Oral, resp. rate 22, weight 199 lb 15.3 oz (90.7 kg), SpO2 97%.    Temp:  [97.6 °F (36.4 °C)-98.1 °F (36.7 °C)] 97.8 °F (36.6 °C)  Pulse:  [67-87] 72  Resp:  [16-22] 22  BP: (131-150)/(67-77) 131/77  SpO2:  [94 %-98 %] 97 %      Intake/Output:    Intake/Output Summary (Last 24 hours) at 4/6/2025 2003  Last data filed at 4/6/2025 1800  Gross per 24 hour   Intake 360 ml   Output 650 ml   Net -290 ml       Last 3 Weights   03/31/25 0300 199 lb 15.3 oz (90.7 kg)   03/30/25 0200 197 lb 5 oz (89.5 kg)   03/27/25 0600 193 lb 12.6 oz (87.9 kg)   03/26/25 0400 193 lb 12.6 oz (87.9 kg)   03/25/25 0600 200 lb 2.8 oz (90.8 kg)   03/24/25 0900 200 lb 3.2 oz (90.8 kg)   03/24/25 0500 203 lb 0.7 oz (92.1 kg)   03/23/25 0516 204 lb 2.3 oz (92.6 kg)   03/22/25 0637 153 lb 3.5 oz (69.5 kg)   03/20/25 1231 192 lb 15.9 oz (87.5 kg)   03/19/25 1444 193 lb (87.5 kg)   03/22/25 1113 153 lb 3.5 oz (69.5 kg)   03/07/25 1536 185 lb (83.9 kg)       Exam   Gen: No acute distress, alert and oriented x3, no focal neurologic deficits, right-sided [place], arterial line and lumbar drain noted  Heent: NC AT, mucous memb clear, neck supple  Pulm: Lungs clear, normal respiratory effort  CV: Heart with regular rate and rhythm, no peripheral edema  Abd: Abdomen soft, nontender, nondistended, bowel sounds present  MSK:expected rom, no hematoma appreciated bilaterally, 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, 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, strentgh 4/5 on R, pulses intact  Skin: no rashes or lesions  Neuro: AO*3, motor intact, no sensory deficits  Psyc: appropriate mood and affect      Data Review:       Labs:     Recent Labs    Lab 04/04/25  0556 04/05/25  0536 04/06/25  0553   RBC 3.09* 3.02* 2.88*   HGB 8.7* 8.6* 8.4*   HCT 26.5* 26.1* 24.9*   MCV 85.8 86.4 86.5   MCH 28.2 28.5 29.2   MCHC 32.8 33.0 33.7   RDW 16.5 16.5 16.6   NEPRELIM 9.00* 7.79* 6.50   WBC 10.9 9.8 8.4   .0* 115.0* 125.0*         Recent Labs   Lab 04/04/25  0557 04/05/25  0536 04/06/25  0553   GLU 93 100* 93   BUN 35* 32* 29*   CREATSERUM 1.25 1.29 1.17   EGFRCR 62 59* 67   CA 8.4* 8.4* 8.6*    139 139   K 4.4 4.2 4.4    106 106   CO2 27.0 27.0 26.0       Recent Labs   Lab 03/31/25  0511 04/01/25  0824   ALT 68* 50*   AST 19 13   ALB 3.2 3.4         Imaging:  No results found.      Meds:      multivitamin  1 tablet Oral Daily    amLODIPine  10 mg Oral Daily    metoprolol tartrate  25 mg Oral 2x Daily(Beta Blocker)    fluticasone propionate  1 spray Each Nare Daily    docusate sodium  100 mg Oral BID    polyethylene glycol (PEG 3350)  17 g Oral Daily    insulin aspart  1-5 Units Subcutaneous TID AC and HS      argatroban 0.25 mcg/kg/min (04/06/25 0743)       peppermint oil    hydrALAzine    ipratropium-albuterol    glucose **OR** glucose **OR** glucose-vitamin C **OR** dextrose **OR** glucose **OR** glucose **OR** glucose-vitamin C    acetaminophen **OR** HYDROcodone-acetaminophen **OR** HYDROcodone-acetaminophen    ondansetron    metoclopramide    HYDROmorphone **OR** HYDROmorphone **OR** [DISCONTINUED] HYDROmorphone         Assessment/Plan:     Active Problems:    Pre-op testing    Thoracoabdominal aortic aneurysm (TAAA)    Bilateral leg weakness      69 year old male with a past medical history of hypertension, GERD, anxiety, renal calculi, DVT, ascending aortic dissection s/p repair, aortic dissection distal to left subclavian, infrarenal endovascular AAA repair with bilateral KYLER for hypogastric and common iliac aneurysms, mesenteric ischemia s/p SMA bypass with right external iliac artery to SMA bypass, who is presenting to the hospital for  direct admission for complex AAA repair with s/p lumbar drain.  Postoperative course complicated by HOLLIS on CKD as well as right lower extremity weakness along with hypoxic respiratory failure.      History of prior infrarenal endovascular AAA repair with bilateral KYLER for hypogastric and common iliac aneurysms  History of mesenteric ischemia s/p SMA bypass with right external iliac artery to SMA bypass  History of aortic dissection s/p repair, aortic dissection distal to left subclavian  S/p complex thoraco abdominal aortic repair 3/21  Hx of DVT   -Complicated aortic pathology with multiple repairs in the past. 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   - HIT neg 3/23, positive on 3/31  - hematology consulted, heparin stopped, argatroban started,   - SUNDEEP pending  - warfarin and argatroban per hematology / pharmacy to dose    - INR technically remains subtherapeutic     Acute hypoxemic respiratory failure w/ threat to bodily function 2/2 possible pneumonia versus edema  Leukocytosis  -Completed 3 days of azithro (3/27-3/29), janna (3/24-3/28), now switched to cefepime per ICU (3/28-3/30)  - tx w/ iv steroids x 5 days, iv steroids completed, completed abx as above   - wean o2 as tolerated     Leukocytosis  - no new infectious complaints, likely 2/2 steroids  - trend     Right lower extremity weakness-improving  - In the setting of complex aortic repair 3/21  - Seen by neurointensivist, recommendations reviewed  - Improved overall  -- PT/OT     HOLLIS on CKD-improving  -Creatinine reviewed today - 1.34 (baseline 1.2)  -Continue to monitor with serial RFP's  -Avoid nephrotoxic agents  - Cr bumped post operatively - given prolonged surgery and complicated repair  - improving      Hypertension  -hold home HCTZ     Allergic rhinitis  -Continue home  fluticasone as needed     Short gut syndrome  - resume lamotil if diarrhea resumes    Normocytic anemia  - stable, trend     H/o dvt  - see above       Supplementary Documentation:   DVT Mechanical Prophylaxis:   SCDs, Early ambuation  DVT Pharmacologic Prophylaxis   Medication    argatroban 50 mg/50mL infusion premix                Code Status: DNAR/Full Treatment  Arzola: External urinary catheter in place  Arzola Duration (in days):   Central line:            Dispo: inpatient - on argatroban until therapeutic on coumadin per vascular - dc to [facility] once cleared by heme and vascular     Abdon Castro DO  [facility] Hospitalist

## 2025-04-07 NOTE — PROGRESS NOTES
[facility] Hospitalist Progress Note     CC: Hospital Follow up    PCP: Kam Alberts MD     Subjective:     No acute events.  He is laying in recliner, complains of significant back pain.  Per RN, having diarrhea.  Denies any chest pain or shortness of breath.    OBJECTIVE:    Blood pressure 153/74, pulse 91, temperature 98.4 °F (36.9 °C), temperature source Oral, resp. rate 18, weight 199 lb 15.3 oz (90.7 kg), SpO2 94%.    Temp:  [97.6 °F (36.4 °C)-98.4 °F (36.9 °C)] 98.4 °F (36.9 °C)  Pulse:  [61-91] 91  Resp:  [18-22] 18  BP: (131-164)/(73-77) 153/74  SpO2:  [92 %-98 %] 94 %      Intake/Output:    Intake/Output Summary (Last 24 hours) at 4/7/2025 1503  Last data filed at 4/7/2025 0940  Gross per 24 hour   Intake 200 ml   Output 1350 ml   Net -1150 ml       Last 3 Weights   03/31/25 0300 199 lb 15.3 oz (90.7 kg)   03/30/25 0200 197 lb 5 oz (89.5 kg)   03/27/25 0600 193 lb 12.6 oz (87.9 kg)   03/26/25 0400 193 lb 12.6 oz (87.9 kg)   03/25/25 0600 200 lb 2.8 oz (90.8 kg)   03/24/25 0900 200 lb 3.2 oz (90.8 kg)   03/24/25 0500 203 lb 0.7 oz (92.1 kg)   03/23/25 0516 204 lb 2.3 oz (92.6 kg)   03/22/25 0637 153 lb 3.5 oz (69.5 kg)   03/20/25 1231 192 lb 15.9 oz (87.5 kg)   03/19/25 1444 193 lb (87.5 kg)   03/22/25 1113 153 lb 3.5 oz (69.5 kg)   03/07/25 1536 185 lb (83.9 kg)       Exam   Gen: No acute distress, alert and oriented x3, no focal neurologic deficits,   Heent: NC AT, mucous memb clear, neck supple  Pulm: Lungs clear, normal respiratory effort  CV: Heart with regular rate and rhythm, no peripheral edema  Abd: Abdomen soft, nontender, nondistended, bowel sounds present  MSK:expected rom, no hematoma appreciated bilaterally, 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, 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,   Skin: no rashes or lesions  Neuro: AO*3, motor intact, no sensory deficits  Psyc: appropriate mood and affect      Data Review:       Labs:     Recent Labs   Lab 04/05/25  0536 04/06/25  0553 04/07/25  0607    RBC 3.02* 2.88* 2.84*   HGB 8.6* 8.4* 8.2*   HCT 26.1* 24.9* 24.7*   MCV 86.4 86.5 87.0   MCH 28.5 29.2 28.9   MCHC 33.0 33.7 33.2   RDW 16.5 16.6 16.8   NEPRELIM 7.79* 6.50 5.15   WBC 9.8 8.4 7.1   .0* 125.0* 127.0*         Recent Labs   Lab 04/05/25  0536 04/06/25  0553 04/07/25  0607   * 93 91   BUN 32* 29* 29*   CREATSERUM 1.29 1.17 1.23   EGFRCR 59* 67 63   CA 8.4* 8.6* 8.7    139 139   K 4.2 4.4 4.2    106 106   CO2 27.0 26.0 26.0       Recent Labs   Lab 04/01/25  0824   ALT 50*   AST 13   ALB 3.4         Imaging:  No results found.      Meds:      multivitamin  1 tablet Oral Daily    amLODIPine  10 mg Oral Daily    metoprolol tartrate  25 mg Oral 2x Daily(Beta Blocker)    fluticasone propionate  1 spray Each Nare Daily    docusate sodium  100 mg Oral BID    polyethylene glycol (PEG 3350)  17 g Oral Daily    insulin aspart  1-5 Units Subcutaneous TID AC and HS      argatroban 0.25 mcg/kg/min (04/06/25 0743)       peppermint oil    hydrALAzine    ipratropium-albuterol    glucose **OR** glucose **OR** glucose-vitamin C **OR** dextrose **OR** glucose **OR** glucose **OR** glucose-vitamin C    acetaminophen **OR** HYDROcodone-acetaminophen **OR** HYDROcodone-acetaminophen    ondansetron    metoclopramide    HYDROmorphone **OR** HYDROmorphone **OR** [DISCONTINUED] HYDROmorphone         Assessment/Plan:     Active Problems:    Pre-op testing    Thoracoabdominal aortic aneurysm (TAAA)    Bilateral leg weakness      69 year old male with a past medical history of hypertension, GERD, anxiety, renal calculi, DVT, ascending aortic dissection s/p repair, 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  Postoperative  course complicated by HOLLIS on CKD as well as right lower extremity weakness along with hypoxic respiratory failure.      History of prior infrarenal endovascular AAA repair with bilateral KYLER for hypogastric and common iliac aneurysms  History of mesenteric ischemia s/p SMA bypass with right external iliac artery to SMA bypass  History of aortic dissection s/p repair, aortic dissection distal to left subclavian  S/p complex thoraco abdominal aortic repair 3/21  Hx of DVT   -Complicated aortic pathology with multiple repairs in the past. 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   - HIT neg 3/23, positive on 3/31  - hematology consulted, heparin stopped, argatroban started  - SUNDEEP pending  - warfarin and argatroban per hematology / pharmacy to dose-discussed with hematology, okay to resume Coumadin given platelets greater than 125  - INR technically remains subtherapeutic     Acute hypoxemic respiratory failure w/ threat to bodily function 2/2 possible pneumonia versus edema  Leukocytosis  -Completed 3 days of azithro (3/27-3/29), janna (3/24-3/28), now switched to cefepime per ICU (3/28-3/30)  - tx w/ iv steroids x 5 days, iv steroids completed, completed abx as above   - wean o2 as tolerated     Leukocytosis  - no new infectious complaints, likely 2/2 steroids  - trend     Right lower extremity weakness-improving  - In the setting of complex aortic repair 3/21  - Seen by neurointensivist, recommendations reviewed  - Improved overall  -- PT/OT     HOLLIS on CKD-resolved        Hypertension  -hold home HCTZ     Allergic rhinitis  -Continue home fluticasone as needed     Short gut syndrome  - resume lamotil if diarrhea resumes    Normocytic anemia  - stable, trend     H/o dvt  - see above       Supplementary Documentation:   DVT Mechanical Prophylaxis:    SCDs, Early ambuation  DVT Pharmacologic Prophylaxis   Medication    argatroban 50 mg/50mL infusion premix                Code Status: DNAR/Full Treatment  Arzola: External urinary catheter in place  Arzola Duration (in days):   Central line:            Dispo: inpatient - on argatroban until therapeutic on coumadin per vascular - dc to [facility] once cleared by heme and vascular     Dallas Green DO  Hospitalist  [facility]

## 2025-04-07 NOTE — PLAN OF CARE
Problem: CARDIOVASCULAR - ADULT  Goal: Maintains optimal cardiac output and hemodynamic stability  Description: INTERVENTIONS:- Monitor vital signs, rhythm, and trends- Monitor for bleeding, hypotension and signs of decreased cardiac output- Evaluate effectiveness of vasoactive medications to optimize hemodynamic stability- Monitor arterial and/or venous puncture sites for bleeding and/or hematoma- Assess quality of pulses, skin color and temperature- Assess for signs of decreased coronary artery perfusion - ex. Angina- Evaluate fluid balance, assess for edema, trend weights  Outcome: Progressing     Problem: HEMATOLOGIC - ADULT  Goal: Free from bleeding injury  Description: (Example usage: patient with low platelets)INTERVENTIONS:- Avoid intramuscular injections, enemas and rectal medication administration- Ensure safe mobilization of patient- Hold pressure on venipuncture sites to achieve adequate hemostasis- Assess for signs and symptoms of internal bleeding- Monitor lab trends- Patient is to report abnormal signs of bleeding to staff- Avoid use of toothpicks and dental floss- Use electric shaver for shaving- Use soft bristle tooth brush- Limit straining and forceful nose blowing  Outcome: Progressing     Problem: NEUROLOGICAL - ADULT  Goal: Achieves stable or improved neurological status  Description: INTERVENTIONS- Assess for and report changes in neurological status- Initiate measures to prevent increased intracranial pressure- Maintain blood pressure and fluid volume within ordered parameters to optimize cerebral perfusion and minimize risk of hemorrhage- Monitor temperature, glucose, and sodium. Initiate appropriate interventions as ordered  Outcome: Progressing  Goal: Achieves maximal functionality and self care  Description: INTERVENTIONS- Monitor swallowing and airway patency with patient fatigue and changes in neurological status- Encourage and assist patient to increase activity and self care with  guidance from PT/OT- Encourage visually impaired, hearing impaired and aphasic patients to use assistive/communication devices  Outcome: Progressing     Problem: PAIN - ADULT  Goal: Verbalizes/displays adequate comfort level or patient's stated pain goal  Description: INTERVENTIONS:- Encourage pt to monitor pain and request assistance- Assess pain using appropriate pain scale- Administer analgesics based on type and severity of pain and evaluate response- Implement non-pharmacological measures as appropriate and evaluate response- Consider cultural and social influences on pain and pain management- Manage/alleviate anxiety- Utilize distraction and/or relaxation techniques- Monitor for opioid side effects- Notify MD/LIP if interventions unsuccessful or patient reports new pain- Anticipate increased pain with activity and pre-medicate as appropriate  Outcome: Progressing     Problem: RESPIRATORY - ADULT  Goal: Achieves optimal ventilation and oxygenation  Description: INTERVENTIONS:- Assess for changes in respiratory status- Assess for changes in mentation and behavior- Position to facilitate oxygenation and minimize respiratory effort- Oxygen supplementation based on oxygen saturation or ABGs- Provide Smoking Cessation handout, if applicable- Encourage broncho-pulmonary hygiene including cough, deep breathe, Incentive Spirometry- Assess the need for suctioning and perform as needed- Assess and instruct to report SOB or any respiratory difficulty- Respiratory Therapy support as indicated- Manage/alleviate anxiety- Monitor for signs/symptoms of CO2 retention  Outcome: Progressing     Problem: Patient/Family Goals  Goal: Patient/Family Long Term Goal  Description: Patient's Long Term Goal: discharge to recommended facility  Interventions:- monitor O2 status  -wean from vapotherm  -IV antibiotics  -IS  -consults  -PT/OT  -tele monitoring  -labs  -medicines  - See additional Care Plan goals for specific  interventions  Outcome: Progressing  Goal: Patient/Family Short Term Goal  Description: Patient's Short Term Goal: Be comfortable   Interventions: -pain controlled  -cluster care  -needs attended   See additional Care Plan goals for specific interventions  Outcome: Progressing     Problem: Diabetes/Glucose Control  Goal: Glucose maintained within prescribed range  Description: INTERVENTIONS:- Monitor Blood Glucose as ordered- Assess for signs and symptoms of hyperglycemia and hypoglycemia- Administer ordered medications to maintain glucose within target range- Assess barriers to adequate nutritional intake and initiate nutrition consult as needed- Instruct patient on self management of diabetes  Outcome: Progressing     Problem: SAFETY ADULT - FALL  Goal: Free from fall injury  Description: INTERVENTIONS:- Assess pt frequently for physical needs- Identify cognitive and physical deficits and behaviors that affect risk of falls.- [place]a fall precautions as indicated by assessment.- Educate pt/family on patient safety including physical limitations- Instruct pt to call for assistance with activity based on assessment- Modify environment to reduce risk of injury- Provide assistive devices as appropriate- Consider OT/PT consult to assist with strengthening/mobility- Encourage toileting schedule  Outcome: Progressing

## 2025-04-07 NOTE — PHYSICAL THERAPY NOTE
PHYSICAL THERAPY TREATMENT NOTE - INPATIENT    Room Number: 7613/7613-A     Session: 6   Patient remains appropriate for additional physical therapy treatment in order to progress towards goals as noted below.     Number of Visits to Meet Established Goals: 8    Presenting Problem: S/p 3/21 fenestrated abdominal aortic aneurysm repair with left carotid to subclavian transposition on  with Dr. Yanez and Dr. Oquendo and 3/20 lumbar drain  Co-Morbidities : HTN, Peripheral Neuropathy, Hx of toe amputation L great toe, mesenteric ischemia s/p SMA bypass with right external iliac artery to SMA bypass, Short gut syndrome    PHYSICAL THERAPY ASSESSMENT   Patient demonstrates good  progress this session, goals  remain in progress.      Patient is requiring contact guard assist as a result of the following impairments: decreased functional strength, decreased endurance/aerobic capacity, impaired static and dynamic balance, impaired motor planning, decreased muscular endurance, and medical status.     Patient continues to function below baseline with bed mobility, transfers, and gait.  Next session anticipate patient to progress transfers, gait, and standing prolonged periods.  Physical Therapy will continue to follow patient for duration of hospitalization.    Patient continues to benefit from continued skilled PT services. Discussed with patient today - encouraged TID ambulation with staff in [place].     PLAN DURING HOSPITALIZATION  Nursing Mobility Recommendation : 1 Assist  PT Device Recommendation: Rolling walker  PT Treatment Plan: Bed mobility, Body mechanics, Gait training, Strengthening, Transfer training, Stair training, Balance training  Frequency (Obs): 3-5x/week     CURRENT GOALS   Goal #1 Patient is able to demonstrate supine - sit EOB @ level: CGA assistance   MET Raquel 4/1   Goal #2 Patient is able to demonstrate transfers EOB to/from BSC at assistance level: CGA assistance   MET Raquel 4/1   Goal #3 Patient is  able to sit at the EOB for 10 mins with CGA.       Goal #4 Patient is able to ambulate 50 feet with assist device: walker - rolling at assistance level: moderate assistance  MET 4/7 progress to 150ft CGA   Goal #5     Goal #6     Goal Comments: Goals established on 3/22/2025  4/7/2025 all goals ongoing    SUBJECTIVE  \" I could stay on the main level, the bathroom is like 10 steps away from the couch\"     OBJECTIVE  Precautions: Bed/chair alarm (surgical boot L foot? SBP )    WEIGHT BEARING RESTRICTION  R Lower Extremity: Full Weight Bearing  L Lower Extremity: Full Weight Bearing    PAIN ASSESSMENT   Rating: Unable to rate  Location: bottom  Management Techniques: Activity promotion, Body mechanics, Repositioning    BALANCE                                                                                                                       Static Sitting: Good  Dynamic Sitting: Good           Static Standing: Fair -  Dynamic Standing: Fair -    ACTIVITY TOLERANCE                         O2 WALK  Oxygen Therapy  SPO2% on Room Air at Rest: 94  SPO2% Ambulation on Room Air:  (Hazel 6)    AM-PAC '6-Clicks' INPATIENT SHORT FORM - BASIC MOBILITY  How much difficulty does the patient currently have...  Patient Difficulty: Turning over in bed (including adjusting bedclothes, sheets and blankets)?: None   Patient Difficulty: Sitting down on and standing up from a chair with arms (e.g., wheelchair, bedside commode, etc.): A Little   Patient Difficulty: Moving from lying on back to sitting on the side of the bed?: None   How much help from another person does the patient currently need...   Help from Another: Moving to and from a bed to a chair (including a wheelchair)?: A Little   Help from Another: Need to walk in hospital room?: A Little   Help from Another: Climbing 3-5 steps with a railing?: A Lot     AM-PAC Score:  Raw Score: 19   Approx Degree of Impairment: 41.77%   Standardized Score (AM-PAC Scale): 45.44   CMS  Modifier (G-Code): CK    FUNCTIONAL ABILITY STATUS  Gait Assessment   Functional Mobility/Gait Assessment  Gait Assistance: Contact guard assist  Distance (ft): 50-50  Assistive Device: Rolling walker  Pattern: Shuffle    Skilled Therapy Provided    Bed Mobility:  Rolling: NT   Supine<>Sit: CGA   Sit<>Supine: NT     Transfer Mobility:  Sit<>Stand: [facility]   Stand<>Sit: CGA  Gait: CGA    Therapist's Comments: Discussed case with RN prior to session initiation. Pt agreeable to participation in therapy. Gait belt donned for out of bed mobility. Discussed with patient importance of continued mobility for recovery with recommendation for TID ambulation with staff and RW - pt agreeable. All mobility today performed at [facility]. Participated in gait training with cuing for EC and pacing strategies w/ pt reporting RPE 6. Seated rest break x3 minutes provided between gait trials.     MODIFIED JAMAL DYSPNEA SCALE - (SHORTNESS OF BREATH SCALE)    SCORE DESCRIPTION   0 Nothing at all   1 Very slight   2 Slight   3 Moderate   4 Somewhat severe   5 Strong or hard breathing   6    7 Very hard breathing   8    9 Very, Very Severe   10 MAX - You need to stop     Patient Education provided:    Use this scale to rate the difficulty of your breathing:  - As you would rate your pain from 0-10, you can rate your SOB from 0-10  - Goal is to stay below 7/10 with activity  - If you reach beyond 7/10, it is important to rest; stop activity and if you need to sit down to recove          Patient End of Session: Up in chair, Needs met, Call light within reach, RN aware of session/findings, All patient questions and concerns addressed, Hospital anti-slip socks    PT Session Time: 25 minutes  Gait Trainin minutes  Therapeutic Activity:  minutes

## 2025-04-07 NOTE — PROGRESS NOTES
Formerly [facility], later [facility] Dosing Service  Warfarin (Coumadin) Subsequent Dosing    Cy Monsalve is a 69 year old patient for whom pharmacy is dosing warfarin (Coumadin). 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  Warfarin was on hold until the platelet count is > 150K x 10^3. With platelet now 127, OK to resume warfarin tonight. Spoke with CHANTAL Cruz and confirmed. Give warfarin 7.5mg x1 dose tonight.  2   PT/INR ordered daily while on warfarin  3.  Pharmacy will continue to follow.  We appreciate the opportunity to assist in the care of this patient.    Jessica Danielle, PharmD  4/7/2025  9:43 AM

## 2025-04-07 NOTE — CM/SW NOTE
Care Progression Note:  Length of stay: 18  GMLOS:   Avoidable Delays:   Code Status: DNAR/Full    Acute Medical Issue/Factors:    AAA repair- Pt admitted for scheduled fenestrated abdominal aneurysm repair with Dr Oquendo and Dr Yanez on 3/20/25.  s/p lumbar drain with neurointerventional to limit risk of spinal cord ischemia 3/20 and removed 3/26   Post op complicated by hypoxia and HOLLIS  Acute Hypoxic Respiratory Failure- possible pneumonia vs pulm edema.  Pt had increased O2 needs during stay, required Vapotherm/ICU, antibiotics, lasix, steroids. 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   Pharmacy following and dosing and warfarin restarted    Discharge Barriers:  pending clearance from hematology/pharmacy, still requiring IV pain medications  Expected discharge date:    Expected next site of care: Acute Rehab. Shivam    / available for discharge planning.     Gauri Barnes MBA MSN, RN Case Manager  [phone]

## 2025-04-07 NOTE — PROGRESS NOTES
[facility]   part of [facility]     Vascular Surgery Progress Note    Cy Monsalve Patient Status:  Inpatient    1955 MRN [id number]   Location [facility] 7NE-A Attending Neil Oquendo MD   Hosp Day # 18 PCP Kam Alberts MD     Objective:   Temp: 97.6 °F (36.4 °C)  Pulse: 77  Resp: 18  BP: 164/77    Intake/Output:     Intake/Output Summary (Last 24 hours) at 2025 0802  Last data filed at 2025 0520  Gross per 24 hour   Intake 120 ml   Output 1350 ml   Net -1230 ml     Events Yesterday/Overnight:     No acute events overnight. SBP currently 153/74- home BP meds not given this morning. Patient with minimal pain and currently controlled with PRN medication. Plt count trending up- currently 127k. HGB 8.2. On room air.     Exam:    Patient seen and examined. Neck incision is flat with no hematoma, neurovascularly intact. Bilateral groin access sites healed, no hematoma- minimal amount of ecchymosis.     Impression/Plan:    70 yo male who is s/p fenestrated endovascular arch repair with fenestrated thoracoabdominal aneurysm repair, left carotid to subclavian transposition with endovascular septotomy of dissection with Dr. Oquendo and Dr. Yanez.     -PT/OT  -Patient encouraged to be up and walking throughout the day   -Patients INR still subtherapeutic- will need to stay inpatient today- plan to continue to bridge with argatroban and start on warfarin tonight-discussed with hospitalist/pharmacy.   -Plan for discharge to [facility] for acute rehab once INR is therapeutic  -Will arrange f/u in clinic in one week    Medications:  Current Facility-Administered Medications   Medication Dose Route Frequency    argatroban 50 mg/50mL infusion premix  0.25 mcg/kg/min Intravenous Continuous    multivitamin (Tab-A-Immanuel/Beta Carotene) tab 1 tablet  1 tablet Oral Daily    amLODIPine (Norvasc) tab 10 mg  10 mg Oral Daily    peppermint oil liquid 1 mL  1 mL Other PRN    metoprolol tartrate  (Lopressor) tab 25 mg  25 mg Oral 2x Daily(Beta Blocker)    hydrALAzine (Apresoline) 20 mg/mL injection 10 mg  10 mg Intravenous Q4H PRN    fluticasone propionate (Flonase) 50 MCG/ACT nasal suspension 1 spray  1 spray Each Nare Daily    ipratropium-albuterol (Duoneb) 0.5-2.5 (3) MG/3ML inhalation solution 3 mL  3 mL Nebulization Q4H PRN    docusate sodium (Colace) cap 100 mg  100 mg Oral BID    polyethylene glycol (PEG 3350) (Miralax) 17 g oral packet 17 g  17 g Oral Daily    glucose (Dex4) 15 GM/59ML oral liquid 15 g  15 g Oral Q15 Min PRN    Or    glucose (Glutose) 40% oral gel 15 g  15 g Oral Q15 Min PRN    Or    glucose-vitamin C (Dex-4) chewable tab 4 tablet  4 tablet Oral Q15 Min PRN    Or    dextrose 50% injection 50 mL  50 mL Intravenous Q15 Min PRN    Or    glucose (Dex4) 15 GM/59ML oral liquid 30 g  30 g Oral Q15 Min PRN    Or    glucose (Glutose) 40% oral gel 30 g  30 g Oral Q15 Min PRN    Or    glucose-vitamin C (Dex-4) chewable tab 8 tablet  8 tablet Oral Q15 Min PRN    insulin aspart (NovoLOG) 100 Units/mL FlexPen 1-5 Units  1-5 Units Subcutaneous TID AC and HS    acetaminophen (Tylenol) tab 650 mg  650 mg Oral Q4H PRN    Or    HYDROcodone-acetaminophen (Norco) 5-325 MG per tab 1 tablet  1 tablet Oral Q4H PRN    Or    HYDROcodone-acetaminophen (Norco) 5-325 MG per tab 2 tablet  2 tablet Oral Q4H PRN    ondansetron (Zofran) 4 MG/2ML injection 4 mg  4 mg Intravenous Q6H PRN    metoclopramide (Reglan) 5 mg/mL injection 10 mg  10 mg Intravenous Q8H PRN    HYDROmorphone (Dilaudid) 1 MG/ML injection 0.2 mg  0.2 mg Intravenous Q2H PRN    Or    HYDROmorphone (Dilaudid) 1 MG/ML injection 0.4 mg  0.4 mg Intravenous Q2H PRN     Laboratory/Data:    LABS:  Recent Labs   Lab 04/03/25  0525 04/04/25  0556 04/04/25  0557 04/05/25  0536 04/06/25  0553 04/07/25  0607   WBC 12.4* 10.9  --  9.8 8.4 7.1   HGB 8.9* 8.7*  --  8.6* 8.4* 8.2*   MCV 84.8 85.8  --  86.4 86.5 87.0   PLT 88.0* 115.0*  --  115.0* 125.0* 127.0*    INR 3.01*  --  2.39* 2.12* 1.80* 1.71*       Recent Labs   Lab 04/03/25  0525 04/04/25  0557 04/05/25  0536 04/06/25  0553 04/07/25  0607    139 139 139 139   K 4.5 4.4 4.2 4.4 4.2    105 106 106 106   CO2 29.0 27.0 27.0 26.0 26.0   BUN 41* 35* 32* 29* 29*   CREATSERUM 1.43* 1.25 1.29 1.17 1.23   GLU 97 93 100* 93 91   CA 8.2* 8.4* 8.4* 8.6* 8.7   MG 2.0 2.0 1.9 1.9 1.9     Recent Labs   Lab 04/05/25  0536 04/06/25  0553 04/07/25  0607   PTP 23.9* 21.1* 20.2*   INR 2.12* 1.80* 1.71*   PTT 57.9* 51.5* 51.8*     Recent Labs   Lab 04/01/25  0824   ALT 50*   AST 13   ALB 3.4     No results for input(s): \"TROP\" in the last 168 hours.  No results found for: \"ANAS\", \"ROVERTO\", \"ANASCRN\"  Recent Labs   Lab 03/31/25  1100   HIPAB Reactive- Confirmed*       CHANTAL Diaz  4/7/2025  8:02 AM

## 2025-04-07 NOTE — CM/SW NOTE
Pt seen by PMR on 3/24 with recommendations for AR. PMR re eval not necessary. Pt discussed with Christina meléndez, BRIGITTE planning.     Noted pt received IV pain meds overnight, will need to be 24 hr free of IV medications prior to DC.     ISAAC Julien

## 2025-04-07 NOTE — PLAN OF CARE
Patient A&O x4, can be demanding in his tone, room air, c/o butt pain.  Patient able to stand, but needed a lot of help to get off the chair, once standing, he was able to turn and pivot to the bed.  Patient requested IV pain medication this evening, c/o \"it being a rough night, it hasn't been this rough for a while,\" talking about the soreness to his bottom.

## 2025-04-07 NOTE — OCCUPATIONAL THERAPY NOTE
OCCUPATIONAL THERAPY TREATMENT NOTE - INPATIENT     Room Number: 7613/7613-A  Session: 5   Number of Visits to Meet Established Goals: 7    Presenting Problem: S/p 3/21 fenestrated abdominal aortic aneurysm repair with left carotid to subclavian transposition on  with Dr. Yanez and Dr. Oquendo and 3/20 lumbar drain    ASSESSMENT   Patient demonstrates good  progress this session, goals updated to reflect patient performance.    Patient continues to function below baseline with toileting, bathing, lower body dressing, transfers, and energy conservation strategies.   Contributing factors to remaining limitations include decreased endurance.  Next session anticipate patient to progress toileting and lower body dressing.  Occupational Therapy will continue to follow patient for duration of hospitalization.    Patient continues to benefit from continued skilled OT services: at discharge to promote prior level of function and safety with additional support and return home with home health OT. Patient is performing toilet transfer CGA, bed mobility SBA, LB ADL min A, and UB ADL set-up level.  Changing anticipated therapy needs from gradual to HHC with assistance with LB ADL, bathing, and household tasks. PT communicated with  already.     History:  Patient is a 69 year old male admitted on 3/19/2025 with Presenting Problem: S/p 3/21 fenestrated abdominal aortic aneurysm repair with left carotid to subclavian transposition on  with Dr. Yanez and Dr. Oquendo and 3/20 lumbar drain. Co-Morbidities : HTN, Peripheral Neuropathy, Hx of toe amputation L great toe, mesenteric ischemia s/p SMA bypass with right external iliac artery to SMA bypass, Short gut syndrome      **3/25 acute respiratory failure, possible pneumonia, on vapotherm    WEIGHT BEARING RESTRICTION  R Lower Extremity: Full Weight Bearing  L Lower Extremity: Full Weight Bearing    TREATMENT SESSION:  Patient Start of Session: supine    FUNCTIONAL  TRANSFER ASSESSMENT  Sit to Stand: Edge of Bed  Edge of Bed: Contact Guard Assist  Chair: Contact Guard Assist  Commode Transfer: Not Tested    BED MOBILITY  Rolling: Contact Guard Assist  Supine to Sit : Contact Guard Assist  Sit to Supine (OT): Not Tested  Scooting: independent    BALANCE ASSESSMENT  Static Sitting: Independent  Static Standing: Contact Guard Assist    FUNCTIONAL ADL ASSESSMENT  Grooming Seated: Not Tested  UB Dressing Seated: Not Tested  LB Dressing Seated: Not Tested  LB Dressing Standing: Not Tested  Toileting Standing: Not Tested    O2 SATURATIONS  Oxygen Therapy  SPO2% on Room Air at Rest: 94    EDUCATION PROVIDED  Patient Education : Role of Occupational Therapy; Plan of Care; Posture/Positioning; Energy Conservation; Proper Body Mechanics  Patient's Response to Education: Returned Demonstration    Equipment used: rw    Therapist comments: pleasant, this writer worked with the patient when he was in [facility]. Patient mentioned, \"I am still here, sleeping better.\"  Patient mentioned, \"It helps when I know what I am supposed to be doing.\" Patient actively completed supine to sit with CGA.   Patient was able to shift weight to scoot forward independently.   CGA to stand and to ambulate in hallway with rest break.   OT and PT provided encouragement mid-walk.   Patient was able to performed scapula retraction exercises as a part of therapeutic exercises for respiratory functions. Educated the pt about Hazel Dyspnea Scale. Rated 6 out of 10. Room air 94%.   CGA to stand from chair,simulation for toilet transfer.      Patient End of Session: Up in chair, Call light within reach, Needs met, RN aware of session/findings, All patient questions and concerns addressed    SUBJECTIVE  See above.    PAIN ASSESSMENT  Rating: Unable to rate  Location: \"you know, the usual\" not specifying location  Management Techniques: Repositioning, Activity promotion     OBJECTIVE  Precautions: Bed/chair alarm (surgical boot  L foot? SBP )    AM-PAC ‘6-Clicks’ Inpatient Daily Activity Short Form  -   Putting on and taking off regular lower body clothing?: A Little  -   Bathing (including washing, rinsing, drying)?: A Little  -   Toileting, which includes using toilet, bedpan or urinal? : A Little  -   Putting on and taking off regular upper body clothing?: None  -   Taking care of personal grooming such as brushing teeth?: None  -   Eating meals?: None    AM-PAC Score:  Score: 21  Approx Degree of Impairment: 32.79%  Standardized Score (AM-PAC Scale): 44.27    PLAN  OT Device Recommendations: TBD  OT Treatment Plan: Energy conservation/work simplification techniques, ADL training, UE strengthening/ROM, Functional transfer training, Patient/Family education  Rehab Potential : Good  Frequency: 3x/week    OT Goals:   Progressing 4/7  ADL Goals   Patient will perform upper body dressing:  with setup-MET 4/7  Patient will perform lower body dressing:  with min assist  Patient will perform toileting: with min assist     Functional Transfer Goals  Patient will transfer from supine to sit:  with min assist -MET 4/7 UPGRADE TO SUPERVISION  Patient will transfer to toilet:  with min assist -MET 4/7 SIMULATED, UPGRADE TO SUPERVISION     UE Exercise Program Goal  Patient will be independent with bilateral AROM HEP (home exercise program).     Additional Goals  Pt will incorporate 2 energy conservation techniques into ADL.    OT Session Time: 24 minutes  Self-Care Home Management: 10 minutes  Therapeutic Activity: 14 minutes

## 2025-04-08 LAB
ANION GAP SERPL CALC-SCNC: 10 MMOL/L (ref 0–18)
APTT PPP: 79.8 SECONDS (ref 23–36)
BASOPHILS # BLD AUTO: 0.02 X10(3) UL (ref 0–0.2)
BASOPHILS NFR BLD AUTO: 0.3 %
BUN BLD-MCNC: 27 MG/DL (ref 9–23)
CALCIUM BLD-MCNC: 8.7 MG/DL (ref 8.7–10.6)
CHLORIDE SERPL-SCNC: 106 MMOL/L (ref 98–112)
CO2 SERPL-SCNC: 24 MMOL/L (ref 21–32)
CREAT BLD-MCNC: 1.31 MG/DL
EGFRCR SERPLBLD CKD-EPI 2021: 58 ML/MIN/1.73M2 (ref 60–?)
EOSINOPHIL # BLD AUTO: 0.04 X10(3) UL (ref 0–0.7)
EOSINOPHIL NFR BLD AUTO: 0.5 %
ERYTHROCYTE [DISTWIDTH] IN BLOOD BY AUTOMATED COUNT: 16.8 %
GLUCOSE BLD-MCNC: 103 MG/DL (ref 70–99)
GLUCOSE BLD-MCNC: 105 MG/DL (ref 70–99)
GLUCOSE BLD-MCNC: 126 MG/DL (ref 70–99)
GLUCOSE BLD-MCNC: 90 MG/DL (ref 70–99)
GLUCOSE BLD-MCNC: 94 MG/DL (ref 70–99)
HCT VFR BLD AUTO: 23.5 %
HGB BLD-MCNC: 7.8 G/DL
IMM GRANULOCYTES # BLD AUTO: 0.03 X10(3) UL (ref 0–1)
IMM GRANULOCYTES NFR BLD: 0.4 %
INR BLD: 3.17 (ref 0.8–1.2)
LYMPHOCYTES # BLD AUTO: 0.7 X10(3) UL (ref 1–4)
LYMPHOCYTES NFR BLD AUTO: 9.5 %
MAGNESIUM SERPL-MCNC: 1.7 MG/DL (ref 1.6–2.6)
MCH RBC QN AUTO: 28.8 PG (ref 26–34)
MCHC RBC AUTO-ENTMCNC: 33.2 G/DL (ref 31–37)
MCV RBC AUTO: 86.7 FL
MONOCYTES # BLD AUTO: 0.69 X10(3) UL (ref 0.1–1)
MONOCYTES NFR BLD AUTO: 9.4 %
NEUTROPHILS # BLD AUTO: 5.87 X10 (3) UL (ref 1.5–7.7)
NEUTROPHILS # BLD AUTO: 5.87 X10(3) UL (ref 1.5–7.7)
NEUTROPHILS NFR BLD AUTO: 79.9 %
OSMOLALITY SERPL CALC.SUM OF ELEC: 295 MOSM/KG (ref 275–295)
PLATELET # BLD AUTO: 108 10(3)UL (ref 150–450)
POTASSIUM SERPL-SCNC: 4.2 MMOL/L (ref 3.5–5.1)
PROTHROMBIN TIME: 32.8 SECONDS (ref 11.6–14.8)
RBC # BLD AUTO: 2.71 X10(6)UL
SODIUM SERPL-SCNC: 140 MMOL/L (ref 136–145)
SRA HIGH DOSE HEPARIN: <1 %
SRA LOW DOSE HEPARIN: <1 %
WBC # BLD AUTO: 7.4 X10(3) UL (ref 4–11)

## 2025-04-08 RX ORDER — MAGNESIUM OXIDE 400 MG/1
400 TABLET ORAL ONCE
Status: COMPLETED | OUTPATIENT
Start: 2025-04-08 | End: 2025-04-08

## 2025-04-08 RX ORDER — ARGATROBAN 1 MG/ML
0.25 INJECTION, SOLUTION INTRAVENOUS CONTINUOUS
Status: DISCONTINUED | OUTPATIENT
Start: 2025-04-08 | End: 2025-04-09

## 2025-04-08 RX ORDER — LOPERAMIDE HYDROCHLORIDE 2 MG/1
2 CAPSULE ORAL
Status: DISCONTINUED | OUTPATIENT
Start: 2025-04-08 | End: 2025-04-18

## 2025-04-08 NOTE — CONSULTS
[facility]  Report of Inpatient Wound Care Consultation    Cy Monsalve Patient Status:  Inpatient    1955 MRN [id number]   Location [facility] 7NE-A Attending Neil Oquendo MD   Hosp Day # 19 PCP Kam Alberts MD     Reason for Consultation:  back and buttock redness      History of Present Illness:  Cy Monsalve is a a(n) 69 year old male. Patient seen at  bedside with a fellow wound care staff.Patient presents with multiple  skin issues as described below.Denies pain at this time. Patient with multiple comorbidities.Spouse in the room.        History:  Past Medical History:    AAA (abdominal aortic aneurysm)    Abdominal aneurysm    Abdominal aortic aneurysm (AAA)    AAA surgery done    Anxiety state    Back pain    Naproxen twice a week    Back problem    minor    Calculus of kidney    Deep vein thrombosis (HCC)    to colon    Esophageal reflux    Hearing impairment    Lt ear tinnitus    High blood pressure    History of recent hospitalization    10/2019- was at [facility] x5 weeks (ICU) with Dissected Aorta/ Post op colon problem/and post op cardiac arrest    Ileostomy present (HCC)    Peripheral vascular disease    Unspecified essential hypertension    Visual impairment    glasses     Past Surgical History:   Procedure Laterality Date    Colonoscopy  10/15/09  [facility]    Screening: small polypoid fold in sigmoid (no polyp tissue seen on path), int hemorrhoids; next colonoscopy in 2019    Other  2017    AAA repair    Other      10/23/19- Dissected Aorta surgery    Other surgical history      Eye surgery for lazy eye    Other surgical history  10/15/09  [facility]    EGD (heartburn): normal    Other surgical history      AAA repair    Other surgical history      multiple toe amputations    Part removal colon w end colostomy      10/2019      reports that he has been smoking cigars and cigarettes. He has never used smokeless tobacco. He reports current alcohol use. He reports that he  does not use drugs.      Allergies:  @ALLERGY    Laboratory Data:    Recent Labs   Lab 04/06/25  0553 04/06/25  1229 04/07/25  0607 04/07/25  1229 04/07/25  1715 04/07/25  2123 04/08/25  0514 04/08/25  0533   WBC 8.4  --  7.1  --   --   --   --  7.4   HGB 8.4*  --  8.2*  --   --   --   --  7.8*   HCT 24.9*  --  24.7*  --   --   --   --  23.5*   .0*  --  127.0*  --   --   --   --  108.0*   CREATSERUM 1.17  --  1.23  --   --   --   --  1.31*   BUN 29*  --  29*  --   --   --   --  27*   GLU 93  --  91  --   --   --   --  94   CA 8.6*  --  8.7  --   --   --   --  8.7   PTT 51.5*  --  51.8*  --   --   --   --  79.8*   INR 1.80*  --  1.71*  --   --   --   --  3.17*   PGLU  --    < >  --    < > 95 118* 103*  --     < > = values in this interval not displayed.         ASSESSMENT:  Wound 03/21/25 Coccyx (Active)   Date First Assessed/Time First Assessed: 03/21/25 2229   Location: Coccyx  Wound Description (Comments): Moisture Associated skin damage to cccyx/gluteal area      Assessments 4/8/2025 10:04 AM   Wound Image      Drainage Amount Scant   Drainage Description Serosanguineous   Wound Length (cm) 7.5 cm   Wound Width (cm) 8 cm   Wound Surface Area (cm^2) 60 cm^2   Wound Depth (cm) 0.1 cm   Wound Volume (cm^3) 6 cm^3   Margins Poorly defined   Non-staged Wound Description Partial thickness   Shahrzad-wound Assessment Blanchable erythema   Wound Odor None   Shape MASD.Clustered wounds       Wound Back Lateral;Left;Upper (Active)   No Date First Assessed or Time First Assessed found.   Location: Back  Wound Location Orientation: Lateral;Left;Upper  Wound Description (Comments): etiology unknowm.      Assessments 4/8/2025 10:00 AM   Wound Image     Drainage Amount Small   Drainage Description Serosanguineous   Wound Length (cm) 1.4 cm   Wound Width (cm) 0.6 cm   Wound Surface Area (cm^2) 0.84 cm^2   Wound Depth (cm) 0.1 cm   Wound Volume (cm^3) 0.084 cm^3   Margins Well-defined edges   Non-staged Wound Description  Partial thickness   Shahrzad-wound Assessment Dry   Wound Odor None   Shape 100% red non granular tissue       Wound 04/08/25 Back Medial (Active)   Date First Assessed/Time First Assessed: 04/08/25 0959   Primary Wound Type: Old surgical  Location: Back  Wound Location Orientation: Medial  Wound Description (Comments): Drain site      Assessments 4/8/2025 10:01 AM   Wound Image     Drainage Amount None   Wound Length (cm) 3.9 cm   Wound Width (cm) 1 cm   Wound Surface Area (cm^2) 3.9 cm^2   Wound Depth (cm) 0.1 cm   Wound Volume (cm^3) 0.39 cm^3   Margins Well-defined edges   Non-staged Wound Description Full thickness   Shahrzad-wound Assessment Dry   Wound Granulation Tissue Pink;Firm   Wound Bed Granulation (%) 25 %   Wound Bed Slough (%) 75 %   Wound Odor None          Wound Cleaning and Dressings:  1.Left  upper back  Wound cleansing:  Cleanse with saline  Wound product: Bordered foam  Dressing change frequency:  Change dressing every other day and/or as needed    2.Mid back   Wound cleansing:  Cleanse with saline  Wound product: Honey gel.Bordered foam  Dressing change frequency:  Change dressing daily and as needed    3.Coccyx/gluteal area  Wound cleansing:  Cleanse with mild soap and water or the no rinse cleansing foam  Wound product:Apply zinc oxide skin protectant daily and as needed        Miscellaneous/Additional Orders:  Offloading: Turn Q 2 hours and as needed, waffle cushion      Miscellaneous orders: Increase dietary protein intake.Dietitian or Attending physician may need to evaluate amount of protein intake/supplements depending on the kidney functions and other medical conditions    Care Summary:  Care Summary: Discussed Plan of Care at beside with patient. Patient verbally acknowledges understanding of all instructions and all questions were answered.        Recommendations:  May need to follow up with home health care and or the outpatient wound clinic if discharged to  home      Thank you for this  consultation and for allowing me to participate in the care of your patient.      Time Spent 30 Minutes.    Thank you,  Chrissy Falcon RN BSN [facility] Wound Care Team  4/8/2025  10:19 AM

## 2025-04-08 NOTE — PROGRESS NOTES
[facility] Hospitalist Progress Note     CC: Hospital Follow up    PCP: Kam Alberts MD     Subjective:     No acute events. Still having diarrhea but he is always had it all his life.  Complains of buttock pain that is relieved with [place].  Seen by wound care today.  Walked earlier.    OBJECTIVE:    Blood pressure 143/63, pulse 69, temperature 98 °F (36.7 °C), temperature source Oral, resp. rate 19, weight 199 lb 15.3 oz (90.7 kg), SpO2 96%.    Temp:  [98 °F (36.7 °C)-98.3 °F (36.8 °C)] 98 °F (36.7 °C)  Pulse:  [69-82] 69  Resp:  [17-26] 19  BP: (132-153)/(63-73) 143/63  SpO2:  [92 %-97 %] 96 %      Intake/Output:    Intake/Output Summary (Last 24 hours) at 4/8/2025 1436  Last data filed at 4/8/2025 0400  Gross per 24 hour   Intake --   Output 1000 ml   Net -1000 ml       Last 3 Weights   03/31/25 0300 199 lb 15.3 oz (90.7 kg)   03/30/25 0200 197 lb 5 oz (89.5 kg)   03/27/25 0600 193 lb 12.6 oz (87.9 kg)   03/26/25 0400 193 lb 12.6 oz (87.9 kg)   03/25/25 0600 200 lb 2.8 oz (90.8 kg)   03/24/25 0900 200 lb 3.2 oz (90.8 kg)   03/24/25 0500 203 lb 0.7 oz (92.1 kg)   03/23/25 0516 204 lb 2.3 oz (92.6 kg)   03/22/25 0637 153 lb 3.5 oz (69.5 kg)   03/20/25 1231 192 lb 15.9 oz (87.5 kg)   03/19/25 1444 193 lb (87.5 kg)   03/22/25 1113 153 lb 3.5 oz (69.5 kg)   03/07/25 1536 185 lb (83.9 kg)       Exam   Gen: No acute distress, alert and oriented x3, no focal neurologic deficits,   Heent: NC AT, mucous memb clear, neck supple  Pulm: Lungs clear, normal respiratory effort  CV: Heart with regular rate and rhythm, no peripheral edema  Abd: Abdomen soft, nontender, nondistended, bowel sounds present  MSK:expected rom, no hematoma appreciated bilaterally, 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, 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,   Skin: no rashes or lesions  Neuro: AO*3, motor intact, no sensory deficits  Psyc: appropriate mood and affect      Data Review:       Labs:     Recent Labs   Lab 04/06/25  0553 04/07/25  0607  04/08/25  0533   RBC 2.88* 2.84* 2.71*   HGB 8.4* 8.2* 7.8*   HCT 24.9* 24.7* 23.5*   MCV 86.5 87.0 86.7   MCH 29.2 28.9 28.8   MCHC 33.7 33.2 33.2   RDW 16.6 16.8 16.8   NEPRELIM 6.50 5.15 5.87   WBC 8.4 7.1 7.4   .0* 127.0* 108.0*         Recent Labs   Lab 04/06/25  0553 04/07/25  0607 04/08/25  0533   GLU 93 91 94   BUN 29* 29* 27*   CREATSERUM 1.17 1.23 1.31*   EGFRCR 67 63 58*   CA 8.6* 8.7 8.7    139 140   K 4.4 4.2 4.2    106 106   CO2 26.0 26.0 24.0       No results for input(s): \"ALT\", \"AST\", \"ALB\", \"AMYLASE\", \"LIPASE\", \"LDH\" in the last 168 hours.    Invalid input(s): \"ALPHOS\", \"TBIL\", \"DBIL\", \"TPROT\"        Imaging:  No results found.      Meds:      loperamide  2 mg Oral Every afternoon at 1400    multivitamin  1 tablet Oral Daily    amLODIPine  10 mg Oral Daily    metoprolol tartrate  25 mg Oral 2x Daily(Beta Blocker)    fluticasone propionate  1 spray Each Nare Daily    docusate sodium  100 mg Oral BID    polyethylene glycol (PEG 3350)  17 g Oral Daily    insulin aspart  1-5 Units Subcutaneous TID AC and HS           diphenoxylate-atropine    peppermint oil    hydrALAzine    ipratropium-albuterol    glucose **OR** glucose **OR** glucose-vitamin C **OR** dextrose **OR** glucose **OR** glucose **OR** glucose-vitamin C    acetaminophen **OR** HYDROcodone-acetaminophen **OR** HYDROcodone-acetaminophen    ondansetron    metoclopramide         Assessment/Plan:     Active Problems:    Pre-op testing    Thoracoabdominal aortic aneurysm (TAAA)    Bilateral leg weakness      69 year old male with a past medical history of hypertension, GERD, anxiety, renal calculi, DVT, ascending aortic dissection s/p repair, aortic dissection distal to left subclavian, infrarenal endovascular AAA repair with bilateral KYLER for hypogastric and common iliac aneurysms, mesenteric ischemia s/p SMA bypass with right external iliac artery to SMA bypass, who is presenting to the hospital for direct admission for  complex AAA repair with s/p lumbar drain.  Postoperative course complicated by HOLLIS on CKD as well as right lower extremity weakness along with hypoxic respiratory failure.      History of prior infrarenal endovascular AAA repair with bilateral KYLER for hypogastric and common iliac aneurysms  History of mesenteric ischemia s/p SMA bypass with right external iliac artery to SMA bypass  History of aortic dissection s/p repair, aortic dissection distal to left subclavian  S/p complex thoraco abdominal aortic repair 3/21  Hx of DVT   -Complicated aortic pathology with multiple repairs in the past. 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  Discussed with pharmacy, will continue argatroban drip going.  However Coumadin will be on hold today.  Follow INR.    Acute hypoxemic respiratory failure w/ threat to bodily function 2/2 possible pneumonia versus edema  Leukocytosis  -Completed 3 days of azithro (3/27-3/29), janna (3/24-3/28), now switched to cefepime per ICU (3/28-3/30)  - tx w/ iv steroids x 5 days, iv steroids completed, completed abx as above   - wean o2 as tolerated     Leukocytosis  - no new infectious complaints, likely 2/2 steroids  - trend     Right lower extremity weakness-improving  - In the setting of complex aortic repair 3/21  - Seen by neurointensivist, recommendations reviewed  - Improved overall  -- PT/OT     HOLLIS on CKD-resolved        Hypertension  -hold home HCTZ     Allergic  rhinitis  -Continue home fluticasone as needed     Short gut syndrome  - resume lamotil if diarrhea resumes  - Add Imodium per home regimen    Sacral wound  - In the setting of persistent diarrhea  - Wound care consult, recommendations reviewed    Normocytic anemia  - stable, trend     H/o dvt  - see above       Supplementary Documentation:   DVT Mechanical Prophylaxis:   SCDs, Early ambuation  DVT Pharmacologic Prophylaxis   Medication   None                Code Status: DNAR/Full Treatment  Arzola: External urinary catheter in place  Arzola Duration (in days):   Central line:            Dispo: inpatient - on argatroban until therapeutic on coumadin per vascular - dc to [facility] once cleared by heme and vascular     Dallas Green DO  Hospitalist  [facility]

## 2025-04-08 NOTE — PLAN OF CARE
Problem: CARDIOVASCULAR - ADULT  Goal: Maintains optimal cardiac output and hemodynamic stability  Description: INTERVENTIONS:- Monitor vital signs, rhythm, and trends- Monitor for bleeding, hypotension and signs of decreased cardiac output- Evaluate effectiveness of vasoactive medications to optimize hemodynamic stability- Monitor arterial and/or venous puncture sites for bleeding and/or hematoma- Assess quality of pulses, skin color and temperature- Assess for signs of decreased coronary artery perfusion - ex. Angina- Evaluate fluid balance, assess for edema, trend weights  Outcome: Progressing     Problem: HEMATOLOGIC - ADULT  Goal: Free from bleeding injury  Description: (Example usage: patient with low platelets)INTERVENTIONS:- Avoid intramuscular injections, enemas and rectal medication administration- Ensure safe mobilization of patient- Hold pressure on venipuncture sites to achieve adequate hemostasis- Assess for signs and symptoms of internal bleeding- Monitor lab trends- Patient is to report abnormal signs of bleeding to staff- Avoid use of toothpicks and dental floss- Use electric shaver for shaving- Use soft bristle tooth brush- Limit straining and forceful nose blowing  Outcome: Progressing     Problem: NEUROLOGICAL - ADULT  Goal: Achieves stable or improved neurological status  Description: INTERVENTIONS- Assess for and report changes in neurological status- Initiate measures to prevent increased intracranial pressure- Maintain blood pressure and fluid volume within ordered parameters to optimize cerebral perfusion and minimize risk of hemorrhage- Monitor temperature, glucose, and sodium. Initiate appropriate interventions as ordered  Outcome: Progressing  Goal: Achieves maximal functionality and self care  Description: INTERVENTIONS- Monitor swallowing and airway patency with patient fatigue and changes in neurological status- Encourage and assist patient to increase activity and self care with  guidance from PT/OT- Encourage visually impaired, hearing impaired and aphasic patients to use assistive/communication devices  Outcome: Progressing     Problem: PAIN - ADULT  Goal: Verbalizes/displays adequate comfort level or patient's stated pain goal  Description: INTERVENTIONS:- Encourage pt to monitor pain and request assistance- Assess pain using appropriate pain scale- Administer analgesics based on type and severity of pain and evaluate response- Implement non-pharmacological measures as appropriate and evaluate response- Consider cultural and social influences on pain and pain management- Manage/alleviate anxiety- Utilize distraction and/or relaxation techniques- Monitor for opioid side effects- Notify MD/LIP if interventions unsuccessful or patient reports new pain- Anticipate increased pain with activity and pre-medicate as appropriate  Outcome: Progressing     Problem: RESPIRATORY - ADULT  Goal: Achieves optimal ventilation and oxygenation  Description: INTERVENTIONS:- Assess for changes in respiratory status- Assess for changes in mentation and behavior- Position to facilitate oxygenation and minimize respiratory effort- Oxygen supplementation based on oxygen saturation or ABGs- Provide Smoking Cessation handout, if applicable- Encourage broncho-pulmonary hygiene including cough, deep breathe, Incentive Spirometry- Assess the need for suctioning and perform as needed- Assess and instruct to report SOB or any respiratory difficulty- Respiratory Therapy support as indicated- Manage/alleviate anxiety- Monitor for signs/symptoms of CO2 retention  Outcome: Progressing     Problem: Patient/Family Goals  Goal: Patient/Family Long Term Goal  Description: Patient's Long Term Goal: discharge to recommended facility  Interventions:- monitor O2 status  -wean from vapotherm  -IV antibiotics  -IS  -consults  -PT/OT  -tele monitoring  -labs  -medicines  - See additional Care Plan goals for specific  interventions  Outcome: Progressing  Goal: Patient/Family Short Term Goal  Description: Patient's Short Term Goal: Be comfortable   Interventions: -pain controlled  -cluster care  -needs attended   See additional Care Plan goals for specific interventions  Outcome: Progressing     Problem: Diabetes/Glucose Control  Goal: Glucose maintained within prescribed range  Description: INTERVENTIONS:- Monitor Blood Glucose as ordered- Assess for signs and symptoms of hyperglycemia and hypoglycemia- Administer ordered medications to maintain glucose within target range- Assess barriers to adequate nutritional intake and initiate nutrition consult as needed- Instruct patient on self management of diabetes  Outcome: Progressing     Problem: SAFETY ADULT - FALL  Goal: Free from fall injury  Description: INTERVENTIONS:- Assess pt frequently for physical needs- Identify cognitive and physical deficits and behaviors that affect risk of falls.- [place] fall precautions as indicated by assessment.- Educate pt/family on patient safety including physical limitations- Instruct pt to call for assistance with activity based on assessment- Modify environment to reduce risk of injury- Provide assistive devices as appropriate- Consider OT/PT consult to assist with strengthening/mobility- Encourage toileting schedule  Outcome: Progressing

## 2025-04-08 NOTE — CM/SW NOTE
DC planning.     Pt discussed with Christina osei, PMR is still approving short stay at [facility] to improve endurance. Noted PT/OT recs for Home with [facility].     Will meet with pt tomorrow for DC planning.     ISAAC Julien

## 2025-04-08 NOTE — PHYSICAL THERAPY NOTE
PHYSICAL THERAPY TREATMENT NOTE - INPATIENT    Room Number: 7613/7613-A     Session: 7     Patient remains appropriate for additional physical therapy treatment in order to progress towards goals as noted below.     Number of Visits to Meet Established Goals: 8    Presenting Problem: S/p 3/21 fenestrated abdominal aortic aneurysm repair with left carotid to subclavian transposition on  with Dr. Yanez and Dr. Oquendo and 3/20 lumbar drain  Co-Morbidities : HTN, Peripheral Neuropathy, Hx of toe amputation L great toe, mesenteric ischemia s/p SMA bypass with right external iliac artery to SMA bypass, Short gut syndrome    PHYSICAL THERAPY ASSESSMENT   Patient demonstrates good  progress this session, goals  remain in progress.      Patient is requiring supervision and contact guard assist as a result of the following impairments: decreased functional strength, decreased endurance/aerobic capacity, impaired static and dynamic balance, impaired motor planning, decreased muscular endurance, and medical status.     Patient continues to function below baseline with bed mobility, transfers, and gait.  Next session anticipate patient to progress transfers, gait, and standing prolonged periods.  Physical Therapy will continue to follow patient for duration of hospitalization.    Patient continues to benefit from continued skilled PT services. Discussed with patient today - encouraged TID ambulation with staff in [place].     PLAN DURING HOSPITALIZATION  Nursing Mobility Recommendation : 1 Assist  PT Device Recommendation: Rolling walker  PT Treatment Plan: Bed mobility, Body mechanics, Gait training, Strengthening, Transfer training, Stair training, Balance training  Frequency (Obs): 3-5x/week     CURRENT GOALS   Goal #1 Patient is able to demonstrate supine - sit EOB @ level: CGA assistance   MET Raquel 4/1     Goal #2 Patient is able to demonstrate transfers EOB to/from BSC at assistance level: CGA assistance   MET Raquel 4/1      Goal #3 Patient is able to sit at the EOB for 10 mins with CGA    Met 4/8/2025       Goal #4 Patient is able to ambulate 50 feet with assist device: walker - rolling at assistance level: moderate assistance  MET 4/7 progress to 150ft CGA     Goal #5     Goal #6     Goal Comments: Goals established on 3/22/2025  4/8/2025 all goals ongoing    SUBJECTIVE  \"Ladies this is just not happening\"    OBJECTIVE  Precautions: Bed/chair alarm (surgical boot L foot? SBP )    WEIGHT BEARING RESTRICTION  R Lower Extremity: Full Weight Bearing  L Lower Extremity: Full Weight Bearing    PAIN ASSESSMENT   Rating: Unable to rate  Location: bottom  Management Techniques: Activity promotion, Body mechanics, Repositioning    BALANCE                                                                                                                       Static Sitting: Good  Dynamic Sitting: Good           Static Standing: Fair -  Dynamic Standing: Fair -    ACTIVITY TOLERANCE                         O2 WALK       AM-PAC '6-Clicks' INPATIENT SHORT FORM - BASIC MOBILITY  How much difficulty does the patient currently have...  Patient Difficulty: Turning over in bed (including adjusting bedclothes, sheets and blankets)?: None   Patient Difficulty: Sitting down on and standing up from a chair with arms (e.g., wheelchair, bedside commode, etc.): A Little   Patient Difficulty: Moving from lying on back to sitting on the side of the bed?: None   How much help from another person does the patient currently need...   Help from Another: Moving to and from a bed to a chair (including a wheelchair)?: A Little   Help from Another: Need to walk in hospital room?: A Little   Help from Another: Climbing 3-5 steps with a railing?: A Lot     AM-PAC Score:  Raw Score: 19   Approx Degree of Impairment: 41.77%   Standardized Score (AM-PAC Scale): 45.44   CMS Modifier (G-Code): CK    FUNCTIONAL ABILITY STATUS  Gait Assessment   Functional Mobility/Gait  Assessment  Gait Assistance: Supervision  Distance (ft): 30 - 50  Assistive Device: Rolling walker  Pattern: Shuffle    Skilled Therapy Provided    Bed Mobility:  Rolling: NT   Supine<>Sit: SBA   Sit<>Supine: NT     Transfer Mobility:  Sit<>Stand: SBA   Stand<>Sit: SBA  Gait: SBA    Therapist's Comments: Discussed case with RN prior to session initiation. Pt agreeable to participation in therapy. Gait belt donned for out of bed mobility. Discussed with patient importance of continued mobility for recovery with recommendation for TID ambulation with staff and RW - pt agreeable.         MODIFIED JAMAL DYSPNEA SCALE - (SHORTNESS OF BREATH SCALE)    SCORE DESCRIPTION   0 Nothing at all   1 Very slight   2 Slight   3 Moderate   4 Somewhat severe   5 Strong or hard breathing   6    7 Very hard breathing   8    9 Very, Very Severe   10 MAX - You need to stop     Patient Education provided:    Use this scale to rate the difficulty of your breathing:  - As you would rate your pain from 0-10, you can rate your SOB from 0-10  - Goal is to stay below 7/10 with activity  - If you reach beyond 7/10, it is important to rest; stop activity and if you need to sit down to recove          Patient End of Session: Up in chair, Needs met, Call light within reach, RN aware of session/findings, All patient questions and concerns addressed    PT Session Time: 15 minutes  Gait Training: 15 minutes  Therapeutic Activity:  minutes

## 2025-04-08 NOTE — PROGRESS NOTES
175/87 was your blood pressure.    ?Should be 120/80 or ask your doctor.      SEE your primary doctor in next week or two - to have bp checked.    I think you had positional vertigo - I treated it 3 x today-   CALL Me on tues or wed next week to let me know how you are.    [phone]  Grecia     Continue anticoagulation with pharmacy recs

## 2025-04-08 NOTE — OCCUPATIONAL THERAPY NOTE
OCCUPATIONAL THERAPY TREATMENT NOTE - INPATIENT     Room Number: 7613/7613-A  Session: 6   Number of Visits to Meet Established Goals: 7    Presenting Problem: S/p 3/21 fenestrated abdominal aortic aneurysm repair with left carotid to subclavian transposition on  with Dr. Yanez and Dr. Oquendo and 3/20 lumbar drain    ASSESSMENT   Patient demonstrates fair progress this session, goals remain in progress.    Patient continues to function below baseline with toileting, bathing, lower body dressing, transfers, and energy conservation strategies.   Contributing factors to remaining limitations include decreased endurance.  Next session anticipate patient to progress toileting and lower body dressing.  Occupational Therapy will continue to follow patient for duration of hospitalization.     Patient continues to benefit from continued skilled OT services: at discharge to promote prior level of function and safety with additional support and return home with home health OT. Patient is performing toilet transfer CGA, bed mobility SBA, LB ADL min A, and UB ADL set-up level.  Changing anticipated therapy needs from gradual to HHC with assistance with LB ADL, bathing, and household tasks. PT communicated with  already.      History:  Patient is a 69 year old male admitted on 3/19/2025 with Presenting Problem: S/p 3/21 fenestrated abdominal aortic aneurysm repair with left carotid to subclavian transposition on  with Dr. Yanez and Dr. Oquendo and 3/20 lumbar drain. Co-Morbidities : HTN, Peripheral Neuropathy, Hx of toe amputation L great toe, mesenteric ischemia s/p SMA bypass with right external iliac artery to SMA bypass, Short gut syndrome      **3/25 acute respiratory failure, possible pneumonia, on vapotherm    TREATMENT SESSION:  Patient Start of Session: supine    FUNCTIONAL TRANSFER ASSESSMENT  Sit to Stand: Edge of Bed  Edge of Bed: Contact Guard Assist  Chair: Contact Guard Assist  Commode Transfer:  Not Tested    BED MOBILITY  Rolling: Contact Guard Assist  Supine to Sit : Contact Guard Assist  Sit to Supine (OT): Not Tested  Scooting: independent    BALANCE ASSESSMENT  Static Sitting: Independent  Static Standing: Contact Guard Assist    FUNCTIONAL ADL ASSESSMENT  Grooming Seated: Not Tested  UB Dressing Seated: Not Tested  LB Dressing Seated: Minimal Assist  LB Dressing Standing: Not Tested  Toileting Standing: Not Tested    O2 SATURATIONS  Oxygen Therapy  SPO2% on Room Air at Rest: 95    EDUCATION PROVIDED  Patient Education : Role of Occupational Therapy; Plan of Care; Functional Transfer Techniques; Posture/Positioning  Patient's Response to Education: Verbalized Understanding    Equipment used: rw    Therapist comments: patient initiated supine to sit supervision.   OT asked the patient about pt's post-op shoe for L foot. Patient previously told therapists that he thinks he left the shoe at home. Patient has a pair of shoes here. R shoe has customized inserts.   Supervision dynamic sitting balance while pt pressed down on his heels to duran shoes.  OT assisted with shoelaces.   CGA to stand with RW, elevated bed height.  CGA standing balance while OT assisted with gathering gown, standing.  Patient reported back and \"buttock\" pain.   Encouragement to continue to take steps in the hallway.  Refer to PT note.   96% room air.    Patient End of Session: Up in chair, Needs met, Call light within reach, All patient questions and concerns addressed, Family present    PAIN ASSESSMENT  Rating: Unable to rate  Location: \"back and buttock\"  Management Techniques: Repositioning     OBJECTIVE  Precautions: Bed/chair alarm (surgical boot L foot? SBP )    AM-PAC ‘6-Clicks’ Inpatient Daily Activity Short Form  -   Putting on and taking off regular lower body clothing?: A Little  -   Bathing (including washing, rinsing, drying)?: A Little  -   Toileting, which includes using toilet, bedpan or urinal? : A Little  -    Putting on and taking off regular upper body clothing?: None  -   Taking care of personal grooming such as brushing teeth?: None  -   Eating meals?: None    AM-PAC Score:  Score: 21  Approx Degree of Impairment: 32.79%  Standardized Score (AM-PAC Scale): 44.27    PLAN  OT Device Recommendations: TBD  OT Treatment Plan: Energy conservation/work simplification techniques, ADL training, UE strengthening/ROM, Functional transfer training, Patient/Family education  Rehab Potential : Good  Frequency: 3x/week    OT Goals:   Ongoing 4/8  ADL Goals   Patient will perform upper body dressing:  with setup-MET 4/7  Patient will perform lower body dressing:  with min assist  Patient will perform toileting: with min assist     Functional Transfer Goals  Patient will transfer from supine to sit:  with min assist -MET 4/7 UPGRADE TO SUPERVISION  Patient will transfer to toilet:  with min assist -MET 4/7 SIMULATED, UPGRADE TO SUPERVISION     UE Exercise Program Goal  Patient will be independent with bilateral AROM HEP (home exercise program).     Additional Goals  Pt will incorporate 2 energy conservation techniques into ADL.    OT Session Time: 15 minutes  Self-Care Home Management: 15 minutes

## 2025-04-08 NOTE — PLAN OF CARE
Assumed care at approx 1930.  Alert and oriented x4.  On RA, NSR on tele.  Pain controlled.  Lamotil for diarrhea,   Sacral mepilex and barrier cream applied, waffle cushion in place to protect skin.  Safety precautions maintained, patient reports needs met, call light in reach.      Problem: CARDIOVASCULAR - ADULT  Goal: Maintains optimal cardiac output and hemodynamic stability  Description: INTERVENTIONS:- Monitor vital signs, rhythm, and trends- Monitor for bleeding, hypotension and signs of decreased cardiac output- Evaluate effectiveness of vasoactive medications to optimize hemodynamic stability- Monitor arterial and/or venous puncture sites for bleeding and/or hematoma- Assess quality of pulses, skin color and temperature- Assess for signs of decreased coronary artery perfusion - ex. Angina- Evaluate fluid balance, assess for edema, trend weights  Outcome: Progressing     Problem: HEMATOLOGIC - ADULT  Goal: Free from bleeding injury  Description: (Example usage: patient with low platelets)INTERVENTIONS:- Avoid intramuscular injections, enemas and rectal medication administration- Ensure safe mobilization of patient- Hold pressure on venipuncture sites to achieve adequate hemostasis- Assess for signs and symptoms of internal bleeding- Monitor lab trends- Patient is to report abnormal signs of bleeding to staff- Avoid use of toothpicks and dental floss- Use electric shaver for shaving- Use soft bristle tooth brush- Limit straining and forceful nose blowing  Outcome: Progressing     Problem: RESPIRATORY - ADULT  Goal: Achieves optimal ventilation and oxygenation  Description: INTERVENTIONS:- Assess for changes in respiratory status- Assess for changes in mentation and behavior- Position to facilitate oxygenation and minimize respiratory effort- Oxygen supplementation based on oxygen saturation or ABGs- Provide Smoking Cessation handout, if applicable- Encourage broncho-pulmonary hygiene including cough, deep  breathe, Incentive Spirometry- Assess the need for suctioning and perform as needed- Assess and instruct to report SOB or any respiratory difficulty- Respiratory Therapy support as indicated- Manage/alleviate anxiety- Monitor for signs/symptoms of CO2 retention  Outcome: Progressing     Problem: NEUROLOGICAL - ADULT  Goal: Achieves stable or improved neurological status  Description: INTERVENTIONS- Assess for and report changes in neurological status- Initiate measures to prevent increased intracranial pressure- Maintain blood pressure and fluid volume within ordered parameters to optimize cerebral perfusion and minimize risk of hemorrhage- Monitor temperature, glucose, and sodium. Initiate appropriate interventions as ordered  Outcome: Progressing  Goal: Achieves maximal functionality and self care  Description: INTERVENTIONS- Monitor swallowing and airway patency with patient fatigue and changes in neurological status- Encourage and assist patient to increase activity and self care with guidance from PT/OT- Encourage visually impaired, hearing impaired and aphasic patients to use assistive/communication devices  Outcome: Progressing     Problem: PAIN - ADULT  Goal: Verbalizes/displays adequate comfort level or patient's stated pain goal  Description: INTERVENTIONS:- Encourage pt to monitor pain and request assistance- Assess pain using appropriate pain scale- Administer analgesics based on type and severity of pain and evaluate response- Implement non-pharmacological measures as appropriate and evaluate response- Consider cultural and social influences on pain and pain management- Manage/alleviate anxiety- Utilize distraction and/or relaxation techniques- Monitor for opioid side effects- Notify MD/LIP if interventions unsuccessful or patient reports new pain- Anticipate increased pain with activity and pre-medicate as appropriate  Outcome: Progressing     Problem: Diabetes/Glucose Control  Goal: Glucose maintained  within prescribed range  Description: INTERVENTIONS:- Monitor Blood Glucose as ordered- Assess for signs and symptoms of hyperglycemia and hypoglycemia- Administer ordered medications to maintain glucose within target range- Assess barriers to adequate nutritional intake and initiate nutrition consult as needed- Instruct patient on self management of diabetes  Outcome: Progressing     Problem: SAFETY ADULT - FALL  Goal: Free from fall injury  Description: INTERVENTIONS:- Assess pt frequently for physical needs- Identify cognitive and physical deficits and behaviors that affect risk of falls.- Hestand fall precautions as indicated by assessment.- Educate pt/family on patient safety including physical limitations- Instruct pt to call for assistance with activity based on assessment- Modify environment to reduce risk of injury- Provide assistive devices as appropriate- Consider OT/PT consult to assist with strengthening/mobility- Encourage toileting schedule  Outcome: Progressing

## 2025-04-08 NOTE — PROGRESS NOTES
[facility]   part of [facility]     Vascular Surgery Progress Note    Cy Monsalve Patient Status:  Inpatient    1955 MRN [id number]   Location [facility] 7NE-A Attending Neil Oquendo MD   Hosp Day # 19 PCP Kam Alberts MD     Objective:   Temp: 98.2 °F (36.8 °C)  Pulse: 78  Resp: 17  BP: 153/71      Events Yesterday/Overnight:  Patient is a 69 year old male, POD 18, fenestrated endovascular arch repair with fenestrated thoracoabdominal aneurysm repair, left carotid to subclavian transposition with endovascular septotomy of dissection with Dr. Oquendo and Dr. Yanez.  BP stable at 153/71.  Pain well-controlled.  On room air. WBC 7.4, hemoglobin 7.8. Platelet 108. Patient states he is receiving bed baths as it takes too much out of him to get into the shower.      Exam:  Palpable bilateral DP and PT pulses.  Bilateral groin access sites healed, no hematoma.     Impression/Plan:    Continue physical therapy.     Continue dietary intake, high protein diet encouraged.    Patient to remain admitted until coumadin is therapeutic. Coumadin on hold today, will resume when platelets are >125k.       Medications:  Current Facility-Administered Medications   Medication Dose Route Frequency    diphenoxylate-atropine (Lomotil) 2.5-0.025 MG per tab 2 tablet  2 tablet Oral QID PRN    argatroban 50 mg/50mL infusion premix  0.25 mcg/kg/min Intravenous Continuous    multivitamin (Tab-A-Immanuel/Beta Carotene) tab 1 tablet  1 tablet Oral Daily    amLODIPine (Norvasc) tab 10 mg  10 mg Oral Daily    peppermint oil liquid 1 mL  1 mL Other PRN    metoprolol tartrate (Lopressor) tab 25 mg  25 mg Oral 2x Daily(Beta Blocker)    hydrALAzine (Apresoline) 20 mg/mL injection 10 mg  10 mg Intravenous Q4H PRN    fluticasone propionate (Flonase) 50 MCG/ACT nasal suspension 1 spray  1 spray Each Nare Daily    ipratropium-albuterol (Duoneb) 0.5-2.5 (3) MG/3ML inhalation solution 3 mL  3 mL Nebulization Q4H PRN    docusate  sodium (Colace) cap 100 mg  100 mg Oral BID    polyethylene glycol (PEG 3350) (Miralax) 17 g oral packet 17 g  17 g Oral Daily    glucose (Dex4) 15 GM/59ML oral liquid 15 g  15 g Oral Q15 Min PRN    Or    glucose (Glutose) 40% oral gel 15 g  15 g Oral Q15 Min PRN    Or    glucose-vitamin C (Dex-4) chewable tab 4 tablet  4 tablet Oral Q15 Min PRN    Or    dextrose 50% injection 50 mL  50 mL Intravenous Q15 Min PRN    Or    glucose (Dex4) 15 GM/59ML oral liquid 30 g  30 g Oral Q15 Min PRN    Or    glucose (Glutose) 40% oral gel 30 g  30 g Oral Q15 Min PRN    Or    glucose-vitamin C (Dex-4) chewable tab 8 tablet  8 tablet Oral Q15 Min PRN    insulin aspart (NovoLOG) 100 Units/mL FlexPen 1-5 Units  1-5 Units Subcutaneous TID AC and HS    acetaminophen (Tylenol) tab 650 mg  650 mg Oral Q4H PRN    Or    HYDROcodone-acetaminophen (Norco) 5-325 MG per tab 1 tablet  1 tablet Oral Q4H PRN    Or    HYDROcodone-acetaminophen (Norco) 5-325 MG per tab 2 tablet  2 tablet Oral Q4H PRN    ondansetron (Zofran) 4 MG/2ML injection 4 mg  4 mg Intravenous Q6H PRN    metoclopramide (Reglan) 5 mg/mL injection 10 mg  10 mg Intravenous Q8H PRN       Laboratory/Data:    LABS:  Recent Labs   Lab 04/04/25  0556 04/04/25  0557 04/05/25  0536 04/06/25  0553 04/07/25  0607 04/08/25  0533   WBC 10.9  --  9.8 8.4 7.1 7.4   HGB 8.7*  --  8.6* 8.4* 8.2* 7.8*   MCV 85.8  --  86.4 86.5 87.0 86.7   .0*  --  115.0* 125.0* 127.0* 108.0*   INR  --  2.39* 2.12* 1.80* 1.71* 3.17*       Recent Labs   Lab 04/04/25  0557 04/05/25  0536 04/06/25  0553 04/07/25  0607 04/08/25  0533    139 139 139 140   K 4.4 4.2 4.4 4.2 4.2    106 106 106 106   CO2 27.0 27.0 26.0 26.0 24.0   BUN 35* 32* 29* 29* 27*   CREATSERUM 1.25 1.29 1.17 1.23 1.31*   GLU 93 100* 93 91 94   CA 8.4* 8.4* 8.6* 8.7 8.7   MG 2.0 1.9 1.9 1.9 1.7     Recent Labs   Lab 04/06/25  0553 04/07/25  0607 04/08/25  0533   PTP 21.1* 20.2* 32.8*   INR 1.80* 1.71* 3.17*   PTT 51.5* 51.8*  79.8*     No results for input(s): \"ALT\", \"AST\", \"ALB\", \"AMYLASE\", \"LIPASE\", \"LDH\" in the last 168 hours.    Invalid input(s): \"ALPHOS\", \"TBIL\", \"DBIL\", \"TPROT\"  No results for input(s): \"TROP\" in the last 168 hours.  No results found for: \"ANAS\", \"ROVERTO\", \"ANASCRN\"  No results for input(s): \"PCACT\", \"PSACT\", \"AT3ACT\", \"HIPAB\", \"PATHI\", \"STALA\", \"DRVVTRATIO\", \"DRVVT\", \"STACLOT\", \"CARIGG\", \"P4RW7VCIVH\", \"Y2DL7SHGEG\", \"RA\", \"HAVIGM\", \"HBCIGM\", \"HCVAB\", \"HBSAG\", \"HBCAB\", \"HBVDNAINTERP\", \"ANAS\", \"C3\", \"C4\" in the last 168 hours.    ISATU Warren  4/8/2025  8:32 AM

## 2025-04-08 NOTE — CONSULTS
[facility] Dosing Service  Warfarin (Coumadin) Subsequent Dosing    Cy Monsalve is a 69 year old patient for whom pharmacy is dosing warfarin (Coumadin). Goal INR is 2-3    Recent Labs   Lab 04/04/25  0557 04/05/25  0536 04/06/25  0553 04/07/25  0607 04/08/25  0533   INR 2.39* 2.12* 1.80* 1.71* 3.17*     Consulted by:  Dr Oquendo  Indication:  hypercoagulable condition  Potential Drug Interactions:  Argatroban - increases the INR lab measurement --- an INR of 4 on concomitant warfarin therapy is approximately equivalent to INR 2 to 2.5 on warfarin alone   Other Anticoagulants:  argatroban gtt for HIT  Home regimen (if applicable):  warfarin 6mg Fri and 4mg rest of week     Inpatient Dosing History:     Date 3/31 4/1 4/2 4/3 4/4 4/5 4/6   INR 1.34 1.62 3.28 3.01 2.39 2.12 1.80   Coumadin dose 7.5mg Held + argatroban started held held held held held   Platelet count 95.7 91.0 82.0 88.0 115.0 115.0 125.0                                                                              Date 4/7 4/8              INR 1.71 3.17              Coumadin dose held  held              Platelet count 127  108                   Platelet Count::    Lab Results   Component Value Date    .0 (L) 04/08/2025    .0 (L) 04/07/2025    .0 (L) 04/06/2025           Based on above -  1.  For today, Hold warfarin (COUMADIN) dose. Warfarin once platelets over 125 K   2   PT/INR ordered daily while on warfarin  3.  Pharmacy will continue to follow.  We appreciate the opportunity to assist in the care of this patient.    Anya Soares [facility]  4/8/2025  7:45 AM

## 2025-04-08 NOTE — CONSULTS
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     Goal PTT is 46-93     PTT   Date Value Ref Range Status   04/08/2025 79.8 (H) 23.0 - 36.0 seconds Final     Comment:       The aPTT Heparin Therapeutic Range is approximately 65- 104 seconds. The therapeutic range has been validated against 0.3-0.7 heparin anti-Xa units/mL.     Elevations of the aPTT in patients not receiving anticoagulant therapy (Heparin, etc.), may be seen in Factor deficiency, vitamin K deficiency, factor inhibitors, liver disease, etc.   Clinical correlation is recommended.            INR   Date Value Ref Range Status   04/08/2025 3.17 (H) 0.80 - 1.20 Final     Comment:     Therapeutic INR range for patients on warfarin:  2.0-3.0 for most medical conditions and surgical prophylaxis   2.5-3.5 for mechanical heart valves and recurrent thromboembolism    Direct thrombin inhibitors (e.g. argatroban, bivalirudin), factor Xa inhibitors (e.g. apixaban, rivaroxaban), and conditions such as coagulation factor deficiency, vitamin K deficiency, and liver disease will prolong the prothrombin time/INR.    Unfractionated heparin and low molecular weight heparin do not affect the prothrombin time/INR up to a concentration of 1 IU/mL and 1.5 IU/mL, respectively.         Heparin Induced Plt Antibody   Date Value Ref Range Status   03/31/2025 Reactive- Confirmed (A) Negative Final       Current dose is 0.25mcg/kg/min    Based on PTT no dose change required     Will recheck PTT with AM labs and adjust based on result for goal PTT of 46-93.     We will continue to follow and appreciate the opportunity to assist in the care of this patient.    Thank you,    Anya Soares, [facility]  4/8/2025 7:41 AM

## 2025-04-09 LAB
ANION GAP SERPL CALC-SCNC: 8 MMOL/L (ref 0–18)
APTT PPP: 51.5 SECONDS (ref 23–36)
BASOPHILS # BLD AUTO: 0.02 X10(3) UL (ref 0–0.2)
BASOPHILS NFR BLD AUTO: 0.4 %
BUN BLD-MCNC: 26 MG/DL (ref 9–23)
CALCIUM BLD-MCNC: 8.7 MG/DL (ref 8.7–10.6)
CHLORIDE SERPL-SCNC: 104 MMOL/L (ref 98–112)
CO2 SERPL-SCNC: 26 MMOL/L (ref 21–32)
CREAT BLD-MCNC: 1.39 MG/DL (ref 0.7–1.3)
EGFRCR SERPLBLD CKD-EPI 2021: 54 ML/MIN/1.73M2 (ref 60–?)
EOSINOPHIL # BLD AUTO: 0.02 X10(3) UL (ref 0–0.7)
EOSINOPHIL NFR BLD AUTO: 0.4 %
ERYTHROCYTE [DISTWIDTH] IN BLOOD BY AUTOMATED COUNT: 17.2 %
GLUCOSE BLD-MCNC: 109 MG/DL (ref 70–99)
GLUCOSE BLD-MCNC: 135 MG/DL (ref 70–99)
GLUCOSE BLD-MCNC: 150 MG/DL (ref 70–99)
GLUCOSE BLD-MCNC: 87 MG/DL (ref 70–99)
GLUCOSE BLD-MCNC: 89 MG/DL (ref 70–99)
HCT VFR BLD AUTO: 23.9 % (ref 39–53)
HGB BLD-MCNC: 8.1 G/DL (ref 13–17.5)
IMM GRANULOCYTES # BLD AUTO: 0.02 X10(3) UL (ref 0–1)
IMM GRANULOCYTES NFR BLD: 0.4 %
INR BLD: 2.36 (ref 0.8–1.2)
LYMPHOCYTES # BLD AUTO: 0.48 X10(3) UL (ref 1–4)
LYMPHOCYTES NFR BLD AUTO: 8.6 %
MAGNESIUM SERPL-MCNC: 1.8 MG/DL (ref 1.6–2.6)
MCH RBC QN AUTO: 29.8 PG (ref 26–34)
MCHC RBC AUTO-ENTMCNC: 33.9 G/DL (ref 31–37)
MCV RBC AUTO: 87.9 FL (ref 80–100)
MONOCYTES # BLD AUTO: 0.46 X10(3) UL (ref 0.1–1)
MONOCYTES NFR BLD AUTO: 8.2 %
NEUTROPHILS # BLD AUTO: 4.6 X10 (3) UL (ref 1.5–7.7)
NEUTROPHILS # BLD AUTO: 4.6 X10(3) UL (ref 1.5–7.7)
NEUTROPHILS NFR BLD AUTO: 82 %
OSMOLALITY SERPL CALC.SUM OF ELEC: 290 MOSM/KG (ref 275–295)
PLATELET # BLD AUTO: 95 10(3)UL (ref 150–450)
PLATELETS.RETICULATED NFR BLD AUTO: 2.9 % (ref 0–7)
POTASSIUM SERPL-SCNC: 4.3 MMOL/L (ref 3.5–5.1)
PROTHROMBIN TIME: 26 SECONDS (ref 11.6–14.8)
RBC # BLD AUTO: 2.72 X10(6)UL (ref 3.8–5.8)
SODIUM SERPL-SCNC: 138 MMOL/L (ref 136–145)
WBC # BLD AUTO: 5.6 X10(3) UL (ref 4–11)

## 2025-04-09 RX ORDER — MAGNESIUM OXIDE 400 MG/1
400 TABLET ORAL ONCE
Status: COMPLETED | OUTPATIENT
Start: 2025-04-09 | End: 2025-04-09

## 2025-04-09 NOTE — PROGRESS NOTES
[facility] Pharmacy Dosing Service  Argatroban Subsequent Dosing       Cy Monsalve is a 69 year old patient on argatroban for HIT .    Goal PTT is 46-93     PTT   Date Value Ref Range Status   04/09/2025 51.5 (H) 23.0 - 36.0 seconds Final     Comment:       The aPTT Heparin Therapeutic Range is approximately 65- 104 seconds. The therapeutic range has been validated against 0.3-0.7 heparin anti-Xa units/mL.     Elevations of the aPTT in patients not receiving anticoagulant therapy (Heparin, etc.), may be seen in Factor deficiency, vitamin K deficiency, factor inhibitors, liver disease, etc.   Clinical correlation is recommended.            INR   Date Value Ref Range Status   04/09/2025 2.36 (H) 0.80 - 1.20 Final     Comment:     Therapeutic INR range for patients on warfarin:  2.0-3.0 for most medical conditions and surgical prophylaxis   2.5-3.5 for mechanical heart valves and recurrent thromboembolism    Direct thrombin inhibitors (e.g. argatroban, bivalirudin), factor Xa inhibitors (e.g. apixaban, rivaroxaban), and conditions such as coagulation factor deficiency, vitamin K deficiency, and liver disease will prolong the prothrombin time/INR.    Unfractionated heparin and low molecular weight heparin do not affect the prothrombin time/INR up to a concentration of 1 IU/mL and 1.5 IU/mL, respectively.         Heparin Induced Plt Antibody   Date Value Ref Range Status   03/31/2025 Reactive- Confirmed (A) Negative Final       Current dose is 0.25mcg/kg/min    Based on PTT will continue current dose     Will recheck PTT with AM labs and adjust based on result for goal PTT of 46-93.     We will continue to follow and appreciate the opportunity to assist in the care of this patient.    Thank you,    Claritza Elise, PharmD  4/9/2025 2:13 PM

## 2025-04-09 NOTE — PROGRESS NOTES
[facility]osing [facility]  Warfarin (Coumadin) Subsequent Dosing    Cy Monsalve is a 69 year old patient for whom pharmacy is dosing warfarin (Coumadin). Goal INR is 2-3 however, while on argatroban goal INR is >4    Recent Labs   Lab 04/05/25  0536 04/06/25  0553 04/07/25  0607 04/08/25  0533 04/09/25  0528   INR 2.12* 1.80* 1.71* 3.17* 2.36*       Consulted by:  Dr. Oquendo and Dr. Green  Indication:  atrial fibrillation, HIT  Potential Drug Interactions:  argatroban  Other Anticoagulants:  argatroban  Home regimen:  warfarin 6 mg on Fridays and 4 mg all other days of the week    Inpatient Dosing History:    Date 3/30 3/31 4/1 4/2 4/3 4/4   INR - 1.34 1.62 3.28 3.01 2.39   Coumadin dose 7.5 mg 7.5 mg Argatroban started - - -   Platelet Count 81 86 91 82 88 115     Date 4/5 4/6 4/7 4/8 4/9    INR 2.12 1.8 1.71 3.17 2.36    Coumadin dose - - 7.5 mg -     Platelet Count 115 125 127 108 95            Based on above -  1.  For today, Hold warfarin (COUMADIN) dose until platelet count >125k.  2   PT/INR ordered daily while on warfarin  3.  Pharmacy will continue to follow.  We appreciate the opportunity to assist in the care of this patient.    Claritza Elise, PharmD  4/9/2025  2:16 PM

## 2025-04-09 NOTE — PROGRESS NOTES
[facility] HEMATOLOGY ONCOLOGY  Progress Note    Cy Monsalve Patient Status:  Inpatient    1955 MRN [id number]   Location [facility] 7NE-A Attending Neil Oquendo MD   Hosp Day # 20 PCP Kam Alberts MD     ADMISSION DATE AND TIME: 3/19/2025  1:15 PM    ADMIT DIAGNOSIS: Abdominal aortic aneurysm (AAA) [I71.40]    SUBJECTIVE:    No events.       OBJECTIVE:  Blood pressure 147/67, pulse 84, temperature 98.3 °F (36.8 °C), temperature source Oral, resp. rate 18, weight 199 lb 15.3 oz (90.7 kg), SpO2 94%.    PHYSICAL EXAM:  General: afebrile, alert and oriented x 3, pleasant  No distress    LABS:  Recent Results (from the past 24 hours)   POCT Glucose    Collection Time: 25  8:55 PM   Result Value Ref Range    POC Glucose 105 (H) 70 - 99 mg/dL   Prothrombin Time (PT)    Collection Time: 25  5:28 AM   Result Value Ref Range    PT 26.0 (H) 11.6 - 14.8 seconds    INR 2.36 (H) 0.80 - 1.20   PTT, Activated    Collection Time: 25  5:28 AM   Result Value Ref Range    PTT 51.5 (H) 23.0 - 36.0 seconds   Magnesium    Collection Time: 25  5:28 AM   Result Value Ref Range    Magnesium 1.8 1.6 - 2.6 mg/dL   CBC With Differential With Platelet    Collection Time: 25  5:28 AM   Result Value Ref Range    WBC 5.6 4.0 - 11.0 x10(3) uL    RBC 2.72 (L) 3.80 - 5.80 x10(6)uL    HGB 8.1 (L) 13.0 - 17.5 g/dL    HCT 23.9 (L) 39.0 - 53.0 %    PLT 95.0 (L) 150.0 - 450.0 10(3)uL    Immature Platelet Fraction 2.9 0.0 - 7.0 %    MCV 87.9 80.0 - 100.0 fL    MCH 29.8 26.0 - 34.0 pg    MCHC 33.9 31.0 - 37.0 g/dL    RDW 17.2 %    Neutrophil Absolute Prelim 4.60 1.50 - 7.70 x10 (3) uL    Neutrophil Absolute 4.60 1.50 - 7.70 x10(3) uL    Lymphocyte Absolute 0.48 (L) 1.00 - 4.00 x10(3) uL    Monocyte Absolute 0.46 0.10 - 1.00 x10(3) uL    Eosinophil Absolute 0.02 0.00 - 0.70 x10(3) uL    Basophil Absolute 0.02 0.00 - 0.20 x10(3) uL    Immature Granulocyte Absolute 0.02 0.00 - 1.00 x10(3)  uL    Neutrophil % 82.0 %    Lymphocyte % 8.6 %    Monocyte % 8.2 %    Eosinophil % 0.4 %    Basophil % 0.4 %    Immature Granulocyte % 0.4 %   Basic Metabolic Panel (8)    Collection Time: 04/09/25  5:28 AM   Result Value Ref Range    Glucose 87 70 - 99 mg/dL    Sodium 138 136 - 145 mmol/L    Potassium 4.3 3.5 - 5.1 mmol/L    Chloride 104 98 - 112 mmol/L    CO2 26.0 21.0 - 32.0 mmol/L    Anion Gap 8 0 - 18 mmol/L    BUN 26 (H) 9 - 23 mg/dL    Creatinine 1.39 (H) 0.70 - 1.30 mg/dL    Calcium, Total 8.7 8.7 - 10.6 mg/dL    Calculated Osmolality 290 275 - 295 mOsm/kg    eGFR-Cr 54 (L) >=60 mL/min/1.73m2   Scan slide    Collection Time: 04/09/25  5:28 AM   Result Value Ref Range    Slide Review       Platelets reviewed on smear. No giant platelets or platelet clumps observed.   POCT Glucose    Collection Time: 04/09/25  5:39 AM   Result Value Ref Range    POC Glucose 89 70 - 99 mg/dL   POCT Glucose    Collection Time: 04/09/25 11:35 AM   Result Value Ref Range    POC Glucose 135 (H) 70 - 99 mg/dL   POCT Glucose    Collection Time: 04/09/25  4:15 PM   Result Value Ref Range    POC Glucose 150 (H) 70 - 99 mg/dL     Platelet Count::    Lab Results   Component Value Date    PLT 95.0 (L) 04/09/2025    .0 (L) 04/08/2025    .0 (L) 04/07/2025        CULTURES  Hospital Encounter on 03/19/25   1. Blood Culture     Status: None    Collection Time: 03/24/25 10:05 AM    Specimen: Blood,peripheral   Result Value Ref Range    Blood Culture Result No Growth 5 Days N/A       IMAGING:    No results found.      MEDICATIONS:  Medications reviewed.     apixaban  5 mg Oral BID    loperamide  2 mg Oral Every afternoon at 1400    multivitamin  1 tablet Oral Daily    amLODIPine  10 mg Oral Daily    metoprolol tartrate  25 mg Oral 2x Daily(Beta Blocker)    fluticasone propionate  1 spray Each Nare Daily    docusate sodium  100 mg Oral BID    polyethylene glycol (PEG 3350)  17 g Oral Daily    insulin aspart  1-5 Units Subcutaneous  TID AC and HS           diphenoxylate-atropine    peppermint oil    hydrALAzine    ipratropium-albuterol    glucose **OR** glucose **OR** glucose-vitamin C **OR** dextrose **OR** glucose **OR** glucose **OR** glucose-vitamin C    acetaminophen **OR** HYDROcodone-acetaminophen **OR** HYDROcodone-acetaminophen    ondansetron    metoclopramide    ASSESSMENT AND PLAN:     Active Problems:    Pre-op testing    Thoracoabdominal aortic aneurysm (TAAA)    Bilateral leg weakness        Cy Monsalve is a 69 year old male with 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     Status post AAA repair with complicated postop course with hypoxia and HOLLIS.  Currently on the floor.     Thrombocytopenia  Platelets were normal until 3/22/2025  Thereafter they had declined into the 70-90 while on heparin  Heparin-induced platelet antibody was positive.    SUNDEEP negative    Discussed with vascular and IM teams.   Ok to discharge on apixaban 5mg bid.   (This would be appropriate treatment for possible hit).     Platelet count adequate.

## 2025-04-09 NOTE — PROGRESS NOTES
[facility] Hospitalist Progress Note     CC: Hospital Follow up    PCP: Kam Alberts MD     Subjective:     No acute events. Diarrhea improved, still with some pain intermittently.      OBJECTIVE:    Blood pressure 131/60, pulse 75, temperature 99.4 °F (37.4 °C), temperature source Oral, resp. rate 20, weight 199 lb 15.3 oz (90.7 kg), SpO2 94%.    Temp:  [98.1 °F (36.7 °C)-99.4 °F (37.4 °C)] 99.4 °F (37.4 °C)  Pulse:  [70-82] 75  Resp:  [18-20] 20  BP: (117-142)/(57-80) 131/60  SpO2:  [90 %-95 %] 94 %      Intake/Output:    Intake/Output Summary (Last 24 hours) at 4/9/2025 1530  Last data filed at 4/9/2025 1200  Gross per 24 hour   Intake 720 ml   Output 750 ml   Net -30 ml       Last 3 Weights   03/31/25 0300 199 lb 15.3 oz (90.7 kg)   03/30/25 0200 197 lb 5 oz (89.5 kg)   03/27/25 0600 193 lb 12.6 oz (87.9 kg)   03/26/25 0400 193 lb 12.6 oz (87.9 kg)   03/25/25 0600 200 lb 2.8 oz (90.8 kg)   03/24/25 0900 200 lb 3.2 oz (90.8 kg)   03/24/25 0500 203 lb 0.7 oz (92.1 kg)   03/23/25 0516 204 lb 2.3 oz (92.6 kg)   03/22/25 0637 153 lb 3.5 oz (69.5 kg)   03/20/25 1231 192 lb 15.9 oz (87.5 kg)   03/19/25 1444 193 lb (87.5 kg)   03/22/25 1113 153 lb 3.5 oz (69.5 kg)   03/07/25 1536 185 lb (83.9 kg)       Exam   Gen: No acute distress, alert and oriented x3, no focal neurologic deficits,   Heent: NC AT, mucous memb clear, neck supple  Pulm: Lungs clear, normal respiratory effort  CV: Heart with regular rate and rhythm, no peripheral edema  Abd: Abdomen soft, nontender, nondistended, bowel sounds present  MSK:expected rom, no hematoma appreciated bilaterally, 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, 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,   Skin: no rashes or lesions  Neuro: AO*3, motor intact, no sensory deficits  Psyc: appropriate mood and affect      Data Review:       Labs:     Recent Labs   Lab 04/07/25  0607 04/08/25  0533 04/09/25  0528   RBC 2.84* 2.71* 2.72*   HGB 8.2* 7.8* 8.1*   HCT 24.7* 23.5* 23.9*   MCV 87.0 86.7  87.9   MCH 28.9 28.8 29.8   MCHC 33.2 33.2 33.9   RDW 16.8 16.8 17.2   NEPRELIM 5.15 5.87 4.60   WBC 7.1 7.4 5.6   .0* 108.0* 95.0*         Recent Labs   Lab 04/07/25  0607 04/08/25  0533 04/09/25  0528   GLU 91 94 87   BUN 29* 27* 26*   CREATSERUM 1.23 1.31* 1.39*   EGFRCR 63 58* 54*   CA 8.7 8.7 8.7    140 138   K 4.2 4.2 4.3    106 104   CO2 26.0 24.0 26.0       No results for input(s): \"ALT\", \"AST\", \"ALB\", \"AMYLASE\", \"LIPASE\", \"LDH\" in the last 168 hours.    Invalid input(s): \"ALPHOS\", \"TBIL\", \"DBIL\", \"TPROT\"        Imaging:  No results found.      Meds:      loperamide  2 mg Oral Every afternoon at 1400    multivitamin  1 tablet Oral Daily    amLODIPine  10 mg Oral Daily    metoprolol tartrate  25 mg Oral 2x Daily(Beta Blocker)    fluticasone propionate  1 spray Each Nare Daily    docusate sodium  100 mg Oral BID    polyethylene glycol (PEG 3350)  17 g Oral Daily    insulin aspart  1-5 Units Subcutaneous TID AC and HS      argatroban 0.25 mcg/kg/min (04/09/25 0722)         diphenoxylate-atropine    peppermint oil    hydrALAzine    ipratropium-albuterol    glucose **OR** glucose **OR** glucose-vitamin C **OR** dextrose **OR** glucose **OR** glucose **OR** glucose-vitamin C    acetaminophen **OR** HYDROcodone-acetaminophen **OR** HYDROcodone-acetaminophen    ondansetron    metoclopramide         Assessment/Plan:     Active Problems:    Pre-op testing    Thoracoabdominal aortic aneurysm (TAAA)    Bilateral leg weakness      69 year old male with a past medical history of hypertension, GERD, anxiety, renal calculi, DVT, ascending aortic dissection s/p repair, 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  Postoperative  course complicated by HOLLIS on CKD as well as right lower extremity weakness along with hypoxic respiratory failure.      History of prior infrarenal endovascular AAA repair with bilateral KYLER for hypogastric and common iliac aneurysms  History of mesenteric ischemia s/p SMA bypass with right external iliac artery to SMA bypass  History of aortic dissection s/p repair, aortic dissection distal to left subclavian  S/p complex thoraco abdominal aortic repair 3/21  Hx of DVT   -Complicated aortic pathology with multiple repairs in the past. 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  Discussed with pharmacy, will continue argatroban drip going.  However Coumadin will be on hold today.  Follow INR.  -Discussed with hematology and vascular surgery at great length.  SUNDEEP is negative however HIT antibodies positive.  Patient will need to be bridged if on Coumadin.  Reviewed chart from outpatient.  Following with hematologist outpatient, had right gastrocnemius DVT in 2024 and was resumed back on Coumadin.  Prior to that he had a mesenteric ischemia and was started on Coumadin which she continued for 8 months.  He is also positive for heterozygous prothrombin gene mutation.  Okay to switch to Eliquis from vascular and hematology standpoint. Patient has likely bioprosthetic aortic valve.   Discussed with cardiology who will see the patient again and determine if he can be switched to an alternative agent other than Coumadin.    Acute hypoxemic respiratory failure w/ threat to bodily function 2/2 possible pneumonia versus edema  Leukocytosis  -Completed 3 days of azithro (3/27-3/29), janna (3/24-3/28), now switched to cefepime per ICU (3/28-3/30)  - tx w/ iv steroids x 5 days, iv steroids completed, completed abx as above   - wean o2 as tolerated     Leukocytosis-resolved      Right lower extremity weakness-resolved  - In the setting of complex aortic repair 3/21  -- PT/OT     HOLLIS on CKD-resolved        Hypertension  -hold home HCTZ     Allergic rhinitis  -Continue home fluticasone as needed     Short gut syndrome  - resume lamotil if diarrhea resumes  - Add Imodium per home regimen    Sacral wound  - In the setting of persistent diarrhea  - Wound care consult, recommendations reviewed    Normocytic anemia  - stable, trend     H/o dvt  - see above       Supplementary Documentation:   DVT Mechanical Prophylaxis:   SCDs, Early ambuation  DVT Pharmacologic Prophylaxis   Medication    argatroban 50 mg/50mL infusion premix                Code Status: DNAR/Full Treatment  Arzola: External urinary catheter in place  Arzola Duration (in days):   Central line:            Dispo: inpatient - on argatroban until therapeutic on coumadin per vascular - dc to [facility] once cleared by heme and vascular     Dallas Green DO  Hospitalist  [facility]

## 2025-04-09 NOTE — PLAN OF CARE
A/O x4  VSS. RA  Q4 neurovascular checks  Argatroban gtt infusing per order  Pulses per doppler  Dressings in place, CDI  Regular diet, poor appetite  PICC in place  Ext cath in place  Up w/ walker x1  Norco for pain  Safety measures in place, call light w/in reach'  Discussed plan of care

## 2025-04-09 NOTE — CM/SW NOTE
BENITO met with pt at bedside, he reports he feels \"mellow\" today. Reviewed progress with therapy. He would still like to go to [facility] for short stay at DC if continues to be recommended.    BENITO to remain available for DC planning. DC plan is for [facility] [place].     ISAAC Julien

## 2025-04-09 NOTE — PROGRESS NOTES
[facility]   part of [facility]     Vascular Surgery Progress Note    Cy Monsalve Patient Status:  Inpatient    1955 MRN [id number]   Location [facility] 7NE-A Attending Neil Oquendo MD   Hosp Day # 20 PCP Kam Alberts MD     Objective:   Temp: 98.4 °F (36.9 °C)  Pulse: 77  Resp: 18  BP: 141/66      Events Yesterday/Overnight:  Patient is a 69 year old male, POD 19, fenestrated endovascular arch repair with fenestrated thoracoabdominal aneurysm repair, left carotid to subclavian transposition with endovascular septotomy of dissection with Dr. Oquendo and Dr. Yanez.  BP stable at 141/66.  Pain well-controlled this morning, patient was experiencing back and abdominal pain yesterday.  On room air. WBC 5.6, hemoglobin 8.1. Platelet count 95. INR 2.35.    Exam:  Palpable bilateral DP and PT pulses.  Bilateral groin access sites healed, no hematoma.     Impression/Plan:  Continue physical therapy.     Continue dietary intake, high protein diet encouraged.     Patient to remain admitted until coumadin is therapeutic. Per hematology, coumadin on hold will resume when platelets are >125k.       Medications:  Current Hospital Medications[1]    Laboratory/Data:    LABS:  Recent Labs   Lab 25  0536 25  0553 25  0607 25  0533 25  0528   WBC 9.8 8.4 7.1 7.4 5.6   HGB 8.6* 8.4* 8.2* 7.8* 8.1*   MCV 86.4 86.5 87.0 86.7 87.9   .0* 125.0* 127.0* 108.0* 95.0*   INR 2.12* 1.80* 1.71* 3.17* 2.36*       Recent Labs   Lab 25  0536 25  0553 25  0607 25  0533 25  0528    139 139 140 138   K 4.2 4.4 4.2 4.2 4.3    106 106 106 104   CO2 27.0 26.0 26.0 24.0 26.0   BUN 32* 29* 29* 27* 26*   CREATSERUM 1.29 1.17 1.23 1.31* 1.39*   * 93 91 94 87   CA 8.4* 8.6* 8.7 8.7 8.7   MG 1.9 1.9 1.9 1.7 1.8     Recent Labs   Lab 25  0607 25  0533 25  0528   PTP 20.2* 32.8* 26.0*   INR 1.71* 3.17* 2.36*   PTT 51.8* 79.8*  51.5*     No results for input(s): \"ALT\", \"AST\", \"ALB\", \"AMYLASE\", \"LIPASE\", \"LDH\" in the last 168 hours.    Invalid input(s): \"ALPHOS\", \"TBIL\", \"DBIL\", \"TPROT\"  No results for input(s): \"TROP\" in the last 168 hours.  No results found for: \"ANAS\", \"ROVERTO\", \"ANASCRN\"  No results for input(s): \"PCACT\", \"PSACT\", \"AT3ACT\", \"HIPAB\", \"PATHI\", \"STALA\", \"DRVVTRATIO\", \"DRVVT\", \"STACLOT\", \"CARIGG\", \"[id number]\", \"[id number]\", \"RA\", \"HAVIGM\", \"HBCIGM\", \"HCVAB\", \"HBSAG\", \"HBCAB\", \"HBVDNAINTERP\", \"ANAS\", \"C3\", \"C4\" in the last 168 hours.    ISATU Warren  4/9/2025  8:43 AM         [1]   Current Facility-Administered Medications   Medication Dose Route Frequency    loperamide (Imodium) cap 2 mg  2 mg Oral Every afternoon at 1400    argatroban 50 mg/50mL infusion premix  0.25 mcg/kg/min Intravenous Continuous    diphenoxylate-atropine (Lomotil) 2.5-0.025 MG per tab 2 tablet  2 tablet Oral QID PRN    multivitamin (Tab-A-Immanuel/Beta Carotene) tab 1 tablet  1 tablet Oral Daily    amLODIPine (Norvasc) tab 10 mg  10 mg Oral Daily    peppermint oil liquid 1 mL  1 mL Other PRN    metoprolol tartrate (Lopressor) tab 25 mg  25 mg Oral 2x Daily(Beta Blocker)    hydrALAzine (Apresoline) 20 mg/mL injection 10 mg  10 mg Intravenous Q4H PRN    fluticasone propionate (Flonase) 50 MCG/ACT nasal suspension 1 spray  1 spray Each Nare Daily    ipratropium-albuterol (Duoneb) 0.5-2.5 (3) MG/3ML inhalation solution 3 mL  3 mL Nebulization Q4H PRN    docusate sodium (Colace) cap 100 mg  100 mg Oral BID    polyethylene glycol (PEG 3350) (Miralax) 17 g oral packet 17 g  17 g Oral Daily    glucose (Dex4) 15 GM/59ML oral liquid 15 g  15 g Oral Q15 Min PRN    Or    glucose (Glutose) 40% oral gel 15 g  15 g Oral Q15 Min PRN    Or    glucose-vitamin C (Dex-4) chewable tab 4 tablet  4 tablet Oral Q15 Min PRN    Or    dextrose 50% injection 50 mL  50 mL Intravenous Q15 Min PRN    Or    glucose (Dex4) 15 GM/59ML oral liquid 30 g  30 g Oral Q15 Min PRN    Or     glucose (Glutose) 40% oral gel 30 g  30 g Oral Q15 Min PRN    Or    glucose-vitamin C (Dex-4) chewable tab 8 tablet  8 tablet Oral Q15 Min PRN    insulin aspart (NovoLOG) 100 Units/mL FlexPen 1-5 Units  1-5 Units Subcutaneous TID AC and HS    acetaminophen (Tylenol) tab 650 mg  650 mg Oral Q4H PRN    Or    HYDROcodone-acetaminophen (Norco) 5-325 MG per tab 1 tablet  1 tablet Oral Q4H PRN    Or    HYDROcodone-acetaminophen (Norco) 5-325 MG per tab 2 tablet  2 tablet Oral Q4H PRN    ondansetron (Zofran) 4 MG/2ML injection 4 mg  4 mg Intravenous Q6H PRN    metoclopramide (Reglan) 5 mg/mL injection 10 mg  10 mg Intravenous Q8H PRN

## 2025-04-09 NOTE — PLAN OF CARE
Problem: CARDIOVASCULAR - ADULT  Goal: Maintains optimal cardiac output and hemodynamic stability  Description: INTERVENTIONS:- Monitor vital signs, rhythm, and trends- Monitor for bleeding, hypotension and signs of decreased cardiac output- Evaluate effectiveness of vasoactive medications to optimize hemodynamic stability- Monitor arterial and/or venous puncture sites for bleeding and/or hematoma- Assess quality of pulses, skin color and temperature- Assess for signs of decreased coronary artery perfusion - ex. Angina- Evaluate fluid balance, assess for edema, trend weights  Outcome: Progressing     Problem: HEMATOLOGIC - ADULT  Goal: Free from bleeding injury  Description: (Example usage: patient with low platelets)INTERVENTIONS:- Avoid intramuscular injections, enemas and rectal medication administration- Ensure safe mobilization of patient- Hold pressure on venipuncture sites to achieve adequate hemostasis- Assess for signs and symptoms of internal bleeding- Monitor lab trends- Patient is to report abnormal signs of bleeding to staff- Avoid use of toothpicks and dental floss- Use electric shaver for shaving- Use soft bristle tooth brush- Limit straining and forceful nose blowing  Outcome: Progressing     Problem: NEUROLOGICAL - ADULT  Goal: Achieves stable or improved neurological status  Description: INTERVENTIONS- Assess for and report changes in neurological status- Initiate measures to prevent increased intracranial pressure- Maintain blood pressure and fluid volume within ordered parameters to optimize cerebral perfusion and minimize risk of hemorrhage- Monitor temperature, glucose, and sodium. Initiate appropriate interventions as ordered  Outcome: Progressing  Goal: Achieves maximal functionality and self care  Description: INTERVENTIONS- Monitor swallowing and airway patency with patient fatigue and changes in neurological status- Encourage and assist patient to increase activity and self care with  guidance from PT/OT- Encourage visually impaired, hearing impaired and aphasic patients to use assistive/communication devices  Outcome: Progressing     Problem: PAIN - ADULT  Goal: Verbalizes/displays adequate comfort level or patient's stated pain goal  Description: INTERVENTIONS:- Encourage pt to monitor pain and request assistance- Assess pain using appropriate pain scale- Administer analgesics based on type and severity of pain and evaluate response- Implement non-pharmacological measures as appropriate and evaluate response- Consider cultural and social influences on pain and pain management- Manage/alleviate anxiety- Utilize distraction and/or relaxation techniques- Monitor for opioid side effects- Notify MD/LIP if interventions unsuccessful or patient reports new pain- Anticipate increased pain with activity and pre-medicate as appropriate  Outcome: Progressing     Problem: RESPIRATORY - ADULT  Goal: Achieves optimal ventilation and oxygenation  Description: INTERVENTIONS:- Assess for changes in respiratory status- Assess for changes in mentation and behavior- Position to facilitate oxygenation and minimize respiratory effort- Oxygen supplementation based on oxygen saturation or ABGs- Provide Smoking Cessation handout, if applicable- Encourage broncho-pulmonary hygiene including cough, deep breathe, Incentive Spirometry- Assess the need for suctioning and perform as needed- Assess and instruct to report SOB or any respiratory difficulty- Respiratory Therapy support as indicated- Manage/alleviate anxiety- Monitor for signs/symptoms of CO2 retention  Outcome: Progressing     Problem: Patient/Family Goals  Goal: Patient/Family Long Term Goal  Description: Patient's Long Term Goal: discharge to recommended facility  Interventions:- monitor O2 status  -wean from vapotherm  -IV antibiotics  -IS  -consults  -PT/OT  -tele monitoring  -labs  -medicines  - See additional Care Plan goals for specific  interventions  Outcome: Progressing  Goal: Patient/Family Short Term Goal  Description: Patient's Short Term Goal: Be comfortable   Interventions: -pain controlled  -cluster care  -needs attended   See additional Care Plan goals for specific interventions  Outcome: Progressing     Problem: Diabetes/Glucose Control  Goal: Glucose maintained within prescribed range  Description: INTERVENTIONS:- Monitor Blood Glucose as ordered- Assess for signs and symptoms of hyperglycemia and hypoglycemia- Administer ordered medications to maintain glucose within target range- Assess barriers to adequate nutritional intake and initiate nutrition consult as needed- Instruct patient on self management of diabetes  Outcome: Progressing     Problem: SAFETY ADULT - FALL  Goal: Free from fall injury  Description: INTERVENTIONS:- Assess pt frequently for physical needs- Identify cognitive and physical deficits and behaviors that affect risk of falls.- [place] fall precautions as indicated by assessment.- Educate pt/family on patient safety including physical limitations- Instruct pt to call for assistance with activity based on assessment- Modify environment to reduce risk of injury- Provide assistive devices as appropriate- Consider OT/PT consult to assist with strengthening/mobility- Encourage toileting schedule  Outcome: Progressing

## 2025-04-09 NOTE — PROGRESS NOTES
Vascular Surgery Progress Note    /60 (BP Location: Left arm)   Pulse 75   Temp 99.4 °F (37.4 °C) (Oral)   Resp 20   Wt 199 lb 15.3 oz (90.7 kg)   SpO2 94%   BMI 23.71 kg/m²     Recent Labs   Lab 04/07/25  0607 04/08/25  0533 04/09/25  0528   RBC 2.84* 2.71* 2.72*   HGB 8.2* 7.8* 8.1*   HCT 24.7* 23.5* 23.9*   MCV 87.0 86.7 87.9   MCH 28.9 28.8 29.8   MCHC 33.2 33.2 33.9   RDW 16.8 16.8 17.2   NEPRELIM 5.15 5.87 4.60   WBC 7.1 7.4 5.6   .0* 108.0* 95.0*       Recent Labs   Lab 04/07/25  0607 04/08/25  0533 04/09/25  0528   GLU 91 94 87   BUN 29* 27* 26*   CREATSERUM 1.23 1.31* 1.39*   CA 8.7 8.7 8.7    140 138   K 4.2 4.2 4.3    106 104   CO2 26.0 24.0 26.0       Patient seen and examined.  No issues overnight.  Is working with therapy but has not yet today.  Agree with inpatient rehabilitation.  Overall poor motivation and of emphasized the importance of increased oral intake and activity.  Spoke with hematology as well as hospitalist.  Patient with platelet consumption as expected post endovascular aortic repair.  This is not due to heparin-induced thrombocytopenia.  From vascular surgery standpoint okay to resume warfarin and get to therapeutic range per pharmacy protocol.  Okay from vascular surgery standpoint as well for Eliquis but would confirm this with cardiology.  Plan for disposition in the next 1 to 2 days to rehab.    David Yanez MD  [facility]  Vascular Surgery

## 2025-04-10 LAB
ANION GAP SERPL CALC-SCNC: 8 MMOL/L (ref 0–18)
APTT PPP: 40.7 SECONDS (ref 23–36)
BASOPHILS # BLD AUTO: 0.02 X10(3) UL (ref 0–0.2)
BASOPHILS NFR BLD AUTO: 0.3 %
BILIRUB UR QL STRIP.AUTO: NEGATIVE
BUN BLD-MCNC: 29 MG/DL (ref 9–23)
CALCIUM BLD-MCNC: 8.5 MG/DL (ref 8.7–10.6)
CHLORIDE SERPL-SCNC: 101 MMOL/L (ref 98–112)
CO2 SERPL-SCNC: 26 MMOL/L (ref 21–32)
COLOR UR AUTO: YELLOW
CREAT BLD-MCNC: 1.48 MG/DL (ref 0.7–1.3)
EGFRCR SERPLBLD CKD-EPI 2021: 50 ML/MIN/1.73M2 (ref 60–?)
EOSINOPHIL # BLD AUTO: 0.01 X10(3) UL (ref 0–0.7)
EOSINOPHIL NFR BLD AUTO: 0.1 %
ERYTHROCYTE [DISTWIDTH] IN BLOOD BY AUTOMATED COUNT: 17.8 %
FLUAV + FLUBV RNA SPEC NAA+PROBE: NEGATIVE
FLUAV + FLUBV RNA SPEC NAA+PROBE: NEGATIVE
GLUCOSE BLD-MCNC: 111 MG/DL (ref 70–99)
GLUCOSE BLD-MCNC: 117 MG/DL (ref 70–99)
GLUCOSE BLD-MCNC: 128 MG/DL (ref 70–99)
GLUCOSE BLD-MCNC: 139 MG/DL (ref 70–99)
GLUCOSE BLD-MCNC: 139 MG/DL (ref 70–99)
GLUCOSE UR STRIP.AUTO-MCNC: NORMAL MG/DL
HCT VFR BLD AUTO: 23.6 % (ref 39–53)
HGB BLD-MCNC: 8.1 G/DL (ref 13–17.5)
IMM GRANULOCYTES # BLD AUTO: 0.04 X10(3) UL (ref 0–1)
IMM GRANULOCYTES NFR BLD: 0.6 %
KETONES UR STRIP.AUTO-MCNC: NEGATIVE MG/DL
LEUKOCYTE ESTERASE UR QL STRIP.AUTO: NEGATIVE
LYMPHOCYTES # BLD AUTO: 0.36 X10(3) UL (ref 1–4)
LYMPHOCYTES NFR BLD AUTO: 5.3 %
MAGNESIUM SERPL-MCNC: 1.8 MG/DL (ref 1.6–2.6)
MCH RBC QN AUTO: 30 PG (ref 26–34)
MCHC RBC AUTO-ENTMCNC: 34.3 G/DL (ref 31–37)
MCV RBC AUTO: 87.4 FL (ref 80–100)
MONOCYTES # BLD AUTO: 0.36 X10(3) UL (ref 0.1–1)
MONOCYTES NFR BLD AUTO: 5.3 %
NEUTROPHILS # BLD AUTO: 6.06 X10 (3) UL (ref 1.5–7.7)
NEUTROPHILS # BLD AUTO: 6.06 X10(3) UL (ref 1.5–7.7)
NEUTROPHILS NFR BLD AUTO: 88.4 %
NITRITE UR QL STRIP.AUTO: NEGATIVE
OSMOLALITY SERPL CALC.SUM OF ELEC: 287 MOSM/KG (ref 275–295)
PH UR STRIP.AUTO: 5.5 [PH] (ref 5–8)
PLATELET # BLD AUTO: 80 10(3)UL (ref 150–450)
PLATELETS.RETICULATED NFR BLD AUTO: 2.8 % (ref 0–7)
POTASSIUM SERPL-SCNC: 4.4 MMOL/L (ref 3.5–5.1)
PROT UR STRIP.AUTO-MCNC: 100 MG/DL
RBC # BLD AUTO: 2.7 X10(6)UL (ref 3.8–5.8)
RSV RNA SPEC NAA+PROBE: NEGATIVE
SARS-COV-2 RNA RESP QL NAA+PROBE: NOT DETECTED
SODIUM SERPL-SCNC: 135 MMOL/L (ref 136–145)
SP GR UR STRIP.AUTO: 1.02 (ref 1–1.03)
UROBILINOGEN UR STRIP.AUTO-MCNC: NORMAL MG/DL
WBC # BLD AUTO: 6.9 X10(3) UL (ref 4–11)

## 2025-04-10 RX ORDER — MAGNESIUM OXIDE 400 MG/1
400 TABLET ORAL ONCE
Status: COMPLETED | OUTPATIENT
Start: 2025-04-10 | End: 2025-04-10

## 2025-04-10 NOTE — PLAN OF CARE
A/O x4  VSS. RA  Q4 neurovascular checks  Dressings in place, CDI  Regular diet, poor appetite  PICC in place  Ext cath in place  Up w/ walker x1  Norco for pain  Safety measures in place, call light w/in reach  Discussed plan of care

## 2025-04-10 NOTE — PROGRESS NOTES
Vascular Surgery Progress Note    /65 (BP Location: Left arm)   Pulse 87   Temp 97.7 °F (36.5 °C) (Oral)   Resp 18   Wt 199 lb 15.3 oz (90.7 kg)   SpO2 92%   BMI 23.71 kg/m²     Recent Labs   Lab 04/08/25  0533 04/09/25  0528 04/10/25  0517   RBC 2.71* 2.72* 2.70*   HGB 7.8* 8.1* 8.1*   HCT 23.5* 23.9* 23.6*   MCV 86.7 87.9 87.4   MCH 28.8 29.8 30.0   MCHC 33.2 33.9 34.3   RDW 16.8 17.2 17.8   NEPRELIM 5.87 4.60 6.06   WBC 7.4 5.6 6.9   .0* 95.0* 80.0*       Recent Labs   Lab 04/08/25 0533 04/09/25  0528 04/10/25  0517   GLU 94 87 111*   BUN 27* 26* 29*   CREATSERUM 1.31* 1.39* 1.48*   CA 8.7 8.7 8.5*    138 135*   K 4.2 4.3 4.4    104 101   CO2 24.0 26.0 26.0       Patient seen and examined.  Overall not feeling his best today with just general aches and pains.  Vitals and labs stable.  On examination groins flat with no hematoma.  Neck flat with no hematoma.  Neurologically intact.  Per therapy he no longer qualifies for acute inpatient rehabilitation.  He will need to be optimized for home PT OT and outpatient therapy.  Will monitor his progress today into the weekend.  I informed him to increase his oral intake and ambulation and activity.  I spoke with the nurse to ambulate at least once per shift.  Plan for discharge home on Monday with [facility].    Davdi Yanez MD  [facility]  Vascular Surgery

## 2025-04-10 NOTE — PROGRESS NOTES
[facility] Hospitalist Progress Note     CC: Hospital Follow up    PCP: Kam Alberts MD     Subjective:     No acute events. Diarrhea improved, he is sitting in a chair.  He feels extremely weak.  Wife is at bedside.  He is agreeable to going to [facility].    Later was informed that patient had an assisted fall.  Did not hit his head.  Fall occurred because \"his knees buckled\".    OBJECTIVE:    Blood pressure 125/65, pulse 88, temperature 97.7 °F (36.5 °C), temperature source Oral, resp. rate 18, weight 199 lb 15.3 oz (90.7 kg), SpO2 92%.    Temp:  [97.7 °F (36.5 °C)-99.1 °F (37.3 °C)] 97.7 °F (36.5 °C)  Pulse:  [69-91] 88  Resp:  [18] 18  BP: (121-147)/(65-70) 125/65  SpO2:  [90 %-94 %] 92 %      Intake/Output:    Intake/Output Summary (Last 24 hours) at 4/10/2025 1537  Last data filed at 4/10/2025 0316  Gross per 24 hour   Intake 360 ml   Output 1100 ml   Net -740 ml       Last 3 Weights   03/31/25 0300 199 lb 15.3 oz (90.7 kg)   03/30/25 0200 197 lb 5 oz (89.5 kg)   03/27/25 0600 193 lb 12.6 oz (87.9 kg)   03/26/25 0400 193 lb 12.6 oz (87.9 kg)   03/25/25 0600 200 lb 2.8 oz (90.8 kg)   03/24/25 0900 200 lb 3.2 oz (90.8 kg)   03/24/25 0500 203 lb 0.7 oz (92.1 kg)   03/23/25 0516 204 lb 2.3 oz (92.6 kg)   03/22/25 0637 153 lb 3.5 oz (69.5 kg)   03/20/25 1231 192 lb 15.9 oz (87.5 kg)   03/19/25 1444 193 lb (87.5 kg)   03/22/25 1113 153 lb 3.5 oz (69.5 kg)   03/07/25 1536 185 lb (83.9 kg)       Exam   Gen: No acute distress, alert and oriented x3, no focal neurologic deficits, chronically ill-appearing  Heent: NC AT, mucous memb clear, neck supple  Pulm: Lungs clear, normal respiratory effort  CV: Heart with regular rate and rhythm, no peripheral edema  Abd: Abdomen soft, nontender, nondistended, bowel sounds present  MSK:expected rom, 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, 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,   Skin: no rashes or lesions  Neuro: AO*3, motor intact, no sensory deficits  Psyc: appropriate mood and  affect      Data Review:       Labs:     Recent Labs   Lab 04/08/25  0533 04/09/25  0528 04/10/25  0517   RBC 2.71* 2.72* 2.70*   HGB 7.8* 8.1* 8.1*   HCT 23.5* 23.9* 23.6*   MCV 86.7 87.9 87.4   MCH 28.8 29.8 30.0   MCHC 33.2 33.9 34.3   RDW 16.8 17.2 17.8   NEPRELIM 5.87 4.60 6.06   WBC 7.4 5.6 6.9   .0* 95.0* 80.0*         Recent Labs   Lab 04/08/25 0533 04/09/25  0528 04/10/25  0517   GLU 94 87 111*   BUN 27* 26* 29*   CREATSERUM 1.31* 1.39* 1.48*   EGFRCR 58* 54* 50*   CA 8.7 8.7 8.5*    138 135*   K 4.2 4.3 4.4    104 101   CO2 24.0 26.0 26.0       No results for input(s): \"ALT\", \"AST\", \"ALB\", \"AMYLASE\", \"LIPASE\", \"LDH\" in the last 168 hours.    Invalid input(s): \"ALPHOS\", \"TBIL\", \"DBIL\", \"TPROT\"        Imaging:  No results found.      Meds:      apixaban  5 mg Oral BID    loperamide  2 mg Oral Every afternoon at 1400    multivitamin  1 tablet Oral Daily    amLODIPine  10 mg Oral Daily    metoprolol tartrate  25 mg Oral 2x Daily(Beta Blocker)    fluticasone propionate  1 spray Each Nare Daily    docusate sodium  100 mg Oral BID    polyethylene glycol (PEG 3350)  17 g Oral Daily    insulin aspart  1-5 Units Subcutaneous TID AC and HS             diphenoxylate-atropine    peppermint oil    hydrALAzine    ipratropium-albuterol    glucose **OR** glucose **OR** glucose-vitamin C **OR** dextrose **OR** glucose **OR** glucose **OR** glucose-vitamin C    acetaminophen **OR** HYDROcodone-acetaminophen **OR** HYDROcodone-acetaminophen    ondansetron    metoclopramide         Assessment/Plan:     Active Problems:    Pre-op testing    Thoracoabdominal aortic aneurysm (TAAA)    Bilateral leg weakness      69 year old male with a past medical history of hypertension, GERD, anxiety, renal calculi, DVT, ascending aortic dissection s/p repair, aortic dissection distal to left subclavian, infrarenal endovascular AAA repair with bilateral KYLER for hypogastric and common iliac aneurysms, mesenteric ischemia s/p  SMA bypass with right external iliac artery to SMA bypass, who is presenting to the hospital for direct admission for complex AAA repair with s/p lumbar drain.  Postoperative course complicated by HOLLIS on CKD as well as right lower extremity weakness along with hypoxic respiratory failure.      History of prior infrarenal endovascular AAA repair with bilateral KYLER for hypogastric and common iliac aneurysms  History of mesenteric ischemia s/p SMA bypass with right external iliac artery to SMA bypass  History of aortic dissection s/p repair, aortic dissection distal to left subclavian  S/p complex thoraco abdominal aortic repair 3/21  Hx of DVT   -Complicated aortic pathology with multiple repairs in the past. 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  SUNDEEP is negative however HIT antibodies positive.  Patient will need to be bridged if on Coumadin.  Reviewed chart from outpatient.  Following with hematologist outpatient, had right gastrocnemius DVT in 2024 and was resumed back on Coumadin.  Prior to that he had a mesenteric ischemia and was started on Coumadin which she continued for 8 months.  He is also positive for heterozygous prothrombin gene mutation.  Eliquis was recommended by hematology.  Okay to switch to Eliquis from cardiology, vascular and hematology standpoint.     Acute hypoxemic respiratory failure w/ threat to bodily function 2/2 possible pneumonia  versus edema  Leukocytosis  -Completed 3 days of azithro (3/27-3/29), janna (3/24-3/28), now switched to cefepime per ICU (3/28-3/30)  - tx w/ iv steroids x 5 days, iv steroids completed, completed abx as above   - wean o2 as tolerated     Generalized weakness  Back pain  - In the setting of prolonged hospital stay  - UA to rule out UTI  - Rapid COVID flu RSV to rule out acute illness  -Pain appears to be generalized, hemoglobin is stable not concerned for other etiologies such as RP bleed at this time  - Continue PT/OT-recommend VERA.  Patient does not qualify for acute rehab.  Unfortunately I do not think the patient is strong enough to be discharged home..    Leukocytosis-resolved      Right lower extremity weakness-resolved  - In the setting of complex aortic repair 3/21  -- PT/OT     HOLLIS on CKD-resolved  -Creatinine appears to be at baseline     Hypertension  -hold home HCTZ     Allergic rhinitis  -Continue home fluticasone as needed     Short gut syndrome  - resume lamotil if diarrhea resumes  - Add Imodium per home regimen    Sacral wound  - In the setting of persistent diarrhea  - Wound care consult, recommendations reviewed    Normocytic anemia  - stable, trend     H/o dvt  - see above       Supplementary Documentation:   DVT Mechanical Prophylaxis:   SCDs, Early ambuation  DVT Pharmacologic Prophylaxis   Medication    apixaban (Eliquis) tab 5 mg                Code Status: DNAR/Full Treatment  Arzola: External urinary catheter in place  Arzola Duration (in days):   Central line:            Dispo: inpatient -needs repeat PT evaluation    Dallas Green DO  Hospitalist  [facility]

## 2025-04-10 NOTE — PROGRESS NOTES
[facility] Cardiology  Consultation Note      Cy Monsalve Patient Status:  Inpatient    1955 MRN [id number]   Location [facility] 4SW-A Attending Neil Oquendo MD   Hosp Day # 20 PCP Kam Alberts MD     Reason for consult: Post op, arrhythmias    Events: Stable. Denies CP or SOB.     Primary cardiologist: Russell     History of Present Illness:  Cy Monsalve is a 69 year old male who presented to [facility] on 3/19/2025 for elective complex aortic surgery, see . Complicated aortic history involving multiple surgeries, AVR, complications etc. Cardiology consulted regarding tachyarhythmias seen on tele. He has felt some palpitations with this. Denies CP. Has needed O2 post op but denies SOB at present. Has been smoking. Being treated for PNA with Abx and steroids. Lasix on hold and has received IVF for HOLLIS, now improving. He is usually on coumadin and is now on heparin gtt.     Medications:  Current Facility-Administered Medications   Medication Dose Route Frequency    apixaban (Eliquis) tab 5 mg  5 mg Oral BID    loperamide (Imodium) cap 2 mg  2 mg Oral Every afternoon at 1400    diphenoxylate-atropine (Lomotil) 2.5-0.025 MG per tab 2 tablet  2 tablet Oral QID PRN    multivitamin (Tab-A-Immanuel/Beta Carotene) tab 1 tablet  1 tablet Oral Daily    amLODIPine (Norvasc) tab 10 mg  10 mg Oral Daily    peppermint oil liquid 1 mL  1 mL Other PRN    metoprolol tartrate (Lopressor) tab 25 mg  25 mg Oral 2x Daily(Beta Blocker)    hydrALAzine (Apresoline) 20 mg/mL injection 10 mg  10 mg Intravenous Q4H PRN    fluticasone propionate (Flonase) 50 MCG/ACT nasal suspension 1 spray  1 spray Each Nare Daily    ipratropium-albuterol (Duoneb) 0.5-2.5 (3) MG/3ML inhalation solution 3 mL  3 mL Nebulization Q4H PRN    docusate sodium (Colace) cap 100 mg  100 mg Oral BID    polyethylene glycol (PEG 3350) (Miralax) 17 g oral packet 17 g  17 g Oral Daily    glucose (Dex4) 15 GM/59ML oral liquid 15 g  15 g  Oral Q15 Min PRN    Or    glucose (Glutose) 40% oral gel 15 g  15 g Oral Q15 Min PRN    Or    glucose-vitamin C (Dex-4) chewable tab 4 tablet  4 tablet Oral Q15 Min PRN    Or    dextrose 50% injection 50 mL  50 mL Intravenous Q15 Min PRN    Or    glucose (Dex4) 15 GM/59ML oral liquid 30 g  30 g Oral Q15 Min PRN    Or    glucose (Glutose) 40% oral gel 30 g  30 g Oral Q15 Min PRN    Or    glucose-vitamin C (Dex-4) chewable tab 8 tablet  8 tablet Oral Q15 Min PRN    insulin aspart (NovoLOG) 100 Units/mL FlexPen 1-5 Units  1-5 Units Subcutaneous TID AC and HS    acetaminophen (Tylenol) tab 650 mg  650 mg Oral Q4H PRN    Or    HYDROcodone-acetaminophen (Norco) 5-325 MG per tab 1 tablet  1 tablet Oral Q4H PRN    Or    HYDROcodone-acetaminophen (Norco) 5-325 MG per tab 2 tablet  2 tablet Oral Q4H PRN    ondansetron (Zofran) 4 MG/2ML injection 4 mg  4 mg Intravenous Q6H PRN    metoclopramide (Reglan) 5 mg/mL injection 10 mg  10 mg Intravenous Q8H PRN       Past Medical History:    AAA (abdominal aortic aneurysm)    Abdominal aneurysm    Abdominal aortic aneurysm (AAA)    AAA surgery done    Anxiety state    Back pain    Naproxen twice a week    Back problem    minor    Calculus of kidney    Deep vein thrombosis (HCC)    to colon    Esophageal reflux    Hearing impairment    Lt ear tinnitus    High blood pressure    History of recent hospitalization    10/2019- was at [facility] x5 weeks (ICU) with Dissected Aorta/ Post op colon problem/and post op cardiac arrest    Ileostomy present (HCC)    Peripheral vascular disease    Unspecified essential hypertension    Visual impairment    glasses       Past Surgical History:   Procedure Laterality Date    Colonoscopy  10/15/09  [facility]    Screening: small polypoid fold in sigmoid (no polyp tissue seen on path), int hemorrhoids; next colonoscopy in 2019    Other  02/2017    AAA repair    Other      10/23/19- Dissected Aorta surgery    Other surgical history      Eye surgery for  lazy eye    Other surgical history  10/15/09  [facility]    EGD (heartburn): normal    Other surgical history      AAA repair    Other surgical history      multiple toe amputations    Part removal colon w end colostomy      10/2019       Family History  family history includes Diabetes in his father; Heart Disorder in his father and mother; Hypertension in his sister.    Social History   reports that he has been smoking cigars and cigarettes. He has never used smokeless tobacco. He reports current alcohol use. He reports that he does not use drugs.     Allergies  Allergies[1]    Review of Systems:  As per HPI, otherwise 10 point ROS is negative in detail.    Physical Exam:  Blood pressure 134/70, pulse 70, temperature 98.6 °F (37 °C), temperature source Temporal, resp. rate 18, weight 199 lb 15.3 oz (90.7 kg), SpO2 92%.  Temp (24hrs), Av.5 °F (36.9 °C), Min:98.2 °F (36.8 °C), Max:99.4 °F (37.4 °C)    Wt Readings from Last 3 Encounters:   25 199 lb 15.3 oz (90.7 kg)   25 153 lb 3.5 oz (69.5 kg)   25 185 lb (83.9 kg)       General: Awake and alert; in no acute distress  HEENT: Extraocular movements are intact; sclerae are anicteric; scalp is atraumatic  Neck: Supple; no JVD; no carotid bruits  Cardiac: Regular, with frequent ectopic beats, tachy, normal S1 and S2, no murmurs, rubs, or gallops are appreciated  Lungs: Clear to auscultation bilaterally; no accessory muscle use is noted, no wheezes, rhonci or rales  Abdomen: Soft, non-distended, non-tender; bowel sounds are normoactive  Extremities: Warm, no edema, clubbing or cyanosis; moves all 4 extremities normally, distal pulses intact and equal  Psychiatric: Normal mood and affect; answers questions appropriately  Dermatologic: No rashes; normal skin turgor    Diagnostic testing:    Labs:   Lab Results   Component Value Date    INR 2.36 (H) 2025    INR 3.17 (H) 2025        Lab Results   Component Value Date    WBC 5.6 2025    HGB  8.1 04/09/2025    HCT 23.9 04/09/2025    PLT 95.0 04/09/2025    CREATSERUM 1.39 04/09/2025    BUN 26 04/09/2025     04/09/2025    K 4.3 04/09/2025     04/09/2025    CO2 26.0 04/09/2025    GLU 87 04/09/2025    CA 8.7 04/09/2025    PTT 51.5 04/09/2025    INR 2.36 04/09/2025    PTP 26.0 04/09/2025    MG 1.8 04/09/2025    PGLU 109 04/09/2025       Cardiac diagnostics:    EKG 3/28/2025:   Sinus tachycardia with frequent Premature ventricular complexes   Nonspecific ST abnormality     Echo 3/25/25  1. Left ventricle: The cavity size was normal. Wall thickness was mildly      increased. Systolic function was vigorous. The estimated ejection      fraction was 65-70%.   2. Right ventricle: The cavity size was normal. Systolic function was      normal. Systolic pressure was moderately increased.   3. Aortic valve: Well visualized. No significant valve disease.      Transvalvular velocity was minimally increased. There was no significant      evidence for stenosis. The peak systolic velocity was 1.78m/sec. The mean      systolic gradient was 7mm Hg.   4. Pulmonary arteries: Systolic pressure was moderately increased. The peak      systolic pressure is 45mm Hg.   5. Pericardium, extracardiac: There was no pericardial effusion.       Impression:  Complex aortic history:   Aortic dissection - type A, s/p bioAVR, graft and hemiarch repair 10/2019, [facility]. Then underwent repair of abdominal aortic dissection. Suffered from mesenteric ischemia requiring subtotal colectomy with colostomy. Also subsequently required amputation of toes due to ischemia. Episodes deemed provoked by surgery. Subsequently underwent reversal of colostomy. Was discharged on anticoagulation. Embolic events - s/p amputation 4.5 toes on left, tip of big toe on R, 2/3 of his colon (post op aortic surgery 10/19), and 3 feet of small bowel (10/23). On coumadin.   H/o EVAR 2018    Current issues  CTA 12/24 reviewed, \"New discontinuity of the left  internal iliac stents with focal contrast extravasation suggestive of type III endoleak. Excluded aneurysm sac has increased to 4.3 cm compared to 3.2 cm on 10/2023 study. Slight increase in additional aneurysms/excluded aneurysm sacs involving the bilateral internal iliac arteries\"  -->  s/p 3/21/25 fenestrated endovascular arch repair with fenestrated thoracoabdominal aneurysm repair, left carotid to subclavian transposition with endovascular septotomy of dissection with Dr. Oquendo and Dr. Yanez.     Hypoxic resp failure  - PNA, ateletasis    Sinus tach / PSVT / frequent PACs, PVCs    HOLLIS - Cr 2, improving    Chronic issues  CAD - mild CAC on CT 12/24. No symptoms. Nuc stress 1/2025 normal   H/o hematuria - kidney stones - 12/4/2023   Renal artery stenosis s/p stent 12/2023  DVT - 1/2024 venous Doppler bilateral done for bilateral edema-no acute DVT. 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  -> on coumadin usually, thought to have hypercoag state  Heterozygous prothrombin gene mutation   PAF - per records transient episode post op 2019, no documented recurrence.   Smoking - not motivated to quit.   COPD  Thrombocytopenia - HIPA +; plt 95K today    Recommendations:  Continue metoprolol 25mg BID  Continue argatroban. Coumadin on hold presently. Ok to change to DOAC from cardiac stand point.   COPD/PNA - Weaned off O2. S/p Abx   S/p steroids per pulm/crit care team  Post op care per vascular  PT      Thank you for allowing our practice to participate in the care of your patient. Please do not hesitate to contact me if you have any questions.    Wilbur Wells MD  Interventional Cardiology  [facility]  Office: [phone]       [1]   Allergies  Allergen Reactions    Amoxicillin ITCHING     Itchiness on hands and feet      Clindamycin OTHER (SEE COMMENTS)     Upset stomach

## 2025-04-10 NOTE — PHYSICAL THERAPY NOTE
PHYSICAL THERAPY TREATMENT NOTE - INPATIENT    Room Number: 7613/7613-A     Session: 8     Patient remains appropriate for additional physical therapy treatment in order to progress towards goals as noted below.     Number of Visits to Meet Established Goals: 8    Presenting Problem: S/p 3/21 fenestrated abdominal aortic aneurysm repair with left carotid to subclavian transposition on  with Dr. Yanez and Dr. Oquendo and 3/20 lumbar drain  Co-Morbidities : HTN, Peripheral Neuropathy, Hx of toe amputation L great toe, mesenteric ischemia s/p SMA bypass with right external iliac artery to SMA bypass, Short gut syndrome    PHYSICAL THERAPY ASSESSMENT   Patient demonstrates limited progress this session, goals  remain in progress.      Patient is requiring supervision and contact guard assist as a result of the following impairments: decreased functional strength, decreased endurance/aerobic capacity, impaired static and dynamic balance, impaired motor planning, decreased muscular endurance, and medical status.     Patient continues to function below baseline with bed mobility, transfers, and gait.  Next session anticipate patient to progress transfers, gait, and standing prolonged periods.  Physical Therapy will continue to follow patient for duration of hospitalization.    Patient continues to benefit from continued skilled PT services. Discussed with patient today - encouraged TID ambulation with staff in [place].     PLAN DURING HOSPITALIZATION  Nursing Mobility Recommendation : 1 Assist  PT Device Recommendation: Rolling walker  PT Treatment Plan: Bed mobility, Body mechanics, Gait training, Strengthening, Transfer training, Stair training, Balance training  Frequency (Obs): 3-5x/week     CURRENT GOALS   Goal #1 Patient is able to demonstrate supine - sit EOB @ level: CGA assistance   MET Raquel 4/1     Goal #2 Patient is able to demonstrate transfers EOB to/from BSC at assistance level: CGA assistance   MET Raquel  4/1     Goal #3 Patient is able to sit at the EOB for 10 mins with CGA    Met 4/8/2025       Goal #4 Patient is able to ambulate 50 feet with assist device: walker - rolling at assistance level: moderate assistance  MET 4/7 progress to 150ft CGA     Goal #5     Goal #6     Goal Comments: Goals established on 3/22/2025  4/10/2025 all goals ongoing    SUBJECTIVE  \"I just can't\" - Pt still utilizing bed pan, rather than sitting on commode.  Multiple conversations and extensive education provided    OBJECTIVE  Precautions: Bed/chair alarm (surgical boot L foot? SBP )    WEIGHT BEARING RESTRICTION  R Lower Extremity: Full Weight Bearing  L Lower Extremity: Full Weight Bearing    PAIN ASSESSMENT   Rating: Unable to rate  Location: bottom  Management Techniques: Activity promotion, Body mechanics, Repositioning    BALANCE                                                                                                                       Static Sitting: Good  Dynamic Sitting: Good           Static Standing: Fair -  Dynamic Standing: Fair -    ACTIVITY TOLERANCE                         O2 WALK       AM-PAC '6-Clicks' INPATIENT SHORT FORM - BASIC MOBILITY  How much difficulty does the patient currently have...  Patient Difficulty: Turning over in bed (including adjusting bedclothes, sheets and blankets)?: None   Patient Difficulty: Sitting down on and standing up from a chair with arms (e.g., wheelchair, bedside commode, etc.): A Little   Patient Difficulty: Moving from lying on back to sitting on the side of the bed?: None   How much help from another person does the patient currently need...   Help from Another: Moving to and from a bed to a chair (including a wheelchair)?: A Little   Help from Another: Need to walk in hospital room?: A Little   Help from Another: Climbing 3-5 steps with a railing?: A Lot     AM-PAC Score:  Raw Score: 19   Approx Degree of Impairment: 41.77%   Standardized Score (AM-PAC Scale): 45.44    CMS Modifier (G-Code): CK    FUNCTIONAL ABILITY STATUS  Gait Assessment   Functional Mobility/Gait Assessment  Gait Assistance: Supervision  Distance (ft): 10 - 20  Assistive Device: Rolling walker  Pattern: Shuffle (reporting high levels of pain, \"I just can't do this anymore\")    Skilled Therapy Provided    Bed Mobility:  Rolling: NT   Supine<>Sit: SBA   Sit<>Supine: NT     Transfer Mobility:  Sit<>Stand: SBA   Stand<>Sit: SBA  Gait: SBA    Therapist's Comments: Discussed case with RN prior to session initiation. Pt initially refused - writer discussed that she would contact MD directly to discuss lack of participation.         MODIFIED JAMAL DYSPNEA SCALE - (SHORTNESS OF BREATH SCALE)    SCORE DESCRIPTION   0 Nothing at all   1 Very slight   2 Slight   3 Moderate   4 Somewhat severe   5 Strong or hard breathing   6    7 Very hard breathing   8    9 Very, Very Severe   10 MAX - You need to stop     Patient Education provided:    Use this scale to rate the difficulty of your breathing:  - As you would rate your pain from 0-10, you can rate your SOB from 0-10  - Goal is to stay below 7/10 with activity  - If you reach beyond 7/10, it is important to rest; stop activity and if you need to sit down to recove          Patient End of Session: Up in chair, Needs met, Call light within reach, RN aware of session/findings, All patient questions and concerns addressed, Hospital anti-slip socks    PT Session Time: 23 minutes  Gait Trainin minutes  Therapeutic Activity: 15 minutes

## 2025-04-10 NOTE — PLAN OF CARE
Assumed care at 0730  Alert and oriented x4  RA. NSR on tele. VSS  C/o of right kidney pain-MD's notified. UA ordered  Norco given prn  Ambulated in halls with pt/ot this AM. Up in chair  Assisted fall by staff while trying to ambulate to the bathroom. See fall note  Up w/ danny steady after fall  Multiple BM's today. Scheduled imodium given  Voiding w/ ext cath  R PICC  Back and sacrum wounds all changed today  Pt updated on POC  Safety precautions in place

## 2025-04-10 NOTE — OCCUPATIONAL THERAPY NOTE
OCCUPATIONAL THERAPY TREATMENT NOTE - INPATIENT     Room Number: 7613/7613-A  Session: 7   Number of Visits to Meet Established Goals:  (15 added 8 sessions on 4/10)    Presenting Problem: S/p 3/21 fenestrated abdominal aortic aneurysm repair with left carotid to subclavian transposition on  with Dr. Yanez and Dr. Oquendo and 3/20 lumbar drain    ASSESSMENT   Patient completed 7 out of 7 sessions. Adding 8 more sessions. Goals will be modified as needed to reflect current functional level.     Patient continues to function below baseline with toileting, bathing, lower body dressing, transfers, and energy conservation strategies. Contributing factors to remaining limitations include decreased endurance.  Next session anticipate patient to progress toileting and lower body dressing.  Occupational Therapy will continue to follow patient for duration of hospitalization.     Patient continues to benefit from continued skilled OT services: at discharge to promote prior level of function and safety with additional support and return home with home health OT. Patient is performing toilet transfer CGA/SBA, bed mobility independent, LB ADL min A, and UB ADL set-up level.  At home, patient would require assistance with LB ADL, bathing, and household tasks.  Potential limiting factors that are affecting rehab potential - guarded therapy participation level and limited activity endurance.       History:  Patient is a 69 year old male admitted on 3/19/2025 with Presenting Problem: S/p 3/21 fenestrated abdominal aortic aneurysm repair with left carotid to subclavian transposition on  with Dr. Yanez and Dr. Oquendo and 3/20 lumbar drain. Co-Morbidities : HTN, Peripheral Neuropathy, Hx of toe amputation L great toe, mesenteric ischemia s/p SMA bypass with right external iliac artery to SMA bypass, Short gut syndrome      **3/25 acute respiratory failure, possible pneumonia, on vapotherm      TREATMENT SESSION:  Patient Start of  Session: supine    FUNCTIONAL TRANSFER ASSESSMENT  Sit to Stand: Edge of Bed  Edge of Bed: Stand-by Assist  Chair: Stand-by Assist  Commode Transfer: Not Tested    BED MOBILITY  Rolling: Not Tested  Supine to Sit : Independent  Sit to Supine (OT): Not Tested  Scooting: independent    BALANCE ASSESSMENT  Static Sitting: Independent  Static Standing: Contact Guard Assist    FUNCTIONAL ADL ASSESSMENT  Grooming Seated: Not Tested  UB Dressing Seated: Not Tested  LB Dressing Seated: Minimal Assist  LB Dressing Standing: Not Tested  Toileting Standing: Not Tested    O2 SATURATIONS  Oxygen Therapy  SPO2% on Room Air at Rest: 96    EDUCATION PROVIDED  Patient Education : Plan of Care; Energy Conservation  Patient's Response to Education: Verbalized Understanding    Equipment used: rw    Therapist comments: patient was using a bedpan.  Rn assisted with hygiene care. OT and PT encouraged the patient to use bathroom (commode already fitted over toilet) as part of activity promotion plan.  Patient verbalized understanding. PT measured the height of commode and chair to ensure successful toilet transfer activity.   They are the same height.  Independent supine to sit, SBA to stand from edge of bed and from chair. Patient prefers not to wear shoes today. PT and OT encouraged the patient to participate in therapy, but patient repeatedly commented, \"This is just bad, I am going down, I am just  so disappointed, I don't know why I am doing all this anyway.\"  Therapists and RN provided active listening and support. Pt only took steps bed to room door.   Patient kept his head down and commented, \"This is it, I am sorry.\"    Patient End of Session: Up in chair, Needs met, Call light within reach, RN aware of session/findings, All patient questions and concerns addressed    SUBJECTIVE  See above.    PAIN ASSESSMENT  Rating: Unable to rate  Location: \"my legs!\"  Management Techniques: Activity promotion     OBJECTIVE  Precautions:  Bed/chair alarm (surgical boot L foot? SBP )    AM-PAC ‘6-Clicks’ Inpatient Daily Activity Short Form  -   Putting on and taking off regular lower body clothing?: A Little  -   Bathing (including washing, rinsing, drying)?: A Little  -   Toileting, which includes using toilet, bedpan or urinal? : A Little  -   Putting on and taking off regular upper body clothing?: None  -   Taking care of personal grooming such as brushing teeth?: None  -   Eating meals?: None    AM-PAC Score:  Score: 21  Approx Degree of Impairment: 32.79%  Standardized Score (AM-PAC Scale): 44.27    PLAN  OT Device Recommendations: TBD  OT Treatment Plan: Energy conservation/work simplification techniques, ADL training, UE strengthening/ROM, Functional transfer training, Patient/Family education  Rehab Potential : Good  Frequency: 3x/week    OT Goals:   Ongoing 4/10  ADL Goals   Patient will perform upper body dressing:  with setup-MET 4/7  Patient will perform lower body dressing:  with min assist  Patient will perform toileting: with min assist     Functional Transfer Goals  Patient will transfer from supine to sit:  with min assist -MET 4/7 UPGRADE TO SUPERVISION  Patient will transfer to toilet:  with min assist -MET 4/7 SIMULATED, UPGRADE TO SUPERVISION     UE Exercise Program Goal  Patient will be independent with bilateral AROM HEP (home exercise program).     Additional Goals  Pt will incorporate 2 energy conservation techniques into ADL.    OT Session Time: 10 minutes  Self-Care Home Management: 10 minutes

## 2025-04-10 NOTE — CM/SW NOTE
Notified pt has progressed enough with therapy that AR services no longer appropriate. Pt performing at A assist. Christina recommending [facility] or outpatient therapies.     BENITO sent  referrals, will discuss with pt. Pt discussed with RN, DC planning.     Addendum:   BENITO met with pt at bedside, he is up in chair. Says he feels terrible today. Says his strength and endurance is \"shot\". Provided some positive feedback in progress with therapy. Notified him that [facility] is no longer accepting due to his progress. Pt was not happy with this news. He is feeling down and discouraged today, support provided. Encouragement provided that his strength and endurance will continue to improve once dc'd home. Pt questioning \"why am I still here?\" Support provided and listened to pt as he shared he lost his son. Pt feels he has cheated death. Pt tearful but redirectable during conversation. Continued to state that he is tired and wants this done with. Offered to provide resource to support mental health, pt declined. Pt with complaints of abdomin and kidney pain, notified RN.     BENITO will follow up with pt for continued DC planning. Offered to call wife with pt to review DC plans.    Addendum 3:00- BENITO met with pt and wife at bedside. Aware of assisted fall. Reviewed importance of getting up to bathroom and walking with staff. Both pt and wife shared discouragement as they thought recover would be quicker. SW shared that pt requires encouragement and support when working with nursing/therapy teams. Notified wife that during PT/OT today pt was able to ambulate 20 ft today with supervision and was CGA for transfers. Wife was surprised to hear of pt's progress. SW shared thoughts of pt's feelings of depression could be affecting recovery. Pt argued that he needed hands on assistance when staff was assisting him off the floor. Stressed importance of out of bed mobility and encouraged pt to get up and walk at least 3x/day with nursing team and  to get up to bathroom rather than using commode. Reviewed VERA vs Home and pro/con of different level. SW raised concern of VERA setting based on pt's lack of self motivation and that he may become weaker in VERA setting due to restrictions/limitations. Pt would do better in home environment, however wife has concerns of being able to care for pt in current state.     Message sent to Dr. Oquendo and Dr. Yanez- inquired on DC timeline. SW spoke with Dr. Oquendo and notified of fall. Shared conversation from earlier. Concerns of depression and wanting to give up, risk of readmission due to poor activity tolerance.  Psych consulted to address.       Please continue to encourage out of bed mobility and pt independence.       ISAAC Julien

## 2025-04-11 PROBLEM — F32.1 MAJOR DEPRESSIVE DISORDER, SINGLE EPISODE, MODERATE (HCC): Status: ACTIVE | Noted: 2025-04-11

## 2025-04-11 LAB
ANION GAP SERPL CALC-SCNC: 7 MMOL/L (ref 0–18)
BASOPHILS # BLD AUTO: 0.01 X10(3) UL (ref 0–0.2)
BASOPHILS NFR BLD AUTO: 0.2 %
BUN BLD-MCNC: 29 MG/DL (ref 9–23)
CALCIUM BLD-MCNC: 8.6 MG/DL (ref 8.7–10.6)
CHLORIDE SERPL-SCNC: 102 MMOL/L (ref 98–112)
CO2 SERPL-SCNC: 26 MMOL/L (ref 21–32)
CREAT BLD-MCNC: 1.55 MG/DL (ref 0.7–1.3)
EGFRCR SERPLBLD CKD-EPI 2021: 48 ML/MIN/1.73M2 (ref 60–?)
EOSINOPHIL # BLD AUTO: 0.01 X10(3) UL (ref 0–0.7)
EOSINOPHIL NFR BLD AUTO: 0.2 %
ERYTHROCYTE [DISTWIDTH] IN BLOOD BY AUTOMATED COUNT: 17.9 %
GLUCOSE BLD-MCNC: 116 MG/DL (ref 70–99)
GLUCOSE BLD-MCNC: 134 MG/DL (ref 70–99)
GLUCOSE BLD-MCNC: 93 MG/DL (ref 70–99)
GLUCOSE BLD-MCNC: 94 MG/DL (ref 70–99)
GLUCOSE BLD-MCNC: 97 MG/DL (ref 70–99)
HCT VFR BLD AUTO: 24.5 % (ref 39–53)
HGB BLD-MCNC: 8.1 G/DL (ref 13–17.5)
IMM GRANULOCYTES # BLD AUTO: 0.03 X10(3) UL (ref 0–1)
IMM GRANULOCYTES NFR BLD: 0.5 %
LYMPHOCYTES # BLD AUTO: 0.43 X10(3) UL (ref 1–4)
LYMPHOCYTES NFR BLD AUTO: 7.4 %
MAGNESIUM SERPL-MCNC: 1.8 MG/DL (ref 1.6–2.6)
MCH RBC QN AUTO: 29 PG (ref 26–34)
MCHC RBC AUTO-ENTMCNC: 33.1 G/DL (ref 31–37)
MCV RBC AUTO: 87.8 FL (ref 80–100)
MONOCYTES # BLD AUTO: 0.31 X10(3) UL (ref 0.1–1)
MONOCYTES NFR BLD AUTO: 5.3 %
NEUTROPHILS # BLD AUTO: 5.05 X10 (3) UL (ref 1.5–7.7)
NEUTROPHILS # BLD AUTO: 5.05 X10(3) UL (ref 1.5–7.7)
NEUTROPHILS NFR BLD AUTO: 86.4 %
OSMOLALITY SERPL CALC.SUM OF ELEC: 286 MOSM/KG (ref 275–295)
PLATELET # BLD AUTO: 75 10(3)UL (ref 150–450)
PLATELETS.RETICULATED NFR BLD AUTO: 2.7 % (ref 0–7)
POTASSIUM SERPL-SCNC: 4.2 MMOL/L (ref 3.5–5.1)
RBC # BLD AUTO: 2.79 X10(6)UL (ref 3.8–5.8)
SODIUM SERPL-SCNC: 135 MMOL/L (ref 136–145)
WBC # BLD AUTO: 5.8 X10(3) UL (ref 4–11)

## 2025-04-11 PROCEDURE — 90792 PSYCH DIAG EVAL W/MED SRVCS: CPT | Performed by: OTHER

## 2025-04-11 RX ORDER — SODIUM CHLORIDE 9 MG/ML
INJECTION, SOLUTION INTRAVENOUS CONTINUOUS
Status: DISCONTINUED | OUTPATIENT
Start: 2025-04-11 | End: 2025-04-11

## 2025-04-11 RX ORDER — BUPROPION HYDROCHLORIDE 75 MG/1
75 TABLET ORAL DAILY
Status: DISCONTINUED | OUTPATIENT
Start: 2025-04-11 | End: 2025-04-14

## 2025-04-11 RX ORDER — GUAIFENESIN 600 MG/1
600 TABLET, EXTENDED RELEASE ORAL 2 TIMES DAILY
Status: DISCONTINUED | OUTPATIENT
Start: 2025-04-11 | End: 2025-04-18

## 2025-04-11 RX ORDER — SODIUM CHLORIDE 9 MG/ML
INJECTION, SOLUTION INTRAVENOUS CONTINUOUS
Status: ACTIVE | OUTPATIENT
Start: 2025-04-11 | End: 2025-04-11

## 2025-04-11 NOTE — CM/SW NOTE
SW present for PT/OT session. Pt up at side of bed, encouragement provided to stand. Pt continued to state that he cannot do this, he is beat and does not know what is wrong with him. Continued to request to lay back in bed, aware that PT/OT team will not be able to return if he refuses. Attempted to play Symphony music for pt as he stated this is what he enjoys.   Informed pt of psych consult due to conversation yesterday and the benefits of speaking to MD regarding his thoughts and feelings. Pt made passive suicidal statements- ie: wanting to lay down and die, no longer wanting to \"do this\". He will say he wants to get better and does not want to be a burden to his wife.     SW kept lights on and blinds open upon leaving room. Pt enjoys Golfing and turned on Masters. Pt also aware of music channels on TV. While looking at channels, pt asked \"can you put me in a coma, I am exhausted.\" Informed him of Psych eval and that team will be addressing mental health.     Pt discussed with RN, PT/OT. Notified team.     3:00- BENITO followed up with pt/wife for DC planning. Pt says he will do whatever he needs to do, but then when it comes to doing it pt is unable. Reviewed plans to DC on Monday, pt needing frequent reminders on what day of week it is. When reviewing DC needs for home, pt again brought up wanting to give up and die. Pt then stated \"I probably won't kill myself\". BENITO notified team of statement.     PT available to follow up for session, SW present. Pt continued with CGA assist, required encouragement. Pt stating he can't do it, feels like he is going down. Noted pt was not moving his feet during this transfer and needed reminders to move his feet. SW and PT spoke with wife outside of pt room. She is unable to care for him at home. Reviewed different levels of care. Encouraged wife to reach out to family/friends to create schedule once at facility for emotional support for pt. Facilities pending. Wife would like  [facility], encouraged her to also reach out to facility for response.     Team updated on above. SW to remain available.       ISAAC Julien

## 2025-04-11 NOTE — OCCUPATIONAL THERAPY NOTE
OCCUPATIONAL THERAPY TREATMENT NOTE - INPATIENT     Room Number: 7613/7613-A  Session: 8   Number of Visits to Meet Established Goals:  (15 added 8 sessions on 4/10)    Presenting Problem: S/p 3/21 fenestrated abdominal aortic aneurysm repair with left carotid to subclavian transposition on  with Dr. Yanez and Dr. Oquendo and 3/20 lumbar drain    ASSESSMENT   Patient demonstrates limited progress this session, goals remain in progress. Patient appeared to be less receptive to working with therapy.  Patient was seen by OT, PT, PTA, and SW today to address activity promotion and discharge plan (sw). Per chart, 4/10, pt had controlled B knee weakness when he was walking to the bathroom with nursing. This writer has been working with the patient since the initial evaluation in [facility]. Today, patient made more specific comments about his thoughts:    \"I just want to lay here and die\"  \"I just need a magic bullet\"  \"I don't want to do this to my wife, but I am beat, I am done. I don't want any of this any more.\"     RN made aware, psychiatry consult already in place.  PT communicated about today's session to the care team.   Patient was provided with increased time to encourage and participate in therapy. Patient completed supine to sit independently.  After much encouragement, pt started to stand independently from edge of the bed, but patient decided to sit back down.  Patient returned to bed independently.       At this time, patient is declining to participate in any ADL, functional mobility therapy activities.  When  mentioned to the patient about plan of care, patient replied, \"I don't care anymore.\"   OT will continue to follow peripherally, but therapy will wait until the care team addresses patient's mental wellbeing. Therapy will attempt to work with the patient when patient is ready to actively participate in therapy.         Anticipate return home with additional support with home health OT.  Patient is performing toilet transfer CGA/SBA, bed mobility independent, LB ADL min A, and UB ADL set-up level.  At home, patient would require assistance with LB ADL, bathing, and household tasks.  Potential limiting factors that are affecting rehab potential - guarded therapy participation level and limited activity endurance.       History:  Patient is a 69 year old male admitted on 3/19/2025 with Presenting Problem: S/p 3/21 fenestrated abdominal aortic aneurysm repair with left carotid to subclavian transposition on  with Dr. Yanez and Dr. Oquendo and 3/20 lumbar drain. Co-Morbidities : HTN, Peripheral Neuropathy, Hx of toe amputation L great toe, mesenteric ischemia s/p SMA bypass with right external iliac artery to SMA bypass, Short gut syndrome     ** 4/10/25  Pt had controlled B knee weakness when he was walking to the bathroom with nursing  **3/25 acute respiratory failure, possible pneumonia, on vapotherm    TREATMENT SESSION:  Patient Start of Session: supine    FUNCTIONAL TRANSFER ASSESSMENT  Sit to Stand: Edge of Bed  Edge of Bed: Supervision (attempted to stand, but after squatting, decided to sit back down)  Chair: Not Tested  Commode Transfer: Not Tested    BED MOBILITY  Rolling: Independent  Supine to Sit : Independent  Sit to Supine (OT): Independent  Scooting: independent    BALANCE ASSESSMENT  Static Sitting: Independent  Static Standing: Not Tested    FUNCTIONAL ADL ASSESSMENT  Grooming Seated: Not Tested  UB Dressing Seated: Not Tested  LB Dressing Seated: Minimal Assist  LB Dressing Standing: Not Tested  Toileting Standing: Not Tested    O2 SATURATIONS  Oxygen Therapy  SPO2% on Room Air at Rest: 91    EDUCATION PROVIDED  Patient Education : Role of Occupational Therapy; Plan of Care; Energy Conservation  Patient's Response to Education: Demonstrates Disinterest    Patient End of Session: In bed, Needs met, With  staff    SUBJECTIVE  See above    PAIN ASSESSMENT  Rating: Unable to  rate  Location: \"I don't even know\"  Management Techniques: Activity promotion     OBJECTIVE  Precautions: Bed/chair alarm (surgical boot L foot? SBP )    AM-PAC ‘6-Clicks’ Inpatient Daily Activity Short Form  -   Putting on and taking off regular lower body clothing?: A Little  -   Bathing (including washing, rinsing, drying)?: A Little  -   Toileting, which includes using toilet, bedpan or urinal? : A Little  -   Putting on and taking off regular upper body clothing?: None  -   Taking care of personal grooming such as brushing teeth?: None  -   Eating meals?: None    AM-PAC Score:  Score: 21  Approx Degree of Impairment: 32.79%  Standardized Score (AM-PAC Scale): 44.27    PLAN  OT Device Recommendations: TBD  OT Treatment Plan: Energy conservation/work simplification techniques, ADL training, UE strengthening/ROM, Functional transfer training, Patient/Family education  Rehab Potential : Guarded  Frequency: 3x/week    OT Goals:   Ongoing 4/11  ADL Goals   Patient will perform upper body dressing:  with setup-MET 4/7  Patient will perform lower body dressing:  with min assist  Patient will perform toileting: with min assist     Functional Transfer Goals  Patient will transfer from supine to sit:  with min assist -MET 4/7 UPGRADE TO SUPERVISION  Patient will transfer to toilet:  with min assist -MET 4/7 SIMULATED, UPGRADE TO SUPERVISION     UE Exercise Program Goal  Patient will be independent with bilateral AROM HEP (home exercise program).     Additional Goals  Pt will incorporate 2 energy conservation techniques into ADL.    OT Session Time: 40 minutes  Self-Care Home Management: 40 minutes

## 2025-04-11 NOTE — CONSULTS
[facility]  Psychiatric consultation    Cy Monsalve YOB: 1955   Age/Gender 69 year old male MRN [id number]   Location [facility] 7NE-A Attending Neil Oquendo MD   Hosp Day # 22 PCP Kam Alberts MD     Date of Service:  4/11/2025     Allergies:  Amoxicillin and Clindamycin    Patient stated reason for consultation:     \"Worsening depression, suicidality\"    Risk Assessment  Suicidal ideation: passive thoughts of death / no suicidal ideation  Homicidal ideation: None noted    History of Present Illness  Cy is a 69 year old male who was admitted on 3/19/2025.     Patient is admitted to the hospital for complex thoracic abdominal aortic repair on March 21.  He is extensive vascular surgeries through the years and has had a complicated course including respiratory failure as well as acute kidney injury.  His medical issues are stabilizing and staff are working with him on physical therapy but they have noticed that he is extremely deconditioned as well as self-limiting.  He is not making much progress and has been verbalizing suicidal statements over the last several days including not want to be around and told staff today \"I wish I could just be in a coma\" and also made reference to \"wishing there was a magic bullet\".  He has talked about wanting to give up and therefore I was consulted.  He has been placed on sitter as well as suicide precautions until I have been able to evaluate him today.    Wife is at bedside and able to provide additional history.  Patient does acknowledge feeling more depressed and demoralized but firmly and convincingly denies any suicide ideation identifies his wife and family as protective factors.  He is apologetic for making the statements but reports that they were made out of frustration as he is tired of being in the hospital and feels overwhelmed and tired.  He does want to get better and I reminded him as to why he is doing all this and he does  acknowledge that he does want to get better but sometimes gets down on him self and gets frustrated.  He does have a lot of negative self-talk and also has been reflecting on the death of his son when the son was just 1 years old and memories of his death seem to be coming up again and patient does become tearful when talking about it.  He does acknowledge some mild hopelessness and helplessness and worries about being a burden to his wife.  He does report some occasional crying spells.  He does acknowledge low energy and poor motivation with variable appetite.  Denies any prominent worrying or panic symptoms.  There is no intrusive thoughts or compulsions.  No current hallucinations or paranoia.  No euphoria or grandiosity.  Cognitively he is oriented only to person and place but struggled with the time and he had 0 out of 3 recall and short-term memory testing.  He was able to tell me days week back without difficulty.    Reviewed current treatment options and they were agreeable with a trial of Wellbutrin 75 mg daily to help with mood apathy energy and motivation.  Patient is able to contract for safety and denies any intermittent harm to self or others and wife also was not concerned about him trying to harm himself.  There are weapons at home including several guns and I did advise the wife and patient to lock up the guns and to only allow wife to have access to them and she agrees to do so.  Lengthy discussion with him about positive self-talk as well as challenging some of his negative distortions and asked him to remind himself why he is going through all this and he was able to acknowledge that life is worth living.  He was agreeable to us continue to follow while in the hospital.      Past Psychiatric History:    Prior treatment.  No prior suicide attempt or psychiatric hospitalization    Past Medical History:     Past Medical History[1]    Family History:     Family History[2]  With possible mental  illness.  No suicide in the family    Social History:   Patient  reports that he has been smoking cigars and cigarettes. He has never used smokeless tobacco. He reports current alcohol use. He reports that he does not use drugs.    Review of Systems:   As above. Otherwise negative on 14 system exam.    Exam    /66 (BP Location: Left arm)   Pulse 84   Temp 98.4 °F (36.9 °C) (Oral)   Resp 22   Wt 90.7 kg (199 lb 15.3 oz)   SpO2 91%   BMI 23.71 kg/m²     MSE:   Appearance: fair grooming  Behavior: unremarkable  Gait and Station:  Not formally tested  Attitude: cooperative and consistent  Speech: normal rate, rhythm and volume  Mood: depressed  Affect: Congruent  Thought process: logical and sequential  Thought content: no delusions, obsessions, or other thought abnormalities  Perceptions: no hallucinations  Consciousness/Orientation: oriented person, place  Concentration:   focused and attentive and as assessed by  days of the week backward  Memory:  intact recent, intact remote, impaired immediate, and as evidenced by 3 items in 5 minutes and ability to present consistent history  Intellect: average and as evidenced by  education level, ability to process clinical information, president question, and level of understanding of current events  Language: normal  Insight: fair as evidenced by aware of medical need   Judgment: intact as evidenced by acceptance of treatment      Assessment    Primary Psychiatric Diagnosis    Major depression moderate single episode without psychosis      Multidisciplinary Problems  Active Multi-Disciplinary problems:  CARDIOVASCULAR - ADULT (3/20/25)  HEMATOLOGIC - ADULT (3/20/25)  NEUROLOGICAL - ADULT (3/22/25)  PAIN - ADULT (3/25/25)  RESPIRATORY - ADULT (3/25/25)  Patient/Family Goals (3/28/25)  Diabetes/Glucose Control (4/1/25)  SAFETY ADULT - FALL (4/6/25)      Recommendations:    Patient not endorsing any active suicidal thoughts plans or ideations currently able to  contract to safety.  Wife is at bedside and also does not have any acute safety concerns.  Patient does have guns at home and I strongly advised wife to lock up guns prior to patient returning home and she agreed to do so.  Will discontinue suicide precautions and sitter.  Patient is safe to discharge from the hospital once medically cleared.  There is no current need for inpatient psychiatric admission. We will provide him with outpatient psychiatric resources for counseling and medications.  He may benefit from going to subacute rehab both for physical as well as continued psychological interventions.  He agrees to a trial of Wellbutrin 75 mg immediate release daily and will give his first dose now.  Strongly encourage him to participate in physical therapy and discussed benefits of therapy and only physically but psychologically.  Cognitive restructuring as well as motivational interviewing techniques provided.  Case discussed with treatment team.  I will have my colleague Dr. Day follow-up over the weekend for reassessment and I will return on Monday.    Medications:    Scheduled:   Scheduled Medications[3]     Prn:   PRN Medications[4]    Precautions:       Labs and Imaging:  Orders Placed This Encounter   Procedures    CBC With Differential With Platelet     Standing Status:   Standing     Number of Occurrences:   1     Release to patient:   Immediate    Comp Metabolic Panel (14)     Standing Status:   Standing     Number of Occurrences:   1     Release to patient:   Immediate    PTT, Activated     Standing Status:   Standing     Number of Occurrences:   1     Release to patient:   Immediate    Prothrombin Time (PT)     Standing Status:   Standing     Number of Occurrences:   1     Release to patient:   Immediate    Prothrombin Time (PT)     Standing Status:   Standing     Number of Occurrences:   1     Release to patient:   Immediate    Magnesium     Standing Status:   Standing     Number of Occurrences:    1    Renal Function Panel     Standing Status:   Standing     Number of Occurrences:   1     Release to patient:   Immediate    CBC, Platelet; No Differential     Standing Status:   Standing     Number of Occurrences:   1     Release to patient:   Immediate    Prothrombin Time (PT)     Standing Status:   Standing     Number of Occurrences:   1     Release to patient:   Immediate    Magnesium     DO NOT DISCONTINUE  MUST REMAIN FOR ELECTROLYTE PROTOCOL     Standing Status:   Standing     Number of Occurrences:   1     Release to patient:   Immediate    CBC With Differential With Platelet     Standing Status:   Standing     Number of Occurrences:   1     Release to patient:   Immediate    Comp Metabolic Panel (14)     Standing Status:   Standing     Number of Occurrences:   1     Release to patient:   Immediate    Prothrombin Time (PT)     Standing Status:   Standing     Number of Occurrences:   1     Release to patient:   Immediate    PTT, Activated     Standing Status:   Standing     Number of Occurrences:   1    CBC, Platelet; No Differential     Standing Status:   Standing     Number of Occurrences:   1     Release to patient:   Immediate    Comp Metabolic Panel (14)     Standing Status:   Standing     Number of Occurrences:   1     Release to patient:   Immediate    Prothrombin Time (PT)     Standing Status:   Standing     Number of Occurrences:   1     Release to patient:   Immediate    PTT, Activated     Standing Status:   Standing     Number of Occurrences:   1     Release to patient:   Immediate    Triglycerides     Okay to add to existing specimens     Standing Status:   Standing     Number of Occurrences:   1     Release to patient:   Immediate    Troponin I (High Sensitivity)     Standing Status:   Standing     Number of Occurrences:   1     Release to patient:   Immediate    CBC With Differential With Platelet     Standing Status:   Standing     Number of Occurrences:   1     Release to patient:    Immediate    Basic Metabolic Panel (8)     Standing Status:   Standing     Number of Occurrences:   1     Release to patient:   Immediate    Basic Metabolic Panel (8)     Standing Status:   Standing     Number of Occurrences:   1     Release to patient:   Immediate    Magnesium     Standing Status:   Standing     Number of Occurrences:   1     Release to patient:   Immediate    Hemoglobin & Hematocrit     Standing Status:   Standing     Number of Occurrences:   1     Release to patient:   Immediate    Hemoglobin A1C     Standing Status:   Standing     Number of Occurrences:   1     Release to patient:   Immediate    Magnesium     DO NOT DISCONTINUE MUST REMAIN FOR ELECTROLYTE PROTOCOL     Standing Status:   Standing     Number of Occurrences:   1     Release to patient:   Immediate    Magnesium     DO NOT DISCONTINUE  MUST REMAIN FOR ELECTROLYTE PROTOCOL     Standing Status:   Standing     Number of Occurrences:   1     Release to patient:   Immediate    Scan slide     Standing Status:   Standing     Number of Occurrences:   1     Release to patient:   Immediate    Heparin Induced Platelet     Standing Status:   Standing     Number of Occurrences:   1     Release to patient:   Immediate    CBC With Differential With Platelet     Standing Status:   Standing     Number of Occurrences:   1     Release to patient:   Immediate    Basic Metabolic Panel (8)     Standing Status:   Standing     Number of Occurrences:   1     Release to patient:   Immediate    CBC, Platelet; No Differential     Standing Status:   Standing     Number of Occurrences:   1     Release to patient:   Immediate    Scan slide     Standing Status:   Standing     Number of Occurrences:   1     Release to patient:   Immediate    Magnesium     DO NOT DISCONTINUE  MUST REMAIN FOR ELECTROLYTE PROTOCOL     Standing Status:   Standing     Number of Occurrences:   1     Release to patient:   Immediate    Potassium     DO NOT DISCONTINUE MUST REMAIN FOR  ELECTROLYTE PROTOCOL     Standing Status:   Standing     Number of Occurrences:   1     Release to patient:   Immediate    Renal Function Panel     Standing Status:   Standing     Number of Occurrences:   1     Release to patient:   Immediate    CBC, Platelet; No Differential     Standing Status:   Standing     Number of Occurrences:   1     Release to patient:   Immediate    Potassium     DO NOT DISCONTINUE MUST REMAIN FOR ELECTROLYTE PROTOCOL     Standing Status:   Standing     Number of Occurrences:   1     Release to patient:   Immediate    Phosphorus     DO NOT DISCONTINUE MUST REMAIN FOR ELECTROLYTE PROTOCOL     Standing Status:   Standing     Number of Occurrences:   1     Release to patient:   Immediate    Potassium     DO NOT DISCONTINUE MUST REMAIN FOR ELECTROLYTE PROTOCOL     Standing Status:   Standing     Number of Occurrences:   1     Release to patient:   Immediate    Magnesium     DO NOT DISCONTINUE  MUST REMAIN FOR ELECTROLYTE PROTOCOL     Standing Status:   Standing     Number of Occurrences:   1     Release to patient:   Immediate    Basic Metabolic Panel (8)     Standing Status:   Standing     Number of Occurrences:   1    Prothrombin Time (PT)     Standing Status:   Standing     Number of Occurrences:   1     Release to patient:   Immediate    ABG PANEL W ELECT AND LACTATE     Standing Status:   Standing     Number of Occurrences:   1     Release to patient:   Immediate    Comp Metabolic Panel (14)     Standing Status:   Standing     Number of Occurrences:   1     Release to patient:   Immediate    Magnesium     Standing Status:   Standing     Number of Occurrences:   1     Release to patient:   Immediate    CBC With Differential With Platelet     Standing Status:   Standing     Number of Occurrences:   1    Prothrombin Time (PT)     Standing Status:   Standing     Number of Occurrences:   1    Scan slide     Standing Status:   Standing     Number of Occurrences:   1    Potassium     DO NOT  DISCONTINUE MUST REMAIN FOR ELECTROLYTE PROTOCOL     Standing Status:   Standing     Number of Occurrences:   1     Release to patient:   Immediate    PTT, Activated     Standing Status:   Standing     Number of Occurrences:   1     Release to patient:   Immediate    Procalcitonin     Standing Status:   Standing     Number of Occurrences:   1     Release to patient:   Immediate    Magnesium     Standing Status:   Standing     Number of Occurrences:   1    Renal Function Panel     Standing Status:   Standing     Number of Occurrences:   1     Release to patient:   Immediate    CBC, Platelet; No Differential     Standing Status:   Standing     Number of Occurrences:   1     Release to patient:   Immediate    PTT, Activated     Standing Status:   Standing     Number of Occurrences:   1     Release to patient:   Immediate    PTT, Activated     RN to modify draw (start) time if needed.     Standing Status:   Standing     Number of Occurrences:   1     Release to patient:   Immediate    PTT, Activated     RN to modify draw (start) time if needed.     Standing Status:   Standing     Number of Occurrences:   1     Release to patient:   Immediate    C-Reactive Protein     Standing Status:   Standing     Number of Occurrences:   1     Release to patient:   Immediate    Procalcitonin     Standing Status:   Standing     Number of Occurrences:   1     Release to patient:   Immediate    Legionella urine Ag serogrp 1     Standing Status:   Standing     Number of Occurrences:   1     Release to patient:   Immediate    Streptococcus Pneumoniae Ag, Urine     Standing Status:   Standing     Number of Occurrences:   1     Release to patient:   Immediate    PTT, Activated     RN to modify draw (start) time if needed.     Standing Status:   Standing     Number of Occurrences:   1     Release to patient:   Immediate    VBG PANEL WITH ELECTROLYTES     Standing Status:   Standing     Number of Occurrences:   1     Release to patient:    Immediate    Basic Metabolic Panel (8)     Standing Status:   Standing     Number of Occurrences:   1     Release to patient:   Immediate    Magnesium     Standing Status:   Standing     Number of Occurrences:   1     Release to patient:   Immediate    Comp Metabolic Panel (14)     Standing Status:   Standing     Number of Occurrences:   3     Release to patient:   Immediate    CBC With Differential With Platelet     Standing Status:   Standing     Number of Occurrences:   3    Magnesium     Standing Status:   Standing     Number of Occurrences:   3     Release to patient:   Immediate    PTT, Activated     RN to modify draw (start) time if needed.     Standing Status:   Standing     Number of Occurrences:   1     Release to patient:   Immediate    PTT, Activated     RN to modify draw (start) time if needed.     Standing Status:   Standing     Number of Occurrences:   1     Release to patient:   Immediate    PTT, Activated     RN to modify draw (start) time if needed.     Standing Status:   Standing     Number of Occurrences:   1     Release to patient:   Immediate    PTT, Activated     RN to modify draw (start) time if needed.     Standing Status:   Standing     Number of Occurrences:   1     Release to patient:   Immediate    PTT, Activated     RN to modify draw (start) time if needed.     Standing Status:   Standing     Number of Occurrences:   1     Release to patient:   Immediate    PTT, Activated     Standing Status:   Standing     Number of Occurrences:   1     Release to patient:   Immediate    PTT, Activated     RN to modify draw (start) time if needed.     Standing Status:   Standing     Number of Occurrences:   1     Release to patient:   Immediate    PTT, Activated     RN to modify draw (start) time if needed.     Standing Status:   Standing     Number of Occurrences:   1     Release to patient:   Immediate    CBC, Platelet; No Differential     Standing Status:   Standing     Number of Occurrences:   1      Release to patient:   Immediate    Comp Metabolic Panel (14)     Standing Status:   Standing     Number of Occurrences:   1     Release to patient:   Immediate    PTT, Activated     Standing Status:   Standing     Number of Occurrences:   1     Release to patient:   Immediate    PTT, Activated     Standing Status:   Standing     Number of Occurrences:   1     Release to patient:   Immediate    Heparin Induced Platelet     Standing Status:   Standing     Number of Occurrences:   1     Release to patient:   Immediate    PTT, Activated     RN to modify draw (start) time if needed.     Standing Status:   Standing     Number of Occurrences:   1     Release to patient:   Immediate    CBC With Differential With Platelet     Standing Status:   Standing     Number of Occurrences:   1     Release to patient:   Immediate    CBC With Differential With Platelet     Standing Status:   Standing     Number of Occurrences:   7     Release to patient:   Immediate    Basic Metabolic Panel (8)     Standing Status:   Standing     Number of Occurrences:   7     Release to patient:   Immediate    Magnesium     Standing Status:   Standing     Number of Occurrences:   7     Release to patient:   Immediate    Basic Metabolic Panel (8)     Standing Status:   Standing     Number of Occurrences:   1    Hepatic Function Panel (7)     Prior to the start of Argatroban     Standing Status:   Standing     Number of Occurrences:   1     Release to patient:   Immediate    PTT, Activated     Once two consecutive PTT levels are within the therapeutic range     Standing Status:   Standing     Number of Occurrences:   1     Release to patient:   Immediate    Magnesium     Standing Status:   Standing     Number of Occurrences:   1     Release to patient:   Immediate    Serotonin Release Assay     Standing Status:   Standing     Number of Occurrences:   1     Release to patient:   Immediate    Magnesium     DO NOT DISCONTINUE  MUST REMAIN FOR ELECTROLYTE  PROTOCOL     Standing Status:   Standing     Number of Occurrences:   1     Release to patient:   Immediate    CBC With Differential With Platelet     Standing Status:   Standing     Number of Occurrences:   1     Release to patient:   Immediate    Basic Metabolic Panel (8)     Standing Status:   Standing     Number of Occurrences:   1     Release to patient:   Immediate    Scan slide     Standing Status:   Standing     Number of Occurrences:   1     Release to patient:   Immediate    Magnesium     DO NOT DISCONTINUE  MUST REMAIN FOR ELECTROLYTE PROTOCOL     Standing Status:   Standing     Number of Occurrences:   1     Release to patient:   Immediate    CBC With Differential With Platelet     Standing Status:   Standing     Number of Occurrences:   1     Release to patient:   Immediate    Basic Metabolic Panel (8)     Standing Status:   Standing     Number of Occurrences:   1     Release to patient:   Immediate    Magnesium     DO NOT DISCONTINUE  MUST REMAIN FOR ELECTROLYTE PROTOCOL     Standing Status:   Standing     Number of Occurrences:   1     Release to patient:   Immediate    Urinalysis with Culture Reflex     Standing Status:   Standing     Number of Occurrences:   1     Specimen source::   Urine, clean catch     Clinical justification::   Dysuria     Documentation of symptoms of UTI or sepsis::   Documentation of symptoms of UTI or sepsis must be corroborated within the EMR     Release to patient:   Immediate    CBC With Differential With Platelet     Standing Status:   Standing     Number of Occurrences:   1     Release to patient:   Immediate    Basic Metabolic Panel (8)     Standing Status:   Standing     Number of Occurrences:   1     Release to patient:   Immediate        Goals of Tx/Objectives for care: Monitor and treat mood and suicidality and assist with discharge planning needs    Time Spent on Case: Currently 45 minutes total time spent in case with greater than 50% time spent in counseling and  coordination of care    Sven Joseph DO  Board Certified Adult and Geriatric Psychiatrist  Medical Director, Geriatric Psychiatry, [facility]   Medical Director, Psychiatric Consultation Service, [facility]          [1]   Past Medical History:   AAA (abdominal aortic aneurysm)    Abdominal aneurysm    Abdominal aortic aneurysm (AAA)    AAA surgery done    Anxiety state    Back pain    Naproxen twice a week    Back problem    minor    Calculus of kidney    Deep vein thrombosis (HCC)    to colon    Esophageal reflux    Hearing impairment    Lt ear tinnitus    High blood pressure    History of recent hospitalization    10/2019- was at [facility] x5 weeks (ICU) with Dissected Aorta/ Post op colon problem/and post op cardiac arrest    Ileostomy present (HCC)    Peripheral vascular disease    Unspecified essential hypertension    Visual impairment    glasses   [2]   Family History  Problem Relation Age of Onset    Diabetes Father     Heart Disorder Father         CHF    Heart Disorder Mother          age 70, aortic aneurysm    Hypertension Sister    [3]    guaiFENesin ER  600 mg Oral BID    buPROPion  75 mg Oral Daily    apixaban  5 mg Oral BID    loperamide  2 mg Oral Every afternoon at 1400    multivitamin  1 tablet Oral Daily    amLODIPine  10 mg Oral Daily    metoprolol tartrate  25 mg Oral 2x Daily(Beta Blocker)    fluticasone propionate  1 spray Each Nare Daily    insulin aspart  1-5 Units Subcutaneous TID AC and HS   [4]   guaiFENesin    diphenoxylate-atropine    peppermint oil    hydrALAzine    ipratropium-albuterol    glucose **OR** glucose **OR** glucose-vitamin C **OR** dextrose **OR** glucose **OR** glucose **OR** glucose-vitamin C    acetaminophen **OR** HYDROcodone-acetaminophen **OR** HYDROcodone-acetaminophen    ondansetron    metoclopramide

## 2025-04-11 NOTE — PROGRESS NOTES
[facility] Hospitalist Progress Note     CC: Hospital Follow up    PCP: Kam Alberts MD     Subjective:     - no new complaints, had an assisted fall the other day w/ knees buckling, does not feel better nor worse, denies dyspnea, cp, dizzy/LH, is now unsure whether or not he wishes for VERA vs home w/ home health, wishes to \"play it by ear\"     OBJECTIVE:    Blood pressure 130/64, pulse 88, temperature 98.4 °F (36.9 °C), temperature source Oral, resp. rate 16, weight 199 lb 15.3 oz (90.7 kg), SpO2 93%.    Temp:  [97.7 °F (36.5 °C)-99.2 °F (37.3 °C)] 98.4 °F (36.9 °C)  Pulse:  [77-96] 88  Resp:  [16-18] 16  BP: (120-162)/(63-70) 130/64  SpO2:  [90 %-93 %] 93 %      Intake/Output:    Intake/Output Summary (Last 24 hours) at 4/11/2025 0842  Last data filed at 4/10/2025 2000  Gross per 24 hour   Intake 120 ml   Output 60 ml   Net 60 ml           Exam   Gen: No acute distress, alert and oriented x3, no focal neurologic deficits, chronically ill-appearing  Heent: NC AT, mucous memb clear, neck supple  Pulm: Lungs clear, normal respiratory effort  CV: Heart with regular rate and rhythm, no peripheral edema  Abd: Abdomen soft, nontender, nondistended, bowel sounds present  MSK:expected rom, 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, 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,   Skin: no rashes or lesions  Neuro: AO*3, motor intact, no sensory deficits  Psyc: appropriate mood and affect      Data Review:       Labs:     Recent Labs   Lab 04/09/25  0528 04/10/25  0517 04/11/25  0527   RBC 2.72* 2.70* 2.79*   HGB 8.1* 8.1* 8.1*   HCT 23.9* 23.6* 24.5*   MCV 87.9 87.4 87.8   MCH 29.8 30.0 29.0   MCHC 33.9 34.3 33.1   RDW 17.2 17.8 17.9   NEPRELIM 4.60 6.06 5.05   WBC 5.6 6.9 5.8   PLT 95.0* 80.0* 75.0*         Recent Labs   Lab 04/09/25  0528 04/10/25  0517 04/11/25  0527   GLU 87 111* 93   BUN 26* 29* 29*   CREATSERUM 1.39* 1.48* 1.55*   EGFRCR 54* 50* 48*   CA 8.7 8.5* 8.6*    135* 135*   K 4.3 4.4 4.2    101 102   CO2 26.0  26.0 26.0       No results for input(s): \"ALT\", \"AST\", \"ALB\", \"AMYLASE\", \"LIPASE\", \"LDH\" in the last 168 hours.    Invalid input(s): \"ALPHOS\", \"TBIL\", \"DBIL\", \"TPROT\"        Imaging:  No results found.      Meds:      apixaban  5 mg Oral BID    loperamide  2 mg Oral Every afternoon at 1400    multivitamin  1 tablet Oral Daily    amLODIPine  10 mg Oral Daily    metoprolol tartrate  25 mg Oral 2x Daily(Beta Blocker)    fluticasone propionate  1 spray Each Nare Daily    docusate sodium  100 mg Oral BID    polyethylene glycol (PEG 3350)  17 g Oral Daily    insulin aspart  1-5 Units Subcutaneous TID AC and HS             diphenoxylate-atropine    peppermint oil    hydrALAzine    ipratropium-albuterol    glucose **OR** glucose **OR** glucose-vitamin C **OR** dextrose **OR** glucose **OR** glucose **OR** glucose-vitamin C    acetaminophen **OR** HYDROcodone-acetaminophen **OR** HYDROcodone-acetaminophen    ondansetron    metoclopramide         Assessment/Plan:     Active Problems:    Pre-op testing    Thoracoabdominal aortic aneurysm (TAAA)    Bilateral leg weakness      69 year old male with a past medical history of hypertension, GERD, anxiety, renal calculi, DVT, ascending aortic dissection s/p repair, 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  Postoperative course complicated by HOLLIS on CKD as well as right lower extremity weakness along with hypoxic respiratory failure.      History of prior infrarenal endovascular AAA repair with bilateral KYLER for hypogastric and common iliac aneurysms  History of mesenteric ischemia s/p SMA bypass with right external iliac artery to SMA bypass  History of aortic dissection s/p repair, aortic dissection  distal to left subclavian  S/p complex thoraco abdominal aortic repair 3/21  Hx of DVT   -Complicated aortic pathology with multiple repairs in the past. 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  Prior to that he had a mesenteric ischemia and was started on Coumadin which he continued for 8 months.  He is also positive for heterozygous prothrombin gene mutation.  Eliquis was recommended by hematology.     Acute hypoxemic respiratory failure w/ threat to bodily function 2/2 possible pneumonia versus edema  Leukocytosis  -Completed 3 days of azithro (3/27-3/29), janna (3/24-3/28), now switched to cefepime per ICU (3/28-3/30)  - tx w/ iv steroids x 5 days, iv steroids completed, completed abx as above   - wean o2 as tolerated     Generalized weakness  Back pain  - In the setting of prolonged hospital stay  - UA to rule out UTI  - Rapid COVID flu RSV to rule out acute illness  -Pain appears to be generalized, hemoglobin is stable not concerned for other etiologies such as RP bleed at this time  - Continue PT/OT-recommend VERA.  Patient does not qualify for acute rehab.  Unfortunately I do not think the patient is strong enough to be discharged home..    Leukocytosis-resolved      Right lower extremity weakness-resolved  - In the setting of complex aortic  repair 3/21  -- PT/OT     HOLLIS on CKD-resolved  - overall improved, however creat up trend past 4 days, ?dry from diarrhea, will give gentle fluids and monitor      Hypertension  -hold home HCTZ     Allergic rhinitis  -Continue home fluticasone as needed     Short gut syndrome  - resume lamotil if diarrhea resumes  - Add Imodium per home regimen    Sacral wound  - In the setting of persistent diarrhea  - Wound care consult, recommendations reviewed    Normocytic anemia  - stable, trend     H/o dvt  - see above     Physical deconditioning  - likely 2/2 prolonged hospitalization, pt/ot - ambulate, encourage oob     Depression  SI  - psych consulted, d/w PT/OT pt reported SI and refused to cooperate w/ therapy, psych consulted, eval patient, patient started on wellbutrin     Anemia  - stable   - 2/2 blood loss     Dispo - per vascular, will follow       Dave Ruiz DO        Supplementary Documentation:   DVT Mechanical Prophylaxis:   SCDs, Early ambuation  DVT Pharmacologic Prophylaxis   Medication    apixaban (Eliquis) tab 5 mg                Code Status: DNAR/Full Treatment  Arzola: External urinary catheter in place  Arzola Duration (in days):   Central line:

## 2025-04-11 NOTE — PHYSICAL THERAPY NOTE
PHYSICAL THERAPY TREATMENT NOTE - INPATIENT    Room Number: 7613/7613-A     Session: 9     Patient remains appropriate for additional physical therapy treatment in order to progress towards goals as noted below.     Number of Visits to Meet Established Goals: 8    Presenting Problem: S/p 3/21 fenestrated abdominal aortic aneurysm repair with left carotid to subclavian transposition on  with Dr. Yanez and Dr. Oquendo and 3/20 lumbar drain  Co-Morbidities : HTN, Peripheral Neuropathy, Hx of toe amputation L great toe, mesenteric ischemia s/p SMA bypass with right external iliac artery to SMA bypass, Short gut syndrome    PHYSICAL THERAPY ASSESSMENT   Patient demonstrates limited progress this session, goals  remain in progress.      Pt made passive suicidal statements:  \"I just want to lay here and die\"  \"I just need a magic bullet\"    Pt was given ample opportunity to participate in session - refusing to give any and all effort towards mobility.  Pt was able to sit up on EOB independently - returned to bed and sat up again.     Session witnessed by PT YUMIKO, OT MS and BENITO EPPS. RN Naila made aware of discussion, and comments.     Pt is refusing to participate in any and all therapy at this time.  Will remain on peripheral therapy caseload until psych needs are addressed - ultimately patient's safety and wellbeing is of the upmost importance.      PLAN DURING HOSPITALIZATION  Nursing Mobility Recommendation : 1 Assist  PT Device Recommendation: Rolling walker  PT Treatment Plan: Bed mobility, Body mechanics, Gait training, Strengthening, Transfer training, Stair training, Balance training  Frequency (Obs): 3-5x/week     CURRENT GOALS   Goal #1 Patient is able to demonstrate supine - sit EOB @ level: CGA assistance   MET Raquel 4/1     Goal #2 Patient is able to demonstrate transfers EOB to/from [facility] at assistance level: CGA assistance   MET Raquel 4/1     Goal #3 Patient is able to sit at the EOB for 10 mins with CGA    Met  4/8/2025       Goal #4 Patient is able to ambulate 50 feet with assist device: walker - rolling at assistance level: moderate assistance  MET 4/7 progress to 150ft CGA     Goal #5     Goal #6     Goal Comments: Goals established on 3/22/2025  4/11/2025 all goals ongoing    SUBJECTIVE  \"I just can't do this anymore, I'm done\"    OBJECTIVE  Precautions: Bed/chair alarm (surgical boot L foot? SBP )    WEIGHT BEARING RESTRICTION  R Lower Extremity: Full Weight Bearing  L Lower Extremity: Full Weight Bearing    PAIN ASSESSMENT   Rating: Unable to rate  Location: bottom  Management Techniques: Activity promotion, Body mechanics, Repositioning    BALANCE                                                                                                                       Static Sitting: Good  Dynamic Sitting: Good           Static Standing: Fair -  Dynamic Standing: Fair -    ACTIVITY TOLERANCE                         O2 WALK       AM-PAC '6-Clicks' INPATIENT SHORT FORM - BASIC MOBILITY  How much difficulty does the patient currently have...  Patient Difficulty: Turning over in bed (including adjusting bedclothes, sheets and blankets)?: None   Patient Difficulty: Sitting down on and standing up from a chair with arms (e.g., wheelchair, bedside commode, etc.): A Little   Patient Difficulty: Moving from lying on back to sitting on the side of the bed?: None   How much help from another person does the patient currently need...   Help from Another: Moving to and from a bed to a chair (including a wheelchair)?: A Little   Help from Another: Need to walk in hospital room?: A Little   Help from Another: Climbing 3-5 steps with a railing?: A Lot     AM-PAC Score:  Raw Score: 19   Approx Degree of Impairment: 41.77%   Standardized Score (AM-PAC Scale): 45.44   CMS Modifier (G-Code): CK    FUNCTIONAL ABILITY STATUS  Gait Assessment   Functional Mobility/Gait Assessment  Gait Assistance: Not tested  Distance (ft): 0 (Pt refused  to give effort to stand, transfer, walk or participate in stair training)  Assistive Device: Rolling walker  Pattern: Shuffle (reporting high levels of pain, \"I just can't do this anymore\")    Skilled Therapy Provided    Bed Mobility:  Rolling: NT   Supine<>Sit: SBA   Sit<>Supine: SBA     Transfer Mobility:  Sit<>Stand: refused   Stand<>Sit: refused  Gait: refused    Therapist's Comments: NA    Patient End of Session: Up in chair, Needs met, Call light within reach, RN aware of session/findings, All patient questions and concerns addressed    PT Session Time: 40 minutes  Therapeutic Activity: 40 minutes

## 2025-04-11 NOTE — BH PROGRESS NOTE
Outpatient psychiatry and therapy referrals placed in patient's discharge instructions.     Below is a list of outpatient psychiatrists that also have a specialization in working with older adult populations.     [facility]  [address]  [place] [address]  [phone]    [facility]  [address] [address]  [phone]     [facility]   [address]  [place], [place] [address] [phone]     [address], [address]  [place], [place] [address] [phone]     [facility]  [address], [place] [address] [phone]     Dr. Samuel Burgess, [facility]  [address], [address]  [place] [address] [phone]     Dr. Aurelio Cortes MD  [facility]  [address], [address]  [place], [place] [id number] [phone]

## 2025-04-11 NOTE — PLAN OF CARE
Assumed care of pt at the change of shift. Pt A&O x4, flat affect. On RA. Mucinex gtt started by hospitalist. Pt making self harm comments- psych contacted and temporarily placed on suicide precautions until psych MD to see physically. SI orders discontinued. Primofit in place. Care endoresed to another RN. Dr. Owen ordered for pt not to use bed pan and to sit in chair. Complaints of back pain- prn medication give. See flowsheets for further information. Wife at bedside and updated. See flowsheets for further information.

## 2025-04-11 NOTE — DIETARY NOTE
[facility]   part of [facility]  NUTRITION ASSESSMENT    Pt does not meet malnutrition criteria at this time.    NUTRITION INTERVENTION:    Meal and Snacks - Continue Regular Diet as tolerated; Monitor and encourage adequate PO intake.   Medical Food Supplements - Continue Magic Shake chocolate daily and Magic Cup chocolate daily.   Nutrition Education - Discussed importance of PO intake for healing 3/25. Reviewed foods high in Calories to help w/ wt maintenance. Pt/family receptive to education, no barriers noted.    Vitamin and Mineral Supplements - Recommend Multivitamin with minerals    PATIENT STATUS:   4/11- Pt states appetite is poor. Mainly only hungry for breakfast. Pt states he sometimes takes ONS. Messaged RN about an updated wt, buddy with extended hospital stay. RD offered pt cafe menu to increase variety and options with low appetite and 23 day stay. Pt happy to try it. Communicated with diet office. + diarrhea. Follow per protocol.     4/4 - Pt sitting in chair, reports he ate a frosty from [employer] and cereal for breakfast this AM.  Denies n/v/d. Reports he is drinking his protein shake and eating his magic cup when he gets it.  Trying to eat, but dislikes food in hospital.  He has no questions/concerns over diet at this time.      3/31: Transferred to ICU 3/27. Visited pt at bedside. He reports his appetite is fair and about the same as a couple days ago. Does not like hospital food much which is making it hard for him to eat much per his report. Denies GI symptoms at this time but does report having loose stools often at baseline. Reports consuming ~1 Magic Shake/day and agreeable to also trying chocolate Magic Cup. Continued to encourage PO and ONS intake; all questions answered at this time.    3/25: 69/M admitted on 3/19 with AAA, POD#4 for fenestrated endovascular arch repair with fenestrated thoracoabdominal aneurysm repair, left carotid to subclavian transposition with endovascular  septotomy of dissection. Pt seen for consult \"Patient refusing Ensure, low appetite, needs protein for wound healing.\"  Pt seen with family at bedside.  Pt reports not a robost appetite, but ate some cornflakes this AM and working on a donut at bedside.  Denies n/v/d. On Vapotherm due to suspected fluid o/l. Pt notes his appetite fluctuated PTA.  He had lost significant wt over the past several years due to medical issues. EMR records do not indicate any wt loss over the past year.  Discussed importance of nutrition and protein for healing.  Pt aware and feels his appetite will return eventually. Pt had Ensure ordered and dislikes it.  He declines this and other ONS offered. He does note that he did tolerate a Boost milkshake previously - will try Magic Shake.  Pt agreeable.  Offered to order food for pt, he declined.    PMH: AAA (abdominal aortic aneurysm), Deep vein thrombosis, Esophageal reflux, Ileostomy, Peripheral vascular disease, essential hypertension    ANTHROPOMETRICS:  Ht:  6'5\"  Wt: 90.7 kg (199 lb 15.3 oz).   BMI: Body mass index is 23.71 kg/m².  IBW: 94.5 kg    WEIGHT HISTORY:   Weight loss: No  Wt Readings from Last 10 Encounters:   03/31/25 90.7 kg (199 lb 15.3 oz)   03/22/25 69.5 kg (153 lb 3.5 oz)   03/07/25 83.9 kg (185 lb)   03/23/22 107.3 kg (236 lb 9.6 oz)   12/28/21 106.5 kg (234 lb 12.8 oz)   08/05/21 101.3 kg (223 lb 6.4 oz)   03/16/21 96.2 kg (212 lb)   12/02/20 97.7 kg (215 lb 6.4 oz)   10/27/20 92.1 kg (203 lb)   08/20/20 90 kg (198 lb 6.4 oz)      NUTRITION:  Diet:       Procedures    Regular/General diet Is Patient on Accuchecks? Yes; Is Patient on Suicide Precautions? No      Food Allergies: No  Cultural/Ethnic/Synagogue Preferences Addressed: Yes    Percent Meals Eaten (last 3 days)       Date/Time Percent Meals Eaten (%)    04/09/25 1000 0 %     Percent Meals Eaten (%): did not want brkfst at 04/09/25 1000    04/09/25 1200 100 %     Percent Meals Eaten (%): cup of peaches at  04/09/25 1200    04/10/25 1900 30 %          GI system review: WNL; Last BM Date: 04/10/25  Skin and wounds: surgical wound to neck, stage I pressure injury to coccyx    NUTRITION RELATED PHYSICAL FINDINGS:     1. Body Fat/Muscle Mass: no wasting noted / well nourished     2. Fluid Accumulation: none per RN documentation    NUTRITION PRESCRIPTION:  90.7 kg Actual Body Weight  Calories: 6895-1378 calories/day (25-30 kcal/kg)  Protein: 109-136 grams protein/day (1.2-1.5 grams protein/kg)  Fluid: ~1 ml/kcal or per MD discretion    NUTRITION DIAGNOSIS/PROBLEM:  Inadequate oral intake related to insufficient appetite resulting in inadequate nutrition intake as evidenced by documented/reported insufficient oral intake    MONITOR AND EVALUATE/NUTRITION GOALS:  PO intake of 75% of meals TID - Not met, Continues  PO intake of 75% of oral nutrition supplement/s - Not met, Continues  Weight stable within 1 to 2 lbs during admission - Ongoing  Provide nutrition adequate for wound healing - Ongoing    MEDICATIONS:  MVI, Colace, Novolog (0u x 24h), miralax    LABS:  Reviewed     Pt is at Low nutrition risk    Leonila Martinez RD, LDN  Clinical Dietitian

## 2025-04-11 NOTE — PHYSICAL THERAPY NOTE
PHYSICAL THERAPY TREATMENT NOTE - INPATIENT    Room Number: 7613/7613-A     Session: 10     Patient seen for additional therapy session this afternoon per request of spouse who was not present during AM session.       Number of Visits to Meet Established Goals: 8    Presenting Problem: S/p 3/21 fenestrated abdominal aortic aneurysm repair with left carotid to subclavian transposition on  with Dr. Yanez and Dr. Oquendo and 3/20 lumbar drain  Co-Morbidities : HTN, Peripheral Neuropathy, Hx of toe amputation L great toe, mesenteric ischemia s/p SMA bypass with right external iliac artery to SMA bypass, Short gut syndrome    PHYSICAL THERAPY ASSESSMENT   Patient demonstrates limited progress this session, goals  remain in progress.      Patient continues to demonstrate limited participation in therapy. Reluctantly willing to work towards transfer to bedside chair this afternoon with encouragement from spouse and rehab tech Sumanth. Pt declined further gait training despite encouragement that he had been walking in [place] with therapy multiple times this week. Pt then reporting he \"just got to the hospital\" - increased confusion noted (BENITO communicated to medical team in epic secure message).     Therapist participated in dc planning discussion with spouse, BENITO Del Rio, and via secure chat with multi disciplinary medical team.     PT will re-attempt Monday as pt agreeable.       PLAN DURING HOSPITALIZATION  Nursing Mobility Recommendation : 1 Assist  PT Device Recommendation: Rolling walker  PT Treatment Plan: Bed mobility, Body mechanics, Gait training, Strengthening, Transfer training, Stair training, Balance training  Frequency (Obs): 3-5x/week     CURRENT GOALS   Goal #1 Patient is able to demonstrate supine - sit EOB @ level: CGA assistance   MET Raquel 4/1     Goal #2 Patient is able to demonstrate transfers EOB to/from [facility] at assistance level: CGA assistance   MET Raquel 4/1     Goal #3 Patient is able to sit at the EOB for  10 mins with CGA    Met 4/8/2025       Goal #4 Patient is able to ambulate 50 feet with assist device: walker - rolling at assistance level: moderate assistance  MET 4/7 progress to 150ft CGA     Goal #5     Goal #6     Goal Comments: Goals established on 3/22/2025  4/11/2025 all goals ongoing    SUBJECTIVE  \"I'm beat- I just can't do anymore\" - stated multiple times during mobility     OBJECTIVE  Precautions: Bed/chair alarm (surgical boot L foot? SBP )    WEIGHT BEARING RESTRICTION  R Lower Extremity: Full Weight Bearing  L Lower Extremity: Full Weight Bearing    PAIN ASSESSMENT   Rating: Unable to rate  Location: bottom  Management Techniques: Activity promotion, Body mechanics, Repositioning    BALANCE                                                                                                                       Static Sitting: Good  Dynamic Sitting: Good           Static Standing: Fair -  Dynamic Standing: Fair -    ACTIVITY TOLERANCE                         O2 WALK       AM-PAC '6-Clicks' INPATIENT SHORT FORM - BASIC MOBILITY  How much difficulty does the patient currently have...  Patient Difficulty: Turning over in bed (including adjusting bedclothes, sheets and blankets)?: None   Patient Difficulty: Sitting down on and standing up from a chair with arms (e.g., wheelchair, bedside commode, etc.): A Little   Patient Difficulty: Moving from lying on back to sitting on the side of the bed?: None   How much help from another person does the patient currently need...   Help from Another: Moving to and from a bed to a chair (including a wheelchair)?: A Little   Help from Another: Need to walk in hospital room?: A Little   Help from Another: Climbing 3-5 steps with a railing?: A Lot     AM-PAC Score:  Raw Score: 19   Approx Degree of Impairment: 41.77%   Standardized Score (AM-PAC Scale): 45.44   CMS Modifier (G-Code): CK    FUNCTIONAL ABILITY STATUS  Gait Assessment   Functional Mobility/Gait  Assessment  Gait Assistance: Contact guard assist  Distance (ft): 3  Assistive Device: Rolling walker  Pattern: Shuffle    Skilled Therapy Provided    Bed Mobility:  Rolling: NT   Supine<>Sit: SBA *tactile assist from rehab aid Sumanth and JHONATAN Bowles to promote task initiation  Sit<>Supine:  NT    Transfer Mobility:  Sit<>Stand: CGA*increased time, improved participation compared to morning session with encouragement from rehab aid Sumanth however required multiple trials to perform complete stand  Stand<>Sit: CGA  Gait: CGA - pt performing BL closed chair knee flexion/extension in standing throughout transfer to bedside chair with concentric/eccentric control demonstrated. Lifted up RW and placed hard on ground when frustrated therapist was not bringing chair closer. Repeatedly stating \"I'm going down\" and perseverating during task, taking steps in place but not towards  chair.     Therapist's Comments: Discussed case with RN prior to session initiation.Spouse, rehab aid Sumanth, JHONATAN Bowles and BENITO Del Rio present during session. Gait belt donned for out of bed mobility. Increased time and encouragement provided for all mobility. Pt apologized at end of session for not being more cooperative.       Patient End of Session: Up in chair, Needs met, Call light within reach, RN aware of session/findings, All patient questions and concerns addressed, Hospital anti-slip socks, Family present, With  staff, Discussed recommendations with /    PT Session Time: 24 minutes  Therapeutic Activity: 24 minutes

## 2025-04-12 ENCOUNTER — APPOINTMENT (OUTPATIENT)
Dept: GENERAL RADIOLOGY | Facility: HOSPITAL | Age: 70
End: 2025-04-12
Attending: INTERNAL MEDICINE
Payer: MEDICARE

## 2025-04-12 PROBLEM — F32.1 CURRENT MODERATE EPISODE OF MAJOR DEPRESSIVE DISORDER WITHOUT PRIOR EPISODE (HCC): Status: ACTIVE | Noted: 2025-04-11

## 2025-04-12 LAB
ANION GAP SERPL CALC-SCNC: 7 MMOL/L (ref 0–18)
BASOPHILS # BLD AUTO: 0.01 X10(3) UL (ref 0–0.2)
BASOPHILS NFR BLD AUTO: 0.2 %
BUN BLD-MCNC: 35 MG/DL (ref 9–23)
CALCIUM BLD-MCNC: 8.4 MG/DL (ref 8.7–10.6)
CHLORIDE SERPL-SCNC: 100 MMOL/L (ref 98–112)
CO2 SERPL-SCNC: 25 MMOL/L (ref 21–32)
CREAT BLD-MCNC: 1.6 MG/DL (ref 0.7–1.3)
EGFRCR SERPLBLD CKD-EPI 2021: 46 ML/MIN/1.73M2 (ref 60–?)
EOSINOPHIL # BLD AUTO: 0.01 X10(3) UL (ref 0–0.7)
EOSINOPHIL NFR BLD AUTO: 0.2 %
ERYTHROCYTE [DISTWIDTH] IN BLOOD BY AUTOMATED COUNT: 18.2 %
GLUCOSE BLD-MCNC: 108 MG/DL (ref 70–99)
GLUCOSE BLD-MCNC: 109 MG/DL (ref 70–99)
GLUCOSE BLD-MCNC: 117 MG/DL (ref 70–99)
GLUCOSE BLD-MCNC: 117 MG/DL (ref 70–99)
GLUCOSE BLD-MCNC: 95 MG/DL (ref 70–99)
GLUCOSE BLD-MCNC: 98 MG/DL (ref 70–99)
HCT VFR BLD AUTO: 23.7 % (ref 39–53)
HGB BLD-MCNC: 7.9 G/DL (ref 13–17.5)
IMM GRANULOCYTES # BLD AUTO: 0.03 X10(3) UL (ref 0–1)
IMM GRANULOCYTES NFR BLD: 0.7 %
LYMPHOCYTES # BLD AUTO: 0.48 X10(3) UL (ref 1–4)
LYMPHOCYTES NFR BLD AUTO: 10.5 %
MCH RBC QN AUTO: 29.3 PG (ref 26–34)
MCHC RBC AUTO-ENTMCNC: 33.3 G/DL (ref 31–37)
MCV RBC AUTO: 87.8 FL (ref 80–100)
MONOCYTES # BLD AUTO: 0.24 X10(3) UL (ref 0.1–1)
MONOCYTES NFR BLD AUTO: 5.2 %
NEUTROPHILS # BLD AUTO: 3.82 X10 (3) UL (ref 1.5–7.7)
NEUTROPHILS # BLD AUTO: 3.82 X10(3) UL (ref 1.5–7.7)
NEUTROPHILS NFR BLD AUTO: 83.2 %
OSMOLALITY SERPL CALC.SUM OF ELEC: 282 MOSM/KG (ref 275–295)
PLATELET # BLD AUTO: 62 10(3)UL (ref 150–450)
PLATELETS.RETICULATED NFR BLD AUTO: 4.1 % (ref 0–7)
POTASSIUM SERPL-SCNC: 4.2 MMOL/L (ref 3.5–5.1)
RBC # BLD AUTO: 2.7 X10(6)UL (ref 3.8–5.8)
SODIUM SERPL-SCNC: 132 MMOL/L (ref 136–145)
WBC # BLD AUTO: 4.6 X10(3) UL (ref 4–11)

## 2025-04-12 PROCEDURE — 99232 SBSQ HOSP IP/OBS MODERATE 35: CPT | Performed by: OTHER

## 2025-04-12 PROCEDURE — 71045 X-RAY EXAM CHEST 1 VIEW: CPT | Performed by: INTERNAL MEDICINE

## 2025-04-12 RX ORDER — VANCOMYCIN HYDROCHLORIDE
15 EVERY 24 HOURS
Status: DISCONTINUED | OUTPATIENT
Start: 2025-04-12 | End: 2025-04-15 | Stop reason: ALTCHOICE

## 2025-04-12 NOTE — PROGRESS NOTES
[facility] Hospitalist Progress Note     CC: Hospital Follow up    PCP: Kam Alberts MD     Subjective:     - having right groin wound drainage  - reports more cough today     OBJECTIVE:    Blood pressure 128/64, pulse 87, temperature 97.8 °F (36.6 °C), temperature source Oral, resp. rate 14, weight 199 lb 15.3 oz (90.7 kg), SpO2 96%.    Temp:  [97.8 °F (36.6 °C)-98.6 °F (37 °C)] 97.8 °F (36.6 °C)  Pulse:  [70-94] 87  Resp:  [14-22] 14  BP: (113-134)/(57-67) 128/64  SpO2:  [89 %-96 %] 96 %      Intake/Output:    Intake/Output Summary (Last 24 hours) at 4/12/2025 1031  Last data filed at 4/11/2025 2230  Gross per 24 hour   Intake 900 ml   Output 1200 ml   Net -300 ml           Exam   Gen: No acute distress, alert and oriented x3, no focal neurologic deficits, chronically ill-appearing  Heent: NC AT, mucous memb clear, neck supple  Pulm: Lungs clear, normal respiratory effort  CV: Heart with regular rate and rhythm, no peripheral edema  Abd: Abdomen soft, nontender, nondistended, bowel sounds present  MSK:right groin w/ small annular wound w/ purulent drainage      Neuro: AO*3, motor intact, no sensory deficits  Psyc: appropriate mood and affect      Data Review:       Labs:     Recent Labs   Lab 04/09/25  0528 04/10/25  0517 04/11/25  0527   RBC 2.72* 2.70* 2.79*   HGB 8.1* 8.1* 8.1*   HCT 23.9* 23.6* 24.5*   MCV 87.9 87.4 87.8   MCH 29.8 30.0 29.0   MCHC 33.9 34.3 33.1   RDW 17.2 17.8 17.9   NEPRELIM 4.60 6.06 5.05   WBC 5.6 6.9 5.8   PLT 95.0* 80.0* 75.0*         Recent Labs   Lab 04/09/25  0528 04/10/25  0517 04/11/25  0527   GLU 87 111* 93   BUN 26* 29* 29*   CREATSERUM 1.39* 1.48* 1.55*   EGFRCR 54* 50* 48*   CA 8.7 8.5* 8.6*    135* 135*   K 4.3 4.4 4.2    101 102   CO2 26.0 26.0 26.0       No results for input(s): \"ALT\", \"AST\", \"ALB\", \"AMYLASE\", \"LIPASE\", \"LDH\" in the last 168 hours.    Invalid input(s): \"ALPHOS\", \"TBIL\", \"DBIL\", \"TPROT\"        Imaging:  No results found.      Meds:      guaiFENesin ER   600 mg Oral BID    buPROPion  75 mg Oral Daily    apixaban  5 mg Oral BID    loperamide  2 mg Oral Every afternoon at 1400    multivitamin  1 tablet Oral Daily    amLODIPine  10 mg Oral Daily    metoprolol tartrate  25 mg Oral 2x Daily(Beta Blocker)    fluticasone propionate  1 spray Each Nare Daily    insulin aspart  1-5 Units Subcutaneous TID AC and HS             guaiFENesin    diphenoxylate-atropine    peppermint oil    hydrALAzine    ipratropium-albuterol    glucose **OR** glucose **OR** glucose-vitamin C **OR** dextrose **OR** glucose **OR** glucose **OR** glucose-vitamin C    acetaminophen **OR** HYDROcodone-acetaminophen **OR** HYDROcodone-acetaminophen    ondansetron    metoclopramide         Assessment/Plan:     69 year old male with a past medical history of hypertension, GERD, anxiety, renal calculi, DVT, ascending aortic dissection s/p repair, 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  Postoperative course complicated by HOLLIS on CKD as well as right lower extremity weakness along with hypoxic respiratory failure.      History of prior infrarenal endovascular AAA repair with bilateral KYLER for hypogastric and common iliac aneurysms  History of mesenteric ischemia s/p SMA bypass with right external iliac artery to SMA bypass  History of aortic dissection s/p repair, aortic dissection distal to left subclavian  S/p complex thoraco abdominal aortic repair 3/21  Hx of DVT   -Complicated aortic pathology with multiple repairs in the past. Underwent thoracoabdominal aneurysm repair 3/21  -s/p lumbar drain with neurointerventional to limit risk of spinal cord ischemia 3/20 and removed 3/26  -management per vascular  - vascular  managing - d/w Dr. 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  Prior to that he had a mesenteric ischemia and was started on Coumadin which he continued for 8 months.  He is also positive for heterozygous prothrombin gene mutation.  Eliquis was recommended by hematology.     Acute hypoxemic respiratory failure w/ threat to bodily function 2/2 possible pneumonia versus edema  Leukocytosis  -Completed 3 days of azithro (3/27-3/29), janna (3/24-3/28), then switched to cefepime per ICU (3/28-3/30)  - tx w/ iv steroids x 5 days, iv steroids completed, completed abx as above   - wean o2  - repeat CXR  - encourage oob  - has FV and IS ordered - not using frequently - explained importance to use 10x per day     Generalized weakness  Back pain  - In the setting of prolonged hospital stay, pain control     Leukocytosis-resolved    Anemia 2/2 blood loss  - stable     Right lower extremity weakness-resolved  - In the setting of complex aortic repair 3/21  -- PT/OT     HOLLIS on CKD-resolved  - overall improving; however, creat slightly up despite iv fluids  - check urine studies and post void residual      Hypertension  -hold home HCTZ     Allergic rhinitis  -Continue home fluticasone as needed     Short gut syndrome  - resume lamotil if diarrhea resumes  - Add Imodium per home regimen    Sacral wound  - In the setting of persistent diarrhea and immobility   - Wound care consult,  recommendations reviewed    Physical deconditioning  - multifactorial 2/2 prolonged hospitalization and situational depression, self limiting mobility   - cont ongoing pt/ot - plan for home therapies at dc     Depression  SI  - psych consulted, started on wellbutrin     Dispo - per vascular, will follow       Dave Ruiz DO        Supplementary Documentation:   DVT Mechanical Prophylaxis:   SCDs, Early ambuation  DVT Pharmacologic Prophylaxis   Medication    apixaban (Eliquis) tab 5 mg                Code Status: DNAR/Full Treatment  Arzola: External urinary catheter in place  Arzola Duration (in days):   Central line:

## 2025-04-12 NOTE — PROGRESS NOTES
[facility] consultation liaison psychiatry follow-up progress note    Subjective: I met with Mr. Byers for follow-up of depression in the setting of prolonged medical hospitalization.  Care plan signed out by Dr. Joseph.  Patient started Wellbutrin yesterday afternoon and denies significant effects or side effects.  He remains discouraged about his continued hospital stay.  He reports he slept well through the night and has an appetite this morning.  He denies any thoughts of harm toward self or others.    Objective: He seemed initially a bit irritated by my visit.  He was seen by telepsychiatry.  He does have emaciated appearance.  No focal abnormal movements are seen.  He is oriented to place and purpose of admission.  He denies any thoughts of harm toward self or others.  Affect is mostly neutral.  Insight and judgment are preserved.    Assessment and plan: I explained to patient that it may take a week or so for the Wellbutrin to help with his motivation.  He looks forward to this.  He can follow up with Dr. Joseph on Monday, should he remain medically hospitalized.  Total care time, including chart review, exceeds 25 minutes.

## 2025-04-12 NOTE — PLAN OF CARE
Received pt at 1930, pt c/o pain to abdomen at that time and was medicated by off going Rn with relief. Pt had been up in chair for multiple hours. This Rn discussed Md orders with pt re staying up in chair for 3 hours twice a day, having blinds opened from 7a-7p, showering daily and pt must ambulate to bathroom and not use urinal. Pt stated understanding. All questions and concerns addressed.

## 2025-04-12 NOTE — PROGRESS NOTES
Patient up to chair, tolerated well  Ambulated with assist, gait belt  All safety measures maintained

## 2025-04-12 NOTE — PROGRESS NOTES
[facility] Dosing Service      Initial Pharmacokinetic Consult for Vancomycin Dosing     Cy Monsalve is a 69 year old male who is being initiated on vancomycin therapy for  R groin abscess, with a concern for MRSA .  Pharmacy has been asked to dose vancomycin by Dave Ruiz DO.  The initial treatment and monitoring approach will be non-AUC strategy.        Weight and Temperature:    Wt Readings from Last 1 Encounters:   25 90.7 kg (199 lb 15.3 oz)        Temp Readings from Last 1 Encounters:   25 97.8 °F (36.6 °C) (Oral)      Labs:   Recent Labs   Lab 04/10/25  0517 25  0527 25  1139   CREATSERUM 1.48* 1.55* 1.60*      Estimated Creatinine Clearance: 53.4 mL/min (A) (based on SCr of 1.6 mg/dL (H)).     Recent Labs   Lab 04/10/25  0517 04/11/25  0527 25  1139   WBC 6.9 5.8 4.6          The Pharmacokinetic Target is:    Trough/random 10-15 mg/L    Renal Dosing Considerations:    HOLLIS/ARF     Assessment/Plan:   Initial/Loading dose: Will receive 1500 mg IV (16 mg/kg, capped at 2250 mg) x 1 initial dose.      Maintenance dose: Pharmacy will dose vancomycin at 1500 mg IV every 24 hours    Monitorin) Plan for vancomycin trough to be obtained at steady state    2) Pharmacy will order SCr as clinically indicated to assess renal function.    3) Pharmacy will monitor for toxicity and efficacy, adjust vancomycin dose and/or frequency, and order vancomycin levels as appropriate per the Pharmacy and Therapeutics Committee approved protocol until discontinuation of the medication.       We appreciate the opportunity to assist in the care of this patient.     Luann Lemon, PharmD  2025  1:29 PM  [facility]: [phone]

## 2025-04-13 LAB
ALBUMIN SERPL-MCNC: 3.2 G/DL (ref 3.2–4.8)
ALBUMIN/GLOB SERPL: 1.3 {RATIO} (ref 1–2)
ALP LIVER SERPL-CCNC: 68 U/L (ref 45–117)
ALT SERPL-CCNC: 16 U/L (ref 10–49)
ANION GAP SERPL CALC-SCNC: 9 MMOL/L (ref 0–18)
AST SERPL-CCNC: 14 U/L (ref ?–34)
BILIRUB SERPL-MCNC: 1.1 MG/DL (ref 0.2–1.1)
BUN BLD-MCNC: 31 MG/DL (ref 9–23)
CALCIUM BLD-MCNC: 8.2 MG/DL (ref 8.7–10.6)
CHLORIDE SERPL-SCNC: 101 MMOL/L (ref 98–112)
CO2 SERPL-SCNC: 23 MMOL/L (ref 21–32)
CREAT BLD-MCNC: 1.65 MG/DL (ref 0.7–1.3)
CREAT UR-SCNC: 141.5 MG/DL
EGFRCR SERPLBLD CKD-EPI 2021: 44 ML/MIN/1.73M2 (ref 60–?)
GLOBULIN PLAS-MCNC: 2.5 G/DL (ref 2–3.5)
GLUCOSE BLD-MCNC: 100 MG/DL (ref 70–99)
GLUCOSE BLD-MCNC: 106 MG/DL (ref 70–99)
GLUCOSE BLD-MCNC: 114 MG/DL (ref 70–99)
GLUCOSE BLD-MCNC: 116 MG/DL (ref 70–99)
GLUCOSE BLD-MCNC: 140 MG/DL (ref 70–99)
OSMOLALITY SERPL CALC.SUM OF ELEC: 285 MOSM/KG (ref 275–295)
OSMOLALITY UR: 489 MOSM/KG (ref 300–1300)
POTASSIUM SERPL-SCNC: 4 MMOL/L (ref 3.5–5.1)
PROT SERPL-MCNC: 5.7 G/DL (ref 5.7–8.2)
SODIUM SERPL-SCNC: 13 MMOL/L
SODIUM SERPL-SCNC: 133 MMOL/L (ref 136–145)

## 2025-04-13 RX ORDER — SODIUM CHLORIDE FOR INHALATION 3 %
3 VIAL, NEBULIZER (ML) INHALATION
Status: DISCONTINUED | OUTPATIENT
Start: 2025-04-13 | End: 2025-04-18

## 2025-04-13 RX ORDER — SODIUM CHLORIDE 9 MG/ML
INJECTION, SOLUTION INTRAVENOUS CONTINUOUS
Status: ACTIVE | OUTPATIENT
Start: 2025-04-13 | End: 2025-04-14

## 2025-04-13 NOTE — PROGRESS NOTES
Cy XAVIER Monsalve  [id number]  04/13/25    69-year-old male status post complex thoracoabdominal aortic repair 3/21.  Right groin access site with purulent drainage.  Flushed copiously at bedside until no further purulence was expressed.  Packed with wet-to-dry dressing.  Reassess tomorrow, hopefully this was a limited superficial infection and will not require operative exploration.    Sandy Saunders MD  Vascular Surgery  [facility]

## 2025-04-13 NOTE — PLAN OF CARE
Assumed care at 0700  Pt AxOx4  Weaned to 1L O2  Norco given for sacral pain w/ relief  Up to chair for most of shift  Bath given  Dressings changed  Family at bedside  Discharge planning in progress  Pt/family updated on POC

## 2025-04-13 NOTE — PLAN OF CARE
Patient was up for most of the day in the chair. Alert and oriented x4. Incentive spirometry used along with other breathing tools, bowel sounds present,   Patient washed up today including his hair. When getting cleaned up, noted a small round area on right hip with pus. Told Hospitalist and order was placed to cultured wound. Wound tunnels down 2cm and does have an odor. Patient also got up to the bathroom and had an accident on the way to the bathroom. Immodium given to help. Patient was very tired during ambulation and after the Bowel movement. Sacrum is also noted to be very red and excoriated. All dressings were changed today. Bladder scan done today due to needing urine sample and no urine was noted when going to the bathroom. 184 was the scan at 1700. Will have next shift recheck and collect sample.    Call light within reach, all safety measures maintained               Problem: HEMATOLOGIC - ADULT  Goal: Free from bleeding injury  Description: (Example usage: patient with low platelets)INTERVENTIONS:- Avoid intramuscular injections, enemas and rectal medication administration- Ensure safe mobilization of patient- Hold pressure on venipuncture sites to achieve adequate hemostasis- Assess for signs and symptoms of internal bleeding- Monitor lab trends- Patient is to report abnormal signs of bleeding to staff- Avoid use of toothpicks and dental floss- Use electric shaver for shaving- Use soft bristle tooth brush- Limit straining and forceful nose blowing  Outcome: Progressing     Problem: PAIN - ADULT  Goal: Verbalizes/displays adequate comfort level or patient's stated pain goal  Description: INTERVENTIONS:- Encourage pt to monitor pain and request assistance- Assess pain using appropriate pain scale- Administer analgesics based on type and severity of pain and evaluate response- Implement non-pharmacological measures as appropriate and evaluate response- Consider cultural and social influences on pain and  pain management- Manage/alleviate anxiety- Utilize distraction and/or relaxation techniques- Monitor for opioid side effects- Notify MD/LIP if interventions unsuccessful or patient reports new pain- Anticipate increased pain with activity and pre-medicate as appropriate  Outcome: Progressing     Problem: SAFETY ADULT - FALL  Goal: Free from fall injury  Description: INTERVENTIONS:- Assess pt frequently for physical needs- Identify cognitive and physical deficits and behaviors that affect risk of falls.- [place] fall precautions as indicated by assessment.- Educate pt/family on patient safety including physical limitations- Instruct pt to call for assistance with activity based on assessment- Modify environment to reduce risk of injury- Provide assistive devices as appropriate- Consider OT/PT consult to assist with strengthening/mobility- Encourage toileting schedule  Outcome: Progressing

## 2025-04-13 NOTE — PROGRESS NOTES
[facility] Hospitalist Progress Note     CC: Hospital Follow up    PCP: Kam Alberts MD     Subjective:     - wound eval by vascular   - no pain  - cont to have cough, difficulty expectorating   - now willing / wishing to dc to [facility]     OBJECTIVE:    Blood pressure 111/58, pulse 62, temperature 97.8 °F (36.6 °C), temperature source Oral, resp. rate 19, weight 199 lb 15.3 oz (90.7 kg), SpO2 91%.    Temp:  [97.8 °F (36.6 °C)-98.3 °F (36.8 °C)] 97.8 °F (36.6 °C)  Pulse:  [62-98] 62  Resp:  [16-22] 19  BP: (109-123)/(54-88) 111/58  SpO2:  [90 %-98 %] 91 %      Intake/Output:    Intake/Output Summary (Last 24 hours) at 4/13/2025 1210  Last data filed at 4/13/2025 0500  Gross per 24 hour   Intake 360 ml   Output 400 ml   Net -40 ml           Exam   Gen: No acute distress, alert and oriented x3, no focal neurologic deficits, chronically ill-appearing  Heent: NC AT, mucous memb clear, neck supple  Pulm: Lungs clear, normal respiratory effort  CV: Heart with regular rate and rhythm, no peripheral edema  Abd: Abdomen soft, nontender, nondistended, bowel sounds present  MSK:right groin dressed      Neuro: AO*3, motor intact, no sensory deficits  Psyc: appropriate mood and affect      Data Review:       Labs:     Recent Labs   Lab 04/10/25  0517 04/11/25  0527 04/12/25  1139   RBC 2.70* 2.79* 2.70*   HGB 8.1* 8.1* 7.9*   HCT 23.6* 24.5* 23.7*   MCV 87.4 87.8 87.8   MCH 30.0 29.0 29.3   MCHC 34.3 33.1 33.3   RDW 17.8 17.9 18.2   NEPRELIM 6.06 5.05 3.82   WBC 6.9 5.8 4.6   PLT 80.0* 75.0* 62.0*         Recent Labs   Lab 04/11/25  0527 04/12/25  1139 04/13/25  1044   GLU 93 95 140*   BUN 29* 35* 31*   CREATSERUM 1.55* 1.60* 1.65*   EGFRCR 48* 46* 44*   CA 8.6* 8.4* 8.2*   * 132* 133*   K 4.2 4.2 4.0    100 101   CO2 26.0 25.0 23.0       Recent Labs   Lab 04/13/25  1044   ALT 16   AST 14   ALB 3.2           Imaging:  XR CHEST AP PORTABLE  (CPT=71045)  Result Date: 4/12/2025  CONCLUSION:  Stable cardiac and mediastinal contours  with aortic stent graft again noted.  Chronic interstitial thickening/fibrosis with mild patchy airspace opacities of the right mid/lower lung, concordant with recent CT findings, possibly atelectatic or inflammatory.  No associated pleural effusion or pneumothorax.  Right upper extremity PICC terminates in the lower SVC.   LOCATION:  [facility]      Dictated by (CST): An Childs MD on 4/12/2025 at 12:03 PM     Finalized by (CST): An Childs MD on 4/12/2025 at 12:04 PM           Meds:      vancomycin  15 mg/kg Intravenous Q24H    guaiFENesin ER  600 mg Oral BID    buPROPion  75 mg Oral Daily    apixaban  5 mg Oral BID    loperamide  2 mg Oral Every afternoon at 1400    multivitamin  1 tablet Oral Daily    amLODIPine  10 mg Oral Daily    metoprolol tartrate  25 mg Oral 2x Daily(Beta Blocker)    fluticasone propionate  1 spray Each Nare Daily    insulin aspart  1-5 Units Subcutaneous TID AC and HS      sodium chloride             guaiFENesin    diphenoxylate-atropine    peppermint oil    hydrALAzine    ipratropium-albuterol    glucose **OR** glucose **OR** glucose-vitamin C **OR** dextrose **OR** glucose **OR** glucose **OR** glucose-vitamin C    acetaminophen **OR** HYDROcodone-acetaminophen **OR** HYDROcodone-acetaminophen    ondansetron    metoclopramide         Assessment/Plan:     69 year old male with a past medical history of hypertension, GERD, anxiety, renal calculi, DVT, ascending aortic dissection s/p repair, 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  Postoperative course complicated by HOLLIS on CKD as well as right lower extremity weakness along with hypoxic respiratory failure.      History of prior infrarenal endovascular AAA  repair with bilateral KYLER for hypogastric and common iliac aneurysms  History of mesenteric ischemia s/p SMA bypass with right external iliac artery to SMA bypass  History of aortic dissection s/p repair, aortic dissection distal to left subclavian  S/p complex thoraco abdominal aortic repair 3/21  Hx of DVT   -Complicated aortic pathology with multiple repairs in the past. Underwent thoracoabdominal aneurysm repair 3/21  -s/p lumbar drain with neurointerventional to limit risk of spinal cord ischemia 3/20 and removed 3/26  -management per vascular  - vascular managing - d/w vascular Dr. 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  Prior to that he had a mesenteric ischemia and was started on Coumadin which he continued for 8 months.  He is also positive for heterozygous prothrombin gene mutation.  Eliquis was recommended by hematology.     Acute hypoxemic respiratory failure w/ threat to bodily function 2/2 possible pneumonia versus edema  Leukocytosis  -Completed 3 days of azithro (3/27-3/29), janna (3/24-3/28), then switched to cefepime per ICU (3/28-3/30)  - tx w/ iv steroids x 5 days, iv steroids completed, completed abx as above   - wean o2  - encourage oob  - has FV and IS ordered - not using frequently - explained importance to use 10x per day   - repeat cxr w/o sig changes, can add hypertonic saline nebs    Generalized  weakness  Back pain  - In the setting of prolonged hospital stay, pain control     Leukocytosis-resolved    Anemia 2/2 blood loss  - stable     Right lower extremity weakness-resolved  - In the setting of complex aortic repair 3/21  -- PT/OT     HOLLIS on CKD-   - initial hollis resolved, now w/ new worsening of creatinine, fena low, likely pre renal   - restart fluids and trend      Hypertension  -hold home HCTZ     Allergic rhinitis  -Continue home fluticasone as needed     Short gut syndrome  - resume lamotil if diarrhea resumes  - Add Imodium per home regimen    Sacral wound  - In the setting of persistent diarrhea and immobility   - Wound care consult, recommendations reviewed    Physical deconditioning  - multifactorial 2/2 prolonged hospitalization and situational depression, self limiting mobility   - cont ongoing pt/ot - plan for home therapies at [place]     Depression  SI  - psych consulted, started on wellbutrin     Dispo - per vascular, will follow       Dave Ruiz DO        Supplementary Documentation:   DVT Mechanical Prophylaxis:   SCDs, Early ambuation  DVT Pharmacologic Prophylaxis   Medication    apixaban (Eliquis) tab 5 mg                Code Status: DNAR/Full Treatment  Arzola: External urinary catheter in place  Arzola Duration (in days):   Central line:

## 2025-04-13 NOTE — PLAN OF CARE
Assume care of pt at 1930, pt denies pain at that time. A/O x4. Remains depressed stated, \"I have finally become a grumpy old man, what people find funny I dont any more.\" Remains with decreased appetite. States he feels better than yesterday. Iv sl. Remains on 2L nc with rhonchi noted to right lung. Pt with weak wet cough. Sat 98% at this time. Pt has not had any urine output since day shift. Bladder scan at 5 pm showed 185 md aware. At 2300 showed 405 pt refused to be straight cathed stated he will wait and see if he can void on his own, will bladder scan again at 0400.  Pt aware of poc. Pt need a lot of encouragement. Bed alarm in place.  0500- pt voided 400 ml sravan urine and post void residual was 72 ml. Urine sent per orders.

## 2025-04-13 NOTE — CM/SW NOTE
SW met with pt and spouse at bedside, provided current accepting [facility] list. Reopened referral for additional facilities to review. Wife inquiring on [facility], will send message to liaison for pt to be reviewed by PMR on Monday.     SW to remain available.    ISAAC Julien

## 2025-04-14 LAB
ANION GAP SERPL CALC-SCNC: 8 MMOL/L (ref 0–18)
ANTIBODY SCREEN: NEGATIVE
BASOPHILS # BLD AUTO: 0.01 X10(3) UL (ref 0–0.2)
BASOPHILS NFR BLD AUTO: 0.3 %
BUN BLD-MCNC: 31 MG/DL (ref 9–23)
CALCIUM BLD-MCNC: 8.2 MG/DL (ref 8.7–10.6)
CHLORIDE SERPL-SCNC: 104 MMOL/L (ref 98–112)
CO2 SERPL-SCNC: 23 MMOL/L (ref 21–32)
CREAT BLD-MCNC: 1.44 MG/DL (ref 0.7–1.3)
EGFRCR SERPLBLD CKD-EPI 2021: 52 ML/MIN/1.73M2 (ref 60–?)
EOSINOPHIL # BLD AUTO: 0.07 X10(3) UL (ref 0–0.7)
EOSINOPHIL NFR BLD AUTO: 2 %
ERYTHROCYTE [DISTWIDTH] IN BLOOD BY AUTOMATED COUNT: 17.9 %
GLUCOSE BLD-MCNC: 102 MG/DL (ref 70–99)
GLUCOSE BLD-MCNC: 110 MG/DL (ref 70–99)
GLUCOSE BLD-MCNC: 124 MG/DL (ref 70–99)
GLUCOSE BLD-MCNC: 151 MG/DL (ref 70–99)
GLUCOSE BLD-MCNC: 427 MG/DL (ref 70–99)
GLUCOSE BLD-MCNC: 91 MG/DL (ref 70–99)
HCT VFR BLD AUTO: 20.6 % (ref 39–53)
HGB BLD-MCNC: 6.9 G/DL (ref 13–17.5)
HGB BLD-MCNC: 8.3 G/DL (ref 13–17.5)
IMM GRANULOCYTES # BLD AUTO: 0.01 X10(3) UL (ref 0–1)
IMM GRANULOCYTES NFR BLD: 0.3 %
LYMPHOCYTES # BLD AUTO: 0.68 X10(3) UL (ref 1–4)
LYMPHOCYTES NFR BLD AUTO: 19.8 %
MAGNESIUM SERPL-MCNC: 1.8 MG/DL (ref 1.6–2.6)
MCH RBC QN AUTO: 29.4 PG (ref 26–34)
MCHC RBC AUTO-ENTMCNC: 33.5 G/DL (ref 31–37)
MCV RBC AUTO: 87.7 FL (ref 80–100)
MONOCYTES # BLD AUTO: 0.24 X10(3) UL (ref 0.1–1)
MONOCYTES NFR BLD AUTO: 7 %
NEUTROPHILS # BLD AUTO: 2.43 X10 (3) UL (ref 1.5–7.7)
NEUTROPHILS # BLD AUTO: 2.43 X10(3) UL (ref 1.5–7.7)
NEUTROPHILS NFR BLD AUTO: 70.6 %
OSMOLALITY SERPL CALC.SUM OF ELEC: 286 MOSM/KG (ref 275–295)
PLATELET # BLD AUTO: 64 10(3)UL (ref 150–450)
PLATELETS.RETICULATED NFR BLD AUTO: 4.1 % (ref 0–7)
POTASSIUM SERPL-SCNC: 3.7 MMOL/L (ref 3.5–5.1)
RBC # BLD AUTO: 2.35 X10(6)UL (ref 3.8–5.8)
RH BLOOD TYPE: POSITIVE
SODIUM SERPL-SCNC: 135 MMOL/L (ref 136–145)
WBC # BLD AUTO: 3.4 X10(3) UL (ref 4–11)

## 2025-04-14 PROCEDURE — 99232 SBSQ HOSP IP/OBS MODERATE 35: CPT | Performed by: OTHER

## 2025-04-14 RX ORDER — SODIUM CHLORIDE 9 MG/ML
INJECTION, SOLUTION INTRAVENOUS ONCE
Status: DISCONTINUED | OUTPATIENT
Start: 2025-04-14 | End: 2025-04-18

## 2025-04-14 RX ORDER — MAGNESIUM OXIDE 400 MG/1
400 TABLET ORAL ONCE
Status: COMPLETED | OUTPATIENT
Start: 2025-04-14 | End: 2025-04-14

## 2025-04-14 RX ORDER — BUPROPION HYDROCHLORIDE 100 MG/1
100 TABLET ORAL DAILY
Status: DISCONTINUED | OUTPATIENT
Start: 2025-04-15 | End: 2025-04-18

## 2025-04-14 RX ORDER — SODIUM CHLORIDE 9 MG/ML
INJECTION, SOLUTION INTRAVENOUS CONTINUOUS
Status: ACTIVE | OUTPATIENT
Start: 2025-04-14 | End: 2025-04-15

## 2025-04-14 RX ORDER — POTASSIUM CHLORIDE 1500 MG/1
40 TABLET, EXTENDED RELEASE ORAL ONCE
Status: COMPLETED | OUTPATIENT
Start: 2025-04-14 | End: 2025-04-14

## 2025-04-14 NOTE — BH PROGRESS NOTE
4/14/2025    Seen in person at [facility]    Interval Chief Complaint: Follow up on depression    Subjective:  Wife present.  Patient had no events over the weekend and was seen by my colleague on Saturday.  Tolerating addition of Wellbutrin denies any side effects but still reports ongoing struggles with energy and motivation.  He is no longer suicidal and firmly and convincingly denies any active suicidal thoughts.  Identifies his wife as protective factors and wants to get better.  He is currently anemic and getting an IV transfusion of blood and does complain of weakness and lightheadedness which I advised him is likely related to the severe anemia.  He also has an abscess in his groin which is getting addressed.  His mental status is improved today and he is alert and oriented to person place and time and situation throughout the recall and able to tell me days a week back without difficulty which she had struggled with on her last visit on Friday.  He still acknowledges that he has a long road ahead of him but does seem more committed to continuing on with his treatment and living his life rather than giving up.    Reviewed his medications and he agreed to increase his Wellbutrin up to 100 mg every morning.  Validation provided for his efforts.  We talked about the benefits of going to a skilled rehab and he remains agreeable with it and is hopeful to get discharge soon.    MEDS: Current Medications[1]     Vitals:    04/14/25 1430   BP: 92/78   Pulse: 61   Resp:    Temp:         ROS:   As above. Otherwise negative on 14 system exam.    MSE:   Conscious/Orientation: A + O x person, place, time, situation  Appearance: good grooming  Behavior: no psychomotor abnormalities, good eye contact and engagement with interview.  Speech: normal rate, rhythm, and volume  Mood: \" Up and down\"  Affect: congruent, reactive  Thought process: linear, logical, goal directed  Thought content:   No delusions, obsessions, or  other thought abnormalities  Suicidal ideation: denies  Homicidal ideation: denies  Perceptions: no hallucinations  Insight: fair  Judgment: fair  Loosening of associations: None  Naming: Not tested  Fund of knowledge: Not tested  Short term memory: Improved 2 out of 3 recall  Recent memory: Improved able to recall meeting me before  Long term memory: Stable aware of work and marital    Major depression single episode moderate without psychosis still improving but still symptomatic    Plan:  Increased Wellbutrin immediate release to 100 mg every morning starting tomorrow to address mood and apathy titrate further as warranted  Case discussed with wife at bedside and she is supportive of our treatment plan  Agree with skilled rehab placement as the most appropriate setting post discharge to address all of his medical needs  Will continue to follow  Moderate complexity case    Sven Joseph DO  Board Certified Adult and Geriatric Psychiatrist  Medical Director, Geriatric Psychiatry, [facility]   Medical Director, Psychiatric Consultation Service, [facility]          [1]    sodium chloride 0.9% infusion   Intravenous Once    [COMPLETED] potassium chloride (Klor-Con M20) tab 40 mEq  40 mEq Oral Once    [COMPLETED] magnesium oxide (Mag-Ox) tab 400 mg  400 mg Oral Once    [START ON 4/15/2025] buPROPion (Wellbutrin) tab 100 mg  100 mg Oral Daily    [] sodium chloride 0.9% infusion   Intravenous Continuous    sodium chloride 3 % nebulizer solution 3 mL  3 mL Nebulization TID    vancomycin (Vancocin) 1.5 g in sodium chloride 0.9% 250mL IVPB premix  15 mg/kg Intravenous Q24H    [] sodium chloride 0.9% infusion   Intravenous Continuous    guaiFENesin ER (Mucinex) 12 hr tab 600 mg  600 mg Oral BID    guaiFENesin (Robitussin) 100 MG/5 ML oral liquid 100 mg  100 mg Oral Q4H PRN    [COMPLETED] magnesium oxide (Mag-Ox) tab 400 mg  400 mg Oral Once    [COMPLETED] magnesium oxide (Mag-Ox) tab 400 mg   400 mg Oral Once    apixaban (Eliquis) tab 5 mg  5 mg Oral BID    [COMPLETED] magnesium oxide (Mag-Ox) tab 400 mg  400 mg Oral Once    loperamide (Imodium) cap 2 mg  2 mg Oral Every afternoon at 1400    [COMPLETED] warfarin (Coumadin) tab 7.5 mg  7.5 mg Oral Once at night    diphenoxylate-atropine (Lomotil) 2.5-0.025 MG per tab 2 tablet  2 tablet Oral QID PRN    [COMPLETED] iopamidol 76% (ISOVUE-370) injection for power injector  100 mL Intravenous ONCE PRN    multivitamin (Tab-A-Immanuel/Beta Carotene) tab 1 tablet  1 tablet Oral Daily    [COMPLETED] warfarin (Coumadin) tab 7.5 mg  7.5 mg Oral Once at night    [COMPLETED] warfarin (Coumadin) tab 7.5 mg  7.5 mg Oral Once at night    amLODIPine (Norvasc) tab 10 mg  10 mg Oral Daily    [COMPLETED] methylPREDNISolone sodium succinate (Solu-MEDROL) injection 60 mg  60 mg Intravenous Daily    [COMPLETED] azithromycin (Zithromax) tab 500 mg  500 mg Oral Q24H    peppermint oil liquid 1 mL  1 mL Other PRN    [COMPLETED] pantoprazole (Protonix) DR tab 40 mg  40 mg Oral Q24H    [COMPLETED] ceFEPIme (Maxpime) 2 g in sodium chloride 0.9% 100 mL IVPB-MBP  2 g Intravenous Q12H    metoprolol tartrate (Lopressor) tab 25 mg  25 mg Oral 2x Daily(Beta Blocker)    [COMPLETED] heparin (Porcine) 1000 UNIT/ML injection - BOLUS IV 2,600 Units  30 Units/kg Intravenous Once    [COMPLETED] heparin (Porcine) 1000 UNIT/ML injection - BOLUS IV 2,600 Units  30 Units/kg Intravenous Once    [COMPLETED] potassium chloride 40 mEq/100mL IVPB premix (central line) 40 mEq  40 mEq Intravenous Once    [COMPLETED] heparin (Porcine) 85849 units/250mL infusion (PE/DVT/THROMBUS) INITIAL DOSE  18 Units/kg/hr Intravenous Once    [COMPLETED] lidocaine PF (Xylocaine-MPF) 1% injection  5 mL Intradermal Once    [COMPLETED] heparin (Porcine) 1000 UNIT/ML injection - BOLUS IV 2,600 Units  30 Units/kg Intravenous Once    [COMPLETED] potassium phosphate dibasic 15 mmol in sodium chloride 0.9% 250 mL IVPB  15 mmol  Intravenous Once    [COMPLETED] magnesium oxide (Mag-Ox) tab 400 mg  400 mg Oral Once    [COMPLETED] furosemide (Lasix) 10 mg/mL injection 40 mg  40 mg Intravenous Once    hydrALAzine (Apresoline) 20 mg/mL injection 10 mg  10 mg Intravenous Q4H PRN    [COMPLETED] potassium chloride 20 mEq/100mL IVPB premix 20 mEq  20 mEq Intravenous Once    [COMPLETED] magnesium oxide (Mag-Ox) tab 400 mg  400 mg Oral Once    [COMPLETED] potassium chloride (Klor-Con M20) tab 40 mEq  40 mEq Oral Q4H    [COMPLETED] furosemide (Lasix) 10 mg/mL injection 40 mg  40 mg Intravenous Once    fluticasone propionate (Flonase) 50 MCG/ACT nasal suspension 1 spray  1 spray Each Nare Daily    [COMPLETED] potassium chloride (Klor-Con M20) tab 40 mEq  40 mEq Oral Once    [COMPLETED] magnesium oxide (Mag-Ox) tab 400 mg  400 mg Oral Once    [COMPLETED] sodium chloride 0.9% infusion   Intravenous Once    [COMPLETED] furosemide (Lasix) 10 mg/mL injection 20 mg  20 mg Intravenous Once    ipratropium-albuterol (Duoneb) 0.5-2.5 (3) MG/3ML inhalation solution 3 mL  3 mL Nebulization Q4H PRN    [COMPLETED] sodium chloride 0.9 % IV bolus 500 mL  500 mL Intravenous Once    [COMPLETED] sodium chloride 0.9% infusion   Intravenous Once    [COMPLETED] sodium chloride 0.9 % IV bolus 1,000 mL  1,000 mL Intravenous Once    [COMPLETED] methylPREDNISolone sodium succinate (Solu-MEDROL) 2,000 mg in sodium chloride 0.9% 100 mL IVPB  2,000 mg Intravenous Once    glucose (Dex4) 15 GM/59ML oral liquid 15 g  15 g Oral Q15 Min PRN    Or    glucose (Glutose) 40% oral gel 15 g  15 g Oral Q15 Min PRN    Or    glucose-vitamin C (Dex-4) chewable tab 4 tablet  4 tablet Oral Q15 Min PRN    Or    dextrose 50% injection 50 mL  50 mL Intravenous Q15 Min PRN    Or    glucose (Dex4) 15 GM/59ML oral liquid 30 g  30 g Oral Q15 Min PRN    Or    glucose (Glutose) 40% oral gel 30 g  30 g Oral Q15 Min PRN    Or    glucose-vitamin C (Dex-4) chewable tab 8 tablet  8 tablet Oral Q15 Min PRN     insulin aspart (NovoLOG) 100 Units/mL FlexPen 1-5 Units  1-5 Units Subcutaneous TID AC and HS    [COMPLETED] magnesium oxide (Mag-Ox) tab 800 mg  800 mg Oral Once    [COMPLETED] methylPREDNISolone sodium succinate (Solu-MEDROL) 1,000 mg in sodium chloride 0.9% 100 mL IVPB  1,000 mg Intravenous Q6H    [COMPLETED] sodium chloride 0.9% infusion   Intravenous Once    acetaminophen (Tylenol) tab 650 mg  650 mg Oral Q4H PRN    Or    HYDROcodone-acetaminophen (Norco) 5-325 MG per tab 1 tablet  1 tablet Oral Q4H PRN    Or    HYDROcodone-acetaminophen (Norco) 5-325 MG per tab 2 tablet  2 tablet Oral Q4H PRN    ondansetron (Zofran) 4 MG/2ML injection 4 mg  4 mg Intravenous Q6H PRN    metoclopramide (Reglan) 5 mg/mL injection 10 mg  10 mg Intravenous Q8H PRN    [COMPLETED] ceFAZolin (Ancef) 2g in 10mL IV syringe premix  2 g Intravenous Q8H    [COMPLETED] phytonadione (Aqua-Mephton) 10 mg in sodium chloride 0.9% 50 mL IVPB  10 mg Intravenous Once    [COMPLETED] magnesium oxide (Mag-Ox) tab 400 mg  400 mg Oral Once    [COMPLETED] lidocaine (Xylocaine) 1 % injection        [COMPLETED] fentaNYL (Sublimaze) 50 mcg/mL injection        [COMPLETED] midazolam (Versed) 2 MG/2ML injection        [COMPLETED] phytonadione (Aqua-Mephton) 10 mg in sodium chloride 0.9% 50 mL IVPB  10 mg Intravenous Once    [COMPLETED] phytonadione (Aqua-Mephton) 10 mg in sodium chloride 0.9% 50 mL IVPB  10 mg Intravenous Once

## 2025-04-14 NOTE — OCCUPATIONAL THERAPY NOTE
OCCUPATIONAL THERAPY TREATMENT NOTE - INPATIENT     Room Number: 7613/7613-A  Session: 9   Number of Visits to Meet Established Goals:  (15 added 8 sessions on 4/10)    Presenting Problem: S/p 3/21 fenestrated abdominal aortic aneurysm repair with left carotid to subclavian transposition on  with Dr. Yanez and Dr. Oquendo and 3/20 lumbar drain    ASSESSMENT   Patient demonstrates limited progress this session, goals remain in progress. As this writer mentioned last week, rehab potential is guarded/fair at this time. Potential limiting factors that are affecting rehab potential - guarded therapy participation level and limited activity endurance.  Patient keeps commenting, \"I just can't do it, I am just done.\" However, when asked about patient's self-perception of therapy goals, patient replied, \"I want to get back to how I was before.\"  Patient returns to repeating, \"I am done.\"  Patient continues to require max encouragement to participate in therapy.         Anticipate return home with additional support with home health OT. Patient is performing toilet transfer CGA/SBA, bed mobility independent, LB ADL min A, and UB ADL set-up level.  At home, patient would require assistance with LB ADL, bathing, and household tasks.  Potential limiting factors that are affecting rehab potential - guarded therapy participation level and limited activity endurance.       History:  Patient is a 69 year old male admitted on 3/19/2025 with Presenting Problem: S/p 3/21 fenestrated abdominal aortic aneurysm repair with left carotid to subclavian transposition on  with Dr. Yanez and Dr. Oquendo and 3/20 lumbar drain. Co-Morbidities : HTN, Peripheral Neuropathy, Hx of toe amputation L great toe, mesenteric ischemia s/p SMA bypass with right external iliac artery to SMA bypass, Short gut syndrome      ** 4/10/25  Pt had controlled B knee weakness when he was walking to the bathroom with nursing  **3/25 acute respiratory failure,  possible pneumonia, on vapotherm    TREATMENT SESSION:  Patient Start of Session: seated    FUNCTIONAL TRANSFER ASSESSMENT  Sit to Stand: Edge of Bed  Edge of Bed: Not Tested  Chair: Supervision  Commode Transfer: Not Tested    BED MOBILITY  Rolling: Not Tested  Supine to Sit : Not tested  Sit to Supine (OT): Not Tested  Scooting: n    BALANCE ASSESSMENT  Static Sitting: Independent  Static Standing: Not Tested    FUNCTIONAL ADL ASSESSMENT  Grooming Seated: Not Tested  UB Dressing Seated: Not Tested  LB Dressing Seated: Minimal Assist  LB Dressing Standing: Not Tested  Toileting Standing: Not Tested    O2 SATURATIONS  Oxygen Therapy  SPO2% on Oxygen at Rest: 91  At rest oxygen flow (liters per minute): 3    EDUCATION PROVIDED  Patient Education : Plan of Care; Role of Occupational Therapy; Posture/Positioning; Energy Conservation; Proper Body Mechanics  Patient's Response to Education: Requires Further Education    Therapist comments: Seated in the chair.  Per chart, MD strongly encouraging patient to be out of bed and to discontinue the use of bedpan and purewick. Completely agree.  OT and PT have been encouraging the patient to do the same since last week. Patient appeared to initiate tasks when Sumanth, our rehab aide, is present during the session. Sumanth was called to encourage the patient.   Supervision to stand from the chair and SBA to take steps with RW in the hallway. Much encouragement and time needed to initiate every task.   See top section of this page. 3 liters, 91%.     Patient End of Session: Up in chair, Needs met, Call light within reach, RN aware of session/findings, With  staff, All patient questions and concerns addressed    PAIN ASSESSMENT  Rating: Unable to rate  Location: \"I don't know anymore\"  Management Techniques: Activity promotion     OBJECTIVE  Precautions: Bed/chair alarm (surgical boot L foot? SBP )    AM-PAC ‘6-Clicks’ Inpatient Daily Activity Short Form  -   Putting on and  taking off regular lower body clothing?: A Little  -   Bathing (including washing, rinsing, drying)?: A Little  -   Toileting, which includes using toilet, bedpan or urinal? : A Little  -   Putting on and taking off regular upper body clothing?: None  -   Taking care of personal grooming such as brushing teeth?: None  -   Eating meals?: None    AM-PAC Score:  Score: 21  Approx Degree of Impairment: 32.79%  Standardized Score (AM-PAC Scale): 44.27    PLAN  OT Device Recommendations: TBD  OT Treatment Plan: Energy conservation/work simplification techniques, ADL training, UE strengthening/ROM, Functional transfer training, Patient/Family education  Rehab Potential : Guarded  Frequency: 3x/week    OT Goals:   Ongoing 4/14  ADL Goals   Patient will perform upper body dressing:  with setup-MET 4/7  Patient will perform lower body dressing:  with min assist  Patient will perform toileting: with min assist     Functional Transfer Goals  Patient will transfer from supine to sit:  with min assist -MET 4/7 UPGRADE TO SUPERVISION  Patient will transfer to toilet:  with min assist -MET 4/7 SIMULATED, UPGRADE TO SUPERVISION     UE Exercise Program Goal  Patient will be independent with bilateral AROM HEP (home exercise program).     Additional Goals  Pt will incorporate 2 energy conservation techniques into ADL.    OT Session Time: 23 minutes  Self-Care Home Management:  minutes  Therapeutic Activity: 23 minutes

## 2025-04-14 NOTE — PROGRESS NOTES
PHYSICAL MEDICINE AND REHABILITATION CONSULTATION       Location [facility] 6NE-A Attending Neil Oquendo MD   Hosp Day # 25 PCP Kam Alberts MD     Patient Identification  Cy Monsalve is a 69 year old male.  :  1955  Admit Date:  3/19/2025  Attending Provider:  Neil Oquendo MD                                  Primary Care Physician:  Kam Alberts MD   Admitting Diagnosis: Abdominal aortic aneurysm (AAA) [I71.40]    CC: Impaired mobility and ADL dysfunction secondary to AAA repair        HPI: This is a 69 year old male  who presented to [facility] on 3/19/2025 for planned fenestrated endovascular arch repair and fenestrated thoracoabdominal aneurysm repair with left carotid to subclavian transposition, endovascular septotomy of dissection performed by Dr. Yanez on 3/21. Plan to clamp drain tomorrow. Pain is controlled. On 8L O2. Patient denies any CP, SOB. Some fatigue. In good spirits and joking       PM&R has now been consulted in order to assess patient's functional status and make recommendations for rehabilitation plan of care. Since my consultation, patient has been weaned down to 1L O2. Received 1unit pRBC for anemia, CT angio ordered. Wife at bedside. Discussed acute v VERA. At the conclusion of discussion they feel that VERA is a better fit for [place].     ROS:  Fatigued. All other systems were reviewed and are negative except as noted under HPI    Current medications: Full medication list has been personally reviewed and include:   sodium chloride 0.9% infusion   Intravenous Once    [COMPLETED] potassium chloride (Klor-Con M20) tab 40 mEq  40 mEq Oral Once    [COMPLETED] magnesium oxide (Mag-Ox) tab 400 mg  400 mg Oral Once    [START ON 4/15/2025] buPROPion (Wellbutrin) tab 100 mg  100 mg Oral Daily    [] sodium chloride 0.9% infusion   Intravenous Continuous    sodium chloride 3 % nebulizer solution 3 mL  3 mL Nebulization TID    vancomycin (Vancocin) 1.5 g in sodium  chloride 0.9% 250mL IVPB premix  15 mg/kg Intravenous Q24H    [] sodium chloride 0.9% infusion   Intravenous Continuous    guaiFENesin ER (Mucinex) 12 hr tab 600 mg  600 mg Oral BID    guaiFENesin (Robitussin) 100 MG/5 ML oral liquid 100 mg  100 mg Oral Q4H PRN    [COMPLETED] magnesium oxide (Mag-Ox) tab 400 mg  400 mg Oral Once    [COMPLETED] magnesium oxide (Mag-Ox) tab 400 mg  400 mg Oral Once    apixaban (Eliquis) tab 5 mg  5 mg Oral BID    [COMPLETED] magnesium oxide (Mag-Ox) tab 400 mg  400 mg Oral Once    loperamide (Imodium) cap 2 mg  2 mg Oral Every afternoon at 1400    [COMPLETED] warfarin (Coumadin) tab 7.5 mg  7.5 mg Oral Once at night    diphenoxylate-atropine (Lomotil) 2.5-0.025 MG per tab 2 tablet  2 tablet Oral QID PRN    [COMPLETED] iopamidol 76% (ISOVUE-370) injection for power injector  100 mL Intravenous ONCE PRN    multivitamin (Tab-A-Immanuel/Beta Carotene) tab 1 tablet  1 tablet Oral Daily    [COMPLETED] warfarin (Coumadin) tab 7.5 mg  7.5 mg Oral Once at night    [COMPLETED] warfarin (Coumadin) tab 7.5 mg  7.5 mg Oral Once at night    amLODIPine (Norvasc) tab 10 mg  10 mg Oral Daily    [COMPLETED] methylPREDNISolone sodium succinate (Solu-MEDROL) injection 60 mg  60 mg Intravenous Daily    [COMPLETED] azithromycin (Zithromax) tab 500 mg  500 mg Oral Q24H    peppermint oil liquid 1 mL  1 mL Other PRN    [COMPLETED] pantoprazole (Protonix) DR tab 40 mg  40 mg Oral Q24H    [COMPLETED] ceFEPIme (Maxpime) 2 g in sodium chloride 0.9% 100 mL IVPB-MBP  2 g Intravenous Q12H    metoprolol tartrate (Lopressor) tab 25 mg  25 mg Oral 2x Daily(Beta Blocker)    [COMPLETED] heparin (Porcine) 1000 UNIT/ML injection - BOLUS IV 2,600 Units  30 Units/kg Intravenous Once    [COMPLETED] heparin (Porcine) 1000 UNIT/ML injection - BOLUS IV 2,600 Units  30 Units/kg Intravenous Once    [COMPLETED] potassium chloride 40 mEq/100mL IVPB premix (central line) 40 mEq  40 mEq Intravenous Once    [COMPLETED] heparin  (Porcine) 78531 units/250mL infusion (PE/DVT/THROMBUS) INITIAL DOSE  18 Units/kg/hr Intravenous Once    [COMPLETED] lidocaine PF (Xylocaine-MPF) 1% injection  5 mL Intradermal Once    [COMPLETED] heparin (Porcine) 1000 UNIT/ML injection - BOLUS IV 2,600 Units  30 Units/kg Intravenous Once    [COMPLETED] potassium phosphate dibasic 15 mmol in sodium chloride 0.9% 250 mL IVPB  15 mmol Intravenous Once    [COMPLETED] magnesium oxide (Mag-Ox) tab 400 mg  400 mg Oral Once    [COMPLETED] furosemide (Lasix) 10 mg/mL injection 40 mg  40 mg Intravenous Once    hydrALAzine (Apresoline) 20 mg/mL injection 10 mg  10 mg Intravenous Q4H PRN    [COMPLETED] potassium chloride 20 mEq/100mL IVPB premix 20 mEq  20 mEq Intravenous Once    [COMPLETED] magnesium oxide (Mag-Ox) tab 400 mg  400 mg Oral Once    [COMPLETED] potassium chloride (Klor-Con M20) tab 40 mEq  40 mEq Oral Q4H    [COMPLETED] furosemide (Lasix) 10 mg/mL injection 40 mg  40 mg Intravenous Once    fluticasone propionate (Flonase) 50 MCG/ACT nasal suspension 1 spray  1 spray Each Nare Daily    [COMPLETED] potassium chloride (Klor-Con M20) tab 40 mEq  40 mEq Oral Once    [COMPLETED] magnesium oxide (Mag-Ox) tab 400 mg  400 mg Oral Once    [COMPLETED] sodium chloride 0.9% infusion   Intravenous Once    [COMPLETED] furosemide (Lasix) 10 mg/mL injection 20 mg  20 mg Intravenous Once    ipratropium-albuterol (Duoneb) 0.5-2.5 (3) MG/3ML inhalation solution 3 mL  3 mL Nebulization Q4H PRN    [COMPLETED] sodium chloride 0.9 % IV bolus 500 mL  500 mL Intravenous Once    [COMPLETED] sodium chloride 0.9% infusion   Intravenous Once    [COMPLETED] sodium chloride 0.9 % IV bolus 1,000 mL  1,000 mL Intravenous Once    [COMPLETED] methylPREDNISolone sodium succinate (Solu-MEDROL) 2,000 mg in sodium chloride 0.9% 100 mL IVPB  2,000 mg Intravenous Once    glucose (Dex4) 15 GM/59ML oral liquid 15 g  15 g Oral Q15 Min PRN    Or    glucose (Glutose) 40% oral gel 15 g  15 g Oral Q15 Min PRN     Or    glucose-vitamin C (Dex-4) chewable tab 4 tablet  4 tablet Oral Q15 Min PRN    Or    dextrose 50% injection 50 mL  50 mL Intravenous Q15 Min PRN    Or    glucose (Dex4) 15 GM/59ML oral liquid 30 g  30 g Oral Q15 Min PRN    Or    glucose (Glutose) 40% oral gel 30 g  30 g Oral Q15 Min PRN    Or    glucose-vitamin C (Dex-4) chewable tab 8 tablet  8 tablet Oral Q15 Min PRN    insulin aspart (NovoLOG) 100 Units/mL FlexPen 1-5 Units  1-5 Units Subcutaneous TID AC and HS    [COMPLETED] magnesium oxide (Mag-Ox) tab 800 mg  800 mg Oral Once    [COMPLETED] methylPREDNISolone sodium succinate (Solu-MEDROL) 1,000 mg in sodium chloride 0.9% 100 mL IVPB  1,000 mg Intravenous Q6H    [COMPLETED] sodium chloride 0.9% infusion   Intravenous Once    acetaminophen (Tylenol) tab 650 mg  650 mg Oral Q4H PRN    Or    HYDROcodone-acetaminophen (Norco) 5-325 MG per tab 1 tablet  1 tablet Oral Q4H PRN    Or    HYDROcodone-acetaminophen (Norco) 5-325 MG per tab 2 tablet  2 tablet Oral Q4H PRN    ondansetron (Zofran) 4 MG/2ML injection 4 mg  4 mg Intravenous Q6H PRN    metoclopramide (Reglan) 5 mg/mL injection 10 mg  10 mg Intravenous Q8H PRN    [COMPLETED] ceFAZolin (Ancef) 2g in 10mL IV syringe premix  2 g Intravenous Q8H    [COMPLETED] phytonadione (Aqua-Mephton) 10 mg in sodium chloride 0.9% 50 mL IVPB  10 mg Intravenous Once    [COMPLETED] magnesium oxide (Mag-Ox) tab 400 mg  400 mg Oral Once    [COMPLETED] lidocaine (Xylocaine) 1 % injection        [COMPLETED] fentaNYL (Sublimaze) 50 mcg/mL injection        [COMPLETED] midazolam (Versed) 2 MG/2ML injection        [COMPLETED] phytonadione (Aqua-Mephton) 10 mg in sodium chloride 0.9% 50 mL IVPB  10 mg Intravenous Once    [COMPLETED] phytonadione (Aqua-Mephton) 10 mg in sodium chloride 0.9% 50 mL IVPB  10 mg Intravenous Once       Allergies:  Allergies[1]    Past Medical History:  Past Medical History:    AAA (abdominal aortic aneurysm)    Abdominal aneurysm    Abdominal aortic  aneurysm (AAA)    AAA surgery done    Anxiety state    Back pain    Naproxen twice a week    Back problem    minor    Calculus of kidney    Deep vein thrombosis (HCC)    to colon    Esophageal reflux    Hearing impairment    Lt ear tinnitus    High blood pressure    History of recent hospitalization    10/2019- was at [facility]5 weeks (ICU) with Dissected Aorta/ Post op colon problem/and post op cardiac arrest    Ileostomy present (HCC)    Peripheral vascular disease    Unspecified essential hypertension    Visual impairment    glasses       Past Surgical History:  Past Surgical History:   Procedure Laterality Date    Colonoscopy  10/15/09  [facility]    Screening: small polypoid fold in sigmoid (no polyp tissue seen on path), int hemorrhoids; next colonoscopy in 2019    Other  2017    AAA repair    Other      10/23/19- Dissected Aorta surgery    Other surgical history      Eye surgery for lazy eye    Other surgical history  10/15/09  [facility]    EGD (heartburn): normal    Other surgical history      AAA repair    Other surgical history      multiple toe amputations    Part removal colon w end colostomy      10/2019       Family History:   Family History   Problem Relation Age of Onset    Diabetes Father     Heart Disorder Father         CHF    Heart Disorder Mother          age 70, aortic aneurysm    Hypertension Sister          Social History:  Social History     Socioeconomic History    Marital status:    Tobacco Use    Smoking status: Some Days     Types: Cigars, Cigarettes    Smokeless tobacco: Never    Tobacco comments:     Cigars      Vaping Use    Vaping status: Never Used   Substance and Sexual Activity    Alcohol use: Yes     Alcohol/week: 0.0 standard drinks of alcohol     Comment: occassionally    Drug use: No       FUNCTIONAL STATUS:  Premorbid functional status/Living Situation-   HOME SITUATION  Type of Home: House  Home Layout: Two level  Lives With: Spouse     Toilet and Equipment:  Comfort height toilet  Shower/Tub and Equipment: Walk-in shower     Occupation/Status: retired from working for [employer]  Hand Dominance: Right  Drives: Yes     Prior Level of Function: independent with ADL. Pt used to enjoy playing golf. He is hoping to get back on the course.     PAIN ASSESSMENT  Ratin    Current functional Status:   FUNCTIONAL TRANSFER ASSESSMENT  Sit to Stand: Edge of Bed  Edge of Bed: Not Tested  Chair: Supervision  Commode Transfer: Not Tested     BED MOBILITY  Rolling: Not Tested  Supine to Sit : Not tested  Sit to Supine (OT): Not Tested  Scooting: n     BALANCE ASSESSMENT  Static Sitting: Independent  Static Standing: Not Tested     FUNCTIONAL ADL ASSESSMENT  Grooming Seated: Not Tested  UB Dressing Seated: Not Tested  LB Dressing Seated: Minimal Assist  LB Dressing Standing: Not Tested  Toileting Standing: Not Tested     O2 SATURATIONS  Oxygen Therapy  SPO2% on Oxygen at Rest: 91  At rest oxygen flow (liters per minute): 3       OBJECTIVE:    /54 (BP Location: Left arm)   Pulse 56   Temp 98.4 °F (36.9 °C) (Oral)   Resp 18   Wt 199 lb 15.3 oz (90.7 kg)   SpO2 97%   BMI 23.71 kg/m²   Body mass index is 23.71 kg/m².   General: well nourished, NAD  Eyes: conjunctiva and lids intact, pupils are equal and round  Lungs: Non labored on RA, no wheezing appreciated, No accessory muscle noted  Psych: conversational speech    Data Review:    Lab Results   Component Value Date    WBC 3.4 2025    HGB 6.9 2025    HCT 20.6 2025    PLT 64.0 2025    CREATSERUM 1.44 2025    BUN 31 2025     2025    K 3.7 2025     2025    CO2 23.0 2025    GLU 91 2025    CA 8.2 2025    MG 1.8 2025    PGLU 124 2025       No results found.    ASSESSMENT:    Deficits of self care and mobility secondary to   Active Problems:    Pre-op testing    Thoracoabdominal aortic aneurysm (TAAA)    Bilateral leg weakness    Current  moderate episode of major depressive disorder without prior episode (HCC)      Recommendations: VERA    Based on recent therapy notes and patient self reporting, do not feel that patient will be able to tolerate acute rehab. Recommendation for VERA for about 2 weeks for functional upgrading. Could benefit from outpatient day rehab program after VERA to build endurance in a cardiac based rehab program    Faby Stewart MD              [1]   Allergies  Allergen Reactions    Amoxicillin ITCHING     Itchiness on hands and feet      Clindamycin OTHER (SEE COMMENTS)     Upset stomach

## 2025-04-14 NOTE — PHYSICAL THERAPY NOTE
PHYSICAL THERAPY TREATMENT NOTE - INPATIENT    Room Number: 7613/7613-A     Session: 11     Number of Visits to Meet Established Goals:  (15)  History related to current admission: Patient is a 69 year old male admitted on 3/19/2025 from Home for S/p 3/21 fenestrated abdominal aortic aneurysm repair with left carotid to subclavian transposition on  with Dr. Yanez and Dr. Oquendo and 3/20 lumbar drain.  Reporting RLE weakness 3/22 AM worsening to BLE MRI T/L spine w/o obvious compression or signs of cord ischemia. Pt with inconsistent participation in therapy sessions - passive thoughts of death per psych consult 4/11 dx depression. R groin site with purulent drainage  - flushed at bedside by vascular 4/13     HOME SITUATION  Type of Home: House  Home Layout: Two level      Stairs to Bedroom: 16    Railing: Yes    Lives With: Spouse       Prior Level of [place]: Pt states that he lives in a house with spouse. Pt  reports that he was IND with all his ADLs and gait. Pt has no history of falls.  Presenting Problem: S/p 3/21 fenestrated abdominal aortic aneurysm repair with left carotid to subclavian transposition on  with Dr. Yanez and Dr. Oquendo and 3/20 lumbar drain  Co-Morbidities : HTN, Peripheral Neuropathy, Hx of toe amputation L great toe, mesenteric ischemia s/p SMA bypass with right external iliac artery to SMA bypass, Short gut syndrome    PHYSICAL THERAPY ASSESSMENT   Patient demonstrates fair progress this session, goals  remain in progress. Pt reluctantly agreeable to initiate gait training again with extensive encouragement able to ambulate 20ft CGA with RW.     The patient is below baseline and would benefit from skilled inpatient PT to address the above deficits to assist patient in returning to prior to level of function.          PLAN DURING HOSPITALIZATION  Nursing Mobility Recommendation : 1 Assist  PT Device Recommendation: Rolling walker  PT Treatment Plan: Bed mobility, Body mechanics,  Gait training, Strengthening, Transfer training, Stair training, Balance training  Frequency (Obs): 3-5x/week     CURRENT GOALS   Goal #1 Patient is able to demonstrate supine - sit EOB @ level: CGA assistance   MET Raquel 4/1     Goal #2 Patient is able to demonstrate transfers EOB to/from BSC at assistance level: CGA assistance   MET Raquel 4/1     Goal #3 Patient is able to sit at the EOB for 10 mins with CGA    Met 4/8/2025       Goal #4 Patient is able to ambulate 50 feet with assist device: walker - rolling at assistance level: moderate assistance  MET 4/7 progress to 150ft CGA     Goal #5     Goal #6     Goal Comments: Goals established on 3/22/2025  4/14/2025 all goals ongoing    SUBJECTIVE  \"I don't like this drill allen stuff\"     - Re-discussed pts goals which he reports is to \"get out of here\" and \"get back to normal\". Educated pt therapist providing encouragement for continued ambulation to assist pt with meeting his goals as pt often self limiting mobility.     OBJECTIVE  Precautions: Bed/chair alarm (surgical boot L foot? SBP )    WEIGHT BEARING RESTRICTION  R Lower Extremity: Full Weight Bearing  L Lower Extremity: Full Weight Bearing    PAIN ASSESSMENT   Rating: Unable to rate  Location: bottom, groin  Management Techniques: Activity promotion, Body mechanics, Repositioning    BALANCE                                                                                                                       Static Sitting: Good  Dynamic Sitting: Good           Static Standing: Fair -  Dynamic Standing: Fair -    ACTIVITY TOLERANCE           BP: 126/54  BP Location: Left arm  BP Method: Automatic  Patient Position: Sitting    O2 WALK  Oxygen Therapy  SPO2% Ambulation on Oxygen: 91  Ambulation oxygen flow (liters per minute): 3    AM-PAC '6-Clicks' INPATIENT SHORT FORM - BASIC MOBILITY  How much difficulty does the patient currently have...  Patient Difficulty: Turning over in bed (including adjusting  bedclothes, sheets and blankets)?: None   Patient Difficulty: Sitting down on and standing up from a chair with arms (e.g., wheelchair, bedside commode, etc.): A Little   Patient Difficulty: Moving from lying on back to sitting on the side of the bed?: None   How much help from another person does the patient currently need...   Help from Another: Moving to and from a bed to a chair (including a wheelchair)?: A Little   Help from Another: Need to walk in hospital room?: A Little   Help from Another: Climbing 3-5 steps with a railing?: A Lot     AM-PAC Score:  Raw Score: 19   Approx Degree of Impairment: 41.77%   Standardized Score (AM-PAC Scale): 45.44   CMS Modifier (G-Code): CK    FUNCTIONAL ABILITY STATUS  Gait Assessment   Functional Mobility/Gait Assessment  Gait Assistance: Contact guard assist  Distance (ft): 20  Assistive Device: Rolling walker  Pattern: Shuffle    Skilled Therapy Provided    Bed Mobility:  Rolling: NT   Supine<>Sit: NT  Sit<>Supine:  NT    Transfer Mobility:  Sit<>Stand: CGA  Stand<>Sit: CGA  Gait: CGA     Therapist's Comments: Discussed case with RN prior to session initiation. Chair placed in hallway.  Gait belt donned for out of bed mobility. Increased time and encouragement provided for all mobility as pt repeatedly stating he \"cannot do this\" right now. Reluctantly ambulated 20ft with CGA and chair follow.       Patient End of Session: Up in chair, Needs met, Call light within reach, RN aware of session/findings, All patient questions and concerns addressed, Hospital anti-slip socks, Alarm set    PT Session Time: 24 minutes  Therapeutic Activity: 24 minutes

## 2025-04-14 NOTE — PROGRESS NOTES
[facility] Hospitalist Progress Note     CC: Hospital Follow up    PCP: Kam Alberts MD     Subjective:     - hgb down, no overt bleeding, does report mild dyspnea, cough cont to clear w/ fv     OBJECTIVE:    Blood pressure 99/51, pulse 57, temperature 97.8 °F (36.6 °C), resp. rate 20, weight 199 lb 15.3 oz (90.7 kg), SpO2 97%.    Temp:  [97.8 °F (36.6 °C)-98.5 °F (36.9 °C)] 97.8 °F (36.6 °C)  Pulse:  [57-86] 57  Resp:  [17-20] 20  BP: ()/(48-70) 99/51  SpO2:  [91 %-99 %] 97 %      Intake/Output:  No intake or output data in the 24 hours ending 04/14/25 1112          Exam   Gen: No acute distress, alert and oriented x3, no focal neurologic deficits, chronically ill-appearing  Heent: NC AT, mucous memb clear, neck supple  Pulm: Lungs clear, normal respiratory effort  CV: Heart with regular rate and rhythm, no peripheral edema  Abd: Abdomen soft, nontender, nondistended, bowel sounds present  MSK:right groin dressed      Neuro: AO*3, motor intact, no sensory deficits  Psyc: appropriate mood and affect      Data Review:       Labs:     Recent Labs   Lab 04/11/25  0527 04/12/25  1139 04/14/25  0545   RBC 2.79* 2.70* 2.35*   HGB 8.1* 7.9* 6.9*   HCT 24.5* 23.7* 20.6*   MCV 87.8 87.8 87.7   MCH 29.0 29.3 29.4   MCHC 33.1 33.3 33.5   RDW 17.9 18.2 17.9   NEPRELIM 5.05 3.82 2.43   WBC 5.8 4.6 3.4*   PLT 75.0* 62.0* 64.0*         Recent Labs   Lab 04/12/25  1139 04/13/25  1044 04/14/25  0545   GLU 95 140* 91   BUN 35* 31* 31*   CREATSERUM 1.60* 1.65* 1.44*   EGFRCR 46* 44* 52*   CA 8.4* 8.2* 8.2*   * 133* 135*   K 4.2 4.0 3.7    101 104   CO2 25.0 23.0 23.0       Recent Labs   Lab 04/13/25  1044   ALT 16   AST 14   ALB 3.2           Imaging:  XR CHEST AP PORTABLE  (CPT=71045)  Result Date: 4/12/2025  CONCLUSION:  Stable cardiac and mediastinal contours with aortic stent graft again noted.  Chronic interstitial thickening/fibrosis with mild patchy airspace opacities of the right mid/lower lung, concordant with  recent CT findings, possibly atelectatic or inflammatory.  No associated pleural effusion or pneumothorax.  Right upper extremity PICC terminates in the lower SVC.   LOCATION:  [facility]      Dictated by (CST): An Childs MD on 4/12/2025 at 12:03 PM     Finalized by (CST): An Childs MD on 4/12/2025 at 12:04 PM           Meds:      sodium chloride   Intravenous Once    sodium chloride  3 mL Nebulization TID    vancomycin  15 mg/kg Intravenous Q24H    guaiFENesin ER  600 mg Oral BID    buPROPion  75 mg Oral Daily    apixaban  5 mg Oral BID    loperamide  2 mg Oral Every afternoon at 1400    multivitamin  1 tablet Oral Daily    amLODIPine  10 mg Oral Daily    metoprolol tartrate  25 mg Oral 2x Daily(Beta Blocker)    fluticasone propionate  1 spray Each Nare Daily    insulin aspart  1-5 Units Subcutaneous TID AC and HS      sodium chloride 83 mL/hr at 04/13/25 1308           guaiFENesin    diphenoxylate-atropine    peppermint oil    hydrALAzine    ipratropium-albuterol    glucose **OR** glucose **OR** glucose-vitamin C **OR** dextrose **OR** glucose **OR** glucose **OR** glucose-vitamin C    acetaminophen **OR** HYDROcodone-acetaminophen **OR** HYDROcodone-acetaminophen    ondansetron    metoclopramide         Assessment/Plan:     69 year old male with a past medical history of hypertension, GERD, anxiety, renal calculi, DVT, ascending aortic dissection s/p repair, 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  Postoperative course complicated by HOLLIS on CKD as well as right lower extremity weakness along with hypoxic respiratory failure.      History of prior infrarenal endovascular AAA repair with bilateral KYLER for hypogastric and common iliac  aneurysms  History of mesenteric ischemia s/p SMA bypass with right external iliac artery to SMA bypass  History of aortic dissection s/p repair, aortic dissection distal to left subclavian  S/p complex thoraco abdominal aortic repair 3/21  Hx of DVT   -Complicated aortic pathology with multiple repairs in the past. Underwent thoracoabdominal aneurysm repair 3/21  -s/p lumbar drain with neurointerventional to limit risk of spinal cord ischemia 3/20 and removed 3/26  -management per vascular  - vascular managing - planning on CTA today   - culture w/ enterobacter - had reported allergy to amox - reported itching to hands and knee which resolved w/ benadryl, but never took this again. He is willing to attempt pcn in house.   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  Prior to that he had a mesenteric ischemia and was started on Coumadin which he continued for 8 months.  He is also positive for heterozygous prothrombin gene mutation.  Eliquis was recommended by hematology.     Acute hypoxemic respiratory failure w/ threat to bodily function 2/2 possible pneumonia versus edema  Leukocytosis  - pulm consulted - completed course of abx w/ azithro and cefepime  - tx w/ burst steroids - currently off   - ongoing cough, hypoxemia likely 2/2 atelectasis - cont IS, FV and added hypertonic saline nebs  - encourage oob     Generalized weakness  Back pain  - In the setting of prolonged hospital stay, pain control      Leukocytosis-resolved    Anemia 2/2 blood loss  - stable     Right lower extremity weakness-resolved  - In the setting of complex aortic repair 3/21  -- PT/OT     HOLLIS on CKD-   - initial hollis resolved, now w/ new worsening of creatinine, fena low, likely pre renal   - restart fluids and trend      Hypertension  -hold home HCTZ     Allergic rhinitis  -Continue home fluticasone as needed     Short gut syndrome  - resume lamotil if diarrhea resumes  - Add Imodium per home regimen    Sacral wound  - In the setting of persistent diarrhea and immobility   - Wound care consult, recommendations reviewed    Physical deconditioning  - multifactorial 2/2 prolonged hospitalization and situational depression, self limiting mobility   - cont ongoing pt/ot - plan for home therapies at [facility]     Depression  SI  - psych consulted, started on wellbutrin     Dispo - per vascular, will follow       Dave Ruiz DO        Supplementary Documentation:   DVT Mechanical Prophylaxis:   SCDs, Early ambuation  DVT Pharmacologic Prophylaxis   Medication    apixaban (Eliquis) tab 5 mg                Code Status: DNAR/Full Treatment  Arzola: External urinary catheter in place  Arzola Duration (in days):   Central line:

## 2025-04-14 NOTE — PROGRESS NOTES
Patient with right groin superficial abscess drained   Hemoglobin 6.9     Transfuse 1 unit   CT angiogram to determine if patient needs further incision and drainage    No diapers or pure wick  Patient must get up and go to bathroom

## 2025-04-14 NOTE — CONGREGATE LIVING REVIEW
[facility] Living Authorization    The [facility] Review Committee has reviewed this case and the patient IS APPROVED for discharge to a facility for Short Term Skilled once the following procedure is followed:     - The physician discharge instructions (contained within the [facility] note for SNF) must inlcude the below appropriate and approved COVID instructions to the facility    For questions regarding CLRC approval process, please contact the CM assigned to the case.  For questions regarding RN discharge workflow, please contact the unit Clinical Leader.

## 2025-04-14 NOTE — CM/SW NOTE
SW following for DC planning. Notified by [facility] liaison PMR to see today. Will follow up with pt/spouse for continued discussions of AR vs VERA vs HHC. Pt discussed with RN-- blood transfusion needed. Need for CT angio for further plan of care.     Please continue to encourage pt for OOB mobility-- ambulation, up in chair, going to bathroom, etc. Pt requires maximum encouragement by treatment team.     Addendum: SW met with pt at bedside. PMR eval complete with recs for VERA. SW provided an updated VERA list. Pt appears more energetic at time of visit. Asking if he could have multiple therapy sessions a day, made aware that therapy team will see once/day but he is encouraged to get up and ambulate with team throughout day. SW will follow up tomorrow.       ISAAC Julien

## 2025-04-14 NOTE — PROGRESS NOTES
[facility]  [facility]COLOGY  Progress Note    Cy Monsalve Patient Status:  Inpatient    1955 MRN [id number]   Location [address] Attending Neil Oquendo MD   Hosp Day # 25 PCP Kam Alberts MD     ADMISSION DATE AND TIME: 3/19/2025  1:15 PM    ADMIT DIAGNOSIS: Abdominal aortic aneurysm (AAA) [I71.40]    SUBJECTIVE:    No new events.   Hemoglobin 7 grams  Platelet count > 50 at 64      OBJECTIVE:  Blood pressure 121/61, pulse 66, temperature 97.9 °F (36.6 °C), temperature source Temporal, resp. rate 20, weight 199 lb 15.3 oz (90.7 kg), SpO2 92%.    PHYSICAL EXAM:  General: afebrile, alert and oriented x 3, pleasant  No distress    LABS:  Recent Results (from the past 24 hours)   Comp Metabolic Panel (14)    Collection Time: 25 10:44 AM   Result Value Ref Range    Glucose 140 (H) 70 - 99 mg/dL    Sodium 133 (L) 136 - 145 mmol/L    Potassium 4.0 3.5 - 5.1 mmol/L    Chloride 101 98 - 112 mmol/L    CO2 23.0 21.0 - 32.0 mmol/L    Anion Gap 9 0 - 18 mmol/L    BUN 31 (H) 9 - 23 mg/dL    Creatinine 1.65 (H) 0.70 - 1.30 mg/dL    Calcium, Total 8.2 (L) 8.7 - 10.6 mg/dL    Calculated Osmolality 285 275 - 295 mOsm/kg    eGFR-Cr 44 (L) >=60 mL/min/1.73m2    AST 14 <34 U/L    ALT 16 10 - 49 U/L    Alkaline Phosphatase 68 45 - 117 U/L    Bilirubin, Total 1.1 0.2 - 1.1 mg/dL    Total Protein 5.7 5.7 - 8.2 g/dL    Albumin 3.2 3.2 - 4.8 g/dL    Globulin  2.5 2.0 - 3.5 g/dL    A/G Ratio 1.3 1.0 - 2.0   POCT Glucose    Collection Time: 25 11:40 AM   Result Value Ref Range    POC Glucose 116 (H) 70 - 99 mg/dL   POCT Glucose    Collection Time: 25  5:16 PM   Result Value Ref Range    POC Glucose 106 (H) 70 - 99 mg/dL   POCT Glucose    Collection Time: 25  9:10 PM   Result Value Ref Range    POC Glucose 114 (H) 70 - 99 mg/dL   POCT Glucose    Collection Time: 25  5:33 AM   Result Value Ref Range    POC Glucose 102 (H) 70 - 99 mg/dL   CBC With Differential With  Platelet    Collection Time: 04/14/25  5:45 AM   Result Value Ref Range    WBC 3.4 (L) 4.0 - 11.0 x10(3) uL    RBC 2.35 (L) 3.80 - 5.80 x10(6)uL    HGB 6.9 (LL) 13.0 - 17.5 g/dL    HCT 20.6 (L) 39.0 - 53.0 %    PLT 64.0 (L) 150.0 - 450.0 10(3)uL    Immature Platelet Fraction 4.1 0.0 - 7.0 %    MCV 87.7 80.0 - 100.0 fL    MCH 29.4 26.0 - 34.0 pg    MCHC 33.5 31.0 - 37.0 g/dL    RDW 17.9 %    Neutrophil Absolute Prelim 2.43 1.50 - 7.70 x10 (3) uL    Neutrophil Absolute 2.43 1.50 - 7.70 x10(3) uL    Lymphocyte Absolute 0.68 (L) 1.00 - 4.00 x10(3) uL    Monocyte Absolute 0.24 0.10 - 1.00 x10(3) uL    Eosinophil Absolute 0.07 0.00 - 0.70 x10(3) uL    Basophil Absolute 0.01 0.00 - 0.20 x10(3) uL    Immature Granulocyte Absolute 0.01 0.00 - 1.00 x10(3) uL    Neutrophil % 70.6 %    Lymphocyte % 19.8 %    Monocyte % 7.0 %    Eosinophil % 2.0 %    Basophil % 0.3 %    Immature Granulocyte % 0.3 %   Basic Metabolic Panel (8)    Collection Time: 04/14/25  5:45 AM   Result Value Ref Range    Glucose 91 70 - 99 mg/dL    Sodium 135 (L) 136 - 145 mmol/L    Potassium 3.7 3.5 - 5.1 mmol/L    Chloride 104 98 - 112 mmol/L    CO2 23.0 21.0 - 32.0 mmol/L    Anion Gap 8 0 - 18 mmol/L    BUN 31 (H) 9 - 23 mg/dL    Creatinine 1.44 (H) 0.70 - 1.30 mg/dL    Calcium, Total 8.2 (L) 8.7 - 10.6 mg/dL    Calculated Osmolality 286 275 - 295 mOsm/kg    eGFR-Cr 52 (L) >=60 mL/min/1.73m2   Magnesium    Collection Time: 04/14/25  5:45 AM   Result Value Ref Range    Magnesium 1.8 1.6 - 2.6 mg/dL   ABORH (Blood Type)    Collection Time: 04/14/25  7:34 AM   Result Value Ref Range    ABO BLOOD TYPE O     RH BLOOD TYPE Positive    Antibody Screen    Collection Time: 04/14/25  7:34 AM   Result Value Ref Range    Antibody Screen Negative    Prepare RBC Once    Collection Time: 04/14/25  8:44 AM   Result Value Ref Range    Blood Product [id number]     Unit Number [id number]     UNIT ABO O     UNIT RH POS     Crossmatch Compatible     Product Status Cross  Matched     Expiration Date [id number]     Blood Type Barcode 5100     Unit Volume 350 ml     Platelet Count::    Lab Results   Component Value Date    PLT 64.0 (L) 04/14/2025    PLT 62.0 (L) 04/12/2025    PLT 75.0 (L) 04/11/2025        CULTURES  Hospital Encounter on 03/19/25   1. Aerobic Bacterial Culture     Status: Abnormal    Collection Time: 04/12/25 11:20 AM    Specimen: Leg, right; Other   Result Value Ref Range    Aerobic Culture Result  N/A     Mixture of organisms suggestive of normal skin beth    Aerobic Culture Result 2+ growth Enterococcus faecalis NOT VRE (A) N/A    Aerobic Smear 1+ Gram positive cocci in pairs and clusters N/A    Aerobic Smear 1+ WBCs seen N/A       Susceptibility    Enterococcus faecalis NOT VRE -  (no method available)     Ampicillin <=2 Sensitive      Vancomycin 1 Sensitive    2. Blood Culture     Status: None    Collection Time: 03/24/25 10:05 AM    Specimen: Blood,peripheral   Result Value Ref Range    Blood Culture Result No Growth 5 Days N/A       IMAGING:    No results found.      MEDICATIONS:  Medications reviewed.     sodium chloride   Intravenous Once    potassium chloride  40 mEq Oral Once    magnesium oxide  400 mg Oral Once    sodium chloride  3 mL Nebulization TID    vancomycin  15 mg/kg Intravenous Q24H    guaiFENesin ER  600 mg Oral BID    buPROPion  75 mg Oral Daily    apixaban  5 mg Oral BID    loperamide  2 mg Oral Every afternoon at 1400    multivitamin  1 tablet Oral Daily    amLODIPine  10 mg Oral Daily    metoprolol tartrate  25 mg Oral 2x Daily(Beta Blocker)    fluticasone propionate  1 spray Each Nare Daily    insulin aspart  1-5 Units Subcutaneous TID AC and HS      sodium chloride 83 mL/hr at 04/13/25 1308         guaiFENesin    diphenoxylate-atropine    peppermint oil    hydrALAzine    ipratropium-albuterol    glucose **OR** glucose **OR** glucose-vitamin C **OR** dextrose **OR** glucose **OR** glucose **OR** glucose-vitamin C    acetaminophen **OR**  HYDROcodone-acetaminophen **OR** HYDROcodone-acetaminophen    ondansetron    metoclopramide    ASSESSMENT AND PLAN:     Active Problems:    Pre-op testing    Thoracoabdominal aortic aneurysm (TAAA)    Bilateral leg weakness    Current moderate episode of major depressive disorder without prior episode (HCC)        Cy Monsalve is a 69 year old male with 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     Status post AAA repair with complicated postop course with hypoxia and HOLLIS.       Thrombocytopenia  Platelets were normal until 3/22/2025  Thereafter they had declined into the 70-90 while on heparin  Heparin-induced platelet antibody was positive.    SUNDEEP negative    Recent inpatient team notes reviewed.    Ok to discharge on apixaban 5mg bid.   (This would be appropriate treatment for possible hit).     Platelet count stable > 50  We will plan to follow       Jason Modi MD

## 2025-04-14 NOTE — PLAN OF CARE
Assumed care at approx 1930.  A/Ox4, RA, NSR on tele; VSS.  Pain addressed w/ PRN Norco.  Hgb 6.1, hospitalist notified.  Safety and comfort measures in place.  Pt updated on POC.

## 2025-04-15 ENCOUNTER — APPOINTMENT (OUTPATIENT)
Dept: CT IMAGING | Facility: HOSPITAL | Age: 70
End: 2025-04-15
Attending: SURGERY
Payer: MEDICARE

## 2025-04-15 PROBLEM — L02.214 GROIN ABSCESS: Status: ACTIVE | Noted: 2025-04-15

## 2025-04-15 PROBLEM — F05 DELIRIUM DUE TO ANOTHER MEDICAL CONDITION: Status: ACTIVE | Noted: 2025-04-15

## 2025-04-15 LAB
ANION GAP SERPL CALC-SCNC: 9 MMOL/L (ref 0–18)
BASOPHILS # BLD AUTO: 0.01 X10(3) UL (ref 0–0.2)
BASOPHILS NFR BLD AUTO: 0.3 %
BLOOD TYPE BARCODE: 5100
BUN BLD-MCNC: 28 MG/DL (ref 9–23)
CALCIUM BLD-MCNC: 8.1 MG/DL (ref 8.7–10.6)
CHLORIDE SERPL-SCNC: 106 MMOL/L (ref 98–112)
CO2 SERPL-SCNC: 21 MMOL/L (ref 21–32)
CREAT BLD-MCNC: 1.49 MG/DL (ref 0.7–1.3)
EGFRCR SERPLBLD CKD-EPI 2021: 50 ML/MIN/1.73M2 (ref 60–?)
EOSINOPHIL # BLD AUTO: 0.08 X10(3) UL (ref 0–0.7)
EOSINOPHIL NFR BLD AUTO: 2.2 %
ERYTHROCYTE [DISTWIDTH] IN BLOOD BY AUTOMATED COUNT: 17.6 %
GLUCOSE BLD-MCNC: 104 MG/DL (ref 70–99)
GLUCOSE BLD-MCNC: 108 MG/DL (ref 70–99)
GLUCOSE BLD-MCNC: 113 MG/DL (ref 70–99)
GLUCOSE BLD-MCNC: 143 MG/DL (ref 70–99)
GLUCOSE BLD-MCNC: 94 MG/DL (ref 70–99)
HCT VFR BLD AUTO: 23.4 % (ref 39–53)
HGB BLD-MCNC: 10 G/DL (ref 13–17.5)
HGB BLD-MCNC: 7.7 G/DL (ref 13–17.5)
IMM GRANULOCYTES # BLD AUTO: 0.01 X10(3) UL (ref 0–1)
IMM GRANULOCYTES NFR BLD: 0.3 %
LYMPHOCYTES # BLD AUTO: 0.59 X10(3) UL (ref 1–4)
LYMPHOCYTES NFR BLD AUTO: 16.5 %
MAGNESIUM SERPL-MCNC: 1.8 MG/DL (ref 1.6–2.6)
MCH RBC QN AUTO: 29.2 PG (ref 26–34)
MCHC RBC AUTO-ENTMCNC: 32.9 G/DL (ref 31–37)
MCV RBC AUTO: 88.6 FL (ref 80–100)
MONOCYTES # BLD AUTO: 0.29 X10(3) UL (ref 0.1–1)
MONOCYTES NFR BLD AUTO: 8.1 %
NEUTROPHILS # BLD AUTO: 2.59 X10 (3) UL (ref 1.5–7.7)
NEUTROPHILS # BLD AUTO: 2.59 X10(3) UL (ref 1.5–7.7)
NEUTROPHILS NFR BLD AUTO: 72.6 %
OSMOLALITY SERPL CALC.SUM OF ELEC: 287 MOSM/KG (ref 275–295)
PLATELET # BLD AUTO: 76 10(3)UL (ref 150–450)
PLATELETS.RETICULATED NFR BLD AUTO: 4.3 % (ref 0–7)
POTASSIUM SERPL-SCNC: 4.4 MMOL/L (ref 3.5–5.1)
POTASSIUM SERPL-SCNC: 4.4 MMOL/L (ref 3.5–5.1)
RBC # BLD AUTO: 2.64 X10(6)UL (ref 3.8–5.8)
SODIUM SERPL-SCNC: 136 MMOL/L (ref 136–145)
UNIT VOLUME: 350 ML
VANCOMYCIN TROUGH SERPL-MCNC: 16.4 UG/ML (ref 10–20)
WBC # BLD AUTO: 3.6 X10(3) UL (ref 4–11)

## 2025-04-15 PROCEDURE — 74174 CTA ABD&PLVS W/CONTRAST: CPT | Performed by: SURGERY

## 2025-04-15 PROCEDURE — 99232 SBSQ HOSP IP/OBS MODERATE 35: CPT | Performed by: OTHER

## 2025-04-15 RX ORDER — FUROSEMIDE 10 MG/ML
20 INJECTION INTRAMUSCULAR; INTRAVENOUS ONCE
Status: COMPLETED | OUTPATIENT
Start: 2025-04-15 | End: 2025-04-15

## 2025-04-15 RX ORDER — SODIUM CHLORIDE 9 MG/ML
INJECTION, SOLUTION INTRAVENOUS ONCE
Status: COMPLETED | OUTPATIENT
Start: 2025-04-15 | End: 2025-04-15

## 2025-04-15 RX ORDER — MAGNESIUM OXIDE 400 MG/1
400 TABLET ORAL ONCE
Status: COMPLETED | OUTPATIENT
Start: 2025-04-15 | End: 2025-04-15

## 2025-04-15 NOTE — PLAN OF CARE
Assumed care at 0700. Pt A/O x4, forgetful. 2L O2 per NC.  NSR/SB on tele. VSS. PRN Norco for pain.   Medically cleared for discharge. Working on VERA placement.   Pt appealing discharge, waiting on first choice facility.  Pt up in the chair during the day.   UP x1 with the walker to the bathroom.  Updated on POC. Call light within reach.

## 2025-04-15 NOTE — PLAN OF CARE
Assumed care at 1930  Received pt on chair, transferred to specialty bed with 2 assists and walker.  Vital signs stable.  On O2 1L/NC.  2130:blood sugar 427, rechecked 156. Insulin given per order.  AOx4. C/o abdominal pain.Norco 2 tabs given.  IVF, IV antibiotic.  Dressing changed on the Right groin.  Labs in am.  Plan:CTA abd/pelvis .    Problem: CARDIOVASCULAR - ADULT  Goal: Maintains optimal cardiac output and hemodynamic stability  Description: INTERVENTIONS:- Monitor vital signs, rhythm, and trends- Monitor for bleeding, hypotension and signs of decreased cardiac output- Evaluate effectiveness of vasoactive medications to optimize hemodynamic stability- Monitor arterial and/or venous puncture sites for bleeding and/or hematoma- Assess quality of pulses, skin color and temperature- Assess for signs of decreased coronary artery perfusion - ex. Angina- Evaluate fluid balance, assess for edema, trend weights  Outcome: Progressing     Problem: HEMATOLOGIC - ADULT  Goal: Free from bleeding injury  Description: (Example usage: patient with low platelets)INTERVENTIONS:- Avoid intramuscular injections, enemas and rectal medication administration- Ensure safe mobilization of patient- Hold pressure on venipuncture sites to achieve adequate hemostasis- Assess for signs and symptoms of internal bleeding- Monitor lab trends- Patient is to report abnormal signs of bleeding to staff- Avoid use of toothpicks and dental floss- Use electric shaver for shaving- Use soft bristle tooth brush- Limit straining and forceful nose blowing  Outcome: Progressing     Problem: RESPIRATORY - ADULT  Goal: Achieves optimal ventilation and oxygenation  Description: INTERVENTIONS:- Assess for changes in respiratory status- Assess for changes in mentation and behavior- Position to facilitate oxygenation and minimize respiratory effort- Oxygen supplementation based on oxygen saturation or ABGs- Provide Smoking Cessation handout, if applicable-  Encourage broncho-pulmonary hygiene including cough, deep breathe, Incentive Spirometry- Assess the need for suctioning and perform as needed- Assess and instruct to report SOB or any respiratory difficulty- Respiratory Therapy support as indicated- Manage/alleviate anxiety- Monitor for signs/symptoms of CO2 retention  Outcome: Progressing     Problem: NEUROLOGICAL - ADULT  Goal: Achieves stable or improved neurological status  Description: INTERVENTIONS- Assess for and report changes in neurological status- Initiate measures to prevent increased intracranial pressure- Maintain blood pressure and fluid volume within ordered parameters to optimize cerebral perfusion and minimize risk of hemorrhage- Monitor temperature, glucose, and sodium. Initiate appropriate interventions as ordered  Outcome: Progressing  Goal: Achieves maximal functionality and self care  Description: INTERVENTIONS- Monitor swallowing and airway patency with patient fatigue and changes in neurological status- Encourage and assist patient to increase activity and self care with guidance from PT/OT- Encourage visually impaired, hearing impaired and aphasic patients to use assistive/communication devices  Outcome: Progressing     Problem: PAIN - ADULT  Goal: Verbalizes/displays adequate comfort level or patient's stated pain goal  Description: INTERVENTIONS:- Encourage pt to monitor pain and request assistance- Assess pain using appropriate pain scale- Administer analgesics based on type and severity of pain and evaluate response- Implement non-pharmacological measures as appropriate and evaluate response- Consider cultural and social influences on pain and pain management- Manage/alleviate anxiety- Utilize distraction and/or relaxation techniques- Monitor for opioid side effects- Notify MD/LIP if interventions unsuccessful or patient reports new pain- Anticipate increased pain with activity and pre-medicate as appropriate  Outcome: Progressing      Problem: Patient/Family Goals  Goal: Patient/Family Long Term Goal  Description: Patient's Long Term Goal: discharge to recommended facility  Interventions:- monitor O2 status  -IV antibiotics  -IS  -consults  -PT/OT  -tele monitoring  -labs  -medicines  -wound care  -encourage ambulation  - See additional Care Plan goals for specific interventions  Outcome: Progressing  Goal: Patient/Family Short Term Goal  Description: Patient's Short Term Goal: Be comfortable   Interventions: -pain controlled  -cluster care  -needs attended   See additional Care Plan goals for specific interventions  Outcome: Progressing

## 2025-04-15 NOTE — PROGRESS NOTES
[facility]   part of [facility]     Vascular Surgery Progress Note    Cy Monsalve Patient Status:  Inpatient    1955 MRN [id number]   Location [facility] 7NE-A Attending Neil Oquendo MD   Hosp Day # 26 PCP Kam Alberts MD     Objective:   Temp: 97.6 °F (36.4 °C)  Pulse: 46  Resp: 18  BP: 122/52      Events Yesterday/Overnight:  Patient is a 69 year old male, POD 25, fenestrated endovascular arch repair with fenestrated thoracoabdominal aneurysm repair, left carotid to subclavian transposition with endovascular septotomy of dissection with Dr. Oquendo and Dr. Yanez.  BP stable at 122/52. WBC 3.6, hemoglobin 7.7. Platelet count 76.  Patient received transfusion of 1 unit packed red blood cells yesterday.  Right groin abscess drained at bedside by Dr. Oquendo yesterday as well.  Plan for CTA abdomen/pelvis today.      Exam:  Palpable bilateral DP and PT pulses. Left groin access sites healed, no hematoma. Right groin access site open wound, small amount of serosanguinous drainage present. Productive cough present.    Impression/Plan:    CTA abdomen/pelvis today.    Transfuse 2 units PRBC, 20 mg of Lasix IV after each unit.    Change right groin access site gauze and paper tape BID and PRN.    Continue physical therapy.    Continue plan for patient to use urinal/bedside commode.     Continue incentive spirometry.     Medications:  Current Hospital Medications[1]    Laboratory/Data:    LABS:  Recent Labs   Lab 25  0528 04/10/25  0517 25  0527 25  1139 25  0545 25  1724 04/15/25  0554   WBC 5.6 6.9 5.8 4.6 3.4*  --  3.6*   HGB 8.1* 8.1* 8.1* 7.9* 6.9* 8.3* 7.7*   MCV 87.9 87.4 87.8 87.8 87.7  --  88.6   PLT 95.0* 80.0* 75.0* 62.0* 64.0*  --  76.0*   INR 2.36*  --   --   --   --   --   --        Recent Labs   Lab 25  0528 04/10/25  0517 25  0527 25  1139 25  1044 25  0545 04/15/25  0554    135* 135* 132* 133* 135* 136   K  4.3 4.4 4.2 4.2 4.0 3.7 4.4  4.4    101 102 100 101 104 106   CO2 26.0 26.0 26.0 25.0 23.0 23.0 21.0   BUN 26* 29* 29* 35* 31* 31* 28*   CREATSERUM 1.39* 1.48* 1.55* 1.60* 1.65* 1.44* 1.49*   GLU 87 111* 93 95 140* 91 94   CA 8.7 8.5* 8.6* 8.4* 8.2* 8.2* 8.1*   MG 1.8 1.8 1.8  --   --  1.8 1.8     Recent Labs   Lab 04/09/25  0528 04/10/25  0517   PTP 26.0*  --    INR 2.36*  --    PTT 51.5* 40.7*     Recent Labs   Lab 04/13/25  1044   ALT 16   AST 14   ALB 3.2     No results for input(s): \"TROP\" in the last 168 hours.  No results found for: \"ANAS\", \"ROVERTO\", \"ANASCRN\"  No results for input(s): \"PCACT\", \"PSACT\", \"AT3ACT\", \"HIPAB\", \"PATHI\", \"STALA\", \"DRVVTRATIO\", \"DRVVT\", \"STACLOT\", \"CARIGG\", \"S1ZN0QFPBS\", \"Y7EJ8MBDTZ\", \"RA\", \"HAVIGM\", \"HBCIGM\", \"HCVAB\", \"HBSAG\", \"HBCAB\", \"HBVDNAINTERP\", \"ANAS\", \"C3\", \"C4\" in the last 168 hours.    ISATU Warren  4/15/2025  9:22 AM         [1]   Current Facility-Administered Medications   Medication Dose Route Frequency    sodium chloride 0.9% infusion   Intravenous Once    furosemide (Lasix) 10 mg/mL injection 20 mg  20 mg Intravenous Once    furosemide (Lasix) 10 mg/mL injection 20 mg  20 mg Intravenous Once    sodium chloride 0.9% infusion   Intravenous Once    buPROPion (Wellbutrin) tab 100 mg  100 mg Oral Daily    sodium chloride 0.9% infusion   Intravenous Continuous    sodium chloride 3 % nebulizer solution 3 mL  3 mL Nebulization TID    vancomycin (Vancocin) 1.5 g in sodium chloride 0.9% 250mL IVPB premix  15 mg/kg Intravenous Q24H    guaiFENesin ER (Mucinex) 12 hr tab 600 mg  600 mg Oral BID    guaiFENesin (Robitussin) 100 MG/5 ML oral liquid 100 mg  100 mg Oral Q4H PRN    apixaban (Eliquis) tab 5 mg  5 mg Oral BID    loperamide (Imodium) cap 2 mg  2 mg Oral Every afternoon at 1400    diphenoxylate-atropine (Lomotil) 2.5-0.025 MG per tab 2 tablet  2 tablet Oral QID PRN    multivitamin (Tab-A-Immanuel/Beta Carotene) tab 1 tablet  1 tablet Oral Daily    amLODIPine  (Norvasc) tab 10 mg  10 mg Oral Daily    peppermint oil liquid 1 mL  1 mL Other PRN    metoprolol tartrate (Lopressor) tab 25 mg  25 mg Oral 2x Daily(Beta Blocker)    hydrALAzine (Apresoline) 20 mg/mL injection 10 mg  10 mg Intravenous Q4H PRN    fluticasone propionate (Flonase) 50 MCG/ACT nasal suspension 1 spray  1 spray Each Nare Daily    ipratropium-albuterol (Duoneb) 0.5-2.5 (3) MG/3ML inhalation solution 3 mL  3 mL Nebulization Q4H PRN    glucose (Dex4) 15 GM/59ML oral liquid 15 g  15 g Oral Q15 Min PRN    Or    glucose (Glutose) 40% oral gel 15 g  15 g Oral Q15 Min PRN    Or    glucose-vitamin C (Dex-4) chewable tab 4 tablet  4 tablet Oral Q15 Min PRN    Or    dextrose 50% injection 50 mL  50 mL Intravenous Q15 Min PRN    Or    glucose (Dex4) 15 GM/59ML oral liquid 30 g  30 g Oral Q15 Min PRN    Or    glucose (Glutose) 40% oral gel 30 g  30 g Oral Q15 Min PRN    Or    glucose-vitamin C (Dex-4) chewable tab 8 tablet  8 tablet Oral Q15 Min PRN    insulin aspart (NovoLOG) 100 Units/mL FlexPen 1-5 Units  1-5 Units Subcutaneous TID AC and HS    acetaminophen (Tylenol) tab 650 mg  650 mg Oral Q4H PRN    Or    HYDROcodone-acetaminophen (Norco) 5-325 MG per tab 1 tablet  1 tablet Oral Q4H PRN    Or    HYDROcodone-acetaminophen (Norco) 5-325 MG per tab 2 tablet  2 tablet Oral Q4H PRN    ondansetron (Zofran) 4 MG/2ML injection 4 mg  4 mg Intravenous Q6H PRN    metoclopramide (Reglan) 5 mg/mL injection 10 mg  10 mg Intravenous Q8H PRN

## 2025-04-15 NOTE — PROGRESS NOTES
[facility] Hospitalist Progress Note     CC: Hospital Follow up    PCP: Kam Alberts MD     Subjective:     No changes overnight. Pending CTA today.    OBJECTIVE:    Blood pressure 117/62, pulse 72, temperature 98.6 °F (37 °C), temperature source Oral, resp. rate 20, weight 196 lb 3.4 oz (89 kg), SpO2 97%.    Temp:  [97.5 °F (36.4 °C)-98.6 °F (37 °C)] 98.6 °F (37 °C)  Pulse:  [46-72] 72  Resp:  [18-20] 20  BP: (101-143)/(52-75) 117/62  SpO2:  [87 %-99 %] 97 %      Intake/Output:    Intake/Output Summary (Last 24 hours) at 4/15/2025 1557  Last data filed at 4/15/2025 0555  Gross per 24 hour   Intake 1103 ml   Output 500 ml   Net 603 ml             Exam   Gen: No acute distress, alert and oriented x3, no focal neurologic deficits, chronically ill-appearing  Heent: NC AT, mucous memb clear, neck supple  Pulm: Lungs clear, normal respiratory effort  CV: Heart with regular rate and rhythm, no peripheral edema  Abd: Abdomen soft, nontender, nondistended, bowel sounds present  MSK:right groin dressed      Neuro: AO*3, motor intact, no sensory deficits  Psyc: appropriate mood and affect      Data Review:       Labs:     Recent Labs   Lab 04/12/25  1139 04/14/25  0545 04/14/25  1724 04/15/25  0554   RBC 2.70* 2.35*  --  2.64*   HGB 7.9* 6.9* 8.3* 7.7*   HCT 23.7* 20.6*  --  23.4*   MCV 87.8 87.7  --  88.6   MCH 29.3 29.4  --  29.2   MCHC 33.3 33.5  --  32.9   RDW 18.2 17.9  --  17.6   NEPRELIM 3.82 2.43  --  2.59   WBC 4.6 3.4*  --  3.6*   PLT 62.0* 64.0*  --  76.0*         Recent Labs   Lab 04/13/25  1044 04/14/25  0545 04/15/25  0554   * 91 94   BUN 31* 31* 28*   CREATSERUM 1.65* 1.44* 1.49*   EGFRCR 44* 52* 50*   CA 8.2* 8.2* 8.1*   * 135* 136   K 4.0 3.7 4.4  4.4    104 106   CO2 23.0 23.0 21.0       Recent Labs   Lab 04/13/25  1044   ALT 16   AST 14   ALB 3.2           Imaging:  CTA ABD/PEL (CPT=74174)  Result Date: 4/15/2025  CONCLUSION:   1. Redemonstration of endovascular stent graft in the aorta as  above extending into multiple branch vessels.  2. There is a graft extending from the right external iliac artery to a portion of the superior mesenteric artery.  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  3. Minimal increase in small amount of ascites in the abdomen and pelvis.  4. 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  5. Bilateral nephrolithiasis.  No obstructive uropathy.  6. Pancreatic atrophy noted with dilatation of the pancreatic duct measuring up to 6 mm diameter which could be further assessed with a dedicated MRI of the abdomen with without contrast as clinically directed.  7. Splenomegaly.  8. Scattered atelectasis/consolidation in the right lower lobe with underlying pneumonia not excluded.  Clinical correlation recommended.  Please see above for further details.  LOCATION:  [facility]   Dictated by (CST): Lisandro Ferrer MD on 4/15/2025 at 10:19 AM     Finalized by (CST): Lisandro Ferrer MD on 4/15/2025 at 10:28 AM           Meds:      furosemide  20 mg Intravenous Once    ampicillin-sulbactam  3 g Intravenous q6h    sodium chloride   Intravenous Once    buPROPion  100 mg Oral Daily    sodium chloride  3 mL Nebulization TID    guaiFENesin ER  600 mg Oral BID    apixaban  5 mg Oral BID    loperamide  2 mg Oral Every afternoon at 1400    multivitamin  1 tablet Oral Daily    amLODIPine  10 mg Oral Daily    metoprolol tartrate  25 mg Oral 2x Daily(Beta Blocker)    fluticasone propionate  1 spray Each Nare Daily    insulin aspart  1-5 Units Subcutaneous TID AC and HS      sodium chloride 83 mL/hr at 04/15/25 0555           guaiFENesin    diphenoxylate-atropine    peppermint oil     hydrALAzine    ipratropium-albuterol    glucose **OR** glucose **OR** glucose-vitamin C **OR** dextrose **OR** glucose **OR** glucose **OR** glucose-vitamin C    acetaminophen **OR** HYDROcodone-acetaminophen **OR** HYDROcodone-acetaminophen    ondansetron    metoclopramide         Assessment/Plan:     69 year old male with a past medical history of hypertension, GERD, anxiety, renal calculi, DVT, ascending aortic dissection s/p repair, 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  Postoperative course complicated by HOLLIS on CKD as well as right lower extremity weakness along with hypoxic respiratory failure.      History of prior infrarenal endovascular AAA repair with bilateral KYLER for hypogastric and common iliac aneurysms  History of mesenteric ischemia s/p SMA bypass with right external iliac artery to SMA bypass  History of aortic dissection s/p repair, aortic dissection distal to left subclavian  S/p complex thoraco abdominal aortic repair 3/21  Hx of DVT   -Complicated aortic pathology with multiple repairs in the past. Underwent thoracoabdominal aneurysm repair 3/21  -s/p lumbar drain with neurointerventional to limit risk of spinal cord ischemia 3/20 and removed 3/26  -management per vascular  - vascular managing - planning on CTA today   - culture w/ enterobacter - had reported allergy to amox - reported itching to hands and knee which resolved w/ benadryl, but never took this again. He is willing to attempt pcn in house.   - Switch to Unasyn today, will see how patient tolerates this.  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  Prior to that he had a mesenteric ischemia and was started on Coumadin which he continued for 8 months.  He is also positive for heterozygous prothrombin gene mutation.  Eliquis was recommended by hematology.     Acute hypoxemic respiratory failure w/ threat to bodily function 2/2 possible pneumonia versus edema  Leukocytosis  - pulm consulted - completed course of abx w/ azithro and cefepime  - tx w/ burst steroids - currently off   - ongoing cough, hypoxemia likely 2/2 atelectasis - cont IS, FV and added hypertonic saline nebs  - encourage oob     Generalized weakness  Back pain  - In the setting of prolonged hospital stay, pain control     Leukocytosis-resolved    Anemia 2/2 blood loss  - stable     Right lower extremity weakness-resolved  - In the setting of complex aortic repair 3/21  -- PT/OT     HILTON on CKD-   - initial hilton resolved, now w/ new worsening of creatinine, fena low, likely pre renal   - restart fluids and trend      Hypertension  -hold home HCTZ     Allergic rhinitis  -Continue home fluticasone as needed     Short gut syndrome  - resume lamotil if diarrhea resumes  - Add Imodium per home regimen    Sacral wound  - In the setting of persistent diarrhea and immobility   - Wound care consult, recommendations reviewed    Physical deconditioning  - multifactorial 2/2 prolonged hospitalization and situational depression, self limiting mobility   - cont ongoing pt/ot - plan for home therapies at [facility]     Depression  SI  - psych consulted, started on wellbutrin     Dispo - per vascular, will follow     51 minutes were spent w/ patient and  coordinating care.     DO Kendra Lewis [facility]  Hospitalist  Contact via [employer]          Supplementary Documentation:   DVT Mechanical Prophylaxis:   SCDs, Early ambuation  DVT Pharmacologic Prophylaxis   Medication    apixaban (Eliquis) tab 5 mg                Code Status: DNAR/Full Treatment  Arzola: No urinary catheter in place  Arzola Duration (in days):   Central line:

## 2025-04-15 NOTE — BH PROGRESS NOTE
4/15/2025    Seen in person at [facility]    Interval Chief Complaint: Follow up on depression    Subjective:  Wife present.  No acute events overnight.  Does wake up around 3 AM and has difficulty falling back asleep.  He is walking more physical therapy and does feel like he has more energy and motivation to do his therapy.  He reports less depression and no longer suicidal and regrets making those statements.  Still laments the fact that he is in the hospital for so long but is understanding of the need for continued treatment.  He still requiring blood transfusions and required a incision and drainage of his groin abscess yesterday.  He still open to going to skilled rehab and discharge plan remains in progress.  Patient has noted that at times is hard for him to process information and seems more confused and wife is also observed this.  I did talk with him about the diagnosis of delirium likely secondary to his ongoing medical issues including a groin abscess as well as anemia.  Advised them that the blank should be open during the daytime so his brain nose is daytime and blind should be closed at night so that they know its nighttime.  I advised them that with continued physical therapy and ongoing rest and nutrition his brain will heal and will be easier for him to process information.  There is no current hallucinations or paranoia and no aggression or agitation noted.  No prominent worrying or panic symptoms.  Wife does report concerns about poor nutritional status and appetite.  I discussed with him appetite stimulant options including Remeron versus Marinol versus medication discussed the risks and benefits of each.  Patient ultimately declined to try another medication and prefers to continue with current regimen.    Reviewed his medications and agreed to continue with Wellbutrin as ordered any is tolerating the increase in his dose and denies any side effects.  Validation provided for his  efforts.      MEDS: Current Medications[1]     Vitals:    04/15/25 1430   BP:    Pulse:    Resp:    Temp: 98.6 °F (37 °C)        ROS:   As above. Otherwise negative on 14 system exam.    MSE:   Conscious/Orientation: A + O x person, place, time, situation  Appearance: good grooming  Behavior: no psychomotor abnormalities, good eye contact and engagement with interview.  Speech: normal rate, rhythm, and volume  Mood: \" Working on getting better\"  Affect: congruent, reactive  Thought process: linear, logical, goal directed  Thought content:   No delusions, obsessions, or other thought abnormalities  Suicidal ideation: denies  Homicidal ideation: denies  Perceptions: no hallucinations  Insight: fair  Judgment: fair  Loosening of associations: None  Naming: Not tested  Fund of knowledge: Not tested  Short term memory: Improved 2 out of 3 recall  Recent memory: Improved able to recall meeting me before  Long term memory: Stable aware of work and marital    Major depression single episode moderate without psychosis still improving   Hypoactive delirium secondary to acute anemia as well as groin abscess-currently improving    Plan:  Continue current meds as ordered monitor sponsor's increase in Wellbutrin and titrate further as warranted  Okay to discharge to skilled rehab once medically cleared.  No need for inpatient psychiatric care.  He is not an acute danger to himself or others.  Discussed with wife at bedside  Chart reviewed   monitor nutrition and sleep closely  Encourage ambulation  ~35 minutes total time spent in case of greater than 50% of time spent in counseling coordination of care    Sven Joseph DO  Board Certified Adult and Geriatric Psychiatrist  Medical Director, Geriatric Psychiatry, [facility]   Medical Director, Psychiatric Consultation Service, [facility]          [1]    [COMPLETED] sodium chloride 0.9% infusion   Intravenous Once    [COMPLETED] furosemide (Lasix) 10 mg/mL  injection 20 mg  20 mg Intravenous Once    furosemide (Lasix) 10 mg/mL injection 20 mg  20 mg Intravenous Once    [COMPLETED] iopamidol 76% (ISOVUE-370) injection for power injector  100 mL Intravenous ONCE PRN    [COMPLETED] magnesium oxide (Mag-Ox) tab 400 mg  400 mg Oral Once    sodium chloride 0.9% infusion   Intravenous Once    [COMPLETED] potassium chloride (Klor-Con M20) tab 40 mEq  40 mEq Oral Once    [COMPLETED] magnesium oxide (Mag-Ox) tab 400 mg  400 mg Oral Once    buPROPion (Wellbutrin) tab 100 mg  100 mg Oral Daily    sodium chloride 0.9% infusion   Intravenous Continuous    [] sodium chloride 0.9% infusion   Intravenous Continuous    sodium chloride 3 % nebulizer solution 3 mL  3 mL Nebulization TID    vancomycin (Vancocin) 1.5 g in sodium chloride 0.9% 250mL IVPB premix  15 mg/kg Intravenous Q24H    [] sodium chloride 0.9% infusion   Intravenous Continuous    guaiFENesin ER (Mucinex) 12 hr tab 600 mg  600 mg Oral BID    guaiFENesin (Robitussin) 100 MG/5 ML oral liquid 100 mg  100 mg Oral Q4H PRN    [COMPLETED] magnesium oxide (Mag-Ox) tab 400 mg  400 mg Oral Once    [COMPLETED] magnesium oxide (Mag-Ox) tab 400 mg  400 mg Oral Once    apixaban (Eliquis) tab 5 mg  5 mg Oral BID    [COMPLETED] magnesium oxide (Mag-Ox) tab 400 mg  400 mg Oral Once    loperamide (Imodium) cap 2 mg  2 mg Oral Every afternoon at 1400    [COMPLETED] warfarin (Coumadin) tab 7.5 mg  7.5 mg Oral Once at night    diphenoxylate-atropine (Lomotil) 2.5-0.025 MG per tab 2 tablet  2 tablet Oral QID PRN    [COMPLETED] iopamidol 76% (ISOVUE-370) injection for power injector  100 mL Intravenous ONCE PRN    multivitamin (Tab-A-Immanuel/Beta Carotene) tab 1 tablet  1 tablet Oral Daily    [COMPLETED] warfarin (Coumadin) tab 7.5 mg  7.5 mg Oral Once at night    [COMPLETED] warfarin (Coumadin) tab 7.5 mg  7.5 mg Oral Once at night    amLODIPine (Norvasc) tab 10 mg  10 mg Oral Daily    [COMPLETED] methylPREDNISolone sodium succinate  (Solu-MEDROL) injection 60 mg  60 mg Intravenous Daily    [COMPLETED] azithromycin (Zithromax) tab 500 mg  500 mg Oral Q24H    peppermint oil liquid 1 mL  1 mL Other PRN    [COMPLETED] pantoprazole (Protonix) DR tab 40 mg  40 mg Oral Q24H    [COMPLETED] ceFEPIme (Maxpime) 2 g in sodium chloride 0.9% 100 mL IVPB-MBP  2 g Intravenous Q12H    metoprolol tartrate (Lopressor) tab 25 mg  25 mg Oral 2x Daily(Beta Blocker)    [COMPLETED] heparin (Porcine) 1000 UNIT/ML injection - BOLUS IV 2,600 Units  30 Units/kg Intravenous Once    [COMPLETED] heparin (Porcine) 1000 UNIT/ML injection - BOLUS IV 2,600 Units  30 Units/kg Intravenous Once    [COMPLETED] potassium chloride 40 mEq/100mL IVPB premix (central line) 40 mEq  40 mEq Intravenous Once    [COMPLETED] heparin (Porcine) 97381 units/250mL infusion (PE/DVT/THROMBUS) INITIAL DOSE  18 Units/kg/hr Intravenous Once    [COMPLETED] lidocaine PF (Xylocaine-MPF) 1% injection  5 mL Intradermal Once    [COMPLETED] heparin (Porcine) 1000 UNIT/ML injection - BOLUS IV 2,600 Units  30 Units/kg Intravenous Once    [COMPLETED] potassium phosphate dibasic 15 mmol in sodium chloride 0.9% 250 mL IVPB  15 mmol Intravenous Once    [COMPLETED] magnesium oxide (Mag-Ox) tab 400 mg  400 mg Oral Once    [COMPLETED] furosemide (Lasix) 10 mg/mL injection 40 mg  40 mg Intravenous Once    hydrALAzine (Apresoline) 20 mg/mL injection 10 mg  10 mg Intravenous Q4H PRN    [COMPLETED] potassium chloride 20 mEq/100mL IVPB premix 20 mEq  20 mEq Intravenous Once    [COMPLETED] magnesium oxide (Mag-Ox) tab 400 mg  400 mg Oral Once    [COMPLETED] potassium chloride (Klor-Con M20) tab 40 mEq  40 mEq Oral Q4H    [COMPLETED] furosemide (Lasix) 10 mg/mL injection 40 mg  40 mg Intravenous Once    fluticasone propionate (Flonase) 50 MCG/ACT nasal suspension 1 spray  1 spray Each Nare Daily    [COMPLETED] potassium chloride (Klor-Con M20) tab 40 mEq  40 mEq Oral Once    [COMPLETED] magnesium oxide (Mag-Ox) tab 400 mg   400 mg Oral Once    [COMPLETED] sodium chloride 0.9% infusion   Intravenous Once    [COMPLETED] furosemide (Lasix) 10 mg/mL injection 20 mg  20 mg Intravenous Once    ipratropium-albuterol (Duoneb) 0.5-2.5 (3) MG/3ML inhalation solution 3 mL  3 mL Nebulization Q4H PRN    [COMPLETED] sodium chloride 0.9 % IV bolus 500 mL  500 mL Intravenous Once    [COMPLETED] sodium chloride 0.9% infusion   Intravenous Once    [COMPLETED] sodium chloride 0.9 % IV bolus 1,000 mL  1,000 mL Intravenous Once    [COMPLETED] methylPREDNISolone sodium succinate (Solu-MEDROL) 2,000 mg in sodium chloride 0.9% 100 mL IVPB  2,000 mg Intravenous Once    glucose (Dex4) 15 GM/59ML oral liquid 15 g  15 g Oral Q15 Min PRN    Or    glucose (Glutose) 40% oral gel 15 g  15 g Oral Q15 Min PRN    Or    glucose-vitamin C (Dex-4) chewable tab 4 tablet  4 tablet Oral Q15 Min PRN    Or    dextrose 50% injection 50 mL  50 mL Intravenous Q15 Min PRN    Or    glucose (Dex4) 15 GM/59ML oral liquid 30 g  30 g Oral Q15 Min PRN    Or    glucose (Glutose) 40% oral gel 30 g  30 g Oral Q15 Min PRN    Or    glucose-vitamin C (Dex-4) chewable tab 8 tablet  8 tablet Oral Q15 Min PRN    insulin aspart (NovoLOG) 100 Units/mL FlexPen 1-5 Units  1-5 Units Subcutaneous TID AC and HS    [COMPLETED] magnesium oxide (Mag-Ox) tab 800 mg  800 mg Oral Once    [COMPLETED] methylPREDNISolone sodium succinate (Solu-MEDROL) 1,000 mg in sodium chloride 0.9% 100 mL IVPB  1,000 mg Intravenous Q6H    [COMPLETED] sodium chloride 0.9% infusion   Intravenous Once    acetaminophen (Tylenol) tab 650 mg  650 mg Oral Q4H PRN    Or    HYDROcodone-acetaminophen (Norco) 5-325 MG per tab 1 tablet  1 tablet Oral Q4H PRN    Or    HYDROcodone-acetaminophen (Norco) 5-325 MG per tab 2 tablet  2 tablet Oral Q4H PRN    ondansetron (Zofran) 4 MG/2ML injection 4 mg  4 mg Intravenous Q6H PRN    metoclopramide (Reglan) 5 mg/mL injection 10 mg  10 mg Intravenous Q8H PRN    [COMPLETED] ceFAZolin (Ancef) 2g in  10mL IV syringe premix  2 g Intravenous Q8H    [COMPLETED] phytonadione (Aqua-Mephton) 10 mg in sodium chloride 0.9% 50 mL IVPB  10 mg Intravenous Once    [COMPLETED] magnesium oxide (Mag-Ox) tab 400 mg  400 mg Oral Once    [COMPLETED] lidocaine (Xylocaine) 1 % injection        [COMPLETED] fentaNYL (Sublimaze) 50 mcg/mL injection        [COMPLETED] midazolam (Versed) 2 MG/2ML injection        [COMPLETED] phytonadione (Aqua-Mephton) 10 mg in sodium chloride 0.9% 50 mL IVPB  10 mg Intravenous Once    [COMPLETED] phytonadione (Aqua-Mephton) 10 mg in sodium chloride 0.9% 50 mL IVPB  10 mg Intravenous Once

## 2025-04-15 NOTE — PROGRESS NOTES
[facility]  [facility] HEMATOLOGY ONCOLOGY  Progress Note    Cy Monsalve Patient Status:  Inpatient    1955 MRN [id number]   [address] Neil Oquendo MD   Hosp Day # 26 PCP Kam Alberts MD     ADMISSION DATE AND TIME: 3/19/2025  1:15 PM    ADMIT DIAGNOSIS: Abdominal aortic aneurysm (AAA) [I71.40]    SUBJECTIVE:    No new events.   Hemoglobin up to 7.7 grams.  Platelet count is up to 76.  Psychiatry notes reviewed  Mild leukopenia persists      OBJECTIVE:  Blood pressure 120/63, pulse 57, temperature 98.1 °F (36.7 °C), temperature source Oral, resp. rate 18, weight 196 lb 3.4 oz (89 kg), SpO2 97%.    PHYSICAL EXAM:  General: afebrile, alert and oriented x 3, pleasant  No distress    LABS:  Recent Results (from the past 24 hours)   POCT Glucose    Collection Time: 25 12:03 PM   Result Value Ref Range    POC Glucose 124 (H) 70 - 99 mg/dL   POCT Glucose    Collection Time: 25  4:37 PM   Result Value Ref Range    POC Glucose 110 (H) 70 - 99 mg/dL   Hemoglobin    Collection Time: 25  5:24 PM   Result Value Ref Range    HGB 8.3 (L) 13.0 - 17.5 g/dL   POCT Glucose    Collection Time: 25  9:31 PM   Result Value Ref Range    POC Glucose 427 (H) 70 - 99 mg/dL   POCT Glucose    Collection Time: 25 10:01 PM   Result Value Ref Range    POC Glucose 151 (H) 70 - 99 mg/dL   Prepare RBC Once    Collection Time: 04/15/25 12:30 AM   Result Value Ref Range    Blood Product [id number]     Unit Number [id number]     UNIT ABO O     UNIT RH POS     Product Status Presumed Transfused     Expiration Date [id number]     Blood Type Barcode 5100     Unit Volume 350 ml   POCT Glucose    Collection Time: 04/15/25  5:07 AM   Result Value Ref Range    POC Glucose 108 (H) 70 - 99 mg/dL   CBC With Differential With Platelet    Collection Time: 04/15/25  5:54 AM   Result Value Ref Range    WBC 3.6 (L) 4.0 - 11.0 x10(3) uL    RBC 2.64 (L) 3.80 - 5.80 x10(6)uL     HGB 7.7 (L) 13.0 - 17.5 g/dL    HCT 23.4 (L) 39.0 - 53.0 %    PLT 76.0 (L) 150.0 - 450.0 10(3)uL    Immature Platelet Fraction 4.3 0.0 - 7.0 %    MCV 88.6 80.0 - 100.0 fL    MCH 29.2 26.0 - 34.0 pg    MCHC 32.9 31.0 - 37.0 g/dL    RDW 17.6 %    Neutrophil Absolute Prelim 2.59 1.50 - 7.70 x10 (3) uL    Neutrophil Absolute 2.59 1.50 - 7.70 x10(3) uL    Lymphocyte Absolute 0.59 (L) 1.00 - 4.00 x10(3) uL    Monocyte Absolute 0.29 0.10 - 1.00 x10(3) uL    Eosinophil Absolute 0.08 0.00 - 0.70 x10(3) uL    Basophil Absolute 0.01 0.00 - 0.20 x10(3) uL    Immature Granulocyte Absolute 0.01 0.00 - 1.00 x10(3) uL    Neutrophil % 72.6 %    Lymphocyte % 16.5 %    Monocyte % 8.1 %    Eosinophil % 2.2 %    Basophil % 0.3 %    Immature Granulocyte % 0.3 %   Basic Metabolic Panel (8)    Collection Time: 04/15/25  5:54 AM   Result Value Ref Range    Glucose 94 70 - 99 mg/dL    Sodium 136 136 - 145 mmol/L    Potassium 4.4 3.5 - 5.1 mmol/L    Chloride 106 98 - 112 mmol/L    CO2 21.0 21.0 - 32.0 mmol/L    Anion Gap 9 0 - 18 mmol/L    BUN 28 (H) 9 - 23 mg/dL    Creatinine 1.49 (H) 0.70 - 1.30 mg/dL    Calcium, Total 8.1 (L) 8.7 - 10.6 mg/dL    Calculated Osmolality 287 275 - 295 mOsm/kg    eGFR-Cr 50 (L) >=60 mL/min/1.73m2   Magnesium    Collection Time: 04/15/25  5:54 AM   Result Value Ref Range    Magnesium 1.8 1.6 - 2.6 mg/dL   Potassium    Collection Time: 04/15/25  5:54 AM   Result Value Ref Range    Potassium 4.4 3.5 - 5.1 mmol/L     Platelet Count::    Lab Results   Component Value Date    PLT 76.0 (L) 04/15/2025    PLT 64.0 (L) 04/14/2025    PLT 62.0 (L) 04/12/2025        CULTURES  Hospital Encounter on 03/19/25   1. Aerobic Bacterial Culture     Status: Abnormal    Collection Time: 04/12/25 11:20 AM    Specimen: Leg, right; Other   Result Value Ref Range    Aerobic Culture Result  N/A     Mixture of organisms suggestive of normal skin beth    Aerobic Culture Result 2+ growth Enterococcus faecalis NOT VRE (A) N/A    Aerobic  Smear 1+ Gram positive cocci in pairs and clusters N/A    Aerobic Smear 1+ WBCs seen N/A       Susceptibility    Enterococcus faecalis NOT VRE -  (no method available)     Ampicillin <=2 Sensitive      Vancomycin 1 Sensitive    2. Blood Culture     Status: None    Collection Time: 03/24/25 10:05 AM    Specimen: Blood,peripheral   Result Value Ref Range    Blood Culture Result No Growth 5 Days N/A       IMAGING:    No results found.      MEDICATIONS:  Medications reviewed.     sodium chloride   Intravenous Once    furosemide  20 mg Intravenous Once    furosemide  20 mg Intravenous Once    sodium chloride   Intravenous Once    buPROPion  100 mg Oral Daily    sodium chloride  3 mL Nebulization TID    vancomycin  15 mg/kg Intravenous Q24H    guaiFENesin ER  600 mg Oral BID    apixaban  5 mg Oral BID    loperamide  2 mg Oral Every afternoon at 1400    multivitamin  1 tablet Oral Daily    amLODIPine  10 mg Oral Daily    metoprolol tartrate  25 mg Oral 2x Daily(Beta Blocker)    fluticasone propionate  1 spray Each Nare Daily    insulin aspart  1-5 Units Subcutaneous TID AC and HS      sodium chloride 83 mL/hr at 04/15/25 0555         guaiFENesin    diphenoxylate-atropine    peppermint oil    hydrALAzine    ipratropium-albuterol    glucose **OR** glucose **OR** glucose-vitamin C **OR** dextrose **OR** glucose **OR** glucose **OR** glucose-vitamin C    acetaminophen **OR** HYDROcodone-acetaminophen **OR** HYDROcodone-acetaminophen    ondansetron    metoclopramide    ASSESSMENT AND PLAN:     Active Problems:    Pre-op testing    Thoracoabdominal aortic aneurysm (TAAA)    Bilateral leg weakness    Current moderate episode of major depressive disorder without prior episode (HCC)        Cy Monsalve is a 69 year old male with 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     Status post AAA repair with complicated postop course with hypoxia and HOLLIS.       Thrombocytopenia  Platelets were normal  until 3/22/2025  Thereafter they had declined into the 70-90 while on heparin  Heparin-induced platelet antibody was positive.    SUNDEEP negative    Recent inpatient team notes reviewed.    Ok to discharge on apixaban 5mg bid.   (This would be appropriate treatment for possible hit).     Platelet count stable > 75 today.  Hemoglobin higher today > 7.5 grams.     We will plan to follow       Jason Modi MD

## 2025-04-15 NOTE — CM/SW NOTE
ADRIANA left for Jyotsna, admissions at [facility] to inquire on acceptance. BENITO extended deadline for [facility] referrals and sent updates in Aidin.     Pt discussed with RN- pt requires another blood transfusion, plan for CTA today to determine if further intervention needed. Vascular/Hemeonc following. BENITO following, will continue to meet with pt/spouse for continued DC planning.     Addendum 3:00- BENITO met with wife outside pt room. Provided her most recent accepting [facility] list of 7 facilities. BENITO extended referral to [facility]. DC planning.     ISAAC Julien

## 2025-04-15 NOTE — PHYSICAL THERAPY NOTE
PHYSICAL THERAPY TREATMENT NOTE - INPATIENT    Room Number: 7613/7613-A     Session: 12     Number of Visits to Meet Established Goals:  (15)  History related to current admission: Patient is a 69 year old male admitted on 3/19/2025 from Home for S/p 3/21 fenestrated abdominal aortic aneurysm repair with left carotid to subclavian transposition on  with Dr. Yanez and Dr. Oquendo and 3/20 lumbar drain.  Reporting RLE weakness 3/22 AM worsening to BLE MRI T/L spine w/o obvious compression or signs of cord ischemia. Pt with inconsistent participation in therapy sessions - passive thoughts of death per psych consult 4/11 dx depression. R groin site with purulent drainage  - flushed at bedside by vascular 4/13     HOME SITUATION  Type of Home: House  Home Layout: Two level      Stairs to Bedroom: 16    Railing: Yes    Lives With: Spouse       Prior Level of [place]: Pt states that he lives in a house with spouse. Pt  reports that he was IND with all his ADLs and gait. Pt has no history of falls.  Presenting Problem: S/p 3/21 fenestrated abdominal aortic aneurysm repair with left carotid to subclavian transposition on  with Dr. Yanez and Dr. Oquendo and 3/20 lumbar drain  Co-Morbidities : HTN, Peripheral Neuropathy, Hx of toe amputation L great toe, mesenteric ischemia s/p SMA bypass with right external iliac artery to SMA bypass, Short gut syndrome    PHYSICAL THERAPY ASSESSMENT   Patient demonstrates fair progress this session, goals  remain in progress. Pt continues to require increased time and encouragement to participate in mobility however verbalized today he knows he needs to walk. Participated in gait training trials x4 as noted below with 3 minute rest breaks between trials.     The patient is below baseline and would benefit from skilled inpatient PT to address the above deficits to assist patient in returning to prior to level of function.          PLAN DURING HOSPITALIZATION  Nursing Mobility  Recommendation : 1 Assist  PT Device Recommendation: Rolling walker  PT Treatment Plan: Bed mobility, Body mechanics, Gait training, Strengthening, Transfer training, Stair training, Balance training  Frequency (Obs): 3-5x/week     CURRENT GOALS   Goal #1 Patient is able to demonstrate supine - sit EOB @ level: CGA assistance   MET Raquel 4/1     Goal #2 Patient is able to demonstrate transfers EOB to/from BSC at assistance level: CGA assistance   MET Raquel 4/1     Goal #3 Patient is able to sit at the EOB for 10 mins with CGA    Met 4/8/2025       Goal #4 Patient is able to ambulate 50 feet with assist device: walker - rolling at assistance level: moderate assistance  MET 4/7 progress to 150ft CGA     Goal #5     Goal #6     Goal Comments: Goals established on 3/22/2025  4/15/2025 all goals ongoing    SUBJECTIVE  \" I know I have to do the stuff I just feel like I need more time\"     OBJECTIVE  Precautions: Bed/chair alarm (surgical boot L foot? SBP )    WEIGHT BEARING RESTRICTION  R Lower Extremity: Full Weight Bearing  L Lower Extremity: Full Weight Bearing    PAIN ASSESSMENT   Rating: Unable to rate  Location: bottom  Management Techniques: Activity promotion, Body mechanics, Repositioning    BALANCE                                                                                                                       Static Sitting: Good  Dynamic Sitting: Good           Static Standing: Fair -  Dynamic Standing: Fair -    ACTIVITY TOLERANCE           BP: 123/75  BP Location: Left arm  BP Method: Automatic  Patient Position: Sitting    O2 WALK       AM-PAC '6-Clicks' INPATIENT SHORT FORM - BASIC MOBILITY  How much difficulty does the patient currently have...  Patient Difficulty: Turning over in bed (including adjusting bedclothes, sheets and blankets)?: None   Patient Difficulty: Sitting down on and standing up from a chair with arms (e.g., wheelchair, bedside commode, etc.): A Little   Patient Difficulty:  Moving from lying on back to sitting on the side of the bed?: None   How much help from another person does the patient currently need...   Help from Another: Moving to and from a bed to a chair (including a wheelchair)?: A Little   Help from Another: Need to walk in hospital room?: A Little   Help from Another: Climbing 3-5 steps with a railing?: A Lot     AM-PAC Score:  Raw Score: 19   Approx Degree of Impairment: 41.77%   Standardized Score (AM-PAC Scale): 45.44   CMS Modifier (G-Code): CK    FUNCTIONAL ABILITY STATUS  Gait Assessment   Functional Mobility/Gait Assessment  Gait Assistance: Contact guard assist  Distance (ft): 15-10-30-35  Assistive Device: Rolling walker  Pattern: Shuffle    Skilled Therapy Provided    Bed Mobility:  Rolling: mod I    Supine<>Sit: SBA - cuing for use of rail  Sit<>Supine:  NT    Transfer Mobility:  Sit<>Stand: CGA  Stand<>Sit: CGA  Gait: CGA     Therapist's Comments: Discussed case with RN prior to session initiation. Chair placed in hallway.  Gait belt donned for out of bed mobility. Increased time and encouragement provided for all mobility although less than previous sessions. Participated in gait training trials x4 with cuing for R>L foot clearance, postural re-ed while ambulating, and pursed lip breathing with seated rest break between trials. Remained on 3L throughout activity reporting shortness of breath, sats > 90% when assessed.     Patient End of Session: Up in chair, Needs met, Call light within reach, RN aware of session/findings, All patient questions and concerns addressed, Hospital anti-slip socks, Alarm set    PT Session Time: 24 minutes  Gait: 24 minutes

## 2025-04-15 NOTE — OCCUPATIONAL THERAPY NOTE
OCCUPATIONAL THERAPY TREATMENT NOTE - INPATIENT     Room Number: 7613/7613-A  Session: 10   Number of Visits to Meet Established Goals:  (15 added 8 sessions on 4/10)    Presenting Problem: S/p 3/21 fenestrated abdominal aortic aneurysm repair with left carotid to subclavian transposition on  with Dr. Yanez and Dr. Oquendo and 3/20 lumbar drain    ASSESSMENT   Patient demonstrates good  progress this session, goals updated to reflect patient performance.    Patient continues to function below baseline with  all ADL . Contributing factors to remaining limitations include decreased functional strength and decreased endurance.  Next session anticipate patient to progress toileting and lower body dressing.  Occupational Therapy will continue to follow patient for duration of hospitalization.    Patient continues to benefit from continued skilled OT services: at discharge to promote prior level of function and safety with additional support and return home with home health OT. Anticipate return home with additional support with home health OT. Patient is performing toilet transfer supervision/SBA, bed mobility independent, LB ADL min A, and UB ADL set-up level. At home, patient would require assistance with LB ADL, bathing, and household tasks.     History: Patient is a 69 year old male admitted on 3/19/2025 with Presenting Problem: S/p 3/21 fenestrated abdominal aortic aneurysm repair with left carotid to subclavian transposition on  with Dr. Yanez and Dr. Oquendo and 3/20 lumbar drain. Co-Morbidities : HTN, Peripheral Neuropathy, Hx of toe amputation L great toe, mesenteric ischemia s/p SMA bypass with right external iliac artery to SMA bypass, Short gut syndrome      ** 4/10/25  Pt had controlled B knee weakness when he was walking to the bathroom with nursing  **3/25 acute respiratory failure, possible pneumonia, on vapotherm    WEIGHT BEARING RESTRICTION  R Lower Extremity: Full Weight Bearing  L Lower  Extremity: Full Weight Bearing    TREATMENT SESSION:  Patient Start of Session: supine    FUNCTIONAL TRANSFER ASSESSMENT  Sit to Stand: Edge of Bed  Edge of Bed: Supervision  Chair: Supervision  Toilet Transfer: Supervision  Commode Transfer: Not Tested    BED MOBILITY  Rolling: Not Tested  Supine to Sit : Modified Independent  Sit to Supine (OT): Not Tested  Scooting: n    BALANCE ASSESSMENT  Static Sitting: Independent  Static Standing: Supervision    FUNCTIONAL ADL ASSESSMENT  Grooming Seated: Not Tested  UB Dressing Seated: Not Tested  LB Dressing Seated: Minimal Assist  LB Dressing Standing: Not Tested  Toileting Seated: Supervision  Toileting Standing: Contact Guard Assist      O2 SATURATIONS  Oxygen Therapy  SPO2% on Oxygen at Rest: 92  At rest oxygen flow (liters per minute): 2    EDUCATION PROVIDED  Patient Education : Role of Occupational Therapy; Plan of Care; Posture/Positioning; Energy Conservation; Proper Body Mechanics  Patient's Response to Education: Requires Further Education; Returned Demonstration    Equipment used: rw    Therapist comments: supine, receiving transfusion, SBP 120s,seated.  Modified independent supine to sit, cga to stand from edge of bed. Able to ambulate to bathroom with RW, cga.  Completed toilet transfer, sba.  Seated toileting tasks, independent.  Supervision to stand from toilet, pt using grab bar. Cueing for sequencing.  Refer to PT note about gait. 3 liters, 92%. Encouragement provided to initiate each task, but pt appeared to be more engaged during the session today. Brighter affect noted.      Patient End of Session: Up in chair, Needs met, Call light within reach, RN aware of session/findings, All patient questions and concerns addressed    PAIN ASSESSMENT  Rating: Unable to rate  Location: \"you know my back always hurt\"  Management Techniques: Repositioning, Activity promotion     OBJECTIVE  Precautions: Bed/chair alarm (surgical boot L foot? SBP )    AM-PAC  ‘6-Clicks’ Inpatient Daily Activity Short Form  -   Putting on and taking off regular lower body clothing?: A Little  -   Bathing (including washing, rinsing, drying)?: A Little  -   Toileting, which includes using toilet, bedpan or urinal? : A Little  -   Putting on and taking off regular upper body clothing?: None  -   Taking care of personal grooming such as brushing teeth?: None  -   Eating meals?: None    AM-PAC Score:  Score: 21  Approx Degree of Impairment: 32.79%  Standardized Score (AM-PAC Scale): 44.27    PLAN  OT Device Recommendations: TBD  OT Treatment Plan: Energy conservation/work simplification techniques, ADL training, UE strengthening/ROM, Functional transfer training, Patient/Family education  Rehab Potential : Guarded  Frequency: 3x/week    OT Goals:   Progressing 4/15  ADL Goals   Patient will perform upper body dressing:  with setup-MET 4/7  Patient will perform lower body dressing:  with min assist  Patient will perform toileting: with min assist     Functional Transfer Goals  Patient will transfer from supine to sit:  with min assist -MET 4/7 UPGRADE TO SUPERVISION  Patient will transfer to toilet:  with min assist -MET 4/7 SIMULATED, UPGRADE TO SUPERVISION     UE Exercise Program Goal  Patient will be independent with bilateral AROM HEP (home exercise program).     Additional Goals  Pt will incorporate 2 energy conservation techniques into ADL.       OT Session Time: 24 minutes  Self-Care Home Management: 24 minutes

## 2025-04-16 LAB
ANION GAP SERPL CALC-SCNC: 8 MMOL/L (ref 0–18)
BASOPHILS # BLD AUTO: 0.01 X10(3) UL (ref 0–0.2)
BASOPHILS NFR BLD AUTO: 0.3 %
BLOOD TYPE BARCODE: 5100
BUN BLD-MCNC: 23 MG/DL (ref 9–23)
CALCIUM BLD-MCNC: 8.3 MG/DL (ref 8.7–10.6)
CHLORIDE SERPL-SCNC: 106 MMOL/L (ref 98–112)
CO2 SERPL-SCNC: 24 MMOL/L (ref 21–32)
CREAT BLD-MCNC: 1.35 MG/DL (ref 0.7–1.3)
EGFRCR SERPLBLD CKD-EPI 2021: 56 ML/MIN/1.73M2 (ref 60–?)
EOSINOPHIL # BLD AUTO: 0.09 X10(3) UL (ref 0–0.7)
EOSINOPHIL NFR BLD AUTO: 2.7 %
ERYTHROCYTE [DISTWIDTH] IN BLOOD BY AUTOMATED COUNT: 17.3 %
GLUCOSE BLD-MCNC: 106 MG/DL (ref 70–99)
GLUCOSE BLD-MCNC: 111 MG/DL (ref 70–99)
GLUCOSE BLD-MCNC: 90 MG/DL (ref 70–99)
GLUCOSE BLD-MCNC: 91 MG/DL (ref 70–99)
GLUCOSE BLD-MCNC: 99 MG/DL (ref 70–99)
HCT VFR BLD AUTO: 27 % (ref 39–53)
HGB BLD-MCNC: 9.3 G/DL (ref 13–17.5)
IMM GRANULOCYTES # BLD AUTO: 0.01 X10(3) UL (ref 0–1)
IMM GRANULOCYTES NFR BLD: 0.3 %
LYMPHOCYTES # BLD AUTO: 0.47 X10(3) UL (ref 1–4)
LYMPHOCYTES NFR BLD AUTO: 14 %
MAGNESIUM SERPL-MCNC: 1.6 MG/DL (ref 1.6–2.6)
MCH RBC QN AUTO: 29.6 PG (ref 26–34)
MCHC RBC AUTO-ENTMCNC: 34.4 G/DL (ref 31–37)
MCV RBC AUTO: 86 FL (ref 80–100)
MONOCYTES # BLD AUTO: 0.27 X10(3) UL (ref 0.1–1)
MONOCYTES NFR BLD AUTO: 8.1 %
NEUTROPHILS # BLD AUTO: 2.5 X10 (3) UL (ref 1.5–7.7)
NEUTROPHILS # BLD AUTO: 2.5 X10(3) UL (ref 1.5–7.7)
NEUTROPHILS NFR BLD AUTO: 74.6 %
OSMOLALITY SERPL CALC.SUM OF ELEC: 289 MOSM/KG (ref 275–295)
PLATELET # BLD AUTO: 101 10(3)UL (ref 150–450)
PLATELETS.RETICULATED NFR BLD AUTO: 3.2 % (ref 0–7)
POTASSIUM SERPL-SCNC: 3.4 MMOL/L (ref 3.5–5.1)
RBC # BLD AUTO: 3.14 X10(6)UL (ref 3.8–5.8)
SODIUM SERPL-SCNC: 138 MMOL/L (ref 136–145)
UNIT VOLUME: 350 ML
WBC # BLD AUTO: 3.4 X10(3) UL (ref 4–11)

## 2025-04-16 PROCEDURE — 99231 SBSQ HOSP IP/OBS SF/LOW 25: CPT | Performed by: OTHER

## 2025-04-16 RX ORDER — BUPROPION HYDROCHLORIDE 100 MG/1
100 TABLET ORAL DAILY
Qty: 30 TABLET | Refills: 0 | Status: SHIPPED | OUTPATIENT
Start: 2025-04-17 | End: 2025-04-18

## 2025-04-16 RX ORDER — METOPROLOL TARTRATE 25 MG/1
25 TABLET, FILM COATED ORAL 2 TIMES DAILY
Qty: 60 TABLET | Refills: 1 | Status: SHIPPED | OUTPATIENT
Start: 2025-04-16 | End: 2025-04-18

## 2025-04-16 RX ORDER — AMLODIPINE BESYLATE 10 MG/1
10 TABLET ORAL DAILY
Qty: 30 TABLET | Refills: 1 | Status: SHIPPED | OUTPATIENT
Start: 2025-04-17 | End: 2025-04-18

## 2025-04-16 NOTE — PROGRESS NOTES
[facility]   part of [facility]     Vascular Surgery Progress Note    Cy Monsalve Patient Status:  Inpatient    1955 MRN [id number]   Location [facility] 7NE-A Attending Neil Oquendo MD   Hosp Day # 27 PCP Kam Alberts MD     Objective:   Temp: 98.1 °F (36.7 °C)  Pulse: 73  Resp: 23  BP: 135/62      Events Yesterday/Overnight:  Patient is a 69 year old male, POD 26, fenestrated endovascular arch repair with fenestrated thoracoabdominal aneurysm repair, left carotid to subclavian transposition with endovascular septotomy of dissection with Dr. Oquendo and Dr. Yanez.  BP stable at 135/62. WBC 3.4, hemoglobin 9.3. Platelet count 101.  Patient received transfusion of 1 unit packed red blood cells on  and 2 units on 4/15.  Right groin abscess drained at bedside by Dr. Oquendo on .  CTA abdomen/pelvis completed on 4/15.      Exam:  Palpable bilateral DP and PT pulses.  Left groin access site healed, no hematoma. Right groin access site open wound, small amount of serous drainage present. Productive cough present.     Impression/Plan:    Change right groin access site gauze and paper tape BID and PRN.     Continue physical therapy.     Continue plan for patient to use urinal/bedside commode.      Continue incentive spirometry.       Medications:  Current Hospital Medications[1]    Laboratory/Data:    LABS:  Recent Labs   Lab 25  0527 25  1139 25  0545 25  1724 04/15/25  0554 04/15/25  1803 25  0538   WBC 5.8 4.6 3.4*  --  3.6*  --  3.4*   HGB 8.1* 7.9* 6.9* 8.3* 7.7* 10.0* 9.3*   MCV 87.8 87.8 87.7  --  88.6  --  86.0   PLT 75.0* 62.0* 64.0*  --  76.0*  --  101.0*       Recent Labs   Lab 04/10/25  0517 25  0527 25  1139 25  1044 25  0545 04/15/25  0554 25  0538   * 135* 132* 133* 135* 136 138   K 4.4 4.2 4.2 4.0 3.7 4.4  4.4 3.4*    102 100 101 104 106 106   CO2 26.0 26.0 25.0 23.0 23.0 21.0 24.0   BUN 29* 29*  35* 31* 31* 28* 23   CREATSERUM 1.48* 1.55* 1.60* 1.65* 1.44* 1.49* 1.35*   * 93 95 140* 91 94 90   CA 8.5* 8.6* 8.4* 8.2* 8.2* 8.1* 8.3*   MG 1.8 1.8  --   --  1.8 1.8 1.6     Recent Labs   Lab 04/10/25  0517   PTT 40.7*     Recent Labs   Lab 04/13/25  1044   ALT 16   AST 14   ALB 3.2     No results for input(s): \"TROP\" in the last 168 hours.  No results found for: \"ANAS\", \"ROVERTO\", \"ANASCRN\"  No results for input(s): \"PCACT\", \"PSACT\", \"AT3ACT\", \"HIPAB\", \"PATHI\", \"STALA\", \"DRVVTRATIO\", \"DRVVT\", \"STACLOT\", \"CARIGG\", \"N6UF6GGVOV\", \"W7XD5PZJDS\", \"RA\", \"HAVIGM\", \"HBCIGM\", \"HCVAB\", \"HBSAG\", \"HBCAB\", \"HBVDNAINTERP\", \"ANAS\", \"C3\", \"C4\" in the last 168 hours.    ISATU Warren  4/16/2025  8:02 AM         [1]   Current Facility-Administered Medications   Medication Dose Route Frequency    ampicillin-sulbactam (Unasyn) 3 g in sodium chloride 0.9% 100mL IVPB-ADD  3 g Intravenous q6h    sodium chloride 0.9% infusion   Intravenous Once    buPROPion (Wellbutrin) tab 100 mg  100 mg Oral Daily    sodium chloride 3 % nebulizer solution 3 mL  3 mL Nebulization TID    guaiFENesin ER (Mucinex) 12 hr tab 600 mg  600 mg Oral BID    guaiFENesin (Robitussin) 100 MG/5 ML oral liquid 100 mg  100 mg Oral Q4H PRN    apixaban (Eliquis) tab 5 mg  5 mg Oral BID    loperamide (Imodium) cap 2 mg  2 mg Oral Every afternoon at 1400    diphenoxylate-atropine (Lomotil) 2.5-0.025 MG per tab 2 tablet  2 tablet Oral QID PRN    multivitamin (Tab-A-Immanuel/Beta Carotene) tab 1 tablet  1 tablet Oral Daily    amLODIPine (Norvasc) tab 10 mg  10 mg Oral Daily    peppermint oil liquid 1 mL  1 mL Other PRN    metoprolol tartrate (Lopressor) tab 25 mg  25 mg Oral 2x Daily(Beta Blocker)    hydrALAzine (Apresoline) 20 mg/mL injection 10 mg  10 mg Intravenous Q4H PRN    fluticasone propionate (Flonase) 50 MCG/ACT nasal suspension 1 spray  1 spray Each Nare Daily    ipratropium-albuterol (Duoneb) 0.5-2.5 (3) MG/3ML inhalation solution 3 mL  3 mL Nebulization  Q4H PRN    glucose (Dex4) 15 GM/59ML oral liquid 15 g  15 g Oral Q15 Min PRN    Or    glucose (Glutose) 40% oral gel 15 g  15 g Oral Q15 Min PRN    Or    glucose-vitamin C (Dex-4) chewable tab 4 tablet  4 tablet Oral Q15 Min PRN    Or    dextrose 50% injection 50 mL  50 mL Intravenous Q15 Min PRN    Or    glucose (Dex4) 15 GM/59ML oral liquid 30 g  30 g Oral Q15 Min PRN    Or    glucose (Glutose) 40% oral gel 30 g  30 g Oral Q15 Min PRN    Or    glucose-vitamin C (Dex-4) chewable tab 8 tablet  8 tablet Oral Q15 Min PRN    insulin aspart (NovoLOG) 100 Units/mL FlexPen 1-5 Units  1-5 Units Subcutaneous TID AC and HS    acetaminophen (Tylenol) tab 650 mg  650 mg Oral Q4H PRN    Or    HYDROcodone-acetaminophen (Norco) 5-325 MG per tab 1 tablet  1 tablet Oral Q4H PRN    Or    HYDROcodone-acetaminophen (Norco) 5-325 MG per tab 2 tablet  2 tablet Oral Q4H PRN    ondansetron (Zofran) 4 MG/2ML injection 4 mg  4 mg Intravenous Q6H PRN    metoclopramide (Reglan) 5 mg/mL injection 10 mg  10 mg Intravenous Q8H PRN

## 2025-04-16 NOTE — PROGRESS NOTES
[facility] Hospitalist Progress Note     CC: Hospital Follow up    PCP: Kam Alberts MD     Subjective:     No changes overnight.     OBJECTIVE:    Blood pressure 134/63, pulse 59, temperature 97.8 °F (36.6 °C), temperature source Oral, resp. rate 16, weight 196 lb 3.4 oz (89 kg), SpO2 95%.    Temp:  [97.5 °F (36.4 °C)-98.6 °F (37 °C)] 97.8 °F (36.6 °C)  Pulse:  [59-75] 59  Resp:  [15-23] 16  BP: (116-135)/(56-65) 134/63  SpO2:  [89 %-97 %] 95 %      Intake/Output:    Intake/Output Summary (Last 24 hours) at 4/16/2025 1327  Last data filed at 4/16/2025 1030  Gross per 24 hour   Intake 1945.08 ml   Output 3350 ml   Net -1404.92 ml             Exam   Gen: No acute distress, alert and oriented x3, no focal neurologic deficits, chronically ill-appearing  Heent: NC AT, mucous memb clear, neck supple  Pulm: Lungs clear, normal respiratory effort  CV: Heart with regular rate and rhythm, no peripheral edema  Abd: Abdomen soft, nontender, nondistended, bowel sounds present  MSK:right groin dressed   Neuro: AO*3, motor intact, no sensory deficits  Psyc: appropriate mood and affect      Data Review:       Labs:     Recent Labs   Lab 04/14/25  0545 04/14/25  1724 04/15/25  0554 04/15/25  1803 04/16/25  0538   RBC 2.35*  --  2.64*  --  3.14*   HGB 6.9*   < > 7.7* 10.0* 9.3*   HCT 20.6*  --  23.4*  --  27.0*   MCV 87.7  --  88.6  --  86.0   MCH 29.4  --  29.2  --  29.6   MCHC 33.5  --  32.9  --  34.4   RDW 17.9  --  17.6  --  17.3   NEPRELIM 2.43  --  2.59  --  2.50   WBC 3.4*  --  3.6*  --  3.4*   PLT 64.0*  --  76.0*  --  101.0*    < > = values in this interval not displayed.         Recent Labs   Lab 04/14/25  0545 04/15/25  0554 04/16/25  0538   GLU 91 94 90   BUN 31* 28* 23   CREATSERUM 1.44* 1.49* 1.35*   EGFRCR 52* 50* 56*   CA 8.2* 8.1* 8.3*   * 136 138   K 3.7 4.4  4.4 3.4*    106 106   CO2 23.0 21.0 24.0       Recent Labs   Lab 04/13/25  1044   ALT 16   AST 14   ALB 3.2           Imaging:  CTA ABD/PEL  (CPT=74174)  Result Date: 4/15/2025  CONCLUSION:   1. Redemonstration of endovascular stent graft in the aorta as above extending into multiple branch vessels.  2. There is a graft extending from the right external iliac artery to a portion of the superior mesenteric artery.  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  3. Minimal increase in small amount of ascites in the abdomen and pelvis.  4. 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  5. Bilateral nephrolithiasis.  No obstructive uropathy.  6. Pancreatic atrophy noted with dilatation of the pancreatic duct measuring up to 6 mm diameter which could be further assessed with a dedicated MRI of the abdomen with without contrast as clinically directed.  7. Splenomegaly.  8. Scattered atelectasis/consolidation in the right lower lobe with underlying pneumonia not excluded.  Clinical correlation recommended.  Please see above for further details.  LOCATION:  [facility]   Dictated by (CST): Lisandro Ferrer MD on 4/15/2025 at 10:19 AM     Finalized by (CST): Lisandro Ferrer MD on 4/15/2025 at 10:28 AM           Meds:      ampicillin-sulbactam  3 g Intravenous q6h    sodium chloride   Intravenous Once    buPROPion  100 mg Oral Daily    sodium chloride  3 mL Nebulization TID    guaiFENesin ER  600 mg Oral BID    apixaban  5 mg Oral BID    loperamide  2 mg Oral Every afternoon at 1400    multivitamin  1 tablet Oral Daily    amLODIPine  10 mg Oral Daily    metoprolol tartrate  25 mg Oral 2x Daily(Beta Blocker)    fluticasone propionate  1 spray Each Nare Daily    insulin aspart  1-5 Units Subcutaneous TID AC and HS               guaiFENesin     diphenoxylate-atropine    peppermint oil    hydrALAzine    ipratropium-albuterol    glucose **OR** glucose **OR** glucose-vitamin C **OR** dextrose **OR** glucose **OR** glucose **OR** glucose-vitamin C    acetaminophen **OR** HYDROcodone-acetaminophen **OR** HYDROcodone-acetaminophen    ondansetron    metoclopramide         Assessment/Plan:     69 year old male with a past medical history of hypertension, GERD, anxiety, renal calculi, DVT, ascending aortic dissection s/p repair, 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  Postoperative course complicated by HOLLIS on CKD as well as right lower extremity weakness along with hypoxic respiratory failure.      History of prior infrarenal endovascular AAA repair with bilateral KYLER for hypogastric and common iliac aneurysms  History of mesenteric ischemia s/p SMA bypass with right external iliac artery to SMA bypass  History of aortic dissection s/p repair, aortic dissection distal to left subclavian  S/p complex thoraco abdominal aortic repair 3/21  Hx of DVT   -Complicated aortic pathology with multiple repairs in the past. Underwent thoracoabdominal aneurysm repair 3/21  -s/p lumbar drain with neurointerventional to limit risk of spinal cord ischemia 3/20 and removed 3/26  -management per vascular  - vascular managing - planning on CTA today   - culture w/ enterobacter - had reported allergy to amox - reported itching to hands and knee which resolved w/ benadryl, but never took this again. He is willing to attempt pcn in house.   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  Prior to that he had a mesenteric ischemia and was started on Coumadin which he continued for 8 months.  He is also positive for heterozygous prothrombin gene mutation.  Eliquis was recommended by hematology.     Acute hypoxemic respiratory failure w/ threat to bodily function 2/2 possible pneumonia versus edema  Leukocytosis  - pulm consulted - completed course of abx w/ azithro and cefepime  - tx w/ burst steroids - currently off   - ongoing cough, hypoxemia likely 2/2 atelectasis - cont IS, FV and added hypertonic saline nebs  - encourage oob     Generalized weakness  Back pain  - In the setting of prolonged hospital stay, pain control     Leukocytosis-resolved    Anemia 2/2 blood loss  - stable     Right lower extremity weakness-resolved  - In the setting of complex aortic repair 3/21  -- PT/OT     HILTON on CKD-   - initial hilton resolved, now w/ new worsening of creatinine, fena low, likely pre renal   - restart fluids and trend      Hypertension  -hold home HCTZ     Allergic rhinitis  -Continue home fluticasone as needed     Short gut syndrome  - resume lamotil if diarrhea resumes  - Add Imodium per home regimen    Sacral wound  - In the setting of persistent diarrhea and immobility   - Wound care consult, recommendations reviewed    Physical deconditioning  - multifactorial 2/2 prolonged hospitalization and situational depression, self limiting mobility   - cont ongoing pt/ot - plan for home therapies at [place]     Depression  SI  - psych consulted, started on wellbutrin     Dispo - per vascular, will follow     52 minutes were spent w/  patient and coordinating care.     DO Kendra [facility]  Hospitalist  Contact via [email or web address]          Supplementary Documentation:   DVT Mechanical Prophylaxis:   SCDs, Early ambuation  DVT Pharmacologic Prophylaxis   Medication    apixaban (Eliquis) tab 5 mg                Code Status: DNAR/Full Treatment  Arzola: No urinary catheter in place  Arzola Duration (in days):   Central line:

## 2025-04-16 NOTE — BH PROGRESS NOTE
4/16/2025    Seen in person at [facility]    Interval Chief Complaint: Follow up on depression    Subjective:  Staff report no issues overnight.  Patient feeling better overall and is able to work with therapy still and appears motivated to continue his treatment.  He has been cleared by his primary doctors to discharge to rehab and awaiting on acceptance from a rehab facility which we hope will be soon.  He is looking forward to taking a shower and talked about the importance of the little things in life that we taken for granted but appreciates so much more once we have them back.  He was able to sleep better last night and is working on his appetite.  He is smiling more easily and laughing and appears to be responding well to treatment.  He denies any current sadness hopelessness.  He is no longer suicidal.  He wants to live and wants to get better return back to his wife.  Denies any primary worrying or panic symptoms.  No current paranoia or hallucinations.  His mental status is clearing up as well and he is oriented x 3 with 2 out of 3 recall and intact attention.  Validation provided for his efforts.    Reviewed his medications and agreed to keep them unchanged and monitor.      MEDS: Current Medications[1]     Vitals:    04/16/25 1030   BP:    Pulse: 73   Resp: 15   Temp:         ROS:   As above. Otherwise negative on 14 system exam.    MSE:   Conscious/Orientation: A + O x person, place, time, situation  Appearance: good grooming  Behavior: no psychomotor abnormalities, good eye contact and engagement with interview.  Speech: normal rate, rhythm, and volume  Mood: \" better\"  Affect: congruent, reactive, bright  Thought process: linear, logical, goal directed  Thought content:   No delusions, obsessions, or other thought abnormalities  Suicidal ideation: denies  Homicidal ideation: denies  Perceptions: no hallucinations  Insight: fair  Judgment: fair  Loosening of associations: None  Naming: Not  tested  Fund of knowledge: Not tested  Short term memory: Improved 2 out of 3 recall  Recent memory: Improved able to recall meeting me before  Long term memory: Stable aware of work and marital histories    Major depression single episode moderate without psychosis still improving   Hypoactive delirium secondary to acute anemia as well as groin abscess-currently improving    Plan:  Continue current meds as ordered.  Medication reconciliation update  Okay to discharge to skilled rehab once medically cleared.  No need for inpatient psychiatric care.  He is not an acute danger to himself or others.  Chart reviewed   monitor nutrition and sleep closely  Encourage ambulation  ~25 minutes total time spent in case of greater than 50% of time spent in counseling coordination of care    Sven Joseph DO  Board Certified Adult and Geriatric Psychiatrist  Medical Director, Geriatric Psychiatry, [facility]   Medical Director, Psychiatric Consultation Service, [facility]          [1]    [COMPLETED] sodium chloride 0.9% infusion   Intravenous Once    [COMPLETED] furosemide (Lasix) 10 mg/mL injection 20 mg  20 mg Intravenous Once    [COMPLETED] furosemide (Lasix) 10 mg/mL injection 20 mg  20 mg Intravenous Once    [COMPLETED] iopamidol 76% (ISOVUE-370) injection for power injector  100 mL Intravenous ONCE PRN    [COMPLETED] magnesium oxide (Mag-Ox) tab 400 mg  400 mg Oral Once    ampicillin-sulbactam (Unasyn) 3 g in sodium chloride 0.9% 100mL IVPB-ADD  3 g Intravenous q6h    sodium chloride 0.9% infusion   Intravenous Once    [COMPLETED] potassium chloride (Klor-Con M20) tab 40 mEq  40 mEq Oral Once    [COMPLETED] magnesium oxide (Mag-Ox) tab 400 mg  400 mg Oral Once    buPROPion (Wellbutrin) tab 100 mg  100 mg Oral Daily    [] sodium chloride 0.9% infusion   Intravenous Continuous    [] sodium chloride 0.9% infusion   Intravenous Continuous    sodium chloride 3 % nebulizer solution 3 mL  3 mL  Nebulization TID    [] sodium chloride 0.9% infusion   Intravenous Continuous    guaiFENesin ER (Mucinex) 12 hr tab 600 mg  600 mg Oral BID    guaiFENesin (Robitussin) 100 MG/5 ML oral liquid 100 mg  100 mg Oral Q4H PRN    [COMPLETED] magnesium oxide (Mag-Ox) tab 400 mg  400 mg Oral Once    [COMPLETED] magnesium oxide (Mag-Ox) tab 400 mg  400 mg Oral Once    apixaban (Eliquis) tab 5 mg  5 mg Oral BID    [COMPLETED] magnesium oxide (Mag-Ox) tab 400 mg  400 mg Oral Once    loperamide (Imodium) cap 2 mg  2 mg Oral Every afternoon at 1400    [COMPLETED] warfarin (Coumadin) tab 7.5 mg  7.5 mg Oral Once at night    diphenoxylate-atropine (Lomotil) 2.5-0.025 MG per tab 2 tablet  2 tablet Oral QID PRN    [COMPLETED] iopamidol 76% (ISOVUE-370) injection for power injector  100 mL Intravenous ONCE PRN    multivitamin (Tab-A-Immanuel/Beta Carotene) tab 1 tablet  1 tablet Oral Daily    [COMPLETED] warfarin (Coumadin) tab 7.5 mg  7.5 mg Oral Once at night    [COMPLETED] warfarin (Coumadin) tab 7.5 mg  7.5 mg Oral Once at night    amLODIPine (Norvasc) tab 10 mg  10 mg Oral Daily    [COMPLETED] methylPREDNISolone sodium succinate (Solu-MEDROL) injection 60 mg  60 mg Intravenous Daily    [COMPLETED] azithromycin (Zithromax) tab 500 mg  500 mg Oral Q24H    peppermint oil liquid 1 mL  1 mL Other PRN    [COMPLETED] pantoprazole (Protonix) DR tab 40 mg  40 mg Oral Q24H    [COMPLETED] ceFEPIme (Maxpime) 2 g in sodium chloride 0.9% 100 mL IVPB-MBP  2 g Intravenous Q12H    metoprolol tartrate (Lopressor) tab 25 mg  25 mg Oral 2x Daily(Beta Blocker)    [COMPLETED] heparin (Porcine) 1000 UNIT/ML injection - BOLUS IV 2,600 Units  30 Units/kg Intravenous Once    [COMPLETED] heparin (Porcine) 1000 UNIT/ML injection - BOLUS IV 2,600 Units  30 Units/kg Intravenous Once    [COMPLETED] potassium chloride 40 mEq/100mL IVPB premix (central line) 40 mEq  40 mEq Intravenous Once    [COMPLETED] heparin (Porcine) 03600 units/250mL infusion  (PE/DVT/THROMBUS) INITIAL DOSE  18 Units/kg/hr Intravenous Once    [COMPLETED] lidocaine PF (Xylocaine-MPF) 1% injection  5 mL Intradermal Once    [COMPLETED] heparin (Porcine) 1000 UNIT/ML injection - BOLUS IV 2,600 Units  30 Units/kg Intravenous Once    [COMPLETED] potassium phosphate dibasic 15 mmol in sodium chloride 0.9% 250 mL IVPB  15 mmol Intravenous Once    [COMPLETED] magnesium oxide (Mag-Ox) tab 400 mg  400 mg Oral Once    [COMPLETED] furosemide (Lasix) 10 mg/mL injection 40 mg  40 mg Intravenous Once    hydrALAzine (Apresoline) 20 mg/mL injection 10 mg  10 mg Intravenous Q4H PRN    [COMPLETED] potassium chloride 20 mEq/100mL IVPB premix 20 mEq  20 mEq Intravenous Once    [COMPLETED] magnesium oxide (Mag-Ox) tab 400 mg  400 mg Oral Once    [COMPLETED] potassium chloride (Klor-Con M20) tab 40 mEq  40 mEq Oral Q4H    [COMPLETED] furosemide (Lasix) 10 mg/mL injection 40 mg  40 mg Intravenous Once    fluticasone propionate (Flonase) 50 MCG/ACT nasal suspension 1 spray  1 spray Each Nare Daily    [COMPLETED] potassium chloride (Klor-Con M20) tab 40 mEq  40 mEq Oral Once    [COMPLETED] magnesium oxide (Mag-Ox) tab 400 mg  400 mg Oral Once    [COMPLETED] sodium chloride 0.9% infusion   Intravenous Once    [COMPLETED] furosemide (Lasix) 10 mg/mL injection 20 mg  20 mg Intravenous Once    ipratropium-albuterol (Duoneb) 0.5-2.5 (3) MG/3ML inhalation solution 3 mL  3 mL Nebulization Q4H PRN    [COMPLETED] sodium chloride 0.9 % IV bolus 500 mL  500 mL Intravenous Once    [COMPLETED] sodium chloride 0.9% infusion   Intravenous Once    [COMPLETED] sodium chloride 0.9 % IV bolus 1,000 mL  1,000 mL Intravenous Once    [COMPLETED] methylPREDNISolone sodium succinate (Solu-MEDROL) 2,000 mg in sodium chloride 0.9% 100 mL IVPB  2,000 mg Intravenous Once    glucose (Dex4) 15 GM/59ML oral liquid 15 g  15 g Oral Q15 Min PRN    Or    glucose (Glutose) 40% oral gel 15 g  15 g Oral Q15 Min PRN    Or    glucose-vitamin C (Dex-4)  chewable tab 4 tablet  4 tablet Oral Q15 Min PRN    Or    dextrose 50% injection 50 mL  50 mL Intravenous Q15 Min PRN    Or    glucose (Dex4) 15 GM/59ML oral liquid 30 g  30 g Oral Q15 Min PRN    Or    glucose (Glutose) 40% oral gel 30 g  30 g Oral Q15 Min PRN    Or    glucose-vitamin C (Dex-4) chewable tab 8 tablet  8 tablet Oral Q15 Min PRN    insulin aspart (NovoLOG) 100 Units/mL FlexPen 1-5 Units  1-5 Units Subcutaneous TID AC and HS    [COMPLETED] magnesium oxide (Mag-Ox) tab 800 mg  800 mg Oral Once    [COMPLETED] methylPREDNISolone sodium succinate (Solu-MEDROL) 1,000 mg in sodium chloride 0.9% 100 mL IVPB  1,000 mg Intravenous Q6H    [COMPLETED] sodium chloride 0.9% infusion   Intravenous Once    acetaminophen (Tylenol) tab 650 mg  650 mg Oral Q4H PRN    Or    HYDROcodone-acetaminophen (Norco) 5-325 MG per tab 1 tablet  1 tablet Oral Q4H PRN    Or    HYDROcodone-acetaminophen (Norco) 5-325 MG per tab 2 tablet  2 tablet Oral Q4H PRN    ondansetron (Zofran) 4 MG/2ML injection 4 mg  4 mg Intravenous Q6H PRN    metoclopramide (Reglan) 5 mg/mL injection 10 mg  10 mg Intravenous Q8H PRN    [COMPLETED] ceFAZolin (Ancef) 2g in 10mL IV syringe premix  2 g Intravenous Q8H    [COMPLETED] phytonadione (Aqua-Mephton) 10 mg in sodium chloride 0.9% 50 mL IVPB  10 mg Intravenous Once    [COMPLETED] magnesium oxide (Mag-Ox) tab 400 mg  400 mg Oral Once    [COMPLETED] lidocaine (Xylocaine) 1 % injection        [COMPLETED] fentaNYL (Sublimaze) 50 mcg/mL injection        [COMPLETED] midazolam (Versed) 2 MG/2ML injection        [COMPLETED] phytonadione (Aqua-Mephton) 10 mg in sodium chloride 0.9% 50 mL IVPB  10 mg Intravenous Once    [COMPLETED] phytonadione (Aqua-Mephton) 10 mg in sodium chloride 0.9% 50 mL IVPB  10 mg Intravenous Once

## 2025-04-16 NOTE — PHYSICAL THERAPY NOTE
PHYSICAL THERAPY TREATMENT NOTE - INPATIENT    Room Number: 7613/7613-A     Session: 13     Number of Visits to Meet Established Goals:  (15)  History related to current admission: Patient is a 69 year old male admitted on 3/19/2025 from Home for S/p 3/21 fenestrated abdominal aortic aneurysm repair with left carotid to subclavian transposition on  with Dr. Yanez and Dr. Oquendo and 3/20 lumbar drain.  Reporting RLE weakness 3/22 AM worsening to BLE MRI T/L spine w/o obvious compression or signs of cord ischemia. Pt with inconsistent participation in therapy sessions - passive thoughts of death per psych consult 4/11 dx depression. R groin site with purulent drainage  - flushed at bedside by vascular 4/13     HOME SITUATION  Type of Home: House  Home Layout: Two level      Stairs to Bedroom: 16    Railing: Yes    Lives With: Spouse       Prior Level of [place]: Pt states that he lives in a house with spouse. Pt  reports that he was IND with all his ADLs and gait. Pt has no history of falls.  Presenting Problem: S/p 3/21 fenestrated abdominal aortic aneurysm repair with left carotid to subclavian transposition on  with Dr. Yanez and Dr. Oquendo and 3/20 lumbar drain  Co-Morbidities : HTN, Peripheral Neuropathy, Hx of toe amputation L great toe, mesenteric ischemia s/p SMA bypass with right external iliac artery to SMA bypass, Short gut syndrome    PHYSICAL THERAPY ASSESSMENT   Patient demonstrates fair progress this session, goals  remain in progress. Pt continues to require increased time and encouragement to participate in mobility. Continues to be more open to progress with motivational interviewing techniques applied.     The patient is below baseline and would benefit from skilled inpatient PT to address the above deficits to assist patient in returning to prior to level of function.          PLAN DURING HOSPITALIZATION  Nursing Mobility Recommendation : 1 Assist  PT Device Recommendation: Rolling  walker  PT Treatment Plan: Bed mobility, Body mechanics, Gait training, Strengthening, Transfer training, Stair training, Balance training  Frequency (Obs): 3-5x/week     CURRENT GOALS   Goal #1 Patient is able to demonstrate supine - sit EOB @ level: CGA assistance   MET Raquel 4/1     Goal #2 Patient is able to demonstrate transfers EOB to/from [facility] at assistance level: CGA assistance   MET Raquel 4/1     Goal #3 Patient is able to sit at the EOB for 10 mins with CGA    Met 4/8/2025       Goal #4 Patient is able to ambulate 50 feet with assist device: walker - rolling at assistance level: moderate assistance  MET 4/7 progress to 150ft CGA     Goal #5     Goal #6     Goal Comments: Goals established on 3/22/2025  4/16/2025 all goals ongoing    SUBJECTIVE  \" I'm going down, I'm going down\" - 2ft from the bed pt reporting he is falling with need for chair, extensive encouragement provided as pt NOT demonstrating knee buckling or signs of extreme muscular fatigue, pt beginning to actively sit down, chair brought behind him , reported it was because his \"calfs are tired\"     OBJECTIVE  Precautions: Bed/chair alarm (surgical boot L foot? SBP )    WEIGHT BEARING RESTRICTION  R Lower Extremity: Full Weight Bearing  L Lower Extremity: Full Weight Bearing    PAIN ASSESSMENT   Rating: Unable to rate  Location: calfs - described as muscular weakness  Management Techniques: Activity promotion    BALANCE                                                                                                                       Static Sitting: Good  Dynamic Sitting: Good           Static Standing: Fair -  Dynamic Standing: Fair -    ACTIVITY TOLERANCE                         O2 WALK  Oxygen Therapy  SPO2% Ambulation on Room Air: 91    AM-PAC '6-Clicks' INPATIENT SHORT FORM - BASIC MOBILITY  How much difficulty does the patient currently have...  Patient Difficulty: Turning over in bed (including adjusting bedclothes, sheets and  blankets)?: None   Patient Difficulty: Sitting down on and standing up from a chair with arms (e.g., wheelchair, bedside commode, etc.): A Little   Patient Difficulty: Moving from lying on back to sitting on the side of the bed?: A Little   How much help from another person does the patient currently need...   Help from Another: Moving to and from a bed to a chair (including a wheelchair)?: A Little   Help from Another: Need to walk in hospital room?: A Little   Help from Another: Climbing 3-5 steps with a railing?: A Lot     AM-PAC Score:  Raw Score: 18   Approx Degree of Impairment: 46.58%   Standardized Score (AM-PAC Scale): 43.63   CMS Modifier (G-Code): CK    FUNCTIONAL ABILITY STATUS  Gait Assessment   Functional Mobility/Gait Assessment  Gait Assistance: Contact guard assist  Distance (ft): 30-35-3-3  Assistive Device: Rolling walker  Pattern: Shuffle    Skilled Therapy Provided    Bed Mobility:  Rolling: NT  Supine<>Sit: NT  Sit<>Supine:  NT    Transfer Mobility:  Sit<>Stand: CGA from chair, mod A from bed in lowest position  Stand<>Sit: CGA  Gait: CGA     Therapist's Comments: Discussed case with RN prior to session initiation. Chair placed in hallway.  Gait belt donned for out of bed mobility. Increased time and encouragement provided for all mobility although less than previous sessions. Participated in gait training trials x2 with cuing for R>L foot clearance, postural re-ed while ambulating, and pursed lip breathing with seated rest break between trials. On RA today with sats 91% as assessed. Performed x5 mini squats in standing working on TKE to build muscular endurance strength.    Patient End of Session: Up in chair, Needs met, Call light within reach, RN aware of session/findings, All patient questions and concerns addressed, Hospital anti-slip socks, Alarm set    PT Session Time: 24 minutes  Gait: 24 minutes

## 2025-04-16 NOTE — PROGRESS NOTES
[facility] HEMATOLOGY ONCOLOGY  Progress Note    Cy Monsalve Patient Status:  Inpatient    1955 MRN [id number]   Location [facility] 7NE-A Attending Neil Oquendo MD   Hosp Day # 27 PCP Kam Alberts MD     ADMISSION DATE AND TIME: 3/19/2025  1:15 PM    ADMIT DIAGNOSIS: Abdominal aortic aneurysm (AAA) [I71.40]    SUBJECTIVE:    No new events.   Hemoglobin up to 9.3 grams.  Platelet count is up to 101  Psychiatry notes reviewed  Mild leukopenia persists      OBJECTIVE:  Blood pressure 135/62, pulse 73, temperature 98.1 °F (36.7 °C), temperature source Temporal, resp. rate 23, weight 196 lb 3.4 oz (89 kg), SpO2 96%.    PHYSICAL EXAM:  General: afebrile, alert and oriented x 3, pleasant  No distress    LABS:  Recent Results (from the past 24 hours)   POCT Glucose    Collection Time: 04/15/25 12:57 PM   Result Value Ref Range    POC Glucose 113 (H) 70 - 99 mg/dL   Vancomycin Trough, Serum    Collection Time: 04/15/25  1:11 PM   Result Value Ref Range    Vancomycin Trough 16.4 10.0 - 20.0 ug/mL   POCT Glucose    Collection Time: 04/15/25  5:36 PM   Result Value Ref Range    POC Glucose 104 (H) 70 - 99 mg/dL   Hemoglobin    Collection Time: 04/15/25  6:03 PM   Result Value Ref Range    HGB 10.0 (L) 13.0 - 17.5 g/dL   POCT Glucose    Collection Time: 04/15/25  8:05 PM   Result Value Ref Range    POC Glucose 143 (H) 70 - 99 mg/dL   Prepare RBC Once    Collection Time: 25 12:30 AM   Result Value Ref Range    Blood Product [id number]     Unit Number [id number]     UNIT ABO O     UNIT RH POS     Product Status Presumed Transfused     Expiration Date [id number]     Blood Type Barcode 5100     Unit Volume 350 ml   POCT Glucose    Collection Time: 25  5:21 AM   Result Value Ref Range    POC Glucose 106 (H) 70 - 99 mg/dL   CBC With Differential With Platelet    Collection Time: 25  5:38 AM   Result Value Ref Range    WBC 3.4 (L) 4.0 - 11.0 x10(3) uL    RBC 3.14 (L)  3.80 - 5.80 x10(6)uL    HGB 9.3 (L) 13.0 - 17.5 g/dL    HCT 27.0 (L) 39.0 - 53.0 %    .0 (L) 150.0 - 450.0 10(3)uL    Immature Platelet Fraction 3.2 0.0 - 7.0 %    MCV 86.0 80.0 - 100.0 fL    MCH 29.6 26.0 - 34.0 pg    MCHC 34.4 31.0 - 37.0 g/dL    RDW 17.3 %    Neutrophil Absolute Prelim 2.50 1.50 - 7.70 x10 (3) uL    Neutrophil Absolute 2.50 1.50 - 7.70 x10(3) uL    Lymphocyte Absolute 0.47 (L) 1.00 - 4.00 x10(3) uL    Monocyte Absolute 0.27 0.10 - 1.00 x10(3) uL    Eosinophil Absolute 0.09 0.00 - 0.70 x10(3) uL    Basophil Absolute 0.01 0.00 - 0.20 x10(3) uL    Immature Granulocyte Absolute 0.01 0.00 - 1.00 x10(3) uL    Neutrophil % 74.6 %    Lymphocyte % 14.0 %    Monocyte % 8.1 %    Eosinophil % 2.7 %    Basophil % 0.3 %    Immature Granulocyte % 0.3 %   Basic Metabolic Panel (8)    Collection Time: 04/16/25  5:38 AM   Result Value Ref Range    Glucose 90 70 - 99 mg/dL    Sodium 138 136 - 145 mmol/L    Potassium 3.4 (L) 3.5 - 5.1 mmol/L    Chloride 106 98 - 112 mmol/L    CO2 24.0 21.0 - 32.0 mmol/L    Anion Gap 8 0 - 18 mmol/L    BUN 23 9 - 23 mg/dL    Creatinine 1.35 (H) 0.70 - 1.30 mg/dL    Calcium, Total 8.3 (L) 8.7 - 10.6 mg/dL    Calculated Osmolality 289 275 - 295 mOsm/kg    eGFR-Cr 56 (L) >=60 mL/min/1.73m2   Magnesium    Collection Time: 04/16/25  5:38 AM   Result Value Ref Range    Magnesium 1.6 1.6 - 2.6 mg/dL     Platelet Count::    Lab Results   Component Value Date    .0 (L) 04/16/2025    PLT 76.0 (L) 04/15/2025    PLT 64.0 (L) 04/14/2025        CULTURES  Hospital Encounter on 03/19/25   1. Aerobic Bacterial Culture     Status: Abnormal    Collection Time: 04/12/25 11:20 AM    Specimen: Leg, right; Other   Result Value Ref Range    Aerobic Culture Result  N/A     Mixture of organisms suggestive of normal skin beth    Aerobic Culture Result 2+ growth Enterococcus faecalis NOT VRE (A) N/A    Aerobic Smear 1+ Gram positive cocci in pairs and clusters N/A    Aerobic Smear 1+ WBCs seen  N/A       Susceptibility    Enterococcus faecalis NOT VRE -  (no method available)     Ampicillin <=2 Sensitive      Vancomycin 1 Sensitive    2. Blood Culture     Status: None    Collection Time: 03/24/25 10:05 AM    Specimen: Blood,peripheral   Result Value Ref Range    Blood Culture Result No Growth 5 Days N/A       IMAGING:    CTA ABD/PEL (CPT=74174)  Result Date: 4/15/2025  CONCLUSION:   1. Redemonstration of endovascular stent graft in the aorta as above extending into multiple branch vessels.  2. There is a graft extending from the right external iliac artery to a portion of the superior mesenteric artery.  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  3. Minimal increase in small amount of ascites in the abdomen and pelvis.  4. 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  5. Bilateral nephrolithiasis.  No obstructive uropathy.  6. Pancreatic atrophy noted with dilatation of the pancreatic duct measuring up to 6 mm diameter which could be further assessed with a dedicated MRI of the abdomen with without contrast as clinically directed.  7. Splenomegaly.  8. Scattered atelectasis/consolidation in the right lower lobe with underlying pneumonia not excluded.  Clinical correlation recommended.  Please see above for further details.  LOCATION:  [facility]   Dictated by (CST): Lisandro Ferrer MD on 4/15/2025 at 10:19 AM     Finalized by (CST): Lisandro Ferrer MD on 4/15/2025 at 10:28 AM           MEDICATIONS:  Medications reviewed.     ampicillin-sulbactam  3 g Intravenous q6h    sodium chloride   Intravenous Once    buPROPion  100 mg Oral Daily    sodium chloride  3 mL Nebulization TID    guaiFENesin ER  600  mg Oral BID    apixaban  5 mg Oral BID    loperamide  2 mg Oral Every afternoon at 1400    multivitamin  1 tablet Oral Daily    amLODIPine  10 mg Oral Daily    metoprolol tartrate  25 mg Oral 2x Daily(Beta Blocker)    fluticasone propionate  1 spray Each Nare Daily    insulin aspart  1-5 Units Subcutaneous TID AC and HS             guaiFENesin    diphenoxylate-atropine    peppermint oil    hydrALAzine    ipratropium-albuterol    glucose **OR** glucose **OR** glucose-vitamin C **OR** dextrose **OR** glucose **OR** glucose **OR** glucose-vitamin C    acetaminophen **OR** HYDROcodone-acetaminophen **OR** HYDROcodone-acetaminophen    ondansetron    metoclopramide    ASSESSMENT AND PLAN:     Active Problems:    Pre-op testing    Thoracoabdominal aortic aneurysm (TAAA)    Bilateral leg weakness    Current moderate episode of major depressive disorder without prior episode (HCC)    Delirium due to another medical condition    Groin abscess        Cy Monsalve is a 69 year old male with 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  Hx of DVT in 2024  Hx of mesenteric ischemia     Status post AAA repair with complicated postop course with hypoxia and HOLLIS.       Thrombocytopenia  Platelets were normal until 3/22/2025  Thereafter they had declined into the 70-90 while on heparin  Heparin-induced platelet antibody was positive.    SUNDEEP negative    Recent inpatient team notes reviewed.      Ok to discharge on apixaban 5mg bid.   (This would be appropriate treatment for possible hit).     Platelet count stable > 100 today.  Hemoglobin higher today > 9 grams.     We will plan to follow       Jason Modi MD

## 2025-04-16 NOTE — CM/SW NOTE
BENITO met with pt and spouse at bedside for DC planning. Informed that Dr. Oquendo has cleared pt and we are working on discharge. Wife's first choice is [facility] in [place]- however they have not responded to referral. Second if [facility]- have not received acceptance, third choice is [facility] which has accepted pt. Reviewed recs for HHC and pt's improvement with working with therapy. Wife would prefer pt go to [facility], pt also doesn't feel strong enough.     BENITO called [facility] [phone]) and spoke with admissions, Sylvie who states referral not received. SW confirmed fax number and will resend to fax [phone].     VM left for J[facility]na at [facility], updated progress notes sent in [facility] referral system and message left in [facility].   There are currently 7 accepting facilities.     BENITO sent message to treatment team and confirmed pt is medically cleared for DC. Pt currently in restroom. Notified wife that pt is medically cleared, therefore will need to choose a facility. Informed her of Medicare rights and right to appeal discharge. Will continue to remain available.     3:45PM- DC order placed by MD. BENITO met with pt and wife. Reviewed Medicare rights and provided copy of IM and DND. [facility]. SW/CM team will continue to facilitate DC to [facility] as preferred DC plan by pt/wife. [facility] is still reviewing referral. Referral was resent to [facility] and awaiting response from admissions. Pt and wife aware that if [facility] or [facility] unable to accept then will need to choose from current accepting rehab list. Updates have been sent in [employer] [facility] referral system.     Notified SW/CM dept as well as Medical Records of Discharge Appeal. Treatment team made aware. Please continue to encourage OOB mobility and max encouragement to complete ADLs.     4:45- notified by RN that [facility] reporting they have not received clinical fax. Hard  faxed referral packet to [facility] to fax: [phone]. Wife requesting clinical for her to bring to [facility]. BENITO called Manager and unable to release this info. SW notified pt/wife. Wife shared her frustrations regarding DC plan.  Stating \"we have to make this work, you guys want him discharged and no one has been accommodating\".     5:05 PM: ADRIANA left for Erin in Admissions at [facility] (ph: [phone]).     Referral checked- J[facility]na from [facility] state a bed may be available on Friday. Asked for SW/[facility] to call main line tomorrow to follow up on availability. (Msg received at 5:08pm) BENITO asked [facility] for clarification if they are clinically accepting pt. (Sent at 5:09). At this time, no response. BENITO/BROOK[place]N to call main line at [place] tomorrow for clarification. (Ph  [phone])     BENITO frequently checking fax status to [facility], noted referral packet is 69 pages due to prolonged hospital stay. Will also attempt sending referral packet in sections.    5:30PM- BENITO called [facility] [phone] and confirmed fax for the second time to ensure number is correct. Confirmed fax is [phone].     Fax failed with full referral packet (69 pages). BENITO split referral and sent two different faxes. As of this time faxes still sending. BENITO endorsed above to CM.        ISAAC Julien

## 2025-04-16 NOTE — PLAN OF CARE
Assumed care at 1930  Received pt AOx4, conversant.  Dressing changed on Right groin.  IV antibiotic given.  Vital signs stable.  Plan:[facility]A placement.  Problem: CARDIOVASCULAR - ADULT  Goal: Maintains optimal cardiac output and hemodynamic stability  Description: INTERVENTIONS:- Monitor vital signs, rhythm, and trends- Monitor for bleeding, hypotension and signs of decreased cardiac output- Evaluate effectiveness of vasoactive medications to optimize hemodynamic stability- Monitor arterial and/or venous puncture sites for bleeding and/or hematoma- Assess quality of pulses, skin color and temperature- Assess for signs of decreased coronary artery perfusion - ex. Angina- Evaluate fluid balance, assess for edema, trend weights  4/16/2025 0010 by Michelet Solis, RN  Outcome: Progressing     Problem: HEMATOLOGIC - ADULT  Goal: Free from bleeding injury  Description: (Example usage: patient with low platelets)INTERVENTIONS:- Avoid intramuscular injections, enemas and rectal medication administration- Ensure safe mobilization of patient- Hold pressure on venipuncture sites to achieve adequate hemostasis- Assess for signs and symptoms of internal bleeding- Monitor lab trends- Patient is to report abnormal signs of bleeding to staff- Avoid use of toothpicks and dental floss- Use electric shaver for shaving- Use soft bristle tooth brush- Limit straining and forceful nose blowing  4/16/2025 0010 by Michelet Solis, RN  Outcome: Progressing     Problem: RESPIRATORY - ADULT  Goal: Achieves optimal ventilation and oxygenation  Description: INTERVENTIONS:- Assess for changes in respiratory status- Assess for changes in mentation and behavior- Position to facilitate oxygenation and minimize respiratory effort- Oxygen supplementation based on oxygen saturation or ABGs- Provide Smoking Cessation handout, if applicable- Encourage broncho-pulmonary hygiene including cough, deep breathe, Incentive Spirometry- Assess the need for  suctioning and perform as needed- Assess and instruct to report SOB or any respiratory difficulty- Respiratory Therapy support as indicated- Manage/alleviate anxiety- Monitor for signs/symptoms of CO2 retention  4/16/2025 0010 by Michelet Solis RN  Outcome: Progressing     Problem: NEUROLOGICAL - ADULT  Goal: Achieves stable or improved neurological status  Description: INTERVENTIONS- Assess for and report changes in neurological status- Initiate measures to prevent increased intracranial pressure- Maintain blood pressure and fluid volume within ordered parameters to optimize cerebral perfusion and minimize risk of hemorrhage- Monitor temperature, glucose, and sodium. Initiate appropriate interventions as ordered  4/16/2025 0010 by Michelet Solis RN  Outcome: Progressing  Goal: Achieves maximal functionality and self care  Description: INTERVENTIONS- Monitor swallowing and airway patency with patient fatigue and changes in neurological status- Encourage and assist patient to increase activity and self care with guidance from PT/OT- Encourage visually impaired, hearing impaired and aphasic patients to use assistive/communication devices  4/16/2025 0010 by Michelet Solis RN  Outcome: Progressing     Problem: PAIN - ADULT  Goal: Verbalizes/displays adequate comfort level or patient's stated pain goal  Description: INTERVENTIONS:- Encourage pt to monitor pain and request assistance- Assess pain using appropriate pain scale- Administer analgesics based on type and severity of pain and evaluate response- Implement non-pharmacological measures as appropriate and evaluate response- Consider cultural and social influences on pain and pain management- Manage/alleviate anxiety- Utilize distraction and/or relaxation techniques- Monitor for opioid side effects- Notify MD/LIP if interventions unsuccessful or patient reports new pain- Anticipate increased pain with activity and pre-medicate as appropriate  4/16/2025 0010  by Michelet Solis, RN  Outcome: Progressing     Problem: Patient/Family Goals  Goal: Patient/Family Long Term Goal  Description: Patient's Long Term Goal: discharge to recommended facility  Interventions:- monitor O2 status  -IV antibiotics  -IS  -consults  -PT/OT  -tele monitoring  -labs  -medicines  -wound care  -encourage ambulation  - See additional Care Plan goals for specific interventions  4/16/2025 0010 by Michelet Solis, RN  Outcome: Progressing  Goal: Patient/Family Short Term Goal  Description: Patient's Short Term Goal: Be comfortable   Interventions: -pain controlled  -cluster care  -needs attended   See additional Care Plan goals for specific interventions  4/16/2025 0010 by Michelet Solis, RN  Outcome: Progressing     Problem: Diabetes/Glucose Control  Goal: Glucose maintained within prescribed range  Description: INTERVENTIONS:- Monitor Blood Glucose as ordered- Assess for signs and symptoms of hyperglycemia and hypoglycemia- Administer ordered medications to maintain glucose within target range- Assess barriers to adequate nutritional intake and initiate nutrition consult as needed- Instruct patient on self management of diabetes  4/16/2025 0010 by Michelet Solis, RN  Outcome: Progressing     Problem: SAFETY ADULT - FALL  Goal: Free from fall injury  Description: INTERVENTIONS:- Assess pt frequently for physical needs- Identify cognitive and physical deficits and behaviors that affect risk of falls.- [place] fall precautions as indicated by assessment.- Educate pt/family on patient safety including physical limitations- Instruct pt to call for assistance with activity based on assessment- Modify environment to reduce risk of injury- Provide assistive devices as appropriate- Consider OT/PT consult to assist with strengthening/mobility- Encourage toileting schedule  4/16/2025 0010 by Michelet Solis, RN  Outcome: Progressing

## 2025-04-16 NOTE — OCCUPATIONAL THERAPY NOTE
OCCUPATIONAL THERAPY TREATMENT NOTE - INPATIENT     Room Number: 7613/7613-A  Session: 11   Number of Visits to Meet Established Goals:  (15 added 8 sessions on 4/10)    Presenting Problem: S/p 3/21 fenestrated abdominal aortic aneurysm repair with left carotid to subclavian transposition on  with Dr. Yanez and Dr. Oquendo and 3/20 lumbar drain    ASSESSMENT   Patient demonstrates good  progress this session, goals remain in progress.    Patient continues to function below baseline with  all ADL . Contributing factors to remaining limitations include decreased functional strength and decreased endurance.  Next session anticipate patient to progress toileting and lower body dressing.  Occupational Therapy will continue to follow patient for duration of hospitalization.     Patient continues to benefit from continued skilled OT services: at discharge to promote prior level of function and safety with additional support and return home with home health OT. Anticipate return home with additional support with home health OT. Patient is performing toilet transfer supervision/SBA, bed mobility independent, LB ADL min A, and UB ADL set-up level. At home, patient would require assistance with LB ADL, bathing, and household tasks.      History: Patient is a 69 year old male admitted on 3/19/2025 with Presenting Problem: S/p 3/21 fenestrated abdominal aortic aneurysm repair with left carotid to subclavian transposition on  with Dr. Yanez and Dr. Oquendo and 3/20 lumbar drain. Co-Morbidities : HTN, Peripheral Neuropathy, Hx of toe amputation L great toe, mesenteric ischemia s/p SMA bypass with right external iliac artery to SMA bypass, Short gut syndrome      ** 4/10/25  Pt had controlled B knee weakness when he was walking to the bathroom with nursing  **3/25 acute respiratory failure, possible pneumonia, on vapotherm    TREATMENT SESSION:  Patient Start of Session: seated    FUNCTIONAL TRANSFER ASSESSMENT  Sit to Stand:  Edge of Bed  Edge of Bed: Contact Guard Assist  Chair: Contact Guard Assist  Toilet Transfer: Supervision  Commode Transfer: Not Tested    BED MOBILITY  Rolling: Not Tested  Supine to Sit : Not tested  Sit to Supine (OT): Not Tested  Scooting: n    BALANCE ASSESSMENT  Static Sitting: Independent  Static Standing: Supervision    FUNCTIONAL ADL ASSESSMENT  Grooming Seated: Not Tested  UB Dressing Seated: Not Tested  LB Dressing Seated: Not Tested  LB Dressing Standing: Not Tested  Toileting Seated: Supervision  Toileting Standing: Not Tested    O2 SATURATIONS  Oxygen Therapy  SPO2% on Oxygen at Rest: 94  SPO2% Ambulation on Room Air: 91    EDUCATION PROVIDED  Patient Education : Role of Occupational Therapy; Plan of Care; Posture/Positioning; Proper Body Mechanics  Patient's Response to Education: Verbalized Understanding    Equipment used: rw    Therapist comments: seated, smiling, mentioning about \"everyone coming to see him at the same time.\"  Less time needed to initiate each task, cueing to shift weight before standing, cueing for hand placement, cga to stand and to take steps.   Addressed sit to stand from lowest position of the bed to challenge patient, in preparation for safe toilet transfer.  Patient started to comment, \"See, my knees are bending, I need to sit now,\" when pt was steady and taking steps with RW without any sign of loss of balance. Refer to PT note. OT and PT continues to encourage pt about activity promotion.      Patient End of Session: Up in chair, Needs met, Call light within reach, All patient questions and concerns addressed, Family present      PAIN ASSESSMENT  Rating: Unable to rate  Location: not reporting pain, but grimacing when standing  Management Techniques: Activity promotion     OBJECTIVE  Precautions: Bed/chair alarm (surgical boot L foot? SBP )    AM-PAC ‘6-Clicks’ Inpatient Daily Activity Short Form  -   Putting on and taking off regular lower body clothing?: A  Little  -   Bathing (including washing, rinsing, drying)?: A Little  -   Toileting, which includes using toilet, bedpan or urinal? : A Little  -   Putting on and taking off regular upper body clothing?: None  -   Taking care of personal grooming such as brushing teeth?: None  -   Eating meals?: None    AM-PAC Score:  Score: 21  Approx Degree of Impairment: 32.79%  Standardized Score (AM-PAC Scale): 44.27    PLAN  OT Device Recommendations: TBD  OT Treatment Plan: Energy conservation/work simplification techniques, ADL training, UE strengthening/ROM, Functional transfer training, Patient/Family education  Rehab Potential : Guarded  Frequency: 3x/week    OT Goals:   Ongoing 4/16  ADL Goals   Patient will perform upper body dressing:  with setup-MET 4/7  Patient will perform lower body dressing:  with min assist  Patient will perform toileting: with min assist     Functional Transfer Goals  Patient will transfer from supine to sit:  with min assist -MET 4/7 UPGRADE TO SUPERVISION  Patient will transfer to toilet:  with min assist -MET 4/7 SIMULATED, UPGRADE TO SUPERVISION     UE Exercise Program Goal  Patient will be independent with bilateral AROM HEP (home exercise program).     Additional Goals  Pt will incorporate 2 energy conservation techniques into ADL.    OT Session Time: 24 minutes  Self-Care Home Management:  minutes  Therapeutic Activity: 24 minutes

## 2025-04-16 NOTE — PROGRESS NOTES
CT scan reviewed  No abscess near right femoral artery and appears completely drained     Can begin discharge plan when rehab selected   Patient assured me he is motivated

## 2025-04-17 LAB
ANION GAP SERPL CALC-SCNC: 11 MMOL/L (ref 0–18)
BASOPHILS # BLD AUTO: 0.02 X10(3) UL (ref 0–0.2)
BASOPHILS NFR BLD AUTO: 0.6 %
BUN BLD-MCNC: 14 MG/DL (ref 9–23)
CALCIUM BLD-MCNC: 8.6 MG/DL (ref 8.7–10.6)
CHLORIDE SERPL-SCNC: 104 MMOL/L (ref 98–112)
CO2 SERPL-SCNC: 24 MMOL/L (ref 21–32)
CREAT BLD-MCNC: 1.16 MG/DL (ref 0.7–1.3)
EGFRCR SERPLBLD CKD-EPI 2021: 67 ML/MIN/1.73M2 (ref 60–?)
EOSINOPHIL # BLD AUTO: 0.2 X10(3) UL (ref 0–0.7)
EOSINOPHIL NFR BLD AUTO: 5.8 %
ERYTHROCYTE [DISTWIDTH] IN BLOOD BY AUTOMATED COUNT: 17.5 %
GLUCOSE BLD-MCNC: 100 MG/DL (ref 70–99)
GLUCOSE BLD-MCNC: 105 MG/DL (ref 70–99)
GLUCOSE BLD-MCNC: 112 MG/DL (ref 70–99)
GLUCOSE BLD-MCNC: 89 MG/DL (ref 70–99)
HCT VFR BLD AUTO: 28.7 % (ref 39–53)
HGB BLD-MCNC: 9.7 G/DL (ref 13–17.5)
IMM GRANULOCYTES # BLD AUTO: 0.01 X10(3) UL (ref 0–1)
IMM GRANULOCYTES NFR BLD: 0.3 %
LYMPHOCYTES # BLD AUTO: 0.67 X10(3) UL (ref 1–4)
LYMPHOCYTES NFR BLD AUTO: 19.4 %
MAGNESIUM SERPL-MCNC: 1.5 MG/DL (ref 1.6–2.6)
MCH RBC QN AUTO: 28.9 PG (ref 26–34)
MCHC RBC AUTO-ENTMCNC: 33.8 G/DL (ref 31–37)
MCV RBC AUTO: 85.4 FL (ref 80–100)
MONOCYTES # BLD AUTO: 0.37 X10(3) UL (ref 0.1–1)
MONOCYTES NFR BLD AUTO: 10.7 %
NEUTROPHILS # BLD AUTO: 2.19 X10 (3) UL (ref 1.5–7.7)
NEUTROPHILS # BLD AUTO: 2.19 X10(3) UL (ref 1.5–7.7)
NEUTROPHILS NFR BLD AUTO: 63.2 %
OSMOLALITY SERPL CALC.SUM OF ELEC: 288 MOSM/KG (ref 275–295)
PLATELET # BLD AUTO: 131 10(3)UL (ref 150–450)
POTASSIUM SERPL-SCNC: 3.5 MMOL/L (ref 3.5–5.1)
RBC # BLD AUTO: 3.36 X10(6)UL (ref 3.8–5.8)
SODIUM SERPL-SCNC: 139 MMOL/L (ref 136–145)
WBC # BLD AUTO: 3.5 X10(3) UL (ref 4–11)

## 2025-04-17 PROCEDURE — 99231 SBSQ HOSP IP/OBS SF/LOW 25: CPT | Performed by: OTHER

## 2025-04-17 NOTE — BH PROGRESS NOTE
4/17/2025    Seen in person at [facility]    Interval Chief Complaint: Follow up on depression    Subjective:  Wife at bedside.  Patient just completed physical therapy and found the challenging but was able to complete most of it.  He does acknowledge feeling better physically and emotionally and was able to eat a little bit better for lunch today.  Wife also feels that he is doing better and we felt the improvements would like related to improvements in his delirium as well as the addition of Wellbutrin as well as family involvement and attentiveness of medical staff.  He has been cleared for discharge and currently awaiting placement.  He seems more hopeful and less pessimistic and no longer is having any suicidal thoughts and wants to live.  Validation provided for his efforts.    Reviewed his medications and agreed to keep them unchanged and monitor.      MEDS: Current Medications[1]     Vitals:    04/17/25 1304   BP:    Pulse: 64   Resp: 19   Temp:         ROS:   As above. Otherwise negative on 14 system exam.    MSE:   Conscious/Orientation: A + O x person, place, time, situation  Appearance: good grooming  Behavior: no psychomotor abnormalities, good eye contact and engagement with interview.  Speech: normal rate, rhythm, and volume  Mood: \" good\"  Affect: congruent, reactive, bright  Thought process: linear, logical, goal directed  Thought content:   No delusions, obsessions, or other thought abnormalities  Suicidal ideation: denies  Homicidal ideation: denies  Perceptions: no hallucinations  Insight: fair  Judgment: fair  Loosening of associations: None  Naming: Not tested  Fund of knowledge: Not tested  Short term memory: Improved 2 out of 3 recall  Recent memory: Improved able to recall meeting me before  Long term memory: Stable aware of work and marital histories    Major depression single episode moderate without psychosis still improving   Hypoactive delirium secondary to acute anemia as well as  groin abscess-currently improving    Plan:  Continue current meds as ordered.  Medication reconciliation update  Okay to discharge to skilled rehab once medically cleared.  No need for inpatient psychiatric care.  He is not an acute danger to himself or others.  Chart reviewed   monitor nutrition and sleep closely  Encourage ambulation  ~25 minutes total time spent in case of greater than 50% of time spent in counseling coordination of care  We will sign off    Sven Joseph DO  Board Certified Adult and Geriatric Psychiatrist  Medical Director, Geriatric Psychiatry, [facility]   Medical Director, Psychiatric Consultation Service, [facility]          [1]    [COMPLETED] sodium chloride 0.9% infusion   Intravenous Once    [COMPLETED] furosemide (Lasix) 10 mg/mL injection 20 mg  20 mg Intravenous Once    [COMPLETED] furosemide (Lasix) 10 mg/mL injection 20 mg  20 mg Intravenous Once    [COMPLETED] iopamidol 76% (ISOVUE-370) injection for power injector  100 mL Intravenous ONCE PRN    [COMPLETED] magnesium oxide (Mag-Ox) tab 400 mg  400 mg Oral Once    ampicillin-sulbactam (Unasyn) 3 g in sodium chloride 0.9% 100mL IVPB-ADD  3 g Intravenous q6h    sodium chloride 0.9% infusion   Intravenous Once    [COMPLETED] potassium chloride (Klor-Con M20) tab 40 mEq  40 mEq Oral Once    [COMPLETED] magnesium oxide (Mag-Ox) tab 400 mg  400 mg Oral Once    buPROPion (Wellbutrin) tab 100 mg  100 mg Oral Daily    [] sodium chloride 0.9% infusion   Intravenous Continuous    [] sodium chloride 0.9% infusion   Intravenous Continuous    sodium chloride 3 % nebulizer solution 3 mL  3 mL Nebulization TID    [] sodium chloride 0.9% infusion   Intravenous Continuous    guaiFENesin ER (Mucinex) 12 hr tab 600 mg  600 mg Oral BID    guaiFENesin (Robitussin) 100 MG/5 ML oral liquid 100 mg  100 mg Oral Q4H PRN    [COMPLETED] magnesium oxide (Mag-Ox) tab 400 mg  400 mg Oral Once    [COMPLETED] magnesium  oxide (Mag-Ox) tab 400 mg  400 mg Oral Once    apixaban (Eliquis) tab 5 mg  5 mg Oral BID    [COMPLETED] magnesium oxide (Mag-Ox) tab 400 mg  400 mg Oral Once    loperamide (Imodium) cap 2 mg  2 mg Oral Every afternoon at 1400    [COMPLETED] warfarin (Coumadin) tab 7.5 mg  7.5 mg Oral Once at night    diphenoxylate-atropine (Lomotil) 2.5-0.025 MG per tab 2 tablet  2 tablet Oral QID PRN    [COMPLETED] iopamidol 76% (ISOVUE-370) injection for power injector  100 mL Intravenous ONCE PRN    multivitamin (Tab-A-Immanuel/Beta Carotene) tab 1 tablet  1 tablet Oral Daily    [COMPLETED] warfarin (Coumadin) tab 7.5 mg  7.5 mg Oral Once at night    [COMPLETED] warfarin (Coumadin) tab 7.5 mg  7.5 mg Oral Once at night    amLODIPine (Norvasc) tab 10 mg  10 mg Oral Daily    [COMPLETED] methylPREDNISolone sodium succinate (Solu-MEDROL) injection 60 mg  60 mg Intravenous Daily    [COMPLETED] azithromycin (Zithromax) tab 500 mg  500 mg Oral Q24H    peppermint oil liquid 1 mL  1 mL Other PRN    [COMPLETED] pantoprazole (Protonix) DR tab 40 mg  40 mg Oral Q24H    [COMPLETED] ceFEPIme (Maxpime) 2 g in sodium chloride 0.9% 100 mL IVPB-MBP  2 g Intravenous Q12H    metoprolol tartrate (Lopressor) tab 25 mg  25 mg Oral 2x Daily(Beta Blocker)    [COMPLETED] heparin (Porcine) 1000 UNIT/ML injection - BOLUS IV 2,600 Units  30 Units/kg Intravenous Once    [COMPLETED] heparin (Porcine) 1000 UNIT/ML injection - BOLUS IV 2,600 Units  30 Units/kg Intravenous Once    [COMPLETED] potassium chloride 40 mEq/100mL IVPB premix (central line) 40 mEq  40 mEq Intravenous Once    [COMPLETED] heparin (Porcine) 60050 units/250mL infusion (PE/DVT/THROMBUS) INITIAL DOSE  18 Units/kg/hr Intravenous Once    [COMPLETED] lidocaine PF (Xylocaine-MPF) 1% injection  5 mL Intradermal Once    [COMPLETED] heparin (Porcine) 1000 UNIT/ML injection - BOLUS IV 2,600 Units  30 Units/kg Intravenous Once    [COMPLETED] potassium phosphate dibasic 15 mmol in sodium chloride 0.9% 250  mL IVPB  15 mmol Intravenous Once    [COMPLETED] magnesium oxide (Mag-Ox) tab 400 mg  400 mg Oral Once    [COMPLETED] furosemide (Lasix) 10 mg/mL injection 40 mg  40 mg Intravenous Once    hydrALAzine (Apresoline) 20 mg/mL injection 10 mg  10 mg Intravenous Q4H PRN    [COMPLETED] potassium chloride 20 mEq/100mL IVPB premix 20 mEq  20 mEq Intravenous Once    [COMPLETED] magnesium oxide (Mag-Ox) tab 400 mg  400 mg Oral Once    [COMPLETED] potassium chloride (Klor-Con M20) tab 40 mEq  40 mEq Oral Q4H    [COMPLETED] furosemide (Lasix) 10 mg/mL injection 40 mg  40 mg Intravenous Once    fluticasone propionate (Flonase) 50 MCG/ACT nasal suspension 1 spray  1 spray Each Nare Daily    [COMPLETED] potassium chloride (Klor-Con M20) tab 40 mEq  40 mEq Oral Once    [COMPLETED] magnesium oxide (Mag-Ox) tab 400 mg  400 mg Oral Once    [COMPLETED] sodium chloride 0.9% infusion   Intravenous Once    [COMPLETED] furosemide (Lasix) 10 mg/mL injection 20 mg  20 mg Intravenous Once    ipratropium-albuterol (Duoneb) 0.5-2.5 (3) MG/3ML inhalation solution 3 mL  3 mL Nebulization Q4H PRN    [COMPLETED] sodium chloride 0.9 % IV bolus 500 mL  500 mL Intravenous Once    [COMPLETED] sodium chloride 0.9% infusion   Intravenous Once    [COMPLETED] sodium chloride 0.9 % IV bolus 1,000 mL  1,000 mL Intravenous Once    [COMPLETED] methylPREDNISolone sodium succinate (Solu-MEDROL) 2,000 mg in sodium chloride 0.9% 100 mL IVPB  2,000 mg Intravenous Once    glucose (Dex4) 15 GM/59ML oral liquid 15 g  15 g Oral Q15 Min PRN    Or    glucose (Glutose) 40% oral gel 15 g  15 g Oral Q15 Min PRN    Or    glucose-vitamin C (Dex-4) chewable tab 4 tablet  4 tablet Oral Q15 Min PRN    Or    dextrose 50% injection 50 mL  50 mL Intravenous Q15 Min PRN    Or    glucose (Dex4) 15 GM/59ML oral liquid 30 g  30 g Oral Q15 Min PRN    Or    glucose (Glutose) 40% oral gel 30 g  30 g Oral Q15 Min PRN    Or    glucose-vitamin C (Dex-4) chewable tab 8 tablet  8 tablet Oral  Q15 Min PRN    insulin aspart (NovoLOG) 100 Units/mL FlexPen 1-5 Units  1-5 Units Subcutaneous TID AC and HS    [COMPLETED] magnesium oxide (Mag-Ox) tab 800 mg  800 mg Oral Once    [COMPLETED] methylPREDNISolone sodium succinate (Solu-MEDROL) 1,000 mg in sodium chloride 0.9% 100 mL IVPB  1,000 mg Intravenous Q6H    [COMPLETED] sodium chloride 0.9% infusion   Intravenous Once    acetaminophen (Tylenol) tab 650 mg  650 mg Oral Q4H PRN    Or    HYDROcodone-acetaminophen (Norco) 5-325 MG per tab 1 tablet  1 tablet Oral Q4H PRN    Or    HYDROcodone-acetaminophen (Norco) 5-325 MG per tab 2 tablet  2 tablet Oral Q4H PRN    ondansetron (Zofran) 4 MG/2ML injection 4 mg  4 mg Intravenous Q6H PRN    metoclopramide (Reglan) 5 mg/mL injection 10 mg  10 mg Intravenous Q8H PRN    [COMPLETED] ceFAZolin (Ancef) 2g in 10mL IV syringe premix  2 g Intravenous Q8H    [COMPLETED] phytonadione (Aqua-Mephton) 10 mg in sodium chloride 0.9% 50 mL IVPB  10 mg Intravenous Once    [COMPLETED] magnesium oxide (Mag-Ox) tab 400 mg  400 mg Oral Once    [COMPLETED] lidocaine (Xylocaine) 1 % injection        [COMPLETED] fentaNYL (Sublimaze) 50 mcg/mL injection        [COMPLETED] midazolam (Versed) 2 MG/2ML injection        [COMPLETED] phytonadione (Aqua-Mephton) 10 mg in sodium chloride 0.9% 50 mL IVPB  10 mg Intravenous Once    [COMPLETED] phytonadione (Aqua-Mephton) 10 mg in sodium chloride 0.9% 50 mL IVPB  10 mg Intravenous Once

## 2025-04-17 NOTE — PROGRESS NOTES
[facility] Hospitalist Progress Note     CC: Hospital Follow up    PCP: Kam Alberts MD     Subjective:     No changes overnight.     OBJECTIVE:    Blood pressure 124/59, pulse 64, temperature 98.1 °F (36.7 °C), temperature source Oral, resp. rate 19, weight 196 lb 3.4 oz (89 kg), SpO2 94%.    Temp:  [97.8 °F (36.6 °C)-98.5 °F (36.9 °C)] 98.1 °F (36.7 °C)  Pulse:  [64-95] 64  Resp:  [13-24] 19  BP: (124-139)/(57-74) 124/59  SpO2:  [88 %-97 %] 94 %      Intake/Output:    Intake/Output Summary (Last 24 hours) at 4/17/2025 1348  Last data filed at 4/17/2025 0800  Gross per 24 hour   Intake --   Output 1250 ml   Net -1250 ml             Exam   Gen: No acute distress, alert and oriented x3, no focal neurologic deficits, chronically ill-appearing  Heent: NC AT, mucous memb clear, neck supple  Pulm: Lungs clear, normal respiratory effort  CV: Heart with regular rate and rhythm, no peripheral edema  Abd: Abdomen soft, nontender, nondistended, bowel sounds present  MSK:right groin dressed   Neuro: AO*3, motor intact, no sensory deficits  Psyc: appropriate mood and affect      Data Review:       Labs:     Recent Labs   Lab 04/15/25  0554 04/15/25  1803 04/16/25  0538 04/17/25  0637   RBC 2.64*  --  3.14* 3.36*   HGB 7.7* 10.0* 9.3* 9.7*   HCT 23.4*  --  27.0* 28.7*   MCV 88.6  --  86.0 85.4   MCH 29.2  --  29.6 28.9   MCHC 32.9  --  34.4 33.8   RDW 17.6  --  17.3 17.5   NEPRELIM 2.59  --  2.50 2.19   WBC 3.6*  --  3.4* 3.5*   PLT 76.0*  --  101.0* 131.0*         Recent Labs   Lab 04/15/25  0554 04/16/25  0538 04/17/25  0637   GLU 94 90 89   BUN 28* 23 14   CREATSERUM 1.49* 1.35* 1.16   EGFRCR 50* 56* 67   CA 8.1* 8.3* 8.6*    138 139   K 4.4  4.4 3.4* 3.5    106 104   CO2 21.0 24.0 24.0       Recent Labs   Lab 04/13/25  1044   ALT 16   AST 14   ALB 3.2           Imaging:  CTA ABD/PEL (CPT=74174)  Result Date: 4/15/2025  CONCLUSION:   1. Redemonstration of endovascular stent graft in the aorta as above extending into  multiple branch vessels.  2. There is a graft extending from the right external iliac artery to a portion of the superior mesenteric artery.  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  3. Minimal increase in small amount of ascites in the abdomen and pelvis.  4. 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  5. Bilateral nephrolithiasis.  No obstructive uropathy.  6. Pancreatic atrophy noted with dilatation of the pancreatic duct measuring up to 6 mm diameter which could be further assessed with a dedicated MRI of the abdomen with without contrast as clinically directed.  7. Splenomegaly.  8. Scattered atelectasis/consolidation in the right lower lobe with underlying pneumonia not excluded.  Clinical correlation recommended.  Please see above for further details.  LOCATION:  [facility]   Dictated by (CST): Lisandro Ferrer MD on 4/15/2025 at 10:19 AM     Finalized by (CST): Lisandro Ferrer MD on 4/15/2025 at 10:28 AM           Meds:      ampicillin-sulbactam  3 g Intravenous q6h    sodium chloride   Intravenous Once    buPROPion  100 mg Oral Daily    sodium chloride  3 mL Nebulization TID    guaiFENesin ER  600 mg Oral BID    apixaban  5 mg Oral BID    loperamide  2 mg Oral Every afternoon at 1400    multivitamin  1 tablet Oral Daily    amLODIPine  10 mg Oral Daily    metoprolol tartrate  25 mg Oral 2x Daily(Beta Blocker)    fluticasone propionate  1 spray Each Nare Daily    insulin aspart  1-5 Units Subcutaneous TID AC and HS               guaiFENesin    diphenoxylate-atropine    peppermint oil    hydrALAzine    ipratropium-albuterol    glucose **OR** glucose **OR** glucose-vitamin C **OR** dextrose **OR**  glucose **OR** glucose **OR** glucose-vitamin C    acetaminophen **OR** HYDROcodone-acetaminophen **OR** HYDROcodone-acetaminophen    ondansetron    metoclopramide         Assessment/Plan:     69 year old male with a past medical history of hypertension, GERD, anxiety, renal calculi, DVT, ascending aortic dissection s/p repair, 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  Postoperative course complicated by HOLLIS on CKD as well as right lower extremity weakness along with hypoxic respiratory failure.      History of prior infrarenal endovascular AAA repair with bilateral KYLER for hypogastric and common iliac aneurysms  History of mesenteric ischemia s/p SMA bypass with right external iliac artery to SMA bypass  History of aortic dissection s/p repair, aortic dissection distal to left subclavian  S/p complex thoraco abdominal aortic repair 3/21  Hx of DVT   -Complicated aortic pathology with multiple repairs in the past. Underwent thoracoabdominal aneurysm repair 3/21  -s/p lumbar drain with neurointerventional to limit risk of spinal cord ischemia 3/20 and removed 3/26  -management per vascular  - vascular managing - planning on CTA today   - culture w/ enterobacter - had reported allergy to amox - reported itching to hands and knee which resolved w/ benadryl, but never took this again. He is willing to attempt pcn in house.   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  Prior to that he had a mesenteric ischemia and was started on Coumadin which he continued for 8 months.  He is also positive for heterozygous prothrombin gene mutation.  Eliquis was recommended by hematology.     Acute hypoxemic respiratory failure w/ threat to bodily function 2/2 possible pneumonia versus edema  Leukocytosis  - pulm consulted - completed course of abx w/ azithro and cefepime  - tx w/ burst steroids - currently off   - ongoing cough, hypoxemia likely 2/2 atelectasis - cont IS, FV and added hypertonic saline nebs  - encourage oob     Generalized weakness  Back pain  - In the setting of prolonged hospital stay, pain control     Leukocytosis-resolved    Anemia 2/2 blood loss  - stable     Right lower extremity weakness-resolved  - In the setting of complex aortic repair 3/21  -- PT/OT     HOLLIS on CKD- resolved     Hypertension  -hold home hydrochlorothiazide, continue amlodipine      Allergic rhinitis  -Continue home fluticasone as needed     Short gut syndrome  - resume lamotil if diarrhea resumes  - Add Imodium per home regimen    Sacral wound  - In the setting of persistent diarrhea and immobility   - Wound care consult, recommendations reviewed    Physical deconditioning  - multifactorial 2/2 prolonged hospitalization and situational depression, self limiting mobility   - cont ongoing pt/ot - plan for home therapies at dc     Depression  SI  - psych consulted, started on wellbutrin     Dispo - per vascular, will follow. Medically stable for dc     36 minutes were spent w/ patient and coordinating care.     DO Kendra Lewis [facility]  Hospitalist  Contact via [employer]          Supplementary Documentation:   DVT Mechanical Prophylaxis:   SCDs, Early  ambuation  DVT Pharmacologic Prophylaxis   Medication    apixaban (Eliquis) tab 5 mg                Code Status: DNAR/Full Treatment  Arzola: No urinary catheter in place  Arzola Duration (in days):   Central line:

## 2025-04-17 NOTE — PHYSICAL THERAPY NOTE
PHYSICAL THERAPY TREATMENT NOTE - INPATIENT    Room Number: 7613/7613-A     Session: 14     Number of Visits to Meet Established Goals:  (15)  History related to current admission: Patient is a 69 year old male admitted on 3/19/2025 from Home for S/p 3/21 fenestrated abdominal aortic aneurysm repair with left carotid to subclavian transposition on  with Dr. Yanez and Dr. Oquendo and 3/20 lumbar drain.  Reporting RLE weakness 3/22 AM worsening to BLE MRI T/L spine w/o obvious compression or signs of cord ischemia. Pt with inconsistent participation in therapy sessions - passive thoughts of death per psych consult 4/11 dx depression. R groin site with purulent drainage  - flushed at bedside by vascular 4/13     HOME SITUATION  Type of Home: House  Home Layout: Two level      Stairs to Bedroom: 16    Railing: Yes    Lives With: Spouse       Prior Level of [place]: Pt states that he lives in a house with spouse. Pt  reports that he was IND with all his ADLs and gait. Pt has no history of falls.  Presenting Problem: S/p 3/21 fenestrated abdominal aortic aneurysm repair with left carotid to subclavian transposition on  with Dr. Yanez and Dr. Oquendo and 3/20 lumbar drain  Co-Morbidities : HTN, Peripheral Neuropathy, Hx of toe amputation L great toe, mesenteric ischemia s/p SMA bypass with right external iliac artery to SMA bypass, Short gut syndrome    PHYSICAL THERAPY ASSESSMENT   Patient demonstrates fair progress this session, goals  remain in progress. Pt continues to require increased time and encouragement to participate in mobility. Continues to be more open to progress with motivational interviewing techniques applied.     The patient is below baseline and would benefit from skilled inpatient PT to address the above deficits to assist patient in returning to prior to level of function.          PLAN DURING HOSPITALIZATION  Nursing Mobility Recommendation : 1 Assist  PT Device Recommendation: Rolling  walker  PT Treatment Plan: Bed mobility, Body mechanics, Gait training, Strengthening, Transfer training, Stair training, Balance training  Frequency (Obs): 3-5x/week     CURRENT GOALS   Goal #1 Patient is able to demonstrate supine - sit EOB @ level: CGA assistance   MET Raquel 4/1     Goal #2 Patient is able to demonstrate transfers EOB to/from [facility] at assistance level: CGA assistance   MET Raquel 4/1     Goal #3 Patient is able to sit at the EOB for 10 mins with CGA    Met 4/8/2025       Goal #4 Patient is able to ambulate 50 feet with assist device: walker - rolling at assistance level: moderate assistance  MET 4/7 progress to 150ft CGA     Goal #5     Goal #6     Goal Comments: Goals established on 3/22/2025  4/17/2025 all goals ongoing    SUBJECTIVE  \" I decided this is where I'm going to sit\"     OBJECTIVE  Precautions: Bed/chair alarm (surgical boot L foot? SBP )    WEIGHT BEARING RESTRICTION  R Lower Extremity: Full Weight Bearing  L Lower Extremity: Full Weight Bearing    PAIN ASSESSMENT   Rating: Unable to rate  Location: bottom  Management Techniques: Activity promotion, Body mechanics, Repositioning    BALANCE                                                                                                                       Static Sitting: Good  Dynamic Sitting: Good           Static Standing: Fair -  Dynamic Standing: Fair -    ACTIVITY TOLERANCE  Pulse:  (HR 80s)  Heart Rate Source: Monitor                   O2 WALK  Oxygen Therapy  SPO2% Ambulation on Oxygen: 92  Ambulation oxygen flow (liters per minute): 1    AM-PAC '6-Clicks' INPATIENT SHORT FORM - BASIC MOBILITY  How much difficulty does the patient currently have...  Patient Difficulty: Turning over in bed (including adjusting bedclothes, sheets and blankets)?: None   Patient Difficulty: Sitting down on and standing up from a chair with arms (e.g., wheelchair, bedside commode, etc.): A Little   Patient Difficulty: Moving from lying on back to  sitting on the side of the bed?: A Little   How much help from another person does the patient currently need...   Help from Another: Moving to and from a bed to a chair (including a wheelchair)?: A Little   Help from Another: Need to walk in hospital room?: A Little   Help from Another: Climbing 3-5 steps with a railing?: A Lot     AM-PAC Score:  Raw Score: 18   Approx Degree of Impairment: 46.58%   Standardized Score (AM-PAC Scale): 43.63   CMS Modifier (G-Code): CK    FUNCTIONAL ABILITY STATUS  Gait Assessment   Functional Mobility/Gait Assessment  Gait Assistance: Contact guard assist  Distance (ft): 15-15-40-40  Assistive Device: Rolling walker  Pattern: Shuffle (inc BL knee flexion with fatigue)    Skilled Therapy Provided    Bed Mobility:  Rolling: NT  Supine<>Sit: NT  Sit<>Supine:  NT    Transfer Mobility:  Sit<>Stand: CGA from chair, and toilet with raised toilet seat and grab bar   Stand<>Sit: CGA - requires cuing for UE placement, scooting anteriorly, and rocking x3 for momentum  Gait: CGA     Therapist's Comments: Discussed case with RN prior to session initiation. Ambulated <> bathroom with RW and CGA - BM on floor. Chair placed in hallway.  Gait belt donned for out of bed mobility. Increased time and encouragement provided for all mobility consistent with previous sessions. Participated in gait training with cuing for TKE, picking up BLE R>L, postural re-ed,  and application of energy conservation techniques including pursed lip breathing.     Longest gait training trial 53 seconds     Patient End of Session: Up in chair, Needs met, Call light within reach, RN aware of session/findings, All patient questions and concerns addressed, Hospital anti-slip socks, Alarm set, Family present    PT Session Time: 29 minutes  Gait: 24 minutes  Therapeutic Activity: 5

## 2025-04-17 NOTE — PROGRESS NOTES
[facility]  [facility] HEMATOLOGY ONCOLOGY  Progress Note    Cy Monsalve Patient Status:  Inpatient    1955 MRN [id number]   Location [facility] 7NE-A Attending Neil Oquendo MD   Hosp Day # 28 PCP Kam Alberts MD     ADMISSION DATE AND TIME: 3/19/2025  1:15 PM    ADMIT DIAGNOSIS: Abdominal aortic aneurysm (AAA) [I71.40]    SUBJECTIVE:    No new events.   Hemoglobin up to 9.7 grams.  Platelet count is up to 131  Psychiatry notes reviewed  Mild leukopenia persists at 3.5      OBJECTIVE:  Blood pressure 138/63, pulse 75, temperature 98 °F (36.7 °C), temperature source Oral, resp. rate 15, weight 196 lb 3.4 oz (89 kg), SpO2 90%.    PHYSICAL EXAM:  General: afebrile, alert and oriented x 3, pleasant  No distress    LABS:  Recent Results (from the past 24 hours)   POCT Glucose    Collection Time: 25 12:04 PM   Result Value Ref Range    POC Glucose 111 (H) 70 - 99 mg/dL   POCT Glucose    Collection Time: 25  3:50 PM   Result Value Ref Range    POC Glucose 91 70 - 99 mg/dL   POCT Glucose    Collection Time: 25  8:56 PM   Result Value Ref Range    POC Glucose 99 70 - 99 mg/dL   POCT Glucose    Collection Time: 25  5:19 AM   Result Value Ref Range    POC Glucose 100 (H) 70 - 99 mg/dL   CBC With Differential With Platelet    Collection Time: 25  6:37 AM   Result Value Ref Range    WBC 3.5 (L) 4.0 - 11.0 x10(3) uL    RBC 3.36 (L) 3.80 - 5.80 x10(6)uL    HGB 9.7 (L) 13.0 - 17.5 g/dL    HCT 28.7 (L) 39.0 - 53.0 %    .0 (L) 150.0 - 450.0 10(3)uL    MCV 85.4 80.0 - 100.0 fL    MCH 28.9 26.0 - 34.0 pg    MCHC 33.8 31.0 - 37.0 g/dL    RDW 17.5 %    Neutrophil Absolute Prelim 2.19 1.50 - 7.70 x10 (3) uL    Neutrophil Absolute 2.19 1.50 - 7.70 x10(3) uL    Lymphocyte Absolute 0.67 (L) 1.00 - 4.00 x10(3) uL    Monocyte Absolute 0.37 0.10 - 1.00 x10(3) uL    Eosinophil Absolute 0.20 0.00 - 0.70 x10(3) uL    Basophil Absolute 0.02 0.00 - 0.20 x10(3) uL    Immature  Granulocyte Absolute 0.01 0.00 - 1.00 x10(3) uL    Neutrophil % 63.2 %    Lymphocyte % 19.4 %    Monocyte % 10.7 %    Eosinophil % 5.8 %    Basophil % 0.6 %    Immature Granulocyte % 0.3 %   Basic Metabolic Panel (8)    Collection Time: 04/17/25  6:37 AM   Result Value Ref Range    Glucose 89 70 - 99 mg/dL    Sodium 139 136 - 145 mmol/L    Potassium 3.5 3.5 - 5.1 mmol/L    Chloride 104 98 - 112 mmol/L    CO2 24.0 21.0 - 32.0 mmol/L    Anion Gap 11 0 - 18 mmol/L    BUN 14 9 - 23 mg/dL    Creatinine 1.16 0.70 - 1.30 mg/dL    Calcium, Total 8.6 (L) 8.7 - 10.6 mg/dL    Calculated Osmolality 288 275 - 295 mOsm/kg    eGFR-Cr 67 >=60 mL/min/1.73m2   Magnesium    Collection Time: 04/17/25  6:37 AM   Result Value Ref Range    Magnesium 1.5 (L) 1.6 - 2.6 mg/dL     Platelet Count::    Lab Results   Component Value Date    .0 (L) 04/17/2025    .0 (L) 04/16/2025    PLT 76.0 (L) 04/15/2025        CULTURES  Hospital Encounter on 03/19/25   1. Aerobic Bacterial Culture     Status: Abnormal    Collection Time: 04/12/25 11:20 AM    Specimen: Leg, right; Other   Result Value Ref Range    Aerobic Culture Result  N/A     Mixture of organisms suggestive of normal skin beth    Aerobic Culture Result 2+ growth Enterococcus faecalis NOT VRE (A) N/A    Aerobic Smear 1+ Gram positive cocci in pairs and clusters N/A    Aerobic Smear 1+ WBCs seen N/A       Susceptibility    Enterococcus faecalis NOT VRE -  (no method available)     Ampicillin <=2 Sensitive      Vancomycin 1 Sensitive    2. Blood Culture     Status: None    Collection Time: 03/24/25 10:05 AM    Specimen: Blood,peripheral   Result Value Ref Range    Blood Culture Result No Growth 5 Days N/A       IMAGING:    No results found.        MEDICATIONS:  Medications reviewed.     ampicillin-sulbactam  3 g Intravenous q6h    sodium chloride   Intravenous Once    buPROPion  100 mg Oral Daily    sodium chloride  3 mL Nebulization TID    guaiFENesin ER  600 mg Oral BID     apixaban  5 mg Oral BID    loperamide  2 mg Oral Every afternoon at 1400    multivitamin  1 tablet Oral Daily    amLODIPine  10 mg Oral Daily    metoprolol tartrate  25 mg Oral 2x Daily(Beta Blocker)    fluticasone propionate  1 spray Each Nare Daily    insulin aspart  1-5 Units Subcutaneous TID AC and HS             guaiFENesin    diphenoxylate-atropine    peppermint oil    hydrALAzine    ipratropium-albuterol    glucose **OR** glucose **OR** glucose-vitamin C **OR** dextrose **OR** glucose **OR** glucose **OR** glucose-vitamin C    acetaminophen **OR** HYDROcodone-acetaminophen **OR** HYDROcodone-acetaminophen    ondansetron    metoclopramide    ASSESSMENT AND PLAN:     Active Problems:    Pre-op testing    Thoracoabdominal aortic aneurysm (TAAA)    Bilateral leg weakness    Current moderate episode of major depressive disorder without prior episode (HCC)    Delirium due to another medical condition    Groin abscess        Cy Monsalve is a 69 year old male with 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  Hx of DVT in 2024  Hx of mesenteric ischemia     Status post AAA repair with complicated postop course with hypoxia and HOLLIS.       Thrombocytopenia  Platelets were normal until 3/22/2025  Thereafter they had declined into the 70-90 while on heparin  Heparin-induced platelet antibody was positive.    SUNDEEP negative    Recent inpatient team notes reviewed.      Ok to discharge on apixaban 5mg bid.   (This would be appropriate treatment for possible hit).     Platelet count stable > 130 today.  Hemoglobin higher today > 9.5 grams.     We will plan to follow       Jason Modi MD

## 2025-04-17 NOTE — DIETARY NOTE
[facility]   part of [facility]  NUTRITION ASSESSMENT    Pt does not meet malnutrition criteria at this time.    NUTRITION INTERVENTION:    Meal and Snacks - Continue Regular Diet as tolerated; Monitor and encourage adequate PO intake.   Medical Food Supplements - Continue Magic Shake chocolate daily and Magic Cup chocolate daily.   Nutrition Education - Discussed importance of PO intake for healing 3/25. Reviewed foods high in Calories to help w/ wt maintenance. Pt/family receptive to education, no barriers noted.    Vitamin and Mineral Supplements - Recommend Multivitamin with minerals    PATIENT STATUS:   4/17 - Pt seen in chair.  He reports he's doing \"OK\". No n/v/d. Having soft Bms, improved per pt.  Sometimes drinks his Magic Shake.  Reviewed with pt importance behind shake including protein and calories for wound healing.  Pt verbalized understanding. He has no other questions at present.  Pt is medically discharged, awaiting placement.     4/11- Pt states appetite is poor. Mainly only hungry for breakfast. Pt states he sometimes takes ONS. Messaged RN about an updated wt, buddy with extended hospital stay. RD offered pt cafe menu to increase variety and options with low appetite and 23 day stay. Pt happy to try it. Communicated with diet office. + diarrhea. Follow per protocol.   4/4 - Pt sitting in chair, reports he ate a frosty from [employer] and cereal for breakfast this AM.  Denies n/v/d. Reports he is drinking his protein shake and eating his magic cup when he gets it.  Trying to eat, but dislikes food in hospital.  He has no questions/concerns over diet at this time.    3/31: Transferred to ICU 3/27. Visited pt at bedside. He reports his appetite is fair and about the same as a couple days ago. Does not like hospital food much which is making it hard for him to eat much per his report. Denies GI symptoms at this time but does report having loose stools often at baseline. Reports consuming ~1  Magic Shake/day and agreeable to also trying chocolate Magic Cup. Continued to encourage PO and ONS intake; all questions answered at this time.  3/25: 69/M admitted on 3/19 with AAA, POD#4 for fenestrated endovascular arch repair with fenestrated thoracoabdominal aneurysm repair, left carotid to subclavian transposition with endovascular septotomy of dissection. Pt seen for consult \"Patient refusing Ensure, low appetite, needs protein for wound healing.\"  Pt seen with family at bedside.  Pt reports not a robost appetite, but ate some cornflakes this AM and working on a donut at bedside.  Denies n/v/d. On Vapotherm due to suspected fluid o/l. Pt notes his appetite fluctuated PTA.  He had lost significant wt over the past several years due to medical issues. EMR records do not indicate any wt loss over the past year.  Discussed importance of nutrition and protein for healing.  Pt aware and feels his appetite will return eventually. Pt had Ensure ordered and dislikes it.  He declines this and other ONS offered. He does note that he did tolerate a Boost milkshake previously - will try Magic Shake.  Pt agreeable.  Offered to order food for pt, he declined.    PMH: AAA (abdominal aortic aneurysm), Deep vein thrombosis, Esophageal reflux, Ileostomy, Peripheral vascular disease, essential hypertension    ANTHROPOMETRICS:  Ht:  6'5\"  Wt: 89 kg (196 lb 3.4 oz).   BMI: Body mass index is 23.27 kg/m².  IBW: 94.5 kg    WEIGHT HISTORY:   Weight loss: No  Wt Readings from Last 10 Encounters:   04/15/25 89 kg (196 lb 3.4 oz)   03/22/25 69.5 kg (153 lb 3.5 oz)   03/07/25 83.9 kg (185 lb)   03/23/22 107.3 kg (236 lb 9.6 oz)   12/28/21 106.5 kg (234 lb 12.8 oz)   08/05/21 101.3 kg (223 lb 6.4 oz)   03/16/21 96.2 kg (212 lb)   12/02/20 97.7 kg (215 lb 6.4 oz)   10/27/20 92.1 kg (203 lb)   08/20/20 90 kg (198 lb 6.4 oz)      NUTRITION:  Diet:       Procedures    Regular/General diet Is Patient on Accuchecks? Yes; Is Patient on Suicide  Precautions? No      Food Allergies: No  Cultural/Ethnic/Tenriism Preferences Addressed: Yes    Percent Meals Eaten (last 3 days)       Date/Time Percent Meals Eaten (%)    04/14/25 0900 50 %    04/14/25 1707 50 %    04/15/25 0800 25 %    04/15/25 1300 50 %    04/15/25 2000 75 %    04/16/25 0930 100 %          GI system review: WNL; Last BM Date: 04/13/25  Skin and wounds: surgical wound to neck, stage I pressure injury to coccyx    NUTRITION RELATED PHYSICAL FINDINGS:     1. Body Fat/Muscle Mass: no wasting noted / well nourished     2. Fluid Accumulation: none per RN documentation    NUTRITION PRESCRIPTION:  89 kg Actual Body Weight  Calories: 6853-1213 calories/day (25-30 kcal/kg)  Protein: 107-134 grams protein/day (1.2-1.5 grams protein/kg)  Fluid: ~1 ml/kcal or per MD discretion    NUTRITION DIAGNOSIS/PROBLEM:  Inadequate oral intake related to insufficient appetite resulting in inadequate nutrition intake as evidenced by documented/reported insufficient oral intake    MONITOR AND EVALUATE/NUTRITION GOALS:  PO intake of 75% of meals TID - Not met, Continues  PO intake of 75% of oral nutrition supplement/s - Not met, Continues  Weight stable within 1 to 2 lbs during admission - Ongoing  Provide nutrition adequate for wound healing - Ongoing    MEDICATIONS:  Abx, Imodium, MVI,     LABS:  Reviewed     Pt is at Low nutrition risk    Susu Prakash RD, LDN, [facility]  Clinical Dietitian  Phone [phone]

## 2025-04-17 NOTE — CM/SW NOTE
BENITO spoke to pt sps, informed will refax referral to [facility] in [place].     SW faxed referral ([phone]). Spoke to Sylvie in admissions ([phone]), confirmed they received referral and are reviewing. Informed pt medically cleared for DC. Sylvie to call SW back.    Addendum 1115:  SW received follow up call from sps, informed [facility] reviewing referral. Discussed SW will reach out to [facility], for bed availability.    Awaiting response from [facility].    Addendum 1210:  SW received call from Tamela director of [facility], informed they will not be able to accept pt for admission. Tamela to contact wife and update.     BENITO left message for Jyotsna at [facility] in [place], message also sent in [facility]. Reopened referral in [facility], and sent clinical updates. Awaiting response from [facility].    Addendum 1400:   BENITO left messaged for Jyotsna and Naa at [facility], no response.     BENITO spoke to sps informed [facility] not able to accept. Aware [facility] reviewing and per chart review may have bed for pt tomorrow.    Discussed with sps will need to finalized plan tomorrow as appeal determination will likely be made.    Sps still agreeable to [facility], and [facility] as back up.    SW/BROOKLYN to remain available for dc planning, and/or additional need for support.    Yang WATKINS, ISAAC  Discharge Planner  [phone]

## 2025-04-17 NOTE — OCCUPATIONAL THERAPY NOTE
OCCUPATIONAL THERAPY TREATMENT NOTE - INPATIENT     Room Number: 7613/7613-A  Session: 12   Number of Visits to Meet Established Goals:  (15 added 8 sessions on 4/10)    Presenting Problem: S/p 3/21 fenestrated abdominal aortic aneurysm repair with left carotid to subclavian transposition on  with Dr. Yanez and Dr. Oquendo and 3/20 lumbar drain    ASSESSMENT   Patient demonstrates good  progress this session, goals remain in progress.     Patient continues to function below baseline with  all ADL . Contributing factors to remaining limitations include decreased functional strength and decreased endurance.  Next session anticipate patient to progress toileting and lower body dressing.  Occupational Therapy will continue to follow patient for duration of hospitalization.     Patient continues to benefit from continued skilled OT services: at discharge to promote prior level of function and safety with additional support and return home with home health OT. Anticipate return home with additional support with home health OT. Patient is performing toilet transfer supervision/SBA, bed mobility independent, LB ADL min A, and UB ADL set-up level. At home, patient would require assistance with LB ADL, bathing, and household tasks.      History: Patient is a 69 year old male admitted on 3/19/2025 with Presenting Problem: S/p 3/21 fenestrated abdominal aortic aneurysm repair with left carotid to subclavian transposition on  with Dr. Yanez and Dr. Oquendo and 3/20 lumbar drain. Co-Morbidities : HTN, Peripheral Neuropathy, Hx of toe amputation L great toe, mesenteric ischemia s/p SMA bypass with right external iliac artery to SMA bypass, Short gut syndrome      ** 4/10/25  Pt had controlled B knee weakness when he was walking to the bathroom with nursing  **3/25 acute respiratory failure, possible pneumonia, on vapotherm       TREATMENT SESSION:  Patient Start of Session: seated    FUNCTIONAL TRANSFER ASSESSMENT  Sit to  Stand: Edge of Bed  Edge of Bed: Not Tested  Chair: Contact Guard Assist  Toilet Transfer: Stand-by Assist  Commode Transfer: Not Tested    BED MOBILITY  Rolling: Not Tested  Supine to Sit : Not tested  Sit to Supine (OT): Not Tested  Scooting: n    BALANCE ASSESSMENT  Static Sitting: Independent  Static Standing: Supervision    FUNCTIONAL ADL ASSESSMENT  Grooming Seated: Not Tested  UB Dressing Seated: Not Tested  LB Dressing Seated: Not Tested  LB Dressing Standing: Not Tested  Toileting Seated: Supervision  Toileting Standing: Minimal Assist    O2 SATURATIONS  Oxygen Therapy  SPO2% Ambulation on Oxygen: 92  Ambulation oxygen flow (liters per minute): 1    EDUCATION PROVIDED  Patient Education : Role of Occupational Therapy; Plan of Care; Posture/Positioning; Energy Conservation; Proper Body Mechanics  Patient's Response to Education: Verbalized Understanding    Equipment used: rw    Therapist comments: cga to stand from chair and to ambulate to bathroom. Loose watery stool while pt was walking to the bathroom, SBA toilet transfer.  PT and OT cleaned the floor.  Assistance with B LE hygiene care and to change socks.  Seated, gown changes with set-up.  Sba to stand from toilet with cueing for grab bar use, OT assisted with hygiene care while standing. Refer to PT note about gait.  Frequent rest breaks and encouragement provided to continue with therapy.        Patient End of Session: Up in chair, Needs met, Call light within reach, All patient questions and concerns addressed, Family present    PAIN ASSESSMENT  Rating: Unable to rate  Location: buttock  Management Techniques: Repositioning     OBJECTIVE  Precautions: Bed/chair alarm (surgical boot L foot? SBP )    AM-PAC ‘6-Clicks’ Inpatient Daily Activity Short Form  -   Putting on and taking off regular lower body clothing?: A Little  -   Bathing (including washing, rinsing, drying)?: A Little  -   Toileting, which includes using toilet, bedpan or urinal? :  A Little  -   Putting on and taking off regular upper body clothing?: None  -   Taking care of personal grooming such as brushing teeth?: None  -   Eating meals?: None    AM-PAC Score:  Score: 21  Approx Degree of Impairment: 32.79%  Standardized Score (AM-PAC Scale): 44.27    PLAN  OT Device Recommendations: TBD  OT Treatment Plan: Energy conservation/work simplification techniques, ADL training, UE strengthening/ROM, Functional transfer training, Patient/Family education  Rehab Potential : Guarded  Frequency: 3x/week    OT Goals:   Ongoing 4/17  ADL Goals   Patient will perform upper body dressing:  with setup-MET 4/7  Patient will perform lower body dressing:  with min assist  Patient will perform toileting: with min assist     Functional Transfer Goals  Patient will transfer from supine to sit:  with min assist -MET 4/7 UPGRADE TO SUPERVISION  Patient will transfer to toilet:  with min assist -MET 4/7 SIMULATED, UPGRADE TO SUPERVISION     UE Exercise Program Goal  Patient will be independent with bilateral AROM HEP (home exercise program).     Additional Goals  Pt will incorporate 2 energy conservation techniques into ADL.    OT Session Time: 25 minutes  Self-Care Home Management: 25 minutes

## 2025-04-17 NOTE — PROGRESS NOTES
[facility]   part of [facility]     Vascular Surgery Progress Note    Cy Monsalve Patient Status:  Inpatient    1955 MRN [id number]   Location [facility] 7NE-A Attending Neil Oquendo MD   Hosp Day # 28 PCP Kam Alberts MD     Objective:   Temp: 98.5 °F (36.9 °C)  Pulse: 73  Resp: 19  BP: 135/74      Events Yesterday/Overnight:  Patient is a 69 year old male, POD 27, fenestrated endovascular arch repair with fenestrated thoracoabdominal aneurysm repair, left carotid to subclavian transposition with endovascular septotomy of dissection with Dr. Oquendo and Dr. Yanez.  BP stable at 135/74. WBC 3.5, hemoglobin 9.7. Platelet count 131.    Exam:  Palpable bilateral DP and PT pulses.  Left groin access site healed, no hematoma. Right groin access site open wound, small amount of serous drainage present. Productive cough present. Left subclavian incision site healing well, steri strip intact.    Impression/Plan:    Change right groin access site gauze and paper tape BID and PRN.     Continue physical therapy.     Continue plan for patient to use urinal/bedside commode.      Continue incentive spirometry.     Patient cleared from discharge to rehab from a vascular surgery standpoint.      Medications:  Current Hospital Medications[1]    Laboratory/Data:    LABS:  Recent Labs   Lab 25  1139 25  0545 25  1724 04/15/25  0554 04/15/25  1803 25  0538 25  0637   WBC 4.6 3.4*  --  3.6*  --  3.4* 3.5*   HGB 7.9* 6.9* 8.3* 7.7* 10.0* 9.3* 9.7*   MCV 87.8 87.7  --  88.6  --  86.0 85.4   PLT 62.0* 64.0*  --  76.0*  --  101.0* 131.0*       Recent Labs   Lab 25  0527 25  1139 25  1044 25  0545 04/15/25  0554 25  0538 25  0637   *   < > 133* 135* 136 138 139   K 4.2   < > 4.0 3.7 4.4  4.4 3.4* 3.5      < > 101 104 106 106 104   CO2 26.0   < > 23.0 23.0 21.0 24.0 24.0   BUN 29*   < > 31* 31* 28* 23 14   CREATSERUM 1.55*   < >  1.65* 1.44* 1.49* 1.35* 1.16   GLU 93   < > 140* 91 94 90 89   CA 8.6*   < > 8.2* 8.2* 8.1* 8.3* 8.6*   MG 1.8  --   --  1.8 1.8 1.6 1.5*    < > = values in this interval not displayed.     No results for input(s): \"PTP\", \"INR\", \"PTT\" in the last 168 hours.  Recent Labs   Lab 04/13/25  1044   ALT 16   AST 14   ALB 3.2     No results for input(s): \"TROP\" in the last 168 hours.  No results found for: \"ANAS\", \"ROVERTO\", \"ANASCRN\"  No results for input(s): \"PCACT\", \"PSACT\", \"AT3ACT\", \"HIPAB\", \"PATHI\", \"STALA\", \"DRVVTRATIO\", \"DRVVT\", \"STACLOT\", \"CARIGG\", \"[id number]\", \"[id number]\", \"RA\", \"HAVIGM\", \"HBCIGM\", \"HCVAB\", \"HBSAG\", \"HBCAB\", \"HBVDNAINTERP\", \"ANAS\", \"C3\", \"C4\" in the last 168 hours.    ISATU Warren  4/17/2025  8:20 AM         [1]   Current Facility-Administered Medications   Medication Dose Route Frequency    ampicillin-sulbactam (Unasyn) 3 g in sodium chloride 0.9% 100mL IVPB-ADD  3 g Intravenous q6h    sodium chloride 0.9% infusion   Intravenous Once    buPROPion (Wellbutrin) tab 100 mg  100 mg Oral Daily    sodium chloride 3 % nebulizer solution 3 mL  3 mL Nebulization TID    guaiFENesin ER (Mucinex) 12 hr tab 600 mg  600 mg Oral BID    guaiFENesin (Robitussin) 100 MG/5 ML oral liquid 100 mg  100 mg Oral Q4H PRN    apixaban (Eliquis) tab 5 mg  5 mg Oral BID    loperamide (Imodium) cap 2 mg  2 mg Oral Every afternoon at 1400    diphenoxylate-atropine (Lomotil) 2.5-0.025 MG per tab 2 tablet  2 tablet Oral QID PRN    multivitamin (Tab-A-Immanuel/Beta Carotene) tab 1 tablet  1 tablet Oral Daily    amLODIPine (Norvasc) tab 10 mg  10 mg Oral Daily    peppermint oil liquid 1 mL  1 mL Other PRN    metoprolol tartrate (Lopressor) tab 25 mg  25 mg Oral 2x Daily(Beta Blocker)    hydrALAzine (Apresoline) 20 mg/mL injection 10 mg  10 mg Intravenous Q4H PRN    fluticasone propionate (Flonase) 50 MCG/ACT nasal suspension 1 spray  1 spray Each Nare Daily    ipratropium-albuterol (Duoneb) 0.5-2.5 (3) MG/3ML inhalation solution  3 mL  3 mL Nebulization Q4H PRN    glucose (Dex4) 15 GM/59ML oral liquid 15 g  15 g Oral Q15 Min PRN    Or    glucose (Glutose) 40% oral gel 15 g  15 g Oral Q15 Min PRN    Or    glucose-vitamin C (Dex-4) chewable tab 4 tablet  4 tablet Oral Q15 Min PRN    Or    dextrose 50% injection 50 mL  50 mL Intravenous Q15 Min PRN    Or    glucose (Dex4) 15 GM/59ML oral liquid 30 g  30 g Oral Q15 Min PRN    Or    glucose (Glutose) 40% oral gel 30 g  30 g Oral Q15 Min PRN    Or    glucose-vitamin C (Dex-4) chewable tab 8 tablet  8 tablet Oral Q15 Min PRN    insulin aspart (NovoLOG) 100 Units/mL FlexPen 1-5 Units  1-5 Units Subcutaneous TID AC and HS    acetaminophen (Tylenol) tab 650 mg  650 mg Oral Q4H PRN    Or    HYDROcodone-acetaminophen (Norco) 5-325 MG per tab 1 tablet  1 tablet Oral Q4H PRN    Or    HYDROcodone-acetaminophen (Norco) 5-325 MG per tab 2 tablet  2 tablet Oral Q4H PRN    ondansetron (Zofran) 4 MG/2ML injection 4 mg  4 mg Intravenous Q6H PRN    metoclopramide (Reglan) 5 mg/mL injection 10 mg  10 mg Intravenous Q8H PRN

## 2025-04-17 NOTE — PROGRESS NOTES
Assumed care 0730.  Alert and oriented x4.  R arm precautions   Reg diet  Accu check  PICC jacoby. Unit draw  Eliquid for VTE prophylaxis.   Daily wt  SBP   Tele - NSR

## 2025-04-18 VITALS
WEIGHT: 196.19 LBS | TEMPERATURE: 98 F | HEART RATE: 68 BPM | BODY MASS INDEX: 23 KG/M2 | DIASTOLIC BLOOD PRESSURE: 62 MMHG | OXYGEN SATURATION: 92 % | RESPIRATION RATE: 19 BRPM | SYSTOLIC BLOOD PRESSURE: 139 MMHG

## 2025-04-18 LAB
ANION GAP SERPL CALC-SCNC: 10 MMOL/L (ref 0–18)
BASOPHILS # BLD AUTO: 0.01 X10(3) UL (ref 0–0.2)
BASOPHILS NFR BLD AUTO: 0.3 %
BUN BLD-MCNC: 15 MG/DL (ref 9–23)
CALCIUM BLD-MCNC: 8.3 MG/DL (ref 8.7–10.6)
CHLORIDE SERPL-SCNC: 105 MMOL/L (ref 98–112)
CO2 SERPL-SCNC: 25 MMOL/L (ref 21–32)
CREAT BLD-MCNC: 1.1 MG/DL (ref 0.7–1.3)
EGFRCR SERPLBLD CKD-EPI 2021: 72 ML/MIN/1.73M2 (ref 60–?)
EOSINOPHIL # BLD AUTO: 0.14 X10(3) UL (ref 0–0.7)
EOSINOPHIL NFR BLD AUTO: 4.6 %
ERYTHROCYTE [DISTWIDTH] IN BLOOD BY AUTOMATED COUNT: 17.2 %
GLUCOSE BLD-MCNC: 105 MG/DL (ref 70–99)
GLUCOSE BLD-MCNC: 92 MG/DL (ref 70–99)
GLUCOSE BLD-MCNC: 99 MG/DL (ref 70–99)
HCT VFR BLD AUTO: 28 % (ref 39–53)
HGB BLD-MCNC: 9.4 G/DL (ref 13–17.5)
IMM GRANULOCYTES # BLD AUTO: 0.01 X10(3) UL (ref 0–1)
IMM GRANULOCYTES NFR BLD: 0.3 %
LYMPHOCYTES # BLD AUTO: 0.74 X10(3) UL (ref 1–4)
LYMPHOCYTES NFR BLD AUTO: 24.3 %
MAGNESIUM SERPL-MCNC: 1.6 MG/DL (ref 1.6–2.6)
MCH RBC QN AUTO: 28.9 PG (ref 26–34)
MCHC RBC AUTO-ENTMCNC: 33.6 G/DL (ref 31–37)
MCV RBC AUTO: 86.2 FL (ref 80–100)
MONOCYTES # BLD AUTO: 0.33 X10(3) UL (ref 0.1–1)
MONOCYTES NFR BLD AUTO: 10.8 %
NEUTROPHILS # BLD AUTO: 1.82 X10 (3) UL (ref 1.5–7.7)
NEUTROPHILS # BLD AUTO: 1.82 X10(3) UL (ref 1.5–7.7)
NEUTROPHILS NFR BLD AUTO: 59.7 %
OSMOLALITY SERPL CALC.SUM OF ELEC: 290 MOSM/KG (ref 275–295)
PLATELET # BLD AUTO: 143 10(3)UL (ref 150–450)
POTASSIUM SERPL-SCNC: 3.1 MMOL/L (ref 3.5–5.1)
RBC # BLD AUTO: 3.25 X10(6)UL (ref 3.8–5.8)
SODIUM SERPL-SCNC: 140 MMOL/L (ref 136–145)
WBC # BLD AUTO: 3.1 X10(3) UL (ref 4–11)

## 2025-04-18 RX ORDER — AMLODIPINE BESYLATE 10 MG/1
10 TABLET ORAL DAILY
Qty: 30 TABLET | Refills: 1 | Status: SHIPPED | OUTPATIENT
Start: 2025-04-18 | End: 2025-06-17

## 2025-04-18 RX ORDER — MAGNESIUM OXIDE 400 MG/1
400 TABLET ORAL ONCE
Status: COMPLETED | OUTPATIENT
Start: 2025-04-18 | End: 2025-04-18

## 2025-04-18 RX ORDER — BUPROPION HYDROCHLORIDE 100 MG/1
100 TABLET ORAL DAILY
Qty: 30 TABLET | Refills: 0 | Status: SHIPPED | OUTPATIENT
Start: 2025-04-18

## 2025-04-18 RX ORDER — POTASSIUM CHLORIDE 1500 MG/1
40 TABLET, EXTENDED RELEASE ORAL EVERY 4 HOURS
Status: DISCONTINUED | OUTPATIENT
Start: 2025-04-18 | End: 2025-04-18

## 2025-04-18 RX ORDER — METOPROLOL TARTRATE 25 MG/1
25 TABLET, FILM COATED ORAL 2 TIMES DAILY
Qty: 60 TABLET | Refills: 1 | Status: SHIPPED | OUTPATIENT
Start: 2025-04-18 | End: 2025-06-17

## 2025-04-18 NOTE — CM/SW NOTE
04/18/25 1500   Discharge disposition   Expected discharge disposition Home-Health   Post Acute Care Provider   ([facility])   DME/Infusion Providers [facility]   Discharge transportation [facility]     Ambulance scheduled at [facility] rates for 6:00 PM tonight, PCS completed and reflects need for [facility].     [facility] to coordinate start of care for tomorrow. [facility] to deliver wheelchair to home tomorrow. Order entered for Home O2, should pt/wife want to privately rent home O2. [facility] aware, OOP rental is $145/month.     ISAAC Julien

## 2025-04-18 NOTE — PLAN OF CARE
Assumed care at 0730  A/O x4  RA. 1-2 O2 PRN  IV abx infused per order   O2 walk completed   R groin dressing changed     NURSING DISCHARGE NOTE    Discharged Home via Cart.  Accompanied by Support staff  Belongings Taken by patient/family.  Pt cleared for dc by all consults  AVS reviewed w/ pt. All questions answered   Meds delivered to room

## 2025-04-18 NOTE — CM/SW NOTE
VM left for admissions at  [facility] at this time to inquire on acceptance. Message sent to [place] [facility] to update of potential DC today.     Addendum 10:00- [facility] determination in agreement with termination of services.  Patient has until Noon on 4/19 to discharge.     Reviewed [facility] notice with pt/spouse. Aware that [facility] has not yet responded. Pt had called [facility] liaison to ask again for consideration of AR. Notified pt that he is not appropriate for AR or even VERA. Reviewed recs for [facility] and that he is performing at [facility]. Pt and spouse now agreeable to home with [facility]. We discussed DC home and having pt stay on main level. Will continue to work with  PT/OT on stair navigation. Pt discussed with PT/OT. Encouraged DC home today, rather than tomorrow. Wife is agreeable to this, will need [facility] transport home. Wife states scripts were sent to [facility], they no longer use this pharmacy. Is agreeable to using [facility]. Notified RN.    Addendum 1:00- received VM from wife asking for Home O2, transport wheelchair rental and rental of a \"tall chair\". Asked RN if O2 walk can be completed. Will see if pt has a qualifying dx for home O2. Referral started for wheelchair. Notified MD of need for cosign of orders and face to face. Discussed with wife, provided resources to medical supply stores.     Wheelchair:   Cy Monsalve requires a wheelchair to complete mobility related ADL's within the home. Cy Monsalve is unable to complete the majority of ADL's with a cane or walker.  Cy Monsalve can self-propel in a standard wheelchair. Patient has adequate space within their home to safely use the wheelchair. The patient has not expressed an unwillingness to use the wheelchair and patient can’t use cane or walker to resolve mobility limitation.The wheelchair can be used safely in the home for MRADL’s      2:45- SW uploaded MD order and face to face for wheelchair at home.  Notified [facility]. HME to call wife to coordinate delivery of wheelchair. Antic delivery to be Saturday or Monday. O2 walk being completed now.     [facility] has not responded to referral. Reserved [facility] and asked for them to coordinate SOC with wife.     3:15- O2 walk completed, pt saturations > 88%. Therefore, does not qualify for home O2 per Medicare guidelines.     SW met with pt and wife at bedside for DC planning. [facility], will have start of care tomorrow. Wheelchair to be delivered on Saturday to pt's home. Aware that pt does not qualify for Home O2 under Medicare, however will enter order in case pt/wife would like to self pay for Home Oxygen.  Notified NIKO.       ISAAC Julien

## 2025-04-18 NOTE — PROGRESS NOTES
[facility]   part of [facility]     Vascular Surgery Progress Note    Cy Monsalve Patient Status:  Inpatient    1955 MRN [id number]   Location [facility] 7NE-A Attending Neil Oquendo MD   Hosp Day # 29 PCP Kam Alberts MD     Objective:   Temp: 98.5 °F (36.9 °C)  Pulse: 71  Resp: 17  BP: 141/69      Events Yesterday/Overnight:  Patient is a 69 year old male, POD 28, fenestrated endovascular arch repair with fenestrated thoracoabdominal aneurysm repair, left carotid to subclavian transposition with endovascular septotomy of dissection with Dr. Oquendo and Dr. Yanez.  BP stable at 141/69. WBC 3.1, hemoglobin 9.4. Platelet count 143.       Exam:  Palpable bilateral DP and PT pulses.  Left groin access site healed, no hematoma. Right groin access site open wound, small amount of serous drainage present. Productive cough present. Left subclavian incision site healing well, steri strip intact     Impression/Plan:    Change right groin access site gauze and paper tape BID and PRN.     Continue physical therapy.     Continue plan for patient to use urinal/bedside commode.      Continue incentive spirometry.      Patient cleared from discharge to rehab from a vascular surgery standpoint, awaiting placement for rehab per social work.       Medications:  Current Hospital Medications[1]    Laboratory/Data:    LABS:  Recent Labs   Lab 25  0545 25  1724 04/15/25  0554 04/15/25  1803 25  0538 25  0637 25  0611   WBC 3.4*  --  3.6*  --  3.4* 3.5* 3.1*   HGB 6.9*   < > 7.7* 10.0* 9.3* 9.7* 9.4*   MCV 87.7  --  88.6  --  86.0 85.4 86.2   PLT 64.0*  --  76.0*  --  101.0* 131.0* 143.0*    < > = values in this interval not displayed.       Recent Labs   Lab 25  0545 04/15/25  0554 25  0538 25  0637 25  0611   * 136 138 139 140   K 3.7 4.4  4.4 3.4* 3.5 3.1*    106 106 104 105   CO2 23.0 21.0 24.0 24.0 25.0   BUN 31* 28* 23 14 15    CREATSERUM 1.44* 1.49* 1.35* 1.16 1.10   GLU 91 94 90 89 92   CA 8.2* 8.1* 8.3* 8.6* 8.3*   MG 1.8 1.8 1.6 1.5* 1.6     No results for input(s): \"PTP\", \"INR\", \"PTT\" in the last 168 hours.  Recent Labs   Lab 04/13/25  1044   ALT 16   AST 14   ALB 3.2     No results for input(s): \"TROP\" in the last 168 hours.  No results found for: \"ANAS\", \"ROVERTO\", \"ANASCRN\"  No results for input(s): \"PCACT\", \"PSACT\", \"AT3ACT\", \"HIPAB\", \"PATHI\", \"STALA\", \"DRVVTRATIO\", \"DRVVT\", \"STACLOT\", \"CARIGG\", \"Z8GL6ZHOYY\", \"G6DP4ZGEIO\", \"RA\", \"HAVIGM\", \"HBCIGM\", \"HCVAB\", \"HBSAG\", \"HBCAB\", \"HBVDNAINTERP\", \"ANAS\", \"C3\", \"C4\" in the last 168 hours.    ISATU Warren  4/18/2025  8:56 AM         [1]   Current Facility-Administered Medications   Medication Dose Route Frequency    magnesium oxide (Mag-Ox) tab 400 mg  400 mg Oral Once    potassium chloride (Klor-Con M20) tab 40 mEq  40 mEq Oral Q4H    ampicillin-sulbactam (Unasyn) 3 g in sodium chloride 0.9% 100mL IVPB-ADD  3 g Intravenous q6h    sodium chloride 0.9% infusion   Intravenous Once    buPROPion (Wellbutrin) tab 100 mg  100 mg Oral Daily    sodium chloride 3 % nebulizer solution 3 mL  3 mL Nebulization TID    guaiFENesin ER (Mucinex) 12 hr tab 600 mg  600 mg Oral BID    guaiFENesin (Robitussin) 100 MG/5 ML oral liquid 100 mg  100 mg Oral Q4H PRN    apixaban (Eliquis) tab 5 mg  5 mg Oral BID    loperamide (Imodium) cap 2 mg  2 mg Oral Every afternoon at 1400    diphenoxylate-atropine (Lomotil) 2.5-0.025 MG per tab 2 tablet  2 tablet Oral QID PRN    multivitamin (Tab-A-Immanuel/Beta Carotene) tab 1 tablet  1 tablet Oral Daily    amLODIPine (Norvasc) tab 10 mg  10 mg Oral Daily    peppermint oil liquid 1 mL  1 mL Other PRN    metoprolol tartrate (Lopressor) tab 25 mg  25 mg Oral 2x Daily(Beta Blocker)    hydrALAzine (Apresoline) 20 mg/mL injection 10 mg  10 mg Intravenous Q4H PRN    fluticasone propionate (Flonase) 50 MCG/ACT nasal suspension 1 spray  1 spray Each Nare Daily     ipratropium-albuterol (Duoneb) 0.5-2.5 (3) MG/3ML inhalation solution 3 mL  3 mL Nebulization Q4H PRN    glucose (Dex4) 15 GM/59ML oral liquid 15 g  15 g Oral Q15 Min PRN    Or    glucose (Glutose) 40% oral gel 15 g  15 g Oral Q15 Min PRN    Or    glucose-vitamin C (Dex-4) chewable tab 4 tablet  4 tablet Oral Q15 Min PRN    Or    dextrose 50% injection 50 mL  50 mL Intravenous Q15 Min PRN    Or    glucose (Dex4) 15 GM/59ML oral liquid 30 g  30 g Oral Q15 Min PRN    Or    glucose (Glutose) 40% oral gel 30 g  30 g Oral Q15 Min PRN    Or    glucose-vitamin C (Dex-4) chewable tab 8 tablet  8 tablet Oral Q15 Min PRN    insulin aspart (NovoLOG) 100 Units/mL FlexPen 1-5 Units  1-5 Units Subcutaneous TID AC and HS    acetaminophen (Tylenol) tab 650 mg  650 mg Oral Q4H PRN    Or    HYDROcodone-acetaminophen (Norco) 5-325 MG per tab 1 tablet  1 tablet Oral Q4H PRN    Or    HYDROcodone-acetaminophen (Norco) 5-325 MG per tab 2 tablet  2 tablet Oral Q4H PRN    ondansetron (Zofran) 4 MG/2ML injection 4 mg  4 mg Intravenous Q6H PRN    metoclopramide (Reglan) 5 mg/mL injection 10 mg  10 mg Intravenous Q8H PRN

## 2025-04-18 NOTE — PROGRESS NOTES
[facility]  [facility]  Progress Note    Cy Monsalve Patient Status:  Inpatient    1955 MRN [id number]   Location [address] Attending Neil Oquendo MD   Hosp Day # 29 PCP Kam Alberts MD     ADMISSION DATE AND TIME: 3/19/2025  1:15 PM    ADMIT DIAGNOSIS: Abdominal aortic aneurysm (AAA) [I71.40]    SUBJECTIVE:    No new events.   Hemoglobin stable at 9.4 grams.  Platelet count is up to 143  Psychiatry notes reviewed  Mild leukopenia persists at 3.1      OBJECTIVE:  Blood pressure 135/80, pulse 67, temperature 97.7 °F (36.5 °C), temperature source Oral, resp. rate 17, weight 196 lb 3.4 oz (89 kg), SpO2 92%.    PHYSICAL EXAM:  General: afebrile, alert and oriented x 3, pleasant  No distress    LABS:  Recent Results (from the past 24 hours)   POCT Glucose    Collection Time: 25 12:37 PM   Result Value Ref Range    POC Glucose 105 (H) 70 - 99 mg/dL   POCT Glucose    Collection Time: 25  9:18 PM   Result Value Ref Range    POC Glucose 112 (H) 70 - 99 mg/dL   POCT Glucose    Collection Time: 25  5:18 AM   Result Value Ref Range    POC Glucose 99 70 - 99 mg/dL   CBC With Differential With Platelet    Collection Time: 25  6:11 AM   Result Value Ref Range    WBC 3.1 (L) 4.0 - 11.0 x10(3) uL    RBC 3.25 (L) 3.80 - 5.80 x10(6)uL    HGB 9.4 (L) 13.0 - 17.5 g/dL    HCT 28.0 (L) 39.0 - 53.0 %    .0 (L) 150.0 - 450.0 10(3)uL    MCV 86.2 80.0 - 100.0 fL    MCH 28.9 26.0 - 34.0 pg    MCHC 33.6 31.0 - 37.0 g/dL    RDW 17.2 %    Neutrophil Absolute Prelim 1.82 1.50 - 7.70 x10 (3) uL    Neutrophil Absolute 1.82 1.50 - 7.70 x10(3) uL    Lymphocyte Absolute 0.74 (L) 1.00 - 4.00 x10(3) uL    Monocyte Absolute 0.33 0.10 - 1.00 x10(3) uL    Eosinophil Absolute 0.14 0.00 - 0.70 x10(3) uL    Basophil Absolute 0.01 0.00 - 0.20 x10(3) uL    Immature Granulocyte Absolute 0.01 0.00 - 1.00 x10(3) uL    Neutrophil % 59.7 %    Lymphocyte % 24.3 %    Monocyte %  10.8 %    Eosinophil % 4.6 %    Basophil % 0.3 %    Immature Granulocyte % 0.3 %   Basic Metabolic Panel (8)    Collection Time: 04/18/25  6:11 AM   Result Value Ref Range    Glucose 92 70 - 99 mg/dL    Sodium 140 136 - 145 mmol/L    Potassium 3.1 (L) 3.5 - 5.1 mmol/L    Chloride 105 98 - 112 mmol/L    CO2 25.0 21.0 - 32.0 mmol/L    Anion Gap 10 0 - 18 mmol/L    BUN 15 9 - 23 mg/dL    Creatinine 1.10 0.70 - 1.30 mg/dL    Calcium, Total 8.3 (L) 8.7 - 10.6 mg/dL    Calculated Osmolality 290 275 - 295 mOsm/kg    eGFR-Cr 72 >=60 mL/min/1.73m2   Magnesium    Collection Time: 04/18/25  6:11 AM   Result Value Ref Range    Magnesium 1.6 1.6 - 2.6 mg/dL     Platelet Count::    Lab Results   Component Value Date    .0 (L) 04/18/2025    .0 (L) 04/17/2025    .0 (L) 04/16/2025        CULTURES  Hospital Encounter on 03/19/25   1. Aerobic Bacterial Culture     Status: Abnormal    Collection Time: 04/12/25 11:20 AM    Specimen: Leg, right; Other   Result Value Ref Range    Aerobic Culture Result  N/A     Mixture of organisms suggestive of normal skin beth    Aerobic Culture Result 2+ growth Enterococcus faecalis NOT VRE (A) N/A    Aerobic Smear 1+ Gram positive cocci in pairs and clusters N/A    Aerobic Smear 1+ WBCs seen N/A       Susceptibility    Enterococcus faecalis NOT VRE -  (no method available)     Ampicillin <=2 Sensitive      Vancomycin 1 Sensitive    2. Blood Culture     Status: None    Collection Time: 03/24/25 10:05 AM    Specimen: Blood,peripheral   Result Value Ref Range    Blood Culture Result No Growth 5 Days N/A       IMAGING:    No results found.        MEDICATIONS:  Medications reviewed.     ampicillin-sulbactam  3 g Intravenous q6h    sodium chloride   Intravenous Once    buPROPion  100 mg Oral Daily    sodium chloride  3 mL Nebulization TID    guaiFENesin ER  600 mg Oral BID    apixaban  5 mg Oral BID    loperamide  2 mg Oral Every afternoon at 1400    multivitamin  1 tablet Oral Daily     amLODIPine  10 mg Oral Daily    metoprolol tartrate  25 mg Oral 2x Daily(Beta Blocker)    fluticasone propionate  1 spray Each Nare Daily    insulin aspart  1-5 Units Subcutaneous TID AC and HS             guaiFENesin    diphenoxylate-atropine    peppermint oil    hydrALAzine    ipratropium-albuterol    glucose **OR** glucose **OR** glucose-vitamin C **OR** dextrose **OR** glucose **OR** glucose **OR** glucose-vitamin C    acetaminophen **OR** HYDROcodone-acetaminophen **OR** HYDROcodone-acetaminophen    ondansetron    metoclopramide    ASSESSMENT AND PLAN:     Active Problems:    Pre-op testing    Thoracoabdominal aortic aneurysm (TAAA)    Bilateral leg weakness    Current moderate episode of major depressive disorder without prior episode (HCC)    Delirium due to another medical condition    Groin abscess        Cy Monsalve is a 69 year old male with 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  Hx of DVT in 2024  Hx of mesenteric ischemia     Status post AAA repair with complicated postop course with hypoxia and HOLLIS.       Thrombocytopenia  Platelets were normal until 3/22/2025  Thereafter they had declined into the 70-90 while on heparin  Heparin-induced platelet antibody was positive.    SUNDEEP negative    Recent inpatient team notes reviewed.      Ok to discharge on apixaban 5mg bid.   (This would be appropriate treatment for possible hit).     Platelet count stable > 140 today.  Hemoglobin stable today > 9.0 grams.     Psychiatry notes reviewed.    We will plan to follow       Jason Modi MD

## 2025-04-18 NOTE — PHYSICAL THERAPY NOTE
PHYSICAL THERAPY TREATMENT NOTE - INPATIENT    Room Number: 7613/7613-A     Session: 15     Number of Visits to Meet Established Goals:  (15)  History related to current admission: Patient is a 69 year old male admitted on 3/19/2025 from Home for S/p 3/21 fenestrated abdominal aortic aneurysm repair with left carotid to subclavian transposition on  with Dr. Yanez and Dr. Oquendo and 3/20 lumbar drain.  Reporting RLE weakness 3/22 AM worsening to BLE MRI T/L spine w/o obvious compression or signs of cord ischemia. Pt with inconsistent participation in therapy sessions - passive thoughts of death per psych consult 4/11 dx depression. R groin site with purulent drainage  - flushed at bedside by vascular 4/13     HOME SITUATION  Type of Home: House  Home Layout: Two level      Stairs to Bedroom: 16    Railing: Yes    Lives With: Spouse       Prior Level of Skippers: Pt states that he lives in a house with spouse. Pt  reports that he was IND with all his ADLs and gait. Pt has no history of falls.  Presenting Problem: S/p 3/21 fenestrated abdominal aortic aneurysm repair with left carotid to subclavian transposition on  with Dr. Yanez and Dr. Oquendo and 3/20 lumbar drain  Co-Morbidities : HTN, Peripheral Neuropathy, Hx of toe amputation L great toe, mesenteric ischemia s/p SMA bypass with right external iliac artery to SMA bypass, Short gut syndrome    PHYSICAL THERAPY ASSESSMENT   Patient demonstrates fair progress this session, goals  remain in progress. Pt continues to require increased time and encouragement to participate in mobility. Continues to be more open to progress with motivational interviewing techniques applied. Open to initiation of stair training this session.     The patient is below baseline and would benefit from skilled inpatient PT to address the above deficits to assist patient in returning to prior to level of function.        PLAN DURING HOSPITALIZATION  Nursing Mobility Recommendation :  1 Assist  PT Device Recommendation: Rolling walker  PT Treatment Plan: Bed mobility, Body mechanics, Gait training, Strengthening, Transfer training, Stair training, Balance training  Frequency (Obs): 3-5x/week     CURRENT GOALS   Goal #1 Patient is able to demonstrate supine - sit EOB @ level: CGA assistance   MET Raquel 4/1     Goal #2 Patient is able to demonstrate transfers EOB to/from BSC at assistance level: CGA assistance   MET Raquel 4/1     Goal #3 Patient is able to sit at the EOB for 10 mins with CGA    Met 4/8/2025       Goal #4 Patient is able to ambulate 50 feet with assist device: walker - rolling at assistance level: moderate assistance  MET 4/7 progress to 150ft CGA     Goal #5     Goal #6     Goal Comments: Goals established on 3/22/2025  4/18/2025 all goals ongoing    SUBJECTIVE  \" I'm never ready for you to come. Give me 20 minutes\" - discussed with patient will likely do better at home then he can get up and mobilize when he wants to and not when someone is asking him to participate    OBJECTIVE  Precautions: Bed/chair alarm (surgical boot L foot? SBP )    WEIGHT BEARING RESTRICTION  R Lower Extremity: Full Weight Bearing  L Lower Extremity: Full Weight Bearing    PAIN ASSESSMENT   Rating: Unable to rate  Location: bottom  Management Techniques: Activity promotion, Body mechanics, Repositioning    BALANCE                                                                                                                       Static Sitting: Good  Dynamic Sitting: Good           Static Standing: Fair -  Dynamic Standing: Fair -    ACTIVITY TOLERANCE                         O2 WALK       AM-PAC '6-Clicks' INPATIENT SHORT FORM - BASIC MOBILITY  How much difficulty does the patient currently have...  Patient Difficulty: Turning over in bed (including adjusting bedclothes, sheets and blankets)?: None   Patient Difficulty: Sitting down on and standing up from a chair with arms (e.g., wheelchair, bedside  commode, etc.): A Little   Patient Difficulty: Moving from lying on back to sitting on the side of the bed?: A Little   How much help from another person does the patient currently need...   Help from Another: Moving to and from a bed to a chair (including a wheelchair)?: A Little   Help from Another: Need to walk in hospital room?: A Little   Help from Another: Climbing 3-5 steps with a railing?: A Little     AM-PAC Score:  Raw Score: 19   Approx Degree of Impairment: 41.77%   Standardized Score (AM-PAC Scale): 45.44   CMS Modifier (G-Code): CK    FUNCTIONAL ABILITY STATUS  Gait Assessment   Functional Mobility/Gait Assessment  Gait Assistance: Contact guard assist  Distance (ft): 5-45-45  Assistive Device: Rolling walker  Pattern: Shuffle  Stairs: Stairs  How Many Stairs: 2  Device: 1 Rail  Assist: Contact guard assist  Pattern: Ascend and Descend  Ascend and Descend : Side step    Skilled Therapy Provided    Bed Mobility:  Rolling: NT  Supine<>Sit: min A   Sit<>Supine:  NT    Transfer Mobility:  Sit<>Stand: CGA from bed and bedside chair  Stand<>Sit: CGA - requires cuing for UE placement, scooting anteriorly, and rocking x3 for momentum  Gait: CGA     Therapist's Comments: Discussed case with RN prior to session initiation.   Gait belt donned for out of bed mobility. Continues to require increased encouragement and request inc time for rest breaks throughout session.  Pt open to stair training today with encouragement from spouse - educated on step to pattern with BUE support on rail, initially attempted ascending with LLE > pt demonstrating inadequate strength, with RLE leading pt able to navigate 1 step x2 trials with CGA. Participated in gait training comparable to previous sessions. Reviewed EC and pacing strategies for functional activities     Patient End of Session: Up in chair, Needs met, Call light within reach, RN aware of session/findings, All patient questions and concerns addressed, Family present,  Alarm set    PT Session Time: 29 minutes  Gait: 24 minutes  Therapeutic Activity: 5

## 2025-04-18 NOTE — OCCUPATIONAL THERAPY NOTE
OCCUPATIONAL THERAPY TREATMENT NOTE - INPATIENT     Room Number: 7613/7613-A  Session: 13  Number of Visits to Meet Established Goals:  (15 added 8 sessions on 4/10)    Presenting Problem: S/p 3/21 fenestrated abdominal aortic aneurysm repair with left carotid to subclavian transposition on  with Dr. Yanez and Dr. Oquendo and 3/20 lumbar drain    ASSESSMENT   Patient demonstrates good  progress this session, goals remain in progress.     Patient continues to function below baseline with  all ADL . Contributing factors to remaining limitations include decreased functional strength and decreased endurance.  Next session anticipate patient to progress toileting and lower body dressing.  Occupational Therapy will continue to follow patient for duration of hospitalization.     Patient continues to benefit from continued skilled OT services: at discharge to promote prior level of function and safety with additional support and return home with home health OT. Anticipate return home with additional support with home health OT. Patient is performing toilet transfer supervision/SBA, bed mobility independent, LB ADL min A, and UB ADL set-up level. At home, patient would require assistance with LB ADL, bathing, and household tasks.      History: Patient is a 69 year old male admitted on 3/19/2025 with Presenting Problem: S/p 3/21 fenestrated abdominal aortic aneurysm repair with left carotid to subclavian transposition on  with Dr. Yanez and Dr. Oquendo and 3/20 lumbar drain. Co-Morbidities : HTN, Peripheral Neuropathy, Hx of toe amputation L great toe, mesenteric ischemia s/p SMA bypass with right external iliac artery to SMA bypass, Short gut syndrome      ** 4/10/25  Pt had controlled B knee weakness when he was walking to the bathroom with nursing  **3/25 acute respiratory failure, possible pneumonia, on vapotherm       TREATMENT SESSION:  Patient Start of Session: semi supine in bed    FUNCTIONAL TRANSFER  ASSESSMENT  Sit to Stand: Edge of Bed; Chair  Edge of Bed: Contact Guard Assist  Chair: Contact Guard Assist  Toilet Transfer: Not Tested  Commode Transfer: Not Tested    BED MOBILITY  Rolling: Not Tested  Supine to Sit : Supervision  Sit to Supine (OT): Not Tested  Scooting: Supervision    BALANCE ASSESSMENT  Static Sitting: Independent  Static Standing: Supervision    FUNCTIONAL ADL ASSESSMENT  Grooming Seated: Not Tested  UB Dressing Seated: Not Tested  LB Dressing Seated: Not Tested  LB Dressing Standing: Not Tested  Toileting Seated: Not Tested  Toileting Standing: Not Tested    O2 SATURATIONS       EDUCATION PROVIDED  Patient Education : Role of Occupational Therapy; Plan of Care; Discharge Recommendations; Functional Transfer Techniques; Fall Prevention; Posture/Positioning; Energy Conservation; Proper Body Mechanics  Patient's Response to Education: Verbalized Understanding; Requires Further Education    Equipment used: rw    Therapist comments: Pt received semi supine in bed, pleasant and cooperative for OT session. Pt completing shaving task, seated up in bed at supervision. Pt encouraged on OOB activity as per Eliane OLIVER, pt and spouse agreeable to DC HOME and that pt wants to trial stairs. Pt performs bed mobility at supervision to sit EOB with increased time. Sit to stand transfer performed at CGA and pt performs step transfer to bedside chair with RW requiring CGA. Pt taken to stairs. See PT note for STAIR TRAINING. Following stair training, pt engages in 2 bouts of household distance functional mobility with RW requiring CGA in preparation for standing based ADLs and functional transfers. Seated rest breaks provided. Pt with good tolerance this date and left in chair with all needs.    Patient End of Session: Up in chair, Needs met, Call light within reach, RN aware of session/findings, All patient questions and concerns addressed, Hospital anti-slip socks, Alarm set, Family present    PAIN  ASSESSMENT  Ratin  Location: denies  Management Techniques: Repositioning     OBJECTIVE  Precautions: Bed/chair alarm (surgical boot L foot? SBP )    AM-PAC ‘6-Clicks’ Inpatient Daily Activity Short Form  -   Putting on and taking off regular lower body clothing?: A Little  -   Bathing (including washing, rinsing, drying)?: A Little  -   Toileting, which includes using toilet, bedpan or urinal? : A Little  -   Putting on and taking off regular upper body clothing?: None  -   Taking care of personal grooming such as brushing teeth?: None  -   Eating meals?: None    AM-PAC Score:  Score: 21  Approx Degree of Impairment: 32.79%  Standardized Score (AM-PAC Scale): 44.27    PLAN  OT Device Recommendations: TBD  OT Treatment Plan: Energy conservation/work simplification techniques, ADL training, UE strengthening/ROM, Functional transfer training, Patient/Family education  Rehab Potential : Guarded  Frequency: 3x/week    OT Goals:   Ongoing   ADL Goals   Patient will perform upper body dressing:  with setup-MET   Patient will perform lower body dressing:  with min assist  Patient will perform toileting: with min assist     Functional Transfer Goals  Patient will transfer from supine to sit:  with min assist -MET  UPGRADE TO SUPERVISION; GOAL MET   Patient will transfer to toilet:  with min assist -MET /7 SIMULATED, UPGRADE TO SUPERVISION     UE Exercise Program Goal  Patient will be independent with bilateral AROM HEP (home exercise program).     Additional Goals  Pt will incorporate 2 energy conservation techniques into ADL.    OT Session Time: 25 minutes  Therapeutic Activity: 25 minutes

## 2025-04-18 NOTE — CM/SW NOTE
IF DC ON HOME O2 will need the following:     Documentation for O2 sats: (RN to add a progress note with either o2 walk written out or flow sheet added to progress note )  SPO2% on Room Air at Rest:   SPO2% on Oxygen at Rest:   At rest oxygen flow (liters per minute):   SPO2% Ambulation on Oxygen:   Ambulation oxygen flow (liters per minute):       MD to document AFTER O2 sats  I had a face to face visit with this patient and I evaluated the patient's O2 saturations.  The patient is mobile in their home and is in need of a portable oxygen tank.  The home oxygen will improve the patient's condition.      Once O2 sats and MD verbiage are completed and IF home O2 is indicated, SW to place [facility] and request MD to cosign as appropriate. (Dx: CHF)    (.O2)  Home O2 concentrator and portable tanks at __ liters NC to be used __ (frequency) for diagnosis of __  Duration of treatment 99 mo  Evaluate for conserving device.  Ordering MD: __  NPI # __

## 2025-04-18 NOTE — PROGRESS NOTES
[facility] Hospitalist Progress Note     CC: Hospital Follow up    PCP: Kam Alberts MD     Subjective:     No changes overnight.     OBJECTIVE:    Blood pressure 117/70, pulse 74, temperature 97.6 °F (36.4 °C), temperature source Oral, resp. rate 24, weight 196 lb 3.4 oz (89 kg), SpO2 92%.    Temp:  [97.5 °F (36.4 °C)-98.5 °F (36.9 °C)] 97.6 °F (36.4 °C)  Pulse:  [65-74] 74  Resp:  [16-24] 24  BP: (117-141)/(63-80) 117/70  SpO2:  [92 %-96 %] 92 %      Intake/Output:    Intake/Output Summary (Last 24 hours) at 4/18/2025 1439  Last data filed at 4/18/2025 0810  Gross per 24 hour   Intake --   Output 730 ml   Net -730 ml             Exam   Gen: No acute distress, alert and oriented x3, no focal neurologic deficits, chronically ill-appearing  Heent: NC AT, mucous memb clear, neck supple  Pulm: Lungs clear, normal respiratory effort  CV: Heart with regular rate and rhythm, no peripheral edema  Abd: Abdomen soft, nontender, nondistended, bowel sounds present  MSK:right groin dressed   Neuro: AO*3, motor intact, no sensory deficits  Psyc: appropriate mood and affect      Data Review:       Labs:     Recent Labs   Lab 04/16/25  0538 04/17/25  0637 04/18/25  0611   RBC 3.14* 3.36* 3.25*   HGB 9.3* 9.7* 9.4*   HCT 27.0* 28.7* 28.0*   MCV 86.0 85.4 86.2   MCH 29.6 28.9 28.9   MCHC 34.4 33.8 33.6   RDW 17.3 17.5 17.2   NEPRELIM 2.50 2.19 1.82   WBC 3.4* 3.5* 3.1*   .0* 131.0* 143.0*         Recent Labs   Lab 04/16/25  0538 04/17/25  0637 04/18/25  0611   GLU 90 89 92   BUN 23 14 15   CREATSERUM 1.35* 1.16 1.10   EGFRCR 56* 67 72   CA 8.3* 8.6* 8.3*    139 140   K 3.4* 3.5 3.1*    104 105   CO2 24.0 24.0 25.0       Recent Labs   Lab 04/13/25  1044   ALT 16   AST 14   ALB 3.2           Imaging:  No results found.        Meds:      potassium chloride  40 mEq Intravenous Once    ampicillin-sulbactam  3 g Intravenous q6h    sodium chloride   Intravenous Once    buPROPion  100 mg Oral Daily    sodium chloride  3 mL  Nebulization TID    guaiFENesin ER  600 mg Oral BID    apixaban  5 mg Oral BID    loperamide  2 mg Oral Every afternoon at 1400    multivitamin  1 tablet Oral Daily    amLODIPine  10 mg Oral Daily    metoprolol tartrate  25 mg Oral 2x Daily(Beta Blocker)    fluticasone propionate  1 spray Each Nare Daily    insulin aspart  1-5 Units Subcutaneous TID AC and HS               guaiFENesin    diphenoxylate-atropine    peppermint oil    hydrALAzine    ipratropium-albuterol    glucose **OR** glucose **OR** glucose-vitamin C **OR** dextrose **OR** glucose **OR** glucose **OR** glucose-vitamin C    acetaminophen **OR** HYDROcodone-acetaminophen **OR** HYDROcodone-acetaminophen    ondansetron    metoclopramide         Assessment/Plan:     69 year old male with a past medical history of hypertension, GERD, anxiety, renal calculi, DVT, ascending aortic dissection s/p repair, 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  Postoperative course complicated by HOLLIS on CKD as well as right lower extremity weakness along with hypoxic respiratory failure.      History of prior infrarenal endovascular AAA repair with bilateral KYLER for hypogastric and common iliac aneurysms  History of mesenteric ischemia s/p SMA bypass with right external iliac artery to SMA bypass  History of aortic dissection s/p repair, aortic dissection distal to left subclavian  S/p complex thoraco abdominal aortic repair 3/21  Hx of DVT   -Complicated aortic pathology with multiple repairs in the past. Underwent thoracoabdominal aneurysm repair 3/21  -s/p lumbar drain with neurointerventional to limit risk of spinal cord ischemia 3/20 and removed 3/26  - culture w/ enterobacter - had  reported allergy to amox - reported itching to hands and knee which resolved w/ benadryl, but never took this again. He is willing to attempt pcn in house.   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  Prior to that he had a mesenteric ischemia and was started on Coumadin which he continued for 8 months.  He is also positive for heterozygous prothrombin gene mutation.  Eliquis was recommended by hematology.     Acute hypoxemic respiratory failure w/ threat to bodily function 2/2 possible pneumonia versus edema  Leukocytosis  - pulm consulted - completed course of abx w/ azithro and cefepime  - tx w/ burst steroids - currently off   - ongoing cough, hypoxemia likely 2/2 atelectasis - cont IS, FV and added hypertonic saline nebs  - encourage oob     Generalized weakness  Back pain  - In the setting of prolonged hospital stay, pain control     Leukocytosis-resolved    Anemia 2/2 blood loss  - stable     Right lower extremity weakness-resolved  - In the setting of complex aortic repair 3/21  -- PT/OT     HOLLIS on CKD- resolved     Hypertension  -hold home hydrochlorothiazide, continue amlodipine      Allergic rhinitis  -Continue home fluticasone as needed     Short gut syndrome  - resume lamotil if diarrhea resumes  - Add Imodium per home regimen    Sacral wound  - In the setting of persistent diarrhea and immobility   - Wound care consult, recommendations reviewed    Physical deconditioning  - multifactorial 2/2  prolonged hospitalization and situational depression, self limiting mobility   - cont ongoing pt/ot - plan for home therapies at dc   Cy Monsalve requires a wheelchair to complete mobility related ADL's within the home. Cy Monsalve is unable to complete the majority of ADL's with a cane or walker. Cy Monsalve can self-propel in a standard wheelchair. Patient has adequate space within their home to safely use the wheelchair. The patient has not expressed an unwillingness to use the wheelchair and patient can't use cane or walker to resolve mobility limitation.The wheelchair can be used safely in the home for MRADL's     Depression  SI  - psych consulted, started on wellbutrin     Dispo -  Medically stable for dc     37 minutes were spent w/ patient and coordinating care.     DO Kendra Lewis [facility]  Hospitalist  Contact via [employer]          Supplementary Documentation:   DVT Mechanical Prophylaxis:   SCDs, Early ambuation  DVT Pharmacologic Prophylaxis   Medication    apixaban (Eliquis) tab 5 mg                Code Status: DNAR/Full Treatment  Arzola: No urinary catheter in place  Arzola Duration (in days):   Central line:

## 2025-04-26 NOTE — DISCHARGE SUMMARY
Vascular Surgery  Surgical Discharge Summary    Admission Date: 3/19/2025   D/C Date: 4/18/2025  Discharge Diagnosis: complex aortic dissection  Discharge Condition: good      HISTORY OF PRESENT ILLNESS: Cy Monsalve is a 70 year old  male who was admitted for ascending aortic dissection, aortic dissection distal to left subclavian, type 1c endoleak of aortic graft, history of abdominal aortic aneurysm repair.    PROCEDURES PERFORMED DURING THIS HOSPITALIZATION:   Procedure(s):  FENESTRATED ENDOVASCULAR ARCH REPAIR AND FENESTRATED THORACOABDOMINAL ANEURYSM REPAIR WITH LEFT CAROTID TO SUBCLAVIAN TRANSPOSITION, ENDOVASCULAR SEPTOTOMY OF DISSECTION, NEUROMONITORING; SEE CATH LAB CHARTING FOR ADDITIONAL DETAILS    MEDICAL HISTORY:  Problem List[1]     SURGICAL HISTORY:  Past Surgical History[2]     CURRENT MEDICATIONS:  Current Medications[3]     LABS AT DISCHARGE:    BMG:   Lab Results   Component Value Date    GLU 92 04/18/2025     04/18/2025    K 3.1 (L) 04/18/2025     04/18/2025    CO2 25.0 04/18/2025    BUN 15 04/18/2025    CA 8.3 (L) 04/18/2025       CBC:  Lab Results   Component Value Date    WBC 3.1 (L) 04/18/2025    RBC 3.25 (L) 04/18/2025    HGB 9.4 (L) 04/18/2025    HCT 28.0 (L) 04/18/2025    .0 (L) 04/18/2025   ]    Coags:  Lab Results   Component Value Date    PTT 40.7 (H) 04/10/2025    INR 2.36 (H) 04/09/2025       HOSPITAL COURSE: The patient underwent a complex arch and fenestrated endovascular abdominal aortic aneurysm repair  on 3/21/2025 - had prolonged hospital course    EXAM ON DISCHARGE:  /62 (BP Location: Left arm)   Pulse 68   Temp 97.6 °F (36.4 °C) (Oral)   Resp 19   Wt 196 lb 3.4 oz (89 kg)   SpO2 92%   BMI 23.27 kg/m²        DISCHARGE INSTRUCTIONS: Discharge instructions were reviewed with the patient including follow up within 2 weeks         [1]   Patient Active Problem List  Diagnosis    Essential hypertension    Common iliac aneurysm     Peripheral neuropathy, idiopathic    History of bowel infarction    Ventral hernia    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, subsequent encounter ([facility])    Non-seasonal allergic rhinitis due to pollen    Pre-op testing    Thoracoabdominal aortic aneurysm (TAAA)    Bilateral leg weakness    Current moderate episode of major depressive disorder without prior episode (HCC)    Delirium due to another medical condition    Groin abscess   [2]   Past Surgical History:  Procedure Laterality Date    Colonoscopy  10/15/09  [facility]    Screening: small polypoid fold in sigmoid (no polyp tissue seen on path), int hemorrhoids; next colonoscopy in 2019    Other  02/2017    AAA repair    Other      10/23/19- Dissected Aorta surgery    Other surgical history      Eye surgery for lazy eye    Other surgical history  10/15/09  [facility]    EGD (heartburn): normal    Other surgical history      AAA repair    Other surgical history      multiple toe amputations    Part removal colon w end colostomy      10/2019   [3]   Current Outpatient Medications   Medication Sig Dispense Refill    buPROPion 100 MG Oral Tab Take 1 tablet (100 mg total) by mouth daily. 30 tablet 0    amLODIPine 10 MG Oral Tab Take 1 tablet (10 mg total) by mouth daily. 30 tablet 1    apixaban 5 MG Oral Tab Take 1 tablet (5 mg total) by mouth 2 (two) times daily. 60 tablet 0    metoprolol tartrate 25 MG Oral Tab Take 1 tablet (25 mg total) by mouth 2 (two) times daily. 60 tablet 1    diphenoxylate-atropine 2.5-0.025 MG Oral Tab Take 1 tablet by mouth 4 (four) times daily as needed for Diarrhea.      Fluticasone Propionate 50 MCG/ACT Nasal Suspension 1 spray by Nasal route daily as needed.      Loperamide-Simethicone (IMODIUM ADVANCED OR) Take 2 Doses by mouth Every afternoon at 2:00 pm.

## (undated) DEVICE — PINNACLE INTRODUCER SHEATH: Brand: PINNACLE

## (undated) DEVICE — ZIMMER® STERILE DISPOSABLE TOURNIQUET CUFF WITH PLC, DUAL PORT, SINGLE BLADDER, 34 IN. (86 CM)

## (undated) DEVICE — 3M™ BAIR HUGGER® UNDERBODY BLANKET, FULL ACCESS, 10 PER CASE 63500: Brand: BAIR HUGGER™

## (undated) DEVICE — NON-ADHERENT STRIPS,OIL EMULSION: Brand: CURITY

## (undated) DEVICE — ADHESIVE SKIN TOP FOR WND CLSR DERMBND ADV

## (undated) DEVICE — BEACON TIP TORCON NB ADVANTAGE CATHETER: Brand: BEACON TIP TORCON NB

## (undated) DEVICE — Device: Brand: VISIONS PV .035 DIGITAL IVUS CATHETER

## (undated) DEVICE — PTA BALLOON DILATATION CATHETER: Brand: MUSTANG™

## (undated) DEVICE — UNDYED BRAIDED (POLYGLACTIN 910), SYNTHETIC ABSORBABLE SUTURE: Brand: COATED VICRYL

## (undated) DEVICE — STEERABLE GUIDEWIRE: Brand: BACK-UP MEIER

## (undated) DEVICE — SUT CHRM GUT 3-0 27IN SH ABSRB UD 26MM 1/2

## (undated) DEVICE — RADIFOCUS GLIDEWIRE ADVANTAGE GUIDEWIRE: Brand: GLIDEWIRE ADVANTAGE

## (undated) DEVICE — SHEATH INTRO 6.5FR L55CM DEFLECTION L17MM

## (undated) DEVICE — 1200CC GUARDIAN II: Brand: GUARDIAN

## (undated) DEVICE — HEMOCLIP MED 24 CLIP/CARTRIDGE

## (undated) DEVICE — SUT PERMA- 3-0 30IN NABSRB BLK TIE SILK

## (undated) DEVICE — GAMMEX® NON-LATEX PI TEXTURED SIZE 7.5, STERILE POLYISOPRENE POWDER-FREE SURGICAL GLOVE: Brand: GAMMEX

## (undated) DEVICE — Device

## (undated) DEVICE — FILTERLINE NASAL ADULT O2/CO2

## (undated) DEVICE — ADVANCE, 35LP LOW PROFILE PTA BALLOON DILATATION CATHETER: Brand: ADVANCE

## (undated) DEVICE — SLEEVE COMPR MD KNEE LEN SGL USE KENDALL SCD

## (undated) DEVICE — SUPER SPONGES,MEDIUM: Brand: KERLIX

## (undated) DEVICE — ABSORBABLE HEMOSTAT (OXIDIZED REGENERATED CELLULOSE): Brand: SURGICEL

## (undated) DEVICE — SUT PROL 5-0 24IN NABSRB BLU 13MM C-1 3/8

## (undated) DEVICE — INTENDED TO BE USED TO OCCLUDE, RETRACT AND IDENTIFY ARTERIES, VEINS, TENDONS AND NERVES IN SURGICAL PROCEDURES: Brand: STERION®  VESSEL LOOP

## (undated) DEVICE — ABSORBABLE HEMOSTAT (OXIDIZED REGENERATED CELLULOSE): Brand: SURGICEL NU-KNIT

## (undated) DEVICE — FLEXOR, CHECK-FLO, INTRODUCER ANSEL MODIFICATION - WITH HIGH-FLEX DILATOR AND HYDROPHILIC COATING: Brand: FLEXOR

## (undated) DEVICE — REM POLYHESIVE ADULT PATIENT RETURN ELECTRODE: Brand: VALLEYLAB

## (undated) DEVICE — HEMOCLIP HORIZON SM MULTI

## (undated) DEVICE — ABDOMINAL PAD: Brand: DERMACEA

## (undated) DEVICE — RADIFOCUS GLIDEWIRE: Brand: GLIDEWIRE

## (undated) DEVICE — KENDALL SCD EXPRESS SLEEVES, KNEE LENGTH, MEDIUM: Brand: KENDALL SCD

## (undated) DEVICE — PERCLOSE™ PROSTYLE™ SUTURE-MEDIATED CLOSURE AND REPAIR SYSTEM: Brand: PERCLOSE™ PROSTYLE™

## (undated) DEVICE — GEL AQUASONIC 100 20GR

## (undated) DEVICE — SUTURE ETHILON 3-0 PS-1

## (undated) DEVICE — SUT PROL 5-0 36IN C-1 NABSRB BLU 13MM 3/8 CIR

## (undated) DEVICE — SOL  .9 1000ML BTL

## (undated) DEVICE — HEMOSTAT KIT SURGIFLO THROM 8ML

## (undated) DEVICE — SUT PROL 3-0 36IN SH NABSRB BLU 26MM 1/2 CIR

## (undated) DEVICE — KIT INFL DEV 20ML W/ INSRT TOOL 3 W STPCOCK

## (undated) DEVICE — GLOVE SURG TRIUMPH SZ 8

## (undated) DEVICE — STANDARD HYPODERMIC NEEDLE,POLYPROPYLENE HUB: Brand: MONOJECT

## (undated) DEVICE — STERILE POLYISOPRENE POWDER-FREE SURGICAL GLOVES: Brand: PROTEXIS

## (undated) DEVICE — MONITORING NEUROPHYSIOLOGICAL

## (undated) DEVICE — SUTURE PROLENE 5-0 C-1

## (undated) DEVICE — SUTURE VICRYL 5-0 P-3

## (undated) DEVICE — 3M™ IOBAN™ 2 ANTIMICROBIAL INCISE DRAPE 6651EZ: Brand: IOBAN™ 2

## (undated) DEVICE — GUIDEWIRE: Brand: AMPLATZ SUPER STIFF™

## (undated) DEVICE — 3M™ STERI-STRIP™ REINFORCED ADHESIVE SKIN CLOSURES, R1547, 1/2 IN X 4 IN (12 MM X 100 MM), 6 STRIPS/ENVELOPE: Brand: 3M™ STERI-STRIP™

## (undated) DEVICE — FLEXOR, CHECK-FLO, INTRODUCER ANSEL 1 MODIFICATION - WITH HIGH-FLEX DILATOR AND HYDROPHILIC COATING: Brand: FLEXOR

## (undated) DEVICE — PERCLOSE PROGLIDE™ SUTURE-MEDIATED CLOSURE SYSTEM: Brand: PERCLOSE PROGLIDE™

## (undated) DEVICE — ANGIOGRAPHIC CATHETER: Brand: EXPO™

## (undated) DEVICE — FLEXOR, CHECK-FLO, INTRODUCER ANSEL MODIFICATION: Brand: FLEXOR

## (undated) DEVICE — PROXIMATE RH ROTATING HEAD SKIN STAPLERS (35 WIDE) CONTAINS 35 STAINLESS STEEL STAPLES: Brand: PROXIMATE

## (undated) DEVICE — ENDOSCOPY PACK - LOWER: Brand: MEDLINE INDUSTRIES, INC.

## (undated) DEVICE — SUT ETHBND XL 4-0 30IN RB-1 NABSRB GRN 17MM 1

## (undated) DEVICE — Device: Brand: DEFENDO AIR/WATER/SUCTION AND BIOPSY VALVE

## (undated) DEVICE — 3M™ RED DOT™ MONITORING ELECTRODE WITH FOAM TAPE AND STICKY GEL, 50/BAG, 20/CASE, 72/PLT 2570: Brand: RED DOT™

## (undated) DEVICE — CATHETER ANGIO 5FR L65CM GWIRE 0.038IN KMP

## (undated) DEVICE — MEDI-VAC SUCTION FINE CAPACITY: Brand: CARDINAL HEALTH

## (undated) DEVICE — ATLAS®  PTA BALLOON DILATATION CATHETER 14 MM X 40 MM, 75 CM CATHETER: Brand: ATLAS®

## (undated) DEVICE — SUT COAT VCRL 3-0 18IN SH ABSRB UD CR 26MM

## (undated) DEVICE — INDY OTW VASCULAR RETRIEVER: Brand: INDY OTW

## (undated) DEVICE — WIRE LUNDERQUIST DC 300 2CRV

## (undated) DEVICE — BANDAGE ROLL,100% COTTON, 6 PLY, LARGE: Brand: KERLIX

## (undated) DEVICE — LOWER EXTREMITY CDS-LF: Brand: MEDLINE INDUSTRIES, INC.

## (undated) DEVICE — 3M™ TEGADERM™ TRANSPARENT FILM DRESSING FRAME STYLE, 1626W, 4 IN X 4-3/4 IN (10 CM X 12 CM), 50/CT 4CT/CASE: Brand: 3M™ TEGADERM™

## (undated) DEVICE — EVACUATOR SUR 100CC SIL BLB WND

## (undated) DEVICE — SUTURE PROLENE 6-0 C-1

## (undated) DEVICE — PACK ANGIOGRAPHY CUSTOM

## (undated) DEVICE — FIXED CORE WIRE GUIDE SAFE-T-J, CURVED: Brand: COOK

## (undated) DEVICE — PACK CV CUSTOM

## (undated) DEVICE — GAUZE SPONGES,12 PLY: Brand: CURITY

## (undated) DEVICE — COVER,TABLE,44X90,STERILE: Brand: MEDLINE

## (undated) DEVICE — BLADE SAW KM33-11

## (undated) DEVICE — WIRE ROSEN J 0.035INX260CM

## (undated) DEVICE — SUTURE SILK 2-0 SH

## (undated) DEVICE — HI-TORQUE SUPRA CORE .035 PERIPHERAL GUIDE WIRE .035 X 300 CM: Brand: HI-TORQUE SUPRA CORE

## (undated) DEVICE — HIGH TEMPERATURE CAUTERY: Brand: ACCU-TEMP®

## (undated) DEVICE — SUTURE SILK 3-0 SH

## (undated) DEVICE — BASIC DOUBLE BASIN 1-LF: Brand: MEDLINE INDUSTRIES, INC.

## (undated) DEVICE — CV PACK-LF: Brand: MEDLINE INDUSTRIES, INC.

## (undated) DEVICE — Device: Brand: STABLECUT®

## (undated) DEVICE — TRAY CATH 16FR F INCL BARDX IC COMPLT CARE

## (undated) DEVICE — 3M™ TEGADERM™ TRANSPARENT FILM DRESSING, 1626W, 4 IN X 4-3/4 IN (10 CM X 12 CM), 50 EACH/CARTON, 4 CARTON/CASE: Brand: 3M™ TEGADERM™

## (undated) DEVICE — Device: Brand: QUICK-CROSS SUPPORT CATHETER

## (undated) DEVICE — Device: Brand: ASTATO 30

## (undated) DEVICE — SUT PROL 4-0 36IN RB-1 NABSRB BLU 17MM 1/2 CI

## (undated) DEVICE — 3M™ BAIR HUGGER® CARDIAC ACCESS BLANKET, 5 PER CASE 63000: Brand: BAIR HUGGER™

## (undated) DEVICE — PREMIUM WET SKIN PREP TRAY: Brand: MEDLINE INDUSTRIES, INC.

## (undated) DEVICE — SUT PROL 6-0 24IN C-1 NABSRB BLU 13MM 3/8 CIR

## (undated) DEVICE — VIOLET BRAIDED (POLYGLACTIN 910), SYNTHETIC ABSORBABLE SUTURE: Brand: COATED VICRYL

## (undated) DEVICE — NAVICROSS SUPPORT CATHETER: Brand: NAVICROSS

## (undated) DEVICE — SUT PERMA- 3-0 18IN SH NABSRB BLK CR 26MM

## (undated) DEVICE — CODA, LP BALLOON CATHETER: Brand: CODA

## (undated) DEVICE — LIGASURE EXACT DISSECTOR: Brand: LIGASURE

## (undated) DEVICE — SOLUTION IRRIG 1000ML 0.9% NACL USP BTL

## (undated) DEVICE — CATHETER PTCA BLLN L4CM INFL 10-37MM CATH

## (undated) DEVICE — SUT MCRYL 4-0 18IN PS-2 ABSRB UD 19MM 3/8 CIR

## (undated) DEVICE — GUIDEWIRE: Brand: NITINOL GUIDEWIRE

## (undated) DEVICE — SUTURE SILK 3-0

## (undated) DEVICE — SUTURE VICRYL 3-0 CT-1

## (undated) DEVICE — TRANSPOSAL ULTRAFLEX DUO/QUAD ULTRA CART MANIFOLD

## (undated) DEVICE — CHLORAPREP 26ML APPLICATOR

## (undated) DEVICE — SUTURE SILK 2-0

## (undated) DEVICE — GOWN,SIRUS,FABRIC-REINFORCED,LARGE: Brand: MEDLINE

## (undated) DEVICE — TRAY FOLEY 16F IC URINMETER

## (undated) DEVICE — SYSTEM DEL MED GAS MGMT ANGIASSIST

## (undated) NOTE — LETTER
[address] [facility] [place] FOR BLOOD OR BLOOD COMPONENTS      In the course of your treatment, it may become necessary to administer a transfusion of blood or blood components.  This form provides basic information concerning this proc explain the alternatives to you if it has not already been done. I,Cy Monsalve, have read/had read to me the above. I understand the matters bearing on the decision whether or not to authorize a transfusion of blood or blood components.  I have no q

## (undated) NOTE — LETTER
Terese Sandoval [address] [address]  [place], [address] [facility]    Consent for Operation  Date: __________________                                Time: _______________    1.  I authorize the performance upon Peg Deem the following operation:  Proced procedure has been videotaped, the surgeon will obtain the original videotape. The hospital will not be responsible for storage or maintenance of this tape.   7. For the purpose of advancing medical education, I consent to the admittance of observers to the STATEMENTS REQUIRING INSERTION OR COMPLETION WERE FILLED IN.     Signature of Patient:   ___________________________    When the patient is a minor or mentally incompetent to give consent:  Signature of person authorized to consent for patient: ____________ supplements, and pills I can buy without a prescription (including street drugs/illegal medications). Failure to inform my anesthesiologist about these medicines may increase my risk of anesthetic complications. iv.  If I am allergic to anything or have ha Anesthesiologist Signature     Date   Time  I have discussed the procedure and information above with the patient (or patient’s representative) and answered their questions. The patient or their representative has agreed to have anesthesia services.     ___

## (undated) NOTE — LETTER
[address]  [address]  Authorization for Surgical Operation and Procedure     Date:___________                                                                                                         Time:__________  I hereby authorize Surgeon(s):  Neil Oquendo MD Pontikis, George, MD, my physician and his/her assistants (if applicable), which may include medical students, residents, and/or fellows, to perform the following surgical operation/ procedure and administer such anesthesia as may be determined necessary by my physician:  Operation/Procedure name (s) Procedure(s):  FENESTRATED ENDOVASCULAR ARCH REPAIR AND FENESTRATED THORACOABDOMINAL ANEURYSM REPAIR WITH POSSIBLE LEFT CAROTID TO SUBCLAVIAN TRANSPOSITION, LEFT AXILLARY EXPOSURE, RIGHT AXILLARY EXPOSURE, ENDOVASCULAR SEPTOTOMY OF DISSECTION, NEUROMONITORING on Cy Monsalve   2.   I recognize that during the surgical operation/procedure, unforeseen conditions may necessitate additional or different procedures than those listed above.  I, therefore, further authorize and request that the above-named surgeon, assistants, or designees perform such procedures as are, in their judgment, necessary and desirable.    3.   My surgeon/physician has discussed prior to my surgery the potential benefits, risks and side effects of this procedure; the likelihood of achieving goals; and potential problems that might occur during recuperation.  They also discussed reasonable alternatives to the procedure, including risks, benefits, and side effects related to the alternatives and risks related to not receiving this procedure.  I have had all my questions answered and I acknowledge that no guarantee has been made as to the result that may be obtained.    4.   Should the need arise during my operation/procedure, which includes change of level of care prior to discharge, I also consent to the administration of blood and/or blood  products.  Further, I understand that despite careful testing and screening of blood or blood products by collecting agencies, I may still be subject to ill effects as a result of receiving a blood transfusion and/or blood products.  The following are some, but not all, of the potential risks that can occur: fever and allergic reactions, hemolytic reactions, transmission of diseases such as Hepatitis, AIDS and Cytomegalovirus (CMV) and fluid overload.  In the event that I wish to have an autologous transfusion of my own blood, or a directed donor transfusion, I will discuss this with my physician.  Check only if Refusing Blood or Blood Products  I understand refusal of blood or blood products as deemed necessary by my physician may have serious consequences to my condition to include possible death. I hereby assume responsibility for my refusal and release the hospital, its personnel, and my physicians from any responsibility for the consequences of my refusal.          o  Refuse      5.   I authorize the use of any specimen, organs, tissues, body parts or foreign objects that may be removed from my body during the operation/procedure for diagnosis, research or teaching purposes and their subsequent disposal by hospital authorities.  I also authorize the release of specimen test results and/or written reports to my treating physician on the hospital medical staff or other referring or consulting physicians involved in my care, at the discretion of the Pathologist or my treating physician.    6.   I consent to the photographing or videotaping of the operations or procedures to be performed, including appropriate portions of my body for medical, scientific, or educational purposes, provided my identity is not revealed by the pictures or by descriptive texts accompanying them.  If the procedure has been photographed/videotaped, the surgeon will obtain the original picture, image, videotape or CD.  The hospital will not  be responsible for storage, release or maintenance of the picture, image, tape or CD.    7.   I consent to the presence of a  or observers in the operating room as deemed necessary by my physician or their designees.    8.   I recognize that in the event my procedure results in extended X-Ray/fluoroscopy time, I may develop a skin reaction.    9. If I have a Do Not Attempt Resuscitation (DNAR) order in place, that status will be suspended while in the operating room, procedural suite, and during the recovery period unless otherwise explicitly stated by me (or a person authorized to consent on my behalf). The surgeon or my attending physician will determine when the applicable recovery period ends for purposes of reinstating the DNAR order.  10. Patients having a sterilization procedure: I understand that if the procedure is successful the results will be permanent and it will therefore be impossible for me to inseminate, conceive, or bear children.  I also understand that the procedure is intended to result in sterility, although the result has not been guaranteed.   11. I acknowledge that my physician has explained sedation/analgesia administration to me including the risk and benefits I consent to the administration of sedation/analgesia as may be necessary or desirable in the judgment of my physician.    I CERTIFY THAT I HAVE READ AND FULLY UNDERSTAND THE ABOVE CONSENT TO OPERATION and/or OTHER PROCEDURE.    _________________________________________  __________________________________  Signature of Patient     Signature of Responsible Person         ___________________________________         Printed Name of Responsible Person           _________________________________                 Relationship to Patient  _________________________________________  ______________________________  Signature of Witness          Date  Time      Patient Name: Cy PARK Gricel     : 1955                  Printed: March 19, 2025     Medical Record #: [id number]                     Page 2 of 3                                    [address]  [address]    Consent for Anesthesia    I, Cy Monsalve agree to be cared for by an anesthesiologist, who is specially trained to monitor me and give me medicine to put me to sleep or keep me comfortable during my procedure    I understand that my anesthesiologist is not an employee or agent of [facility] or [employer]. He or she works for [employer].    As the patient asking for anesthesia services, I agree to:  Allow the anesthesiologist (anesthesia doctor) to give me medicine and do additional procedures as necessary. Some examples are: Starting or using an “IV” to give me medicine, fluids or blood during my procedure, and having a breathing tube placed to help me breathe when I’m asleep (intubation). In the event that my heart stops working properly, I understand that my anesthesiologist will make every effort to sustain my life, unless otherwise directed by [facility] Do Not Resuscitate documents.  Tell my anesthesia doctor before my procedure:  If I am pregnant.  The last time that I ate or drank.  All of the medicines I take (including prescriptions, herbal supplements, and pills I can buy without a prescription (including street drugs/illegal medications). Failure to inform my anesthesiologist about these medicines may increase my risk of anesthetic complications.  If I am allergic to anything or have had a reaction to anesthesia before.  I understand how the anesthesia medicine will help me (benefits).  I understand that with any type of anesthesia medicine there are risks:  The most common risks are: nausea, vomiting, sore throat, muscle soreness, damage to my eyes, mouth, or teeth (from breathing tube placement).  Rare risks include: remembering what happened during my procedure, allergic  reactions to medications, injury to my airway, heart, lungs, vision, nerves, or muscles and in extremely rare instances death.  My doctor has explained to me other choices available to me for my care (alternatives).  Pregnant Patients (“epidural”):  I understand that the risks of having an epidural (medicine given into my back to help control pain during labor), include itching, low blood pressure, difficulty urinating, headache or slowing of the baby’s heart. Very rare risks include infection, bleeding, seizure, irregular heart rhythms and nerve injury.  Regional Anesthesia (“spinal”, “epidural”, & “nerve blocks”):  I understand that rare but potential complications include headache, bleeding, infection, seizure, irregular heart rhythms, and nerve injury.    I can change my mind about having anesthesia services at any time before I get the medicine.    _____________________________________________________________________________  Patient (or Representative) Signature/Relationship to Patient  Date   Time    _____________________________________________________________________________   Name (if used)    Language/Organization   Time    _____________________________________________________________________________  Anesthesiologist Signature     Date   Time  I have discussed the procedure and information above with the patient (or patient’s representative) and answered their questions. The patient or their representative has agreed to have anesthesia services.    _____________________________________________________________________________  Witness        Date   Time  I have verified that the signature is that of the patient or patient’s representative, and that it was signed before the procedure  Patient Name: Cy PARK Gricel     : 1955                 Printed: 2025     Medical Record #: [id number]                     Page 3 of 3

## (undated) NOTE — IP AVS SNAPSHOT
Patient Demographics     Address  [address] [place] [address] Phone  [phone] [facility]) E-mail Address  [email or web address]      Emergency Contact(s)     Name Relation Home Work Mobile    Gomez Spouse [phone]        Allergies as o Next dose due:  01/04      Take 1 tablet by mouth once daily. Benny Minaya MD         Naproxen Sodium 550 MG Tabs      Take 1 tablet (550 mg total) by mouth 2 (two) times daily as needed.    Benny Minaya MD         ONE-DAILY MULTI VITAMINS Tabs  Next Vitals  136/78 Filed at 01/03/2020 1215   Pulse  85 Filed at 01/03/2020 1215   Resp  18 Filed at 01/03/2020 1215   Temp  98.4 °F (36.9 °C) Filed at 01/03/2020 1215   SpO2  99 % Filed at 01/03/2020 1215      Patient's Most Recent Weight       Most Recent Va WITH PLATELET.   Procedure                               Abnormality         Status                     ---------                               -----------         ------                     CBC W/ DIFFERENTIAL[[id number]]          Abnormal            Final Performed by Jacquelyn Buchanan MD at [address]   • COLONOSCOPY  10/15/09  [facility]    Screening: small polypoid fold in sigmoid (no polyp tissue seen on path), int hemorrhoids; next colonoscopy in 2019   • OTHER  02/2017    AAA repair   • OTHER SURGICAL HISTORY GENERAL: alert and orientated X 3, well developed, well nourished, in no apparent distress  HEENT: ears and throat are clear  NECK: supple, no lymphadenopathy, thyroid wnl  CAROTID: No bruits  RESPIRATORY: no rales, rhonchi, or wheezes B  CARDIO: RRR witho Mallorie Lowe Patient Status:  Inpatient    1955 MRN [id number]   [facility] 7NE-A Attending Jina Woodward MD   Hosp Day # 3 PCP Elena Jain MD     Patient Identification  Mallorie Lowe is a 75year old male.   :   Physiatry consult obtained now to assess pt's funtional status and make appropriate recommendations. Premorbid Functional Status: Patient was independent with mobility/ambulation, transfers, ADL's, IADL's.   Support System and Family Circumstances (i.e., HYDROcodone-acetaminophen (NORCO)  MG per tab 1 tablet, 1 tablet, Oral, Q4H PRN  enoxaparin sodium (LOVENOX) 100 MG/ML injection 90 mg, 1 mg/kg, Subcutaneous, 2 times per day  melatonin tab TABS 1 mg, 1 mg, Oral, Nightly  [COMPLETED] HYDROmorphone HC Eyes:  Conjunctivae/lids clear. PERRL, EOMs intact. Vision functional.   Ears/Nose/Throat: Hearing intact. Lips, mucosa, and tongue normal. Teeth and gums normal. Moist mucous membranes. Neck: No neck masses or thyroid enlargement/tenderness/nodules. Post-op pain with need for adequate control to allow functional improvement and participation in therapies, with risk for developing chronic pain. Risk for infection. Risk for contractures and stiffness with consequent functional impairments.     Risk fo Filed:  1/3/2020 11:52 AM Date of Service:  1/3/2020 11:46 AM Status:  Signed    :  Meliton Fallon PT (Physical Therapist)        PHYSICAL THERAPY TREATMENT NOTE - INPATIENT    Room Number: 9434/4295-V     Session: 1   Number of Visits to Meet Establ BALANCE                                                                                                                     Static Sitting: Good  Dynamic Sitting: Fair -           Static Standing: Fair -  Dynamic Standing: Fair -    ACTIVITY TOLERANCE  Pul returned to sitting and L LE elevated. Patient limited with mobility due to restrictions with L LE WB and c/o of  Increase pressure in L LE when in dependent position. 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, digits • OTHER SURGICAL HISTORY  10/15/09  [facility]    EGD (heartburn): normal   • PART REMOVAL COLON W END COLOSTOMY      10/2019       HOME SITUATION  Type of Home: House   Home Layout: Two level  Stairs to Enter : 3  Railing: Yes  Stairs to Bedroom: 16  Railing: Aline Garzon blankets)?: A Little   -   Sitting down on and standing up from a chair with arms (e.g., wheelchair, bedside commode, etc.): A Lot   -   Moving from lying on back to sitting on the side of the bed?: A Little   How much help from another person does the pat aware of session/findings; All patient questions and concerns addressed; Family present    ASSESSMENT   Patient is a 75year old male admitted on 12/30/2019 for  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   Pertine Rehab Potential : Good  Frequency (Obs): Daily  Number of Visits to Meet Established Goals: 5      CURRENT GOALS    Goal #1 Patient is able to demonstrate supine - sit EOB @ level: modified independent     Goal #2 Patient is able to demonstrate transfers E Session: 2[AG.1]  Number of Visits to Meet Established Goals: 5    Presenting Problem: LLE gangrene s/p LLE partial ray amputation digits 1-3, digits 4& 5 amputation[AG.2]    History related to current admission:  Pt with extensive recent medical history. Patient self-stated goal is to go home    OBJECTIVE[AG.1]  Precautions: Colostomy;ROYER tube[AG.2]    WEIGHT BEARING RESTRICTION[AG.1]  Weight Bearing Restriction: L lower extremity           L Lower Extremity: Partial Weight Bearing(Heel weight bearing w/ po Patient seen for OT services this am. In this session patient making good progress towards OT goals with improvements in functional transfers and ADL. However,  patient remains below his baseline in these and other functional areas.  Patient would benefit f History related to current admission:  Pt with extensive recent medical history. 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  Pt also with L Lower Extremity: Partial Weight Bearing(Heel weight bearing w/ post-op shoe on (Dr. Dewey Mcghee))    PAIN ASSESSMENT  Rating: 3  Location: left foot w/ standing  Management Techniques: Repositioning     ACTIVITY TOLERANCE                         O2 SATURATION Dr. Janae Kim saw patient during session and removed wound vac. Dr. Janae Kim gave orders to allow patient heel weight bearing on left w/ use of post-op shoe. RN aware and orders to be entered. [AO.2]    Patient End of Session: In bed; With [facility] staff;Needs met; Ca Filed:  1/1/2020  4:01 PM Date of Service:  1/1/2020  3:40 PM Status:  Signed    :  Elise Black OT (Occupational Therapist)       OCCUPATIONAL THERAPY EVALUATION - INPATIENT     Room Number: 1252/3204-G  Evaluation Date: 1/1/2020  Type of Evaluatio • OTHER SURGICAL HISTORY      Eye surgery for lazy eye   • OTHER SURGICAL HISTORY  10/15/09  [facility]    EGD (heartburn): normal   • PART REMOVAL COLON W END COLOSTOMY      10/2019       OCCUPATIONAL PROFILE    HOME SITUATION  Type of Home: House  Home Layout: O2 SATURATIONS                ACTIVITIES OF DAILY LIVING ASSESSMENT  AM-PAC ‘6-Clicks’ Inpatient Daily Activity Short Form  How much help from another person does the patient currently need…  -   Putting on and taking off regular lower body clothing?: activities of daily living. Research supports that patients with this level of impairment often benefit from rehab. [SH.2]   .  In this OT evaluation patient presents with the following performance deficits:[SH.1] maintenance of LLE NWB, use of adaptive tech Frequency (Obs): 5x/week  Number of Visits to Meet Established Goals: 5    ADL Goals[SH.1]   Patient will perform lower body dressing:  with modified independent  Patient will perform toileting: with modified independent[SH.2]    Functional Transfer Goals[

## (undated) NOTE — LETTER
[facility]  [address] [facility], [address] [place] [address]    Consent for Operation    Date: __________________    Time: _______________    1.  I authorize the performance upon Hamm Bussing the following operation:      endovascular abdominal aortic procedure has been videotaped, the surgeon will obtain the original videotape. The hospital will not be responsible for storage or maintenance of this tape.     6. For the purpose of advancing medical education, I consent to the admittance of observers to t STATEMENTS REQUIRING INSERTION OR COMPLETION WERE FILLED IN.     Signature of Patient:   ___________________________    When the patient is a minor or mentally incompetent to give consent:  Signature of person authorized to consent for patient: ____________ supplements, and pills I can buy without a prescription (including street drugs/illegal medications). Failure to inform my anesthesiologist about these medicines may increase my risk of anesthetic complications.   · If I am allergic to anything or have had Anesthesiologist Signature     Date   Time  I have discussed the procedure and information above with the patient (or patient’s representative) and answered their questions. The patient or their representative has agreed to have anesthesia services.     ___

## (undated) NOTE — LETTER
[facility] 6NE-A  [address] [facility] [address]  [phone]    Blood Transfusion Consent    In the course of your treatment, it may become necessary to administer a transfusion of blood or blood components. This form provides basic information concerning this procedure and, if signed by you, authorizes its administration. By signing this form, you agree that all of your questions about the administration of blood or blood products have been answered by the ordering medical professional or designee.    Description of Procedure  Blood is introduced into one of your veins, commonly in the arm, using a sterilized disposable needle. The amount of blood transfused, and whether the transfusion will be of blood or blood components is a judgement the physician will make based on your particular needs.    Risks  The transfusion is a common procedure of low risk.  MINOR AND TEMPORARY REACTIONS ARE NOT UNCOMMON, including a slight bruise, swelling or local reaction in the area where the needle pierces your skin, or a nonserious reaction to the transfused material itself, including headache, fever or mild skin reaction, such as rash.  Serious reactions are possible, though very unlikely, and include severe allergic reaction (shock) and destruction (hemolysis) of transfused blood cells.  Infectious diseases which are known to be transmitted by blood transfusion include certain types of viral Hepatitis(liver infection from a virus), Human Immunodeficiency Virus (HIV-1,2) infection, a viral infection known to cause Acquired Immunodeficiency Syndrome (AIDS), as well as certain other bacterial, viral, and parasitic diseases. While a minimal risk of acquiring an infectious disease from transfused blood exists, in accordance with the Federal and State law, all due care has been taken in donor selection and testing to avoid transmission of disease.    Alternatives  If loss of blood poses serious threats during your  treatment, THERE IS NO EFFECTIVE ALTERNATIVE TO BLOOD TRANSFUSION. However, if you have any further questions on this matter, your provider will fully explain the alternatives to you if it has not already been done.    I, ______________________________, have read/had read to me the above. I understand the matters bearing on the decision whether or not to authorize a transfusion of blood or blood components. I have no questions which have not been answered to my full satisfaction. I hereby consent to such transfusion as my physician may deem necessary or advisable in the course of my treatment.    ______________________________________________                    ___________________________  (Signature of Patient or Responsible party in case of minor,                 (Printed Name of Patient or incompetent, or unconscious patient)              Responsible Party)    ___________________________               _____________________  (Relationship to Patient if not self)                                    (Date and Time)    __________________________                                                           ______________________              (Signature of Witness)               (Printed Name of Witness)     Language line ()    Telephone/Verbal/Video Consent    __________________________                     ____________________  (Signature of 2nd Witness           (Printed Name of 2nd  Telephone/Verbal/Video Consent)           Witness)    Patient Name: Cy Monsalve     : 1955                 Printed: 2025     Medical Record #: [id number]      Rev: 2023

## (undated) NOTE — LETTER
[address]  [address]  Authorization for Surgical Operation and Procedure     Date:___________                                                                                                         Time:__________  I hereby authorize Surgeon(s):  Neil Oquendo MD Pontikis, George, MD, my physician and his/her assistants (if applicable), which may include medical students, residents, and/or fellows, to perform the following surgical operation/ procedure and administer such anesthesia as may be determined necessary by my physician:  Operation/Procedure name (s)  right carotid to subclavian bypass, 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   I recognize that during the surgical operation/procedure, unforeseen conditions may necessitate additional or different procedures than those listed above.  I, therefore, further authorize and request that the above-named surgeon, assistants, or designees perform such procedures as are, in their judgment, necessary and desirable.    3.   My surgeon/physician has discussed prior to my surgery the potential benefits, risks and side effects of this procedure; the likelihood of achieving goals; and potential problems that might occur during recuperation.  They also discussed reasonable alternatives to the procedure, including risks, benefits, and side effects related to the alternatives and risks related to not receiving this procedure.  I have had all my questions answered and I acknowledge that no guarantee has been made as to the result that may be obtained.    4.   Should the need arise during my operation/procedure, which includes change of level of care prior to discharge, I also consent to the administration of blood and/or blood products.  Further, I understand that despite careful testing and screening of blood or blood products by collecting  agencies, I may still be subject to ill effects as a result of receiving a blood transfusion and/or blood products.  The following are some, but not all, of the potential risks that can occur: fever and allergic reactions, hemolytic reactions, transmission of diseases such as Hepatitis, AIDS and Cytomegalovirus (CMV) and fluid overload.  In the event that I wish to have an autologous transfusion of my own blood, or a directed donor transfusion, I will discuss this with my physician.  Check only if Refusing Blood or Blood Products  I understand refusal of blood or blood products as deemed necessary by my physician may have serious consequences to my condition to include possible death. I hereby assume responsibility for my refusal and release the hospital, its personnel, and my physicians from any responsibility for the consequences of my refusal.          o  Refuse      5.   I authorize the use of any specimen, organs, tissues, body parts or foreign objects that may be removed from my body during the operation/procedure for diagnosis, research or teaching purposes and their subsequent disposal by hospital authorities.  I also authorize the release of specimen test results and/or written reports to my treating physician on the hospital medical staff or other referring or consulting physicians involved in my care, at the discretion of the Pathologist or my treating physician.    6.   I consent to the photographing or videotaping of the operations or procedures to be performed, including appropriate portions of my body for medical, scientific, or educational purposes, provided my identity is not revealed by the pictures or by descriptive texts accompanying them.  If the procedure has been photographed/videotaped, the surgeon will obtain the original picture, image, videotape or CD.  The hospital will not be responsible for storage, release or maintenance of the picture, image, tape or CD.    7.   I consent to the  presence of a  or observers in the operating room as deemed necessary by my physician or their designees.    8.   I recognize that in the event my procedure results in extended X-Ray/fluoroscopy time, I may develop a skin reaction.    9. If I have a Do Not Attempt Resuscitation (DNAR) order in place, that status will be suspended while in the operating room, procedural suite, and during the recovery period unless otherwise explicitly stated by me (or a person authorized to consent on my behalf). The surgeon or my attending physician will determine when the applicable recovery period ends for purposes of reinstating the DNAR order.  10. Patients having a sterilization procedure: I understand that if the procedure is successful the results will be permanent and it will therefore be impossible for me to inseminate, conceive, or bear children.  I also understand that the procedure is intended to result in sterility, although the result has not been guaranteed.   11. I acknowledge that my physician has explained sedation/analgesia administration to me including the risk and benefits I consent to the administration of sedation/analgesia as may be necessary or desirable in the judgment of my physician.    I CERTIFY THAT I HAVE READ AND FULLY UNDERSTAND THE ABOVE CONSENT TO OPERATION and/or OTHER PROCEDURE.    _________________________________________  __________________________________  Signature of Patient     Signature of Responsible Person         ___________________________________         Printed Name of Responsible Person           _________________________________                 Relationship to Patient  _________________________________________  ______________________________  Signature of Witness          Date  Time      Patient Name: Cy PARK Gricel     : 1955                 Printed: 2025     Medical Record #: [id number]                     Page 1 of 2                                     [address]  [address]    Consent for Anesthesia    I, Cy Monsalve agree to be cared for by an anesthesiologist, who is specially trained to monitor me and give me medicine to put me to sleep or keep me comfortable during my procedure    I understand that my anesthesiologist is not an employee or agent of [facility] or [employer]. He or she works for [employer].    As the patient asking for anesthesia services, I agree to:  Allow the anesthesiologist (anesthesia doctor) to give me medicine and do additional procedures as necessary. Some examples are: Starting or using an “IV” to give me medicine, fluids or blood during my procedure, and having a breathing tube placed to help me breathe when I’m asleep (intubation). In the event that my heart stops working properly, I understand that my anesthesiologist will make every effort to sustain my life, unless otherwise directed by [facility] Do Not Resuscitate documents.  Tell my anesthesia doctor before my procedure:  If I am pregnant.  The last time that I ate or drank.  All of the medicines I take (including prescriptions, herbal supplements, and pills I can buy without a prescription (including street drugs/illegal medications). Failure to inform my anesthesiologist about these medicines may increase my risk of anesthetic complications.  If I am allergic to anything or have had a reaction to anesthesia before.  I understand how the anesthesia medicine will help me (benefits).  I understand that with any type of anesthesia medicine there are risks:  The most common risks are: nausea, vomiting, sore throat, muscle soreness, damage to my eyes, mouth, or teeth (from breathing tube placement).  Rare risks include: remembering what happened during my procedure, allergic reactions to medications, injury to my airway, heart, lungs, vision, nerves, or muscles and in extremely rare  instances death.  My doctor has explained to me other choices available to me for my care (alternatives).  Pregnant Patients (“epidural”):  I understand that the risks of having an epidural (medicine given into my back to help control pain during labor), include itching, low blood pressure, difficulty urinating, headache or slowing of the baby’s heart. Very rare risks include infection, bleeding, seizure, irregular heart rhythms and nerve injury.  Regional Anesthesia (“spinal”, “epidural”, & “nerve blocks”):  I understand that rare but potential complications include headache, bleeding, infection, seizure, irregular heart rhythms, and nerve injury.    I can change my mind about having anesthesia services at any time before I get the medicine.    _____________________________________________________________________________  Patient (or Representative) Signature/Relationship to Patient  Date   Time    _____________________________________________________________________________   Name (if used)    Language/Organization   Time    _____________________________________________________________________________  Anesthesiologist Signature     Date   Time  I have discussed the procedure and information above with the patient (or patient’s representative) and answered their questions. The patient or their representative has agreed to have anesthesia services.    _____________________________________________________________________________  Witness        Date   Time  I have verified that the signature is that of the patient or patient’s representative, and that it was signed before the procedure  Patient Name: Cy Blancarbrough     : 1955                 Printed: 2025     Medical Record #: [id number]                     Page 2 of 2

## (undated) NOTE — LETTER
[address]  [address]  Consent for Procedure/Sedation  Date: 03/20/2025         Time: 1100    I hereby authorize Dr. Haney, my physician and his/her assistants (if applicable), which may include medical students, residents, and/or fellows, to perform the following surgical operation/ procedure and administer such anesthesia as may be determined necessary by my physician: Lumbar drain insertion on Cy Monsalve  2.   I recognize that during the surgical operation/procedure, unforeseen conditions may necessitate additional or different procedures than those listed above.  I, therefore, further authorize and request that the above-named surgeon, assistants, or designees perform such procedures as are, in their judgment, necessary and desirable.    3.   My surgeon/physician has discussed prior to my surgery the potential benefits, risks and side effects of this procedure; the likelihood of achieving goals; and potential problems that might occur during recuperation.  They also discussed reasonable alternatives to the procedure, including risks, benefits, and side effects related to the alternatives and risks related to not receiving this procedure.  I have had all my questions answered and I acknowledge that no guarantee has been made as to the result that may be obtained.    4.   Should the need arise during my operation/procedure, which includes change of level of care prior to discharge, I also consent to the administration of blood and/or blood products.  Further, I understand that despite careful testing and screening of blood or blood products by collecting agencies, I may still be subject to ill effects as a result of receiving a blood transfusion and/or blood products.  The following are some, but not all, of the potential risks that can occur: fever and allergic reactions, hemolytic reactions, transmission of diseases such as Hepatitis, AIDS and Cytomegalovirus  (CMV) and fluid overload.  In the event that I wish to have an autologous transfusion of my own blood, or a directed donor transfusion, I will discuss this with my physician.   Check only if Refusing Blood or Blood Products  I understand refusal of blood or blood products as deemed necessary by my physician may have serious consequences to my condition to include possible death. I hereby assume responsibility for my refusal and release the hospital, its personnel, and my physicians from any responsibility for the consequences of my refusal.         o  Refuse         5.   I authorize the use of any specimen, organs, tissues, body parts or foreign objects that may be removed from my body during the operation/procedure for diagnosis, research or teaching purposes and their subsequent disposal by hospital authorities.  I also authorize the release of specimen test results and/or written reports to my treating physician on the hospital medical staff or other referring or consulting physicians involved in my care, at the discretion of the Pathologist or my treating physician.    6.   I consent to the photographing or videotaping of the operations or procedures to be performed, including appropriate portions of my body for medical, scientific, or educational purposes, provided my identity is not revealed by the pictures or by descriptive texts accompanying them.  If the procedure has been photographed/videotaped, the surgeon will obtain the original picture, image, videotape or CD.  The hospital will not be responsible for storage, release or maintenance of the picture, image, tape or CD.    7.   I consent to the presence of a  or observers in the operating room as deemed necessary by my physician or their designees.    8.   I recognize that in the event my procedure results in extended X-Ray/fluoroscopy time, I may develop a skin reaction.    9. If I have a Do Not Attempt Resuscitation (DNAR) order in  place, that status will be suspended while in the operating room, procedural suite, and during the recovery period unless otherwise explicitly stated by me (or a person authorized to consent on my behalf). The surgeon or my attending physician will determine when the applicable recovery period ends for purposes of reinstating the DNAR order.  10. Patients having a sterilization procedure: I understand that if the procedure is successful the results will be permanent and it will therefore be impossible for me to inseminate, conceive, or bear children.  I also understand that the procedure is intended to result in sterility, although the result has not been guaranteed.   11. I acknowledge that my physician has explained sedation/analgesia administration to me including the risk and benefits I consent to the administration of sedation/analgesia as may be necessary or desirable in the judgment of my physician.    I CERTIFY THAT I HAVE READ AND FULLY UNDERSTAND THE ABOVE CONSENT TO OPERATION and/or OTHER PROCEDURE.        ____________________________________       _________________________________      ______________________________  Signature of Patient         Signature of Responsible Person        Printed Name of Responsible Person        ____________________________________      _________________________________      ______________________________       Signature of Witness          Relationship to Patient                       Date                                       Time  Patient Name: Cy XAVIER Monsalve  : 1955    Reviewed: 2024   Printed: 2025  Medical Record #: [id number] Page 1 of 1

## (undated) NOTE — LETTER
[address]  [address]  Authorization for Surgical Operation and Procedure     Date:___________                                                                                                         Time:__________  I hereby authorize Surgeon(s):  Neil Oquendo MD Pontikis, George, MD, my physician and his/her assistants (if applicable), which may include medical students, residents, and/or fellows, to perform the following surgical operation/ procedure and administer such anesthesia as may be determined necessary by my physician:  Operation/Procedure name (s) Procedure(s):  FENESTRATED ENDOVASCULAR ARCH REPAIR AND FENESTRATED THORACOABDOMINAL ANEURYSM REPAIR WITH POSSIBLE LEFT CAROTID TO SUBCLAVIAN TRANSPOSITION, LEFT AXILLARY EXPOSURE, RIGHT AXILLARY EXPOSURE, ENDOVASCULAR SEPTOTOMY OF DISSECTION, NEUROMONITORING on Cy Monsalve   2.   I recognize that during the surgical operation/procedure, unforeseen conditions may necessitate additional or different procedures than those listed above.  I, therefore, further authorize and request that the above-named surgeon, assistants, or designees perform such procedures as are, in their judgment, necessary and desirable.    3.   My surgeon/physician has discussed prior to my surgery the potential benefits, risks and side effects of this procedure; the likelihood of achieving goals; and potential problems that might occur during recuperation.  They also discussed reasonable alternatives to the procedure, including risks, benefits, and side effects related to the alternatives and risks related to not receiving this procedure.  I have had all my questions answered and I acknowledge that no guarantee has been made as to the result that may be obtained.    4.   Should the need arise during my operation/procedure, which includes change of level of care prior to discharge, I also consent to the administration of blood and/or blood  products.  Further, I understand that despite careful testing and screening of blood or blood products by collecting agencies, I may still be subject to ill effects as a result of receiving a blood transfusion and/or blood products.  The following are some, but not all, of the potential risks that can occur: fever and allergic reactions, hemolytic reactions, transmission of diseases such as Hepatitis, AIDS and Cytomegalovirus (CMV) and fluid overload.  In the event that I wish to have an autologous transfusion of my own blood, or a directed donor transfusion, I will discuss this with my physician.  Check only if Refusing Blood or Blood Products  I understand refusal of blood or blood products as deemed necessary by my physician may have serious consequences to my condition to include possible death. I hereby assume responsibility for my refusal and release the hospital, its personnel, and my physicians from any responsibility for the consequences of my refusal.          o  Refuse      5.   I authorize the use of any specimen, organs, tissues, body parts or foreign objects that may be removed from my body during the operation/procedure for diagnosis, research or teaching purposes and their subsequent disposal by hospital authorities.  I also authorize the release of specimen test results and/or written reports to my treating physician on the hospital medical staff or other referring or consulting physicians involved in my care, at the discretion of the Pathologist or my treating physician.    6.   I consent to the photographing or videotaping of the operations or procedures to be performed, including appropriate portions of my body for medical, scientific, or educational purposes, provided my identity is not revealed by the pictures or by descriptive texts accompanying them.  If the procedure has been photographed/videotaped, the surgeon will obtain the original picture, image, videotape or CD.  The hospital will not  be responsible for storage, release or maintenance of the picture, image, tape or CD.    7.   I consent to the presence of a  or observers in the operating room as deemed necessary by my physician or their designees.    8.   I recognize that in the event my procedure results in extended X-Ray/fluoroscopy time, I may develop a skin reaction.    9. If I have a Do Not Attempt Resuscitation (DNAR) order in place, that status will be suspended while in the operating room, procedural suite, and during the recovery period unless otherwise explicitly stated by me (or a person authorized to consent on my behalf). The surgeon or my attending physician will determine when the applicable recovery period ends for purposes of reinstating the DNAR order.  10. Patients having a sterilization procedure: I understand that if the procedure is successful the results will be permanent and it will therefore be impossible for me to inseminate, conceive, or bear children.  I also understand that the procedure is intended to result in sterility, although the result has not been guaranteed.   11. I acknowledge that my physician has explained sedation/analgesia administration to me including the risk and benefits I consent to the administration of sedation/analgesia as may be necessary or desirable in the judgment of my physician.    I CERTIFY THAT I HAVE READ AND FULLY UNDERSTAND THE ABOVE CONSENT TO OPERATION and/or OTHER PROCEDURE.    _________________________________________  __________________________________  Signature of Patient     Signature of Responsible Person         ___________________________________         Printed Name of Responsible Person           _________________________________                 Relationship to Patient  _________________________________________  ______________________________  Signature of Witness          Date  Time      Patient Name: Cy APRK Gricel     : 1955                  Printed: March 19, 2025     Medical Record #: [id number]                     Page 2 of 3                                    [address]  [address]    Consent for Anesthesia    I, Cy Monsalve agree to be cared for by an anesthesiologist, who is specially trained to monitor me and give me medicine to put me to sleep or keep me comfortable during my procedure    I understand that my anesthesiologist is not an employee or agent of [facility] or [employer]. He or she works for [employer].    As the patient asking for anesthesia services, I agree to:  Allow the anesthesiologist (anesthesia doctor) to give me medicine and do additional procedures as necessary. Some examples are: Starting or using an “IV” to give me medicine, fluids or blood during my procedure, and having a breathing tube placed to help me breathe when I’m asleep (intubation). In the event that my heart stops working properly, I understand that my anesthesiologist will make every effort to sustain my life, unless otherwise directed by [facility] Do Not Resuscitate documents.  Tell my anesthesia doctor before my procedure:  If I am pregnant.  The last time that I ate or drank.  All of the medicines I take (including prescriptions, herbal supplements, and pills I can buy without a prescription (including street drugs/illegal medications). Failure to inform my anesthesiologist about these medicines may increase my risk of anesthetic complications.  If I am allergic to anything or have had a reaction to anesthesia before.  I understand how the anesthesia medicine will help me (benefits).  I understand that with any type of anesthesia medicine there are risks:  The most common risks are: nausea, vomiting, sore throat, muscle soreness, damage to my eyes, mouth, or teeth (from breathing tube placement).  Rare risks include: remembering what happened during my procedure, allergic  reactions to medications, injury to my airway, heart, lungs, vision, nerves, or muscles and in extremely rare instances death.  My doctor has explained to me other choices available to me for my care (alternatives).  Pregnant Patients (“epidural”):  I understand that the risks of having an epidural (medicine given into my back to help control pain during labor), include itching, low blood pressure, difficulty urinating, headache or slowing of the baby’s heart. Very rare risks include infection, bleeding, seizure, irregular heart rhythms and nerve injury.  Regional Anesthesia (“spinal”, “epidural”, & “nerve blocks”):  I understand that rare but potential complications include headache, bleeding, infection, seizure, irregular heart rhythms, and nerve injury.    I can change my mind about having anesthesia services at any time before I get the medicine.    _____________________________________________________________________________  Patient (or Representative) Signature/Relationship to Patient  Date   Time    _____________________________________________________________________________   Name (if used)    Language/Organization   Time    _____________________________________________________________________________  Anesthesiologist Signature     Date   Time  I have discussed the procedure and information above with the patient (or patient’s representative) and answered their questions. The patient or their representative has agreed to have anesthesia services.    _____________________________________________________________________________  Witness        Date   Time  I have verified that the signature is that of the patient or patient’s representative, and that it was signed before the procedure  Patient Name: Cy PARK Gricel     : 1955                 Printed: 2025     Medical Record #: [id number]                     Page 3 of 3

## (undated) NOTE — IP AVS SNAPSHOT
[address]            (For Outpatient Use Only) Initial Admit Date: 12/30/2019   Inpt/Obs Admit Date: Inpt: 12/30/19 / Obs: N/A   Discharge Date:    Luli Velez:  [de-identified]   MRN: [de-identified]   CSN: [id number]   CEID: [id number] Subscriber ID:  Pt Rel to Subscriber:    Hospital Account Financial Class: Medicare    January 3, 2020